# Patient Record
Sex: FEMALE | Race: OTHER | Employment: OTHER | URBAN - METROPOLITAN AREA
[De-identification: names, ages, dates, MRNs, and addresses within clinical notes are randomized per-mention and may not be internally consistent; named-entity substitution may affect disease eponyms.]

---

## 2017-06-29 PROBLEM — M62.82 NON-TRAUMATIC RHABDOMYOLYSIS: Status: ACTIVE | Noted: 2017-06-29

## 2017-06-29 PROBLEM — G24.9 DYSTONIA: Status: ACTIVE | Noted: 2017-06-29

## 2017-08-21 PROBLEM — R13.12 DYSPHAGIA, OROPHARYNGEAL: Status: ACTIVE | Noted: 2017-05-18

## 2017-08-21 PROBLEM — F41.9 ANXIETY: Status: ACTIVE | Noted: 2017-05-18

## 2017-08-21 PROBLEM — Z91.148 NON COMPLIANCE W MEDICATION REGIMEN: Status: ACTIVE | Noted: 2017-08-21

## 2017-08-21 PROBLEM — R62.7 FAILURE TO THRIVE IN ADULT: Status: ACTIVE | Noted: 2017-08-21

## 2017-08-21 PROBLEM — Z91.14 NON COMPLIANCE W MEDICATION REGIMEN: Status: ACTIVE | Noted: 2017-08-21

## 2017-08-21 PROBLEM — K76.82 HEPATIC ENCEPHALOPATHY: Status: ACTIVE | Noted: 2017-05-17

## 2017-08-21 PROBLEM — E87.0 HYPERNATREMIA: Status: ACTIVE | Noted: 2017-08-21

## 2017-08-21 PROBLEM — R45.1 AGITATION: Status: ACTIVE | Noted: 2017-04-19

## 2017-08-21 PROBLEM — G25.5 CHOREOATHETOID LIMB MOVEMENTS: Status: ACTIVE | Noted: 2017-05-18

## 2017-08-21 PROBLEM — E87.6 HYPOKALEMIA: Status: ACTIVE | Noted: 2017-08-21

## 2017-08-21 PROBLEM — B18.2 CHRONIC HEPATITIS C VIRUS INFECTION (HCC): Status: ACTIVE | Noted: 2017-04-20

## 2017-08-25 ENCOUNTER — TELEPHONE (OUTPATIENT)
Dept: ORTHOPEDIC SURGERY | Age: 22
End: 2017-08-25

## 2017-08-26 PROBLEM — E43 SEVERE PROTEIN-CALORIE MALNUTRITION (GOMEZ: LESS THAN 60% OF STANDARD WEIGHT) (HCC): Chronic | Status: ACTIVE | Noted: 2017-08-26

## 2018-06-04 ENCOUNTER — HOSPITAL ENCOUNTER (OUTPATIENT)
Dept: GENERAL RADIOLOGY | Age: 23
Discharge: HOME | End: 2018-06-04
Attending: FAMILY MEDICINE | Admitting: FAMILY MEDICINE

## 2018-06-04 DIAGNOSIS — R13.12 OROPHARYNGEAL DYSPHAGIA: ICD-10-CM

## 2018-06-04 DIAGNOSIS — R13.10 DYSPHAGIA, UNSPECIFIED TYPE: ICD-10-CM

## 2019-05-03 ENCOUNTER — HOSPITAL ENCOUNTER (OUTPATIENT)
Dept: GENERAL RADIOLOGY | Age: 24
Discharge: HOME | End: 2019-05-03
Payer: MEDICARE

## 2019-05-03 DIAGNOSIS — R13.12 DYSPHAGIA, OROPHARYNGEAL: ICD-10-CM

## 2019-05-03 PROCEDURE — 92611 MOTION FLUOROSCOPY/SWALLOW: CPT

## 2019-05-03 PROCEDURE — 74230 X-RAY XM SWLNG FUNCJ C+: CPT

## 2019-11-25 ENCOUNTER — ANESTHESIA EVENT (OUTPATIENT)
Dept: ENDOSCOPY | Age: 24
End: 2019-11-25
Payer: MEDICARE

## 2019-11-25 RX ORDER — IBUPROFEN 800 MG/1
800 TABLET ORAL EVERY 6 HOURS PRN
Status: ON HOLD | COMMUNITY
End: 2022-05-20

## 2019-11-25 RX ORDER — CHLORHEXIDINE GLUCONATE 0.12 MG/ML
RINSE ORAL DAILY
COMMUNITY
End: 2022-02-22

## 2019-11-25 RX ORDER — SERTRALINE HYDROCHLORIDE 20 MG/ML
200 SOLUTION ORAL DAILY
Status: ON HOLD | COMMUNITY
End: 2022-05-20

## 2019-11-25 RX ORDER — POLYETHYLENE GLYCOL 3350 17 G/17G
17 POWDER, FOR SOLUTION ORAL DAILY
COMMUNITY
End: 2020-07-30

## 2019-11-25 RX ORDER — QUETIAPINE FUMARATE 200 MG/1
300 TABLET, FILM COATED ORAL ONCE
Status: ON HOLD | COMMUNITY
End: 2022-05-20

## 2019-11-25 RX ORDER — LACOSAMIDE 100 MG/1
100 TABLET ORAL 2 TIMES DAILY
COMMUNITY
End: 2022-02-22

## 2019-11-25 RX ORDER — POTASSIUM CHLORIDE 20MEQ/15ML
20 LIQUID (ML) ORAL 2 TIMES DAILY
COMMUNITY
End: 2022-02-22

## 2019-11-25 RX ORDER — DIVALPROEX SODIUM 125 MG/1
500 CAPSULE, COATED PELLETS ORAL 2 TIMES DAILY
COMMUNITY
End: 2022-02-22

## 2019-11-26 ENCOUNTER — ANESTHESIA (OUTPATIENT)
Dept: ENDOSCOPY | Age: 24
End: 2019-11-26
Payer: MEDICARE

## 2019-11-26 ENCOUNTER — HOSPITAL ENCOUNTER (OUTPATIENT)
Age: 24
Setting detail: OUTPATIENT SURGERY
Discharge: HOME | End: 2019-11-26
Attending: INTERNAL MEDICINE | Admitting: INTERNAL MEDICINE
Payer: MEDICARE

## 2019-11-26 VITALS
WEIGHT: 114 LBS | RESPIRATION RATE: 16 BRPM | BODY MASS INDEX: 16.36 KG/M2 | SYSTOLIC BLOOD PRESSURE: 107 MMHG | DIASTOLIC BLOOD PRESSURE: 70 MMHG | HEART RATE: 67 BPM | TEMPERATURE: 96.8 F | OXYGEN SATURATION: 97 %

## 2019-11-26 VITALS
DIASTOLIC BLOOD PRESSURE: 55 MMHG | OXYGEN SATURATION: 97 % | RESPIRATION RATE: 8 BRPM | SYSTOLIC BLOOD PRESSURE: 92 MMHG

## 2019-11-26 LAB — GONADOTROPIN, CHORIONIC (HCG) QUANT: <5 MIU/ML

## 2019-11-26 PROCEDURE — 2500000003 HC RX 250 WO HCPCS: Performed by: NURSE ANESTHETIST, CERTIFIED REGISTERED

## 2019-11-26 PROCEDURE — 6360000002 HC RX W HCPCS: Performed by: NURSE ANESTHETIST, CERTIFIED REGISTERED

## 2019-11-26 PROCEDURE — 7100000000 HC PACU RECOVERY - FIRST 15 MIN: Performed by: INTERNAL MEDICINE

## 2019-11-26 PROCEDURE — 2580000003 HC RX 258: Performed by: ANESTHESIOLOGY

## 2019-11-26 PROCEDURE — 36415 COLL VENOUS BLD VENIPUNCTURE: CPT

## 2019-11-26 PROCEDURE — 3609012900 HC EGD FOREIGN BODY REMOVAL: Performed by: INTERNAL MEDICINE

## 2019-11-26 PROCEDURE — 7100000010 HC PHASE II RECOVERY - FIRST 15 MIN: Performed by: INTERNAL MEDICINE

## 2019-11-26 PROCEDURE — 3700000001 HC ADD 15 MINUTES (ANESTHESIA): Performed by: INTERNAL MEDICINE

## 2019-11-26 PROCEDURE — 84702 CHORIONIC GONADOTROPIN TEST: CPT

## 2019-11-26 PROCEDURE — 7100000001 HC PACU RECOVERY - ADDTL 15 MIN: Performed by: INTERNAL MEDICINE

## 2019-11-26 PROCEDURE — 3609013300 HC EGD TUBE PLACEMENT: Performed by: INTERNAL MEDICINE

## 2019-11-26 PROCEDURE — 7100000011 HC PHASE II RECOVERY - ADDTL 15 MIN: Performed by: INTERNAL MEDICINE

## 2019-11-26 PROCEDURE — 2709999900 HC NON-CHARGEABLE SUPPLY: Performed by: INTERNAL MEDICINE

## 2019-11-26 PROCEDURE — 3700000000 HC ANESTHESIA ATTENDED CARE: Performed by: INTERNAL MEDICINE

## 2019-11-26 RX ORDER — ONDANSETRON 2 MG/ML
4 INJECTION INTRAMUSCULAR; INTRAVENOUS
Status: DISCONTINUED | OUTPATIENT
Start: 2019-11-26 | End: 2019-11-26 | Stop reason: HOSPADM

## 2019-11-26 RX ORDER — PROPOFOL 10 MG/ML
INJECTION, EMULSION INTRAVENOUS PRN
Status: DISCONTINUED | OUTPATIENT
Start: 2019-11-26 | End: 2019-11-26 | Stop reason: SDUPTHER

## 2019-11-26 RX ORDER — SODIUM CHLORIDE 0.9 % (FLUSH) 0.9 %
10 SYRINGE (ML) INJECTION PRN
Status: DISCONTINUED | OUTPATIENT
Start: 2019-11-26 | End: 2019-11-26 | Stop reason: HOSPADM

## 2019-11-26 RX ORDER — LIDOCAINE HYDROCHLORIDE 20 MG/ML
INJECTION, SOLUTION EPIDURAL; INFILTRATION; INTRACAUDAL; PERINEURAL PRN
Status: DISCONTINUED | OUTPATIENT
Start: 2019-11-26 | End: 2019-11-26 | Stop reason: SDUPTHER

## 2019-11-26 RX ORDER — SODIUM CHLORIDE 0.9 % (FLUSH) 0.9 %
10 SYRINGE (ML) INJECTION EVERY 12 HOURS SCHEDULED
Status: DISCONTINUED | OUTPATIENT
Start: 2019-11-26 | End: 2019-11-26 | Stop reason: HOSPADM

## 2019-11-26 RX ORDER — SODIUM CHLORIDE 9 MG/ML
INJECTION, SOLUTION INTRAVENOUS CONTINUOUS
Status: DISCONTINUED | OUTPATIENT
Start: 2019-11-26 | End: 2019-11-26 | Stop reason: HOSPADM

## 2019-11-26 RX ADMIN — PROPOFOL 100 MG: 10 INJECTION, EMULSION INTRAVENOUS at 12:34

## 2019-11-26 RX ADMIN — PROPOFOL 100 MG: 10 INJECTION, EMULSION INTRAVENOUS at 12:17

## 2019-11-26 RX ADMIN — LIDOCAINE HYDROCHLORIDE 70 MG: 20 INJECTION, SOLUTION EPIDURAL; INFILTRATION; INTRACAUDAL; PERINEURAL at 12:17

## 2019-11-26 RX ADMIN — PROPOFOL 50 MG: 10 INJECTION, EMULSION INTRAVENOUS at 12:31

## 2019-11-26 RX ADMIN — PROPOFOL 50 MG: 10 INJECTION, EMULSION INTRAVENOUS at 12:24

## 2019-11-26 RX ADMIN — PROPOFOL 50 MG: 10 INJECTION, EMULSION INTRAVENOUS at 12:20

## 2019-11-26 RX ADMIN — PROPOFOL 50 MG: 10 INJECTION, EMULSION INTRAVENOUS at 12:36

## 2019-11-26 RX ADMIN — PROPOFOL 50 MG: 10 INJECTION, EMULSION INTRAVENOUS at 12:27

## 2019-11-26 RX ADMIN — PROPOFOL 50 MG: 10 INJECTION, EMULSION INTRAVENOUS at 12:29

## 2019-11-26 RX ADMIN — SODIUM CHLORIDE: 9 INJECTION, SOLUTION INTRAVENOUS at 11:11

## 2019-11-26 ASSESSMENT — PULMONARY FUNCTION TESTS
PIF_VALUE: 0
PIF_VALUE: 1
PIF_VALUE: 0

## 2019-11-26 ASSESSMENT — PAIN - FUNCTIONAL ASSESSMENT: PAIN_FUNCTIONAL_ASSESSMENT: FACES

## 2020-07-20 ENCOUNTER — HOSPITAL ENCOUNTER (OUTPATIENT)
Dept: GENERAL RADIOLOGY | Age: 25
Discharge: HOME | End: 2020-07-20
Payer: MEDICARE

## 2020-07-20 PROCEDURE — 74230 X-RAY XM SWLNG FUNCJ C+: CPT

## 2020-07-20 PROCEDURE — 92611 MOTION FLUOROSCOPY/SWALLOW: CPT

## 2020-07-20 NOTE — PROCEDURES
INSTRUMENTAL SWALLOW REPORT  MODIFIED BARIUM SWALLOW    NAME: Onel Scott   : 1995  MRN: [id number]       Date of Eval: 2020     Ordering Physician: Jacklyn Beatty  Radiologist: Cira Marshall     Referring Diagnosis: oropharyngeal dysphagia; r 13.12    Onel Scott has a past medical history of Acute hepatitis C without hepatic coma, Acute hepatitis C without hepatic coma, Anoxic brain damage ([address] Cardiac arrest ([address] Chronic kidney disease, Chronic viral hepatitis C ([address] Diffuse traumatic brain injury with loss of consciousness greater than 24 hours without return to pre-existing conscious level with patient surviving ([address] Major depressive disorder, recurrent, unspecified ([address] MRSA (methicillin resistant staph aureus) culture positive, Nicotine dependence, other tobacco product, uncomplicated, Overdose, Poisoning by heroin, undetermined, initial encounter ([facility] Schizophrenia, unspecified ([facility] Seizures ([address] Streptococcal sepsis, unspecified ([address] TBI (traumatic brain injury) ([address] Unspecified asthma, uncomplicated, and Unspecified sexually transmitted disease. She has a past surgical history that includes Gastrostomy tube placement; Gastrostomy tube placement; Upper gastrointestinal endoscopy (N/A, 2019); and Gastrostomy tube placement (N/A, 2019). Current Diet Solid Consistency: Dysphagia Pureed (Dysphagia I)  Current Diet Liquid Consistency: Mildly Thick (Nectar)    Type of Study: Repeat MBS  5/3/2019 MBS:  Severe oral stage dysphagia characterized by poor ability to masticate and poor lingula manipulation. · All PO with prolonged oral transit d/t limited lingual manipulation for bolus prep and movement. · Premature spillage to the pharynx with all trials. · Significant oral residue is spontaneously minimized with repeat swallow. · Severe left labial spillage with all trials.   Severe pharyngeal stage dysphagia characterized by significantly delayed swallow that does not trigger until the level of pyriform, decreased laryngeal elevation, and decreased pharyngeal peristalsis. · All trials pool to the pyriform prior to swallow. · SILENT aspiration with thin via teaspoon. · Patient did administer nectar thick via cup with assistance, but was unable to use straw this date. Patient Complaints/Reason for Referral:  · Lefty Gallardo was referred for a MBS to assess the efficiency of his/her swallow function, assess for aspiration, and to make recommendations regarding safe dietary consistencies, effective compensatory strategies, and safe eating environment. · Patient complaints: unable to verbalize complaint    Onset of problem:   Date of Onset: 8/22/2017    Subjective  Subjective: Accepted and tolerated MBS in fluoroscopy suite  Comments: staff reports patient eager for possible PO upgrade    Pain   Patient Currently in Pain: No    Behavior/Cognition/Vision/Hearing:  Behavior/Cognition: Alert; Cooperative; Impulsive; Requires cueing;Doesn't follow directions  Vision: Within Functional Limits  Hearing: Within functional limits    Patient Position: Lateral   Patient Degrees: Fully upright in VSE chair  Consistencies Administered: Nectar cup;Nectar  teaspoon;Honey teaspoon;Honey cup;Dysphagia Pureed (Dysphagia I); Dysphagia Minced and Moist (Dysphagia II)    Impressions:  Treatment Dx and ICD 10: oropharyngeal dysphagia; r13.12    Severe oral stage dysphagia characterized by poor ability to masticate and poor lingula manipulation. · All PO with prolonged oral transit d/t limited lingual manipulation for bolus prep and movement. · Premature spillage to the pharynx with all trials. · Significant oral residue is spontaneously minimized with repeat swallow. · Left labial spillage with all trials.     Severe pharyngeal stage dysphagia characterized by significantly delayed swallow that does not trigger until the level of pyriform, decreased laryngeal elevation, and decreased pharyngeal peristalsis. · All trials pool to the pyriform prior to swallow. · SILENT aspiration with nectar via cup and teaspoon which is a decline in swallow function compared to prior MBS completed in 2019. · Patient unable to self-administer PO which also appears to be a decline in function/status compared to prior    Dysphagia Outcome Severity Scale: Level 3: Moderate dysphagia- Total assisstance, supervision or strategies. Two or more diet consistencies restricted  Penetration-Aspiration Scale (PAS): 8 - Material Enters the airway, passes below the vocal folds, and no effort is made to eject    Recommended Diet:  Solid consistency: Dysphagia Pureed (Dysphagia I)  Liquid consistency: Moderately Thick (Honey)  Liquid administration via: Cup;Spoon  Medication administration: Meds in puree(CRUSHED if able)  Compensatory Swallowing Strategies: Upright as possible for all oral intake;Eat/Feed slowly; Remain upright for 30-45 minutes after meals;Small bites/sips;Swallow 2 times per bite/sip; Total feed; Alternate solids and liquids    Recommendations/Treatment  Requires SLP Intervention: Patient with noted decline in status; may benefit from trial tx? Defer decision to primary SLP    Education:   Patient Education: Completed on recommendations/plan  Patient Education Response: No evidence of learning;Needs reinforcement    Prognosis  Prognosis for safe diet advancement: fair  Barriers to reach goals: cognitive deficits;behavior;severity of dysphagia;time post onset      Oral Preparation / Oral Phase  Oral Phase - Major Contributing Deficits  Left Anterior Bolus Loss: All  Poor Mastication: Mechanical soft solid(incomplete, labored)  Weak Lingual Manipulation: All  Reduced Posterior Propulsion: All  Reduced Bolus Control: All  Decreased Bolus Cohesion: All  Lingual / Palatal Residue: All  Premature Bolus Loss to Pharynx:  All  Reduced Tongue Base Retraction: All    Pharyngeal Phase  Pharyngeal Phase - Major Contributing Deficits  Delayed Swallow Initiation: All  Premature Spillage to Valleculae: All  Premature Spillage to Pyriform: All  Reduced Pharyngeal Peristalsis: All  Reduced Laryngeal Elevation: All  Aspiration During: Nectar cup;Nectar teaspoon(SILENT - declined since prior MBS)      Upper Esophageal Phase  Esophageal Screen: (JANETTE)      Therapy Time:   Individual   Time In 1115   Time Out 1215   Minutes 60       Zoë Hdz, [address] [place] [address], [id number]  Speech-Language Pathologist  7/20/2020, 12:15 PM

## 2020-07-30 ENCOUNTER — APPOINTMENT (OUTPATIENT)
Dept: CT IMAGING | Age: 25
End: 2020-07-30
Payer: MEDICARE

## 2020-07-30 ENCOUNTER — HOSPITAL ENCOUNTER (EMERGENCY)
Age: 25
Discharge: HOME | End: 2020-07-30
Payer: MEDICARE

## 2020-07-30 VITALS
WEIGHT: 115.1 LBS | RESPIRATION RATE: 16 BRPM | HEART RATE: 68 BPM | OXYGEN SATURATION: 99 % | DIASTOLIC BLOOD PRESSURE: 101 MMHG | SYSTOLIC BLOOD PRESSURE: 132 MMHG | BODY MASS INDEX: 16.52 KG/M2 | TEMPERATURE: 98.7 F

## 2020-07-30 PROCEDURE — 99284 EMERGENCY DEPT VISIT MOD MDM: CPT

## 2020-07-30 PROCEDURE — 70450 CT HEAD/BRAIN W/O DYE: CPT

## 2020-07-30 PROCEDURE — 72125 CT NECK SPINE W/O DYE: CPT

## 2020-07-30 RX ORDER — DIPHENHYDRAMINE HCL 25 MG
12.5 CAPSULE ORAL EVERY 8 HOURS PRN
COMMUNITY
End: 2022-02-22

## 2020-07-30 RX ORDER — BACLOFEN 5 MG/1
5 TABLET ORAL 2 TIMES DAILY
COMMUNITY

## 2020-07-30 RX ORDER — MULTIVIT WITH MINERALS/LUTEIN
250 TABLET ORAL DAILY
Status: ON HOLD | COMMUNITY
End: 2022-05-20

## 2020-07-30 ASSESSMENT — ENCOUNTER SYMPTOMS
BACK PAIN: 0
APNEA: 0
FACIAL SWELLING: 0
VOMITING: 0
NAUSEA: 0
SHORTNESS OF BREATH: 0
CHOKING: 0
ABDOMINAL PAIN: 0
EYE DISCHARGE: 0
SORE THROAT: 0
EYE REDNESS: 0

## 2020-07-30 NOTE — ED NOTES
Patients POA and LTC nurse updated on patients DC,  at [facility], [address] [place] [address]  07/30/20 2379

## 2020-07-30 NOTE — PROGRESS NOTES
Pharmacy Medication Reconciliation Note      List of medications patient is currently taking is complete. Source of information:   1. Danni at [facility]  2. Epic records     Notes regarding home medications:   Per nurse at [facility], patient has received morning and afternoon medications prior to arrival in the ED.          Denies taking any other OTC or herbal medications    Reema Grossman, Pharmacy Student  07/30/20 4:33 PM

## 2020-07-30 NOTE — ED NOTES
Bed: [facility]  Expected date:   Expected time:   Means of arrival: [facility]  Comments:  Fall from wheelchair     Arsenio Major RN  07/30/20 2922

## 2020-07-30 NOTE — ED NOTES
Patient was again on the edge of the bed, staff assisted back up in the bed.   Reminded not to get up     Dontrell Mckenzie RN  07/30/20 0733

## 2020-07-30 NOTE — ED NOTES
Patient was on the edge of the bed, staff assisted back up in the bed.   Bed alarm placed on patient      Marin Marin RN  07/30/20 7345

## 2020-07-30 NOTE — ED PROVIDER NOTES
**EVALUATED BY ADVANCED PRACTICE PROVIDER**        [address]      Pt Name: Sarah Delgado  HBE:[id number]  [place] 1995  Date of evaluation: 7/30/2020  Provider: Elena Oconnor PA-C      Chief Complaint:    Chief Complaint   Patient presents with   Paulding Nemesio Rodriguezine Carlos Eduardo out of [facility] at nursing home, hit right side of head, has abrasion with steri strips in place       Nursing Notes, Past Medical Hx, Past Surgical Hx, Social Hx, Allergies, and Family Hx were all reviewed and agreed with or any disagreements were addressed in the HPI.    HPI:  (Location, Duration, Timing, Severity, Quality, Assoc Sx, Context, Modifying factors)  This is a  30 y.o. female whose from IV was nursing rehab facility. She nonverbal.  Was brought in for fall out of wheelchair. Has a small hematoma to the right forehead. Patient denies neck pain, no chest pain, no abdominal pain, no nausea vomiting noted. No extremity injury noted. No other complaints. PastMedical/Surgical History:      Diagnosis Date    Acute hepatitis C without hepatic coma     Acute hepatitis C without hepatic coma     Anoxic brain damage ([address]     Cardiac arrest ([address]     s/p overdose 08/2016.     Chronic kidney disease     \"a few\" UTIs with pregnancy    Chronic viral hepatitis C ([address]     Diffuse traumatic brain injury with loss of consciousness greater than 24 hours without return to pre-existing conscious level with patient surviving [facility])     sequela    Major depressive disorder, recurrent, unspecified ([address]     MRSA (methicillin resistant staph aureus) culture positive 08/25/2017    arm    Nicotine dependence, other tobacco product, uncomplicated     Overdose     Poisoning by heroin, undetermined, initial encounter ([address]     Schizophrenia, unspecified ([address]     Seizures ([address]     Streptococcal sepsis, unspecified ([address]     TBI (traumatic brain injury) ([address]     Unspecified asthma, uncomplicated     Unspecified sexually transmitted disease          Procedure Laterality Date    GASTROSTOMY TUBE PLACEMENT      GASTROSTOMY TUBE PLACEMENT      08/02/2016    GASTROSTOMY TUBE PLACEMENT N/A 11/26/2019    EGD PEG TUBE PLACEMENT performed by Cristel Aceves MD at [facility] 11/26/2019    EGD FOREIGN BODY REMOVAL performed by Cristel Aceves MD at [facility]       Medications:  Previous Medications    ASCORBIC ACID (VITAMIN C) 250 MG TABLET    Take 250 mg by mouth daily    BACLOFEN 5 MG TABS    Take 5 mg by mouth 2 times daily    BENZTROPINE (COGENTIN) 1 MG TABLET    Take 2 tablets by mouth 2 times daily    CHLORHEXIDINE (PERIDEX) 0.12 % SOLUTION    Take by mouth daily 1 application daily with toothette to clean mouth and gums    CHOLECALCIFEROL (VITAMIN D3) 125 MCG (5000 UT) TABS    1 tablet by Per G Tube route daily    DIPHENHYDRAMINE (BENADRYL) 25 MG CAPSULE    12.5 mg by Per G Tube route every 8 hours as needed (swelling)    DIVALPROEX (DEPAKOTE SPRINKLES) 125 MG CAPSULE    500 mg 2 times daily Via g-tube    FAMOTIDINE (PEPCID) 20 MG TABLET    1 tablet by PEG Tube route 2 times daily    FOLIC ACID (FOLVITE) 1 MG TABLET    1 tablet by PEG Tube route daily    IBUPROFEN (ADVIL;MOTRIN) 800 MG TABLET    800 mg by Per G Tube route every 6 hours as needed for Pain    LACOSAMIDE (VIMPAT) 100 MG TABS TABLET    100 mg by Per G Tube route 2 times daily.     LEVETIRACETAM (KEPPRA) 100 MG/ML SOLUTION    5 mLs by PEG Tube route 2 times daily    NONFORMULARY    by Gastrostomy Tube route nightly Flush feeding with 180 ml of water    NONFORMULARY    by Gastrostomy Tube route daily Enteral feed flush with 150 ml of water daily in am    PALIPERIDONE PALMITATE ER (INVEGA SUSTENNA) 117 MG/0.75ML MOSES IM INJECTION    Inject 117 mg into the muscle every 30 days At bedtime on the 8th of the month    POTASSIUM CHLORIDE 20 MEQ/15ML (10%) ORAL SOLUTION    20 mEq by Per G Tube route 2 times daily 15 ml via g-tube    QUETIAPINE (SEROQUEL) 200 MG TABLET    200 mg by Per G Tube route 2 times daily    QUETIAPINE (SEROQUEL) 25 MG TABLET    1 tablet by PEG Tube route 2 times daily    SERTRALINE (ZOLOFT) 20 MG/ML CONCENTRATED SOLUTION    200 mg by Per G Tube route daily          Review of Systems:  Review of Systems   Constitutional: Negative for chills and fever. HENT: Negative for congestion, facial swelling and sore throat. Eyes: Negative for discharge and redness. Respiratory: Negative for apnea, choking and shortness of breath. Cardiovascular: Negative for chest pain. Gastrointestinal: Negative for abdominal pain, nausea and vomiting. Genitourinary: Negative for dysuria. Musculoskeletal: Negative for back pain, neck pain and neck stiffness. Neurological: Negative for dizziness, tremors, seizures, weakness and headaches. All other systems reviewed and are negative. Positives and Pertinent negatives as per HPI. Except as noted above in the ROS, problem specific ROS was completed and is negative. Physical Exam:  Physical Exam  Vitals signs and nursing note reviewed. Constitutional:       Appearance: She is well-developed. She is not diaphoretic. HENT:      Head: Normocephalic. Contusion present. Nose: Nose normal.   Eyes:      General:         Right eye: No discharge. Left eye: No discharge. Neck:      Musculoskeletal: Normal range of motion and neck supple. Cardiovascular:      Rate and Rhythm: Normal rate and regular rhythm. Heart sounds: Normal heart sounds. No murmur. No friction rub. No gallop. Pulmonary:      Effort: Pulmonary effort is normal. No respiratory distress. Breath sounds: Normal breath sounds. No wheezing or rales. Chest:      Chest wall: No tenderness. Musculoskeletal: Normal range of motion. Skin:     General: Skin is warm and dry.    Neurological:      Mental Status: She is alert and oriented to person, place, and time. Psychiatric:         Behavior: Behavior normal.         MEDICAL DECISION MAKING    Vitals:    Vitals:    07/30/20 1539   BP: 114/75   Pulse: 75   Resp: 14   Temp: 98.7 °F (37.1 °C)   TempSrc: Oral   SpO2: 100%   Weight: 115 lb 1.6 oz (52.2 kg)       LABS:Labs Reviewed - No data to display     Remainder of labs reviewed and werenegative at this time or not returned at the time of this note. RADIOLOGY:   Non-plain film images such as CT, Ultrasound and MRI are read by the radiologist. Maura Mayes PA-C have directly visualized the radiologic plain film image(s) with the below findings:        Interpretation per the Radiologist below, if available at the time of this note:    CT HEAD WO CONTRAST   Final Result   No acute intracranial abnormality. CT CERVICAL SPINE WO CONTRAST   Final Result   No acute fracture or subluxation. Chronic reverse curvature of the cervical spine associated with prominent   anterior ossifications and spondylosis. Ct Head Wo Contrast    Result Date: 7/30/2020  EXAMINATION: CT OF THE HEAD WITHOUT CONTRAST  7/30/2020 4:48 pm TECHNIQUE: CT of the head was performed without the administration of intravenous contrast. Dose modulation, iterative reconstruction, and/or weight based adjustment of the mA/kV was utilized to reduce the radiation dose to as low as reasonably achievable. COMPARISON: 03/08/2018. HISTORY: ORDERING SYSTEM PROVIDED HISTORY: Fall from wheelchair TECHNOLOGIST PROVIDED HISTORY: Has a \"code stroke\" or \"stroke alert\" been called? ->No Reason for exam:->Fall from wheelchair Is the patient pregnant?->No FINDINGS: BRAIN/VENTRICLES: There is no acute intracranial hemorrhage, mass effect or midline shift. No abnormal extra-axial fluid collection. The gray-white differentiation is maintained without evidence of an acute infarct. There is no evidence of hydrocephalus.  ORBITS: The visualized portion of the orbits demonstrate no acute abnormality. SINUSES: The visualized paranasal sinuses and mastoid air cells demonstrate no acute abnormality. SOFT TISSUES/SKULL:  No acute abnormality of the visualized skull or soft tissues. No acute intracranial abnormality. Fl Modified Barium Swallow W Video    Result Date: 7/20/2020  EXAMINATION: MODIFIED BARIUM SWALLOW WAS PERFORMED IN CONJUNCTION WITH SPEECH PATHOLOGY SERVICES TECHNIQUE: Fluoroscopic evaluation of the swallowing mechanism was performed with multiple consistency of barium product. FLUOROSCOPY DOSE AND TYPE OR TIME AND EXPOSURES: 2.6 minutes, 10.23 mGy, 1 saved image COMPARISON: None HISTORY: ORDERING SYSTEM PROVIDED HISTORY: Oropharyngeal dysphagia TECHNOLOGIST PROVIDED HISTORY: Reason for Exam: oropharyngeal dysphagia; currently on a modified diet: puree nectar; history of dysphagia Acuity: Chronic Type of Exam: Ongoing FINDINGS: With nectar thick liquid, there was delay in swallowing with spillage into the valleculae and piriform sinuses. There were episodes of silent aspiration of nectar thick liquid administered by cup and spoon. No laryngeal penetration with honey thick and puree consistencies. With soft solid (barium moistened bread) there was prolongation of the oral phase, with significant delay in transfer of material to the posterior oral cavity to be swallowed, with limited lingual activity. 1. Silent aspiration of nectar thick liquid 2. Prolonged oral manipulation of soft solid Please see separate speech pathology report for full discussion of findings and recommendations. MEDICAL DECISION MAKING / ED COURSE:      PROCEDURES:   Procedures    None    Patient was given:  Medications - No data to display    Emergency room course: Patient on exam pupils equal round and reactive to light extraocular movement is intact. Scalp show no hematoma. Forehead shows a smile hematoma to the right side with abrasion. Steri-Strips is in place.   No active bleeding. Neck was supple no midline tenderness. No midline tenderness cervical thoracic or lumbar spine. Chest wall show no tenderness with palpation. Cardiovascular regular rhythm, lungs are clear. No wheeze, rales or rhonchi. Abdomen is soft nontender. Normal bowel sounds all 4 quadrant. She is able to move all extremities. No obvious deformity noted to any extremities. She is at her baseline normal mental status. She does take if she hurts. Unable to do full neurological exam due to patient mental status. CT of cervical spine showed no acute fractures. CT of head showed no acute intracranial normalities. Patient will be discharged. I did inform her that her CTs are negative. She will be discharged back to [facility] rehab center. Arrangements will be made for transportation. I will have the nurse notify her POA. The patient tolerated their visit well. I evaluated the patient. The physician was available for consultation as needed. The patient and / or the family were informed of the results of any tests, a time was given to answer questions, a plan was proposed and they agreed with plan. CLINICAL IMPRESSION:  1. Fall from wheelchair, initial encounter    2. Closed head injury, initial encounter    3.  Abrasion of forehead, initial encounter        DISPOSITION  DISPOSITION Decision To Discharge 07/30/2020 06:06:05 PM          PATIENT REFERRED TO:  Anna Salas MD    Call   As needed, If symptoms worsen      DISCHARGE MEDICATIONS:  New Prescriptions    No medications on file       DISCONTINUED MEDICATIONS:  Discontinued Medications    DOCUSATE (COLACE) 50 MG/5ML LIQUID    Take 10 mLs by mouth 2 times daily    GABAPENTIN (NEURONTIN) 250 MG/5ML SOLUTION    2.5 mLs by PEG Tube route every 8 hours    NUTRITIONAL SUPPLEMENTS (JEVITY 1.5 MAYITO) LIQD    1 Can by Gastrostomy Tube route nightly enternal feed    NUTRITIONAL SUPPLEMENTS (JEVITY 1.5 MAYITO) LIQD    1 Can by Gastrostomy Tube route daily as needed Enteral feed    POLYETHYLENE GLYCOL (GLYCOLAX) POWDER    17 g by Per G Tube route daily    TRAZODONE (DESYREL) 50 MG TABLET    Take 1 tablet by mouth nightly              (Please note the MDM and HPI sections of this note were completed with a voice recognition program.  Efforts were made to edit the dictations but occasionally words are mis-transcribed.)    Electronically signed, Jon Trinidad PA-C,          Jon Trinidad PA-C  08/01/20 9417

## 2022-02-22 ENCOUNTER — HOSPITAL ENCOUNTER (INPATIENT)
Age: 27
LOS: 5 days | Discharge: NURSING, REHAB OR LONG-TERM CARE | DRG: 871 | End: 2022-02-27
Attending: EMERGENCY MEDICINE | Admitting: INTERNAL MEDICINE
Payer: MEDICARE

## 2022-02-22 ENCOUNTER — APPOINTMENT (OUTPATIENT)
Dept: CT IMAGING | Age: 27
DRG: 871 | End: 2022-02-22
Payer: MEDICARE

## 2022-02-22 DIAGNOSIS — R33.9 RETENTION OF URINE: ICD-10-CM

## 2022-02-22 DIAGNOSIS — K56.41 FECAL IMPACTION (HCC): ICD-10-CM

## 2022-02-22 DIAGNOSIS — K56.609 COLONIC OBSTRUCTION (HCC): ICD-10-CM

## 2022-02-22 DIAGNOSIS — K62.89 PROCTITIS: Primary | ICD-10-CM

## 2022-02-22 DIAGNOSIS — T85.528A DISLODGED GASTROSTOMY TUBE: ICD-10-CM

## 2022-02-22 LAB
A/G RATIO: 1.2 (ref 1.1–2.2)
ALBUMIN SERPL-MCNC: 4.1 G/DL (ref 3.4–5)
ALP BLD-CCNC: 53 U/L (ref 40–129)
ALT SERPL-CCNC: 18 U/L (ref 10–40)
ANION GAP SERPL CALCULATED.3IONS-SCNC: 14 MMOL/L (ref 3–16)
AST SERPL-CCNC: 14 U/L (ref 15–37)
BASOPHILS ABSOLUTE: 0 K/UL (ref 0–0.2)
BASOPHILS RELATIVE PERCENT: 0.4 %
BILIRUB SERPL-MCNC: 0.3 MG/DL (ref 0–1)
BUN BLDV-MCNC: 25 MG/DL (ref 7–20)
CALCIUM SERPL-MCNC: 10 MG/DL (ref 8.3–10.6)
CHLORIDE BLD-SCNC: 107 MMOL/L (ref 99–110)
CO2: 24 MMOL/L (ref 21–32)
CREAT SERPL-MCNC: 0.8 MG/DL (ref 0.6–1.1)
EOSINOPHILS ABSOLUTE: 0 K/UL (ref 0–0.6)
EOSINOPHILS RELATIVE PERCENT: 0.3 %
GFR AFRICAN AMERICAN: >60
GFR NON-AFRICAN AMERICAN: >60
GLUCOSE BLD-MCNC: 114 MG/DL (ref 70–99)
HCG QUALITATIVE: NEGATIVE
HCT VFR BLD CALC: 39.2 % (ref 36–48)
HEMOGLOBIN: 13 G/DL (ref 12–16)
LACTIC ACID, SEPSIS: 1.2 MMOL/L (ref 0.4–1.9)
LIPASE: 21 U/L (ref 13–60)
LYMPHOCYTES ABSOLUTE: 1.2 K/UL (ref 1–5.1)
LYMPHOCYTES RELATIVE PERCENT: 13.6 %
MCH RBC QN AUTO: 30.7 PG (ref 26–34)
MCHC RBC AUTO-ENTMCNC: 33.1 G/DL (ref 31–36)
MCV RBC AUTO: 92.7 FL (ref 80–100)
MONOCYTES ABSOLUTE: 0.9 K/UL (ref 0–1.3)
MONOCYTES RELATIVE PERCENT: 10.4 %
NEUTROPHILS ABSOLUTE: 6.7 K/UL (ref 1.7–7.7)
NEUTROPHILS RELATIVE PERCENT: 75.3 %
PDW BLD-RTO: 13.2 % (ref 12.4–15.4)
PLATELET # BLD: 167 K/UL (ref 135–450)
PMV BLD AUTO: 9.7 FL (ref 5–10.5)
POTASSIUM SERPL-SCNC: 3.8 MMOL/L (ref 3.5–5.1)
RBC # BLD: 4.23 M/UL (ref 4–5.2)
SODIUM BLD-SCNC: 145 MMOL/L (ref 136–145)
TOTAL PROTEIN: 7.4 G/DL (ref 6.4–8.2)
WBC # BLD: 8.8 K/UL (ref 4–11)

## 2022-02-22 PROCEDURE — 2500000003 HC RX 250 WO HCPCS: Performed by: PHYSICIAN ASSISTANT

## 2022-02-22 PROCEDURE — 0D20XUZ CHANGE FEEDING DEVICE IN UPPER INTESTINAL TRACT, EXTERNAL APPROACH: ICD-10-PCS | Performed by: INTERNAL MEDICINE

## 2022-02-22 PROCEDURE — 84703 CHORIONIC GONADOTROPIN ASSAY: CPT

## 2022-02-22 PROCEDURE — 6370000000 HC RX 637 (ALT 250 FOR IP): Performed by: PHYSICIAN ASSISTANT

## 2022-02-22 PROCEDURE — 83605 ASSAY OF LACTIC ACID: CPT

## 2022-02-22 PROCEDURE — 6360000004 HC RX CONTRAST MEDICATION: Performed by: PHYSICIAN ASSISTANT

## 2022-02-22 PROCEDURE — 80053 COMPREHEN METABOLIC PANEL: CPT

## 2022-02-22 PROCEDURE — 87040 BLOOD CULTURE FOR BACTERIA: CPT

## 2022-02-22 PROCEDURE — 83690 ASSAY OF LIPASE: CPT

## 2022-02-22 PROCEDURE — 87086 URINE CULTURE/COLONY COUNT: CPT

## 2022-02-22 PROCEDURE — 81001 URINALYSIS AUTO W/SCOPE: CPT

## 2022-02-22 PROCEDURE — 1200000000 HC SEMI PRIVATE

## 2022-02-22 PROCEDURE — 2580000003 HC RX 258: Performed by: PHYSICIAN ASSISTANT

## 2022-02-22 PROCEDURE — 0T9B70Z DRAINAGE OF BLADDER WITH DRAINAGE DEVICE, VIA NATURAL OR ARTIFICIAL OPENING: ICD-10-PCS | Performed by: INTERNAL MEDICINE

## 2022-02-22 PROCEDURE — 85025 COMPLETE CBC W/AUTO DIFF WBC: CPT

## 2022-02-22 PROCEDURE — 74177 CT ABD & PELVIS W/CONTRAST: CPT

## 2022-02-22 PROCEDURE — 99285 EMERGENCY DEPT VISIT HI MDM: CPT

## 2022-02-22 RX ORDER — ACETAMINOPHEN 650 MG/1
650 SUPPOSITORY RECTAL ONCE
Status: COMPLETED | OUTPATIENT
Start: 2022-02-22 | End: 2022-02-22

## 2022-02-22 RX ORDER — CIPROFLOXACIN 2 MG/ML
400 INJECTION, SOLUTION INTRAVENOUS CONTINUOUS
Status: DISCONTINUED | OUTPATIENT
Start: 2022-02-22 | End: 2022-02-23 | Stop reason: ALTCHOICE

## 2022-02-22 RX ORDER — 0.9 % SODIUM CHLORIDE 0.9 %
1000 INTRAVENOUS SOLUTION INTRAVENOUS ONCE
Status: COMPLETED | OUTPATIENT
Start: 2022-02-22 | End: 2022-02-23

## 2022-02-22 RX ORDER — POLYETHYLENE GLYCOL 3350 17 G/17G
17 POWDER, FOR SOLUTION ORAL 4 TIMES DAILY PRN
Status: DISCONTINUED | OUTPATIENT
Start: 2022-02-22 | End: 2022-02-25

## 2022-02-22 RX ORDER — POLYETHYLENE GLYCOL 3350 17 G/17G
17 POWDER, FOR SOLUTION ORAL DAILY
Status: ON HOLD | COMMUNITY
End: 2022-05-23 | Stop reason: SDUPTHER

## 2022-02-22 RX ORDER — QUETIAPINE FUMARATE 100 MG/1
300 TABLET, FILM COATED ORAL ONCE
Status: CANCELLED | OUTPATIENT
Start: 2022-02-22 | End: 2022-02-22

## 2022-02-22 RX ORDER — LACOSAMIDE 100 MG/1
100 TABLET ORAL 2 TIMES DAILY
COMMUNITY

## 2022-02-22 RX ADMIN — SODIUM CHLORIDE 1000 ML: 9 INJECTION, SOLUTION INTRAVENOUS at 23:45

## 2022-02-22 RX ADMIN — METRONIDAZOLE 500 MG: 500 INJECTION, SOLUTION INTRAVENOUS at 23:44

## 2022-02-22 RX ADMIN — ACETAMINOPHEN 650 MG: 650 SUPPOSITORY RECTAL at 21:26

## 2022-02-22 RX ADMIN — IOPAMIDOL 75 ML: 755 INJECTION, SOLUTION INTRAVENOUS at 21:48

## 2022-02-22 ASSESSMENT — PAIN SCALES - GENERAL: PAINLEVEL_OUTOF10: 7

## 2022-02-23 ENCOUNTER — APPOINTMENT (OUTPATIENT)
Dept: GENERAL RADIOLOGY | Age: 27
DRG: 871 | End: 2022-02-23
Payer: MEDICARE

## 2022-02-23 PROBLEM — K56.41 FECAL IMPACTION (HCC): Status: ACTIVE | Noted: 2022-02-23

## 2022-02-23 PROBLEM — N32.0 BLADDER OUTLET OBSTRUCTION: Status: ACTIVE | Noted: 2022-02-23

## 2022-02-23 PROBLEM — N13.30 HYDRONEPHROSIS: Status: ACTIVE | Noted: 2022-02-23

## 2022-02-23 PROBLEM — T85.528A DISLODGED GASTROSTOMY TUBE: Status: ACTIVE | Noted: 2022-02-23

## 2022-02-23 LAB
ANION GAP SERPL CALCULATED.3IONS-SCNC: 11 MMOL/L (ref 3–16)
BILIRUBIN URINE: NEGATIVE
BLOOD, URINE: NEGATIVE
BUN BLDV-MCNC: 16 MG/DL (ref 7–20)
CALCIUM SERPL-MCNC: 9.2 MG/DL (ref 8.3–10.6)
CHLORIDE BLD-SCNC: 111 MMOL/L (ref 99–110)
CLARITY: CLEAR
CO2: 27 MMOL/L (ref 21–32)
COLOR: ABNORMAL
COMMENT UA: ABNORMAL
CREAT SERPL-MCNC: 0.7 MG/DL (ref 0.6–1.1)
EPITHELIAL CELLS, UA: 1 /HPF (ref 0–5)
GFR AFRICAN AMERICAN: >60
GFR NON-AFRICAN AMERICAN: >60
GLUCOSE BLD-MCNC: 98 MG/DL (ref 70–99)
GLUCOSE URINE: NEGATIVE MG/DL
HCT VFR BLD CALC: 32.7 % (ref 36–48)
HEMOGLOBIN: 11 G/DL (ref 12–16)
HYALINE CASTS: 6 /LPF (ref 0–8)
KETONES, URINE: ABNORMAL MG/DL
LEUKOCYTE ESTERASE, URINE: ABNORMAL
MAGNESIUM: 1.8 MG/DL (ref 1.8–2.4)
MCH RBC QN AUTO: 30.8 PG (ref 26–34)
MCHC RBC AUTO-ENTMCNC: 33.5 G/DL (ref 31–36)
MCV RBC AUTO: 91.9 FL (ref 80–100)
MICROSCOPIC EXAMINATION: YES
NITRITE, URINE: NEGATIVE
PDW BLD-RTO: 13 % (ref 12.4–15.4)
PH UA: 6 (ref 5–8)
PLATELET # BLD: 131 K/UL (ref 135–450)
PMV BLD AUTO: 9.5 FL (ref 5–10.5)
POTASSIUM REFLEX MAGNESIUM: 3.3 MMOL/L (ref 3.5–5.1)
PROTEIN UA: NEGATIVE MG/DL
RBC # BLD: 3.56 M/UL (ref 4–5.2)
RBC UA: ABNORMAL /HPF (ref 0–4)
SODIUM BLD-SCNC: 149 MMOL/L (ref 136–145)
SPECIFIC GRAVITY UA: 1.03 (ref 1–1.03)
URINE REFLEX TO CULTURE: YES
URINE TYPE: ABNORMAL
UROBILINOGEN, URINE: 0.2 E.U./DL
WBC # BLD: 7.1 K/UL (ref 4–11)
WBC UA: 23 /HPF (ref 0–5)

## 2022-02-23 PROCEDURE — 49465 FLUORO EXAM OF G/COLON TUBE: CPT

## 2022-02-23 PROCEDURE — 99222 1ST HOSP IP/OBS MODERATE 55: CPT | Performed by: SURGERY

## 2022-02-23 PROCEDURE — 6360000002 HC RX W HCPCS: Performed by: PHYSICIAN ASSISTANT

## 2022-02-23 PROCEDURE — 2580000003 HC RX 258: Performed by: PHYSICIAN ASSISTANT

## 2022-02-23 PROCEDURE — 6360000002 HC RX W HCPCS: Performed by: INTERNAL MEDICINE

## 2022-02-23 PROCEDURE — 6370000000 HC RX 637 (ALT 250 FOR IP): Performed by: PHYSICIAN ASSISTANT

## 2022-02-23 PROCEDURE — APPNB30 APP NON BILLABLE TIME 0-30 MINS: Performed by: NURSE PRACTITIONER

## 2022-02-23 PROCEDURE — 2500000003 HC RX 250 WO HCPCS: Performed by: PHYSICIAN ASSISTANT

## 2022-02-23 PROCEDURE — 36415 COLL VENOUS BLD VENIPUNCTURE: CPT

## 2022-02-23 PROCEDURE — APPSS60 APP SPLIT SHARED TIME 46-60 MINUTES: Performed by: NURSE PRACTITIONER

## 2022-02-23 PROCEDURE — 1200000000 HC SEMI PRIVATE

## 2022-02-23 PROCEDURE — 2580000003 HC RX 258: Performed by: INTERNAL MEDICINE

## 2022-02-23 PROCEDURE — 80048 BASIC METABOLIC PNL TOTAL CA: CPT

## 2022-02-23 PROCEDURE — 71045 X-RAY EXAM CHEST 1 VIEW: CPT

## 2022-02-23 PROCEDURE — 85027 COMPLETE CBC AUTOMATED: CPT

## 2022-02-23 PROCEDURE — 83735 ASSAY OF MAGNESIUM: CPT

## 2022-02-23 RX ORDER — BACLOFEN 10 MG/1
5 TABLET ORAL 2 TIMES DAILY
Status: DISCONTINUED | OUTPATIENT
Start: 2022-02-23 | End: 2022-02-27 | Stop reason: HOSPADM

## 2022-02-23 RX ORDER — KETOROLAC TROMETHAMINE 30 MG/ML
30 INJECTION, SOLUTION INTRAMUSCULAR; INTRAVENOUS EVERY 6 HOURS PRN
Status: DISCONTINUED | OUTPATIENT
Start: 2022-02-23 | End: 2022-02-27 | Stop reason: HOSPADM

## 2022-02-23 RX ORDER — LEVETIRACETAM 100 MG/ML
500 SOLUTION ORAL 2 TIMES DAILY
Status: DISCONTINUED | OUTPATIENT
Start: 2022-02-23 | End: 2022-02-27 | Stop reason: HOSPADM

## 2022-02-23 RX ORDER — BENZTROPINE MESYLATE 1 MG/1
1 TABLET ORAL 3 TIMES DAILY
Status: DISCONTINUED | OUTPATIENT
Start: 2022-02-23 | End: 2022-02-27 | Stop reason: HOSPADM

## 2022-02-23 RX ORDER — CIPROFLOXACIN 2 MG/ML
400 INJECTION, SOLUTION INTRAVENOUS EVERY 12 HOURS
Status: DISCONTINUED | OUTPATIENT
Start: 2022-02-23 | End: 2022-02-27 | Stop reason: HOSPADM

## 2022-02-23 RX ORDER — ONDANSETRON 2 MG/ML
4 INJECTION INTRAMUSCULAR; INTRAVENOUS EVERY 6 HOURS PRN
Status: DISCONTINUED | OUTPATIENT
Start: 2022-02-23 | End: 2022-02-27 | Stop reason: HOSPADM

## 2022-02-23 RX ORDER — ACETAMINOPHEN 650 MG/1
650 SUPPOSITORY RECTAL EVERY 6 HOURS PRN
Status: DISCONTINUED | OUTPATIENT
Start: 2022-02-23 | End: 2022-02-27 | Stop reason: HOSPADM

## 2022-02-23 RX ORDER — FAMOTIDINE 20 MG/1
20 TABLET, FILM COATED ORAL 2 TIMES DAILY
Status: DISCONTINUED | OUTPATIENT
Start: 2022-02-23 | End: 2022-02-27 | Stop reason: HOSPADM

## 2022-02-23 RX ORDER — DEXTROSE MONOHYDRATE 50 MG/ML
INJECTION, SOLUTION INTRAVENOUS CONTINUOUS
Status: DISCONTINUED | OUTPATIENT
Start: 2022-02-23 | End: 2022-02-27 | Stop reason: HOSPADM

## 2022-02-23 RX ORDER — SODIUM CHLORIDE 9 MG/ML
INJECTION, SOLUTION INTRAVENOUS ONCE
Status: COMPLETED | OUTPATIENT
Start: 2022-02-23 | End: 2022-02-23

## 2022-02-23 RX ORDER — SODIUM CHLORIDE 9 MG/ML
25 INJECTION, SOLUTION INTRAVENOUS PRN
Status: DISCONTINUED | OUTPATIENT
Start: 2022-02-23 | End: 2022-02-27 | Stop reason: HOSPADM

## 2022-02-23 RX ORDER — LORAZEPAM 2 MG/ML
1 INJECTION INTRAMUSCULAR EVERY 4 HOURS PRN
Status: DISCONTINUED | OUTPATIENT
Start: 2022-02-23 | End: 2022-02-27 | Stop reason: HOSPADM

## 2022-02-23 RX ORDER — LACTOBACILLUS RHAMNOSUS GG 10B CELL
1 CAPSULE ORAL 2 TIMES DAILY WITH MEALS
Status: DISCONTINUED | OUTPATIENT
Start: 2022-02-23 | End: 2022-02-27 | Stop reason: HOSPADM

## 2022-02-23 RX ORDER — SODIUM CHLORIDE 0.9 % (FLUSH) 0.9 %
10 SYRINGE (ML) INJECTION PRN
Status: DISCONTINUED | OUTPATIENT
Start: 2022-02-23 | End: 2022-02-27 | Stop reason: HOSPADM

## 2022-02-23 RX ORDER — LACOSAMIDE 100 MG/1
100 TABLET ORAL 2 TIMES DAILY
Status: DISCONTINUED | OUTPATIENT
Start: 2022-02-23 | End: 2022-02-27 | Stop reason: HOSPADM

## 2022-02-23 RX ORDER — ACETAMINOPHEN 325 MG/1
650 TABLET ORAL EVERY 6 HOURS PRN
Status: DISCONTINUED | OUTPATIENT
Start: 2022-02-23 | End: 2022-02-27 | Stop reason: HOSPADM

## 2022-02-23 RX ORDER — SODIUM CHLORIDE 0.9 % (FLUSH) 0.9 %
10 SYRINGE (ML) INJECTION EVERY 12 HOURS SCHEDULED
Status: DISCONTINUED | OUTPATIENT
Start: 2022-02-23 | End: 2022-02-27 | Stop reason: HOSPADM

## 2022-02-23 RX ADMIN — SODIUM CHLORIDE: 9 INJECTION, SOLUTION INTRAVENOUS at 20:35

## 2022-02-23 RX ADMIN — FAMOTIDINE 20 MG: 20 TABLET ORAL at 11:10

## 2022-02-23 RX ADMIN — DEXTROSE MONOHYDRATE: 50 INJECTION, SOLUTION INTRAVENOUS at 10:57

## 2022-02-23 RX ADMIN — METRONIDAZOLE 500 MG: 500 INJECTION, SOLUTION INTRAVENOUS at 16:58

## 2022-02-23 RX ADMIN — LACOSAMIDE 100 MG: 100 TABLET, FILM COATED ORAL at 10:59

## 2022-02-23 RX ADMIN — BENZTROPINE MESYLATE 1 MG: 1 TABLET ORAL at 10:58

## 2022-02-23 RX ADMIN — SODIUM CHLORIDE 25 ML: 9 INJECTION, SOLUTION INTRAVENOUS at 08:55

## 2022-02-23 RX ADMIN — ACETAMINOPHEN 650 MG: 650 SUPPOSITORY RECTAL at 03:22

## 2022-02-23 RX ADMIN — KETOROLAC TROMETHAMINE 30 MG: 30 INJECTION, SOLUTION INTRAMUSCULAR; INTRAVENOUS at 09:03

## 2022-02-23 RX ADMIN — LEVETIRACETAM 500 MG: 100 SOLUTION ORAL at 10:58

## 2022-02-23 RX ADMIN — CIPROFLOXACIN 400 MG: 2 INJECTION, SOLUTION INTRAVENOUS at 03:47

## 2022-02-23 RX ADMIN — LORAZEPAM 1 MG: 2 INJECTION INTRAMUSCULAR; INTRAVENOUS at 09:04

## 2022-02-23 RX ADMIN — Medication 1 CAPSULE: at 10:59

## 2022-02-23 RX ADMIN — POLYETHYLENE GLYCOL 3350 17 G: 17 POWDER, FOR SOLUTION ORAL at 10:58

## 2022-02-23 RX ADMIN — SODIUM CHLORIDE 25 ML: 9 INJECTION, SOLUTION INTRAVENOUS at 16:57

## 2022-02-23 RX ADMIN — Medication 10 ML: at 08:57

## 2022-02-23 RX ADMIN — BENZTROPINE MESYLATE 1 MG: 1 TABLET ORAL at 14:32

## 2022-02-23 RX ADMIN — METRONIDAZOLE 500 MG: 500 INJECTION, SOLUTION INTRAVENOUS at 08:56

## 2022-02-23 RX ADMIN — Medication 1 CAPSULE: at 16:59

## 2022-02-23 RX ADMIN — BACLOFEN 5 MG: 10 TABLET ORAL at 10:58

## 2022-02-23 RX ADMIN — CIPROFLOXACIN 400 MG: 2 INJECTION, SOLUTION INTRAVENOUS at 18:00

## 2022-02-23 RX ADMIN — SERTRALINE 200 MG: 50 TABLET, FILM COATED ORAL at 10:59

## 2022-02-23 RX ADMIN — METRONIDAZOLE 500 MG: 500 INJECTION, SOLUTION INTRAVENOUS at 23:56

## 2022-02-23 ASSESSMENT — PAIN SCALES - GENERAL
PAINLEVEL_OUTOF10: 0
PAINLEVEL_OUTOF10: 10
PAINLEVEL_OUTOF10: 0
PAINLEVEL_OUTOF10: 0

## 2022-02-23 NOTE — PROGRESS NOTES
Hospitalist Progress Note      PCP: Fozia Cantu MD    Date of Admission: 2/22/2022    Chief Complaint: Dislodged G tube    Hospital Course: 26 yo F with anoxic brain injury s/p G tube, h/o epilepsy, h/o Hep C who came to ER from [facility] with dislodged G tube. Admitted as inpatient for dislodged G tube, sepsis, complicated UTI, neurogenic bladder with bladder outlet obstruction, BL hydroureteronephrosis and massive fecal impaction. Followed by Surgery, GI and Urology. Started on IVF and IV Cipro/Flagyl. Pulled out Crouch placed in ED. Crouch replaced by Urology. Subjective:  Patient is anxious and yelling. Says she wants sitter out of room. No CP, SOB, HA or fevers. Remains impacted. Medications:  Reviewed    Infusion Medications    sodium chloride 25 mL (02/23/22 0863)    dextrose       Scheduled Medications    Baclofen  5 mg Oral BID    benztropine  1 mg Oral TID    famotidine  20 mg PEG Tube BID    lacosamide  100 mg Oral BID    levETIRAcetam  500 mg PEG Tube BID    QUEtiapine  150 mg Per G Tube Nightly    sertraline  200 mg Per G Tube Daily    sodium chloride flush  10 mL IntraVENous 2 times per day    [START ON 2/24/2022] enoxaparin  40 mg SubCUTAneous Daily    ciprofloxacin  400 mg IntraVENous Q12H    metroNIDAZOLE  500 mg IntraVENous Q8H    lactobacillus  1 capsule Oral BID WC     PRN Meds: sodium chloride flush, sodium chloride, acetaminophen **OR** acetaminophen, ondansetron, LORazepam, ketorolac, polyethylene glycol    No intake or output data in the 24 hours ending 02/23/22 0808    Physical Exam Performed:    BP (!) 132/53   Pulse 95   Temp 98.2 °F (36.8 °C) (Temporal)   Resp 17   Wt 110 lb 12.8 oz (50.3 kg)   SpO2 92%   BMI 15.90 kg/m²     General appearance: Awake, agitated, following commands and can be redirected  HEENT: Pupils equal, round, and reactive to light. Conjunctivae/corneas clear. Neck: Supple, with full range of motion.  No jugular venous distention. Trachea midline. Respiratory:  Normal respiratory effort. Clear to auscultation, bilaterally without Rales/Wheezes/Rhonchi. Cardiovascular: Regular rate and rhythm with normal S1/S2 without murmurs, rubs or gallops. Abdomen: Soft, non-tender, non-distended, G tube  Musculoskeletal: No clubbing, cyanosis or edema bilaterally. Full range of motion without deformity. Skin: Skin color, texture, turgor normal.  No rashes or lesions. Neurologic:  Moving all extremities  Psychiatric: Easily agitated  Capillary Refill: Brisk,3 seconds, normal   Peripheral Pulses: +2 palpable, equal bilaterally       Labs:   Recent Labs     02/22/22 2011 02/23/22  0657   WBC 8.8 7.1   HGB 13.0 11.0*   HCT 39.2 32.7*    131*     Recent Labs     02/22/22 2011 02/23/22  0657    149*   K 3.8 3.3*    111*   CO2 24 27   BUN 25* 16   CREATININE 0.8 0.7   CALCIUM 10.0 9.2     Recent Labs     02/22/22 2011   AST 14*   ALT 18   BILITOT 0.3   ALKPHOS 53     No results for input(s): INR in the last 72 hours. No results for input(s): Malina Deering in the last 72 hours. Urinalysis:      Lab Results   Component Value Date    NITRU Negative 02/22/2022    WBCUA 23 02/22/2022    BACTERIA 3+ 06/29/2017    RBCUA 3-4 02/22/2022    BLOODU Negative 02/22/2022    SPECGRAV 1.029 02/22/2022    GLUCOSEU Negative 02/22/2022    GLUCOSEU NEGATIVE 09/20/2011       Radiology:  XR INJ CONTRAST GASTRIC TUBE PERC   Final Result   Contrast injection documents positioning of the gastric tube in the stomach   as described. Moderate bilateral hydronephrosis. See separate recent CT report.          CT ABDOMEN PELVIS W IV CONTRAST Additional Contrast? None   Final Result   Motion artifact throughout the exam.      Mild hydronephrosis and mild hydroureter bilaterally seen down to the bladder   with moderately severe distention of the urinary bladder which may represent   an element of bladder outlet obstruction which is causing

## 2022-02-23 NOTE — PLAN OF CARE
[facility]        Assessment & Plan of Care    History of Present Illness: Ms. Lencho Peter is a 27 y.o. female who presented to the ED yesterday after her G-tube became dislodged at [facility] for an unspecified amount of time. Fever noted up to 101.8 on presentation. Abdomen was distended and rigid. CT of the abdomen/pelvis was obtained and showed moderately severe constipation with a severe fecal impaction in the rectum which is probably causing a partial obstruction of the colon at this level as well as questionable mild proctitis for which we were consulted to evaluate. She was also noted to have hydroureteronephrosis and severe distention of the urinary bladder concerning for bladder outlet obstruction and a Crouch catheter was placed with return of approximately 1700 mL. History is significant for anoxic brain injury and the patient is non-verbal, no family or caregiver present thus the above information was gathered entirely via chart review and staff reports. Physical Exam:  CONSTITUTIONAL:  alert, mild distress and thin  NEUROLOGIC:  Mental Status Exam:  Level of Alertness:   awake  Orientation: non-verbal  EYES:  sclera clear  ENT:  normocepalic, without obvious abnormality  NECK:  supple, symmetrical, trachea midline  LUNGS:  clear to auscultation  CARDIOVASCULAR:  regular rate and rhythm and no murmur noted  ABDOMEN: soft, non-distended, mild poorly localized lower abdominal tenderness noted, voluntary guarding absent, no masses palpated, normal bowel sounds, and hernia absent  Extremities: no edema  SKIN:  no bruising or bleeding and normal skin color, texture, turgor    Assessment:  Constipation, proctitis  Dislodged G-tube, reinserted in ED this admission  History of anoxic brain injury, nonverbal    Plan:  1. Contrast study via G-tube reviewed, appropriately positioned, okay to use  2.  Consult GI for constipation/procititis, monitor bowel function--BM reported overnight  3. IV hydration; monitor and correct electrolytes  4. Antibiotics--on Cipro and Flagyl  5. Activity as tolerated  6. PRN analgesics and antiemetics--minimizing narcotics as tolerated  7. Management of medical comorbid etiologies per primary team and consulting services    EDUCATION:  Deferred. Plans discussed with nursing. Reviewed and discussed with Dr. Nicole Fisher consult to follow.       Signed:  FRANCK Trammell - CNP  2/23/2022 8:24 AM

## 2022-02-23 NOTE — PROGRESS NOTES
Speech Language Pathology  Attempt/Hold Note    Becca Huerta  1995    SLP eval and treat orders received and appreciated for this pt. Chart reviewed, spoke with RN, who reported pt with reduced JOZEF and not appropriate for clinical bedside evaluation at this time. Will hold and attempt again as pt is more appropriate to participate.     Alla Newell, [address] [facility]  Speech-Language Pathologist  [phone]

## 2022-02-23 NOTE — CONSULTS
Urology Consult Note  [facility]     Patient: Coco Chávez MRN: [id number]  Room/Bed: [facility]-3321/3321-01   YOB: 1995  Age/Sex: 27 y. o.female  Admission Date: 2/22/2022     Date of Service:  2/23/2022    Consulting Provider: FRANCK Amezquita CNP  Admitting/Requesting Physician: Bambi Anderson MD  Primary Care Physician: Ligia Nichole MD    Reason for Consult: Urinary Retention, Bilateral hydronephrosis, Complex Uribe    ASSESSMENT/PLAN     28 yo female non-verbal with hx of anoxic brain injury  Presented with G-tube issues and noted to be febrile 101.8  CT a/p demonstrates bilateral hydroureteronephrosis and a severely distended bladder  Cr is stable at 0.7  UA is suspicious for infection  Exam with distended bladder to the level of the umbilicus     Recommendations:  Complex uribe insertion today- see procedure note. She likely has a neurogenic bladder which is a new issue for her as it appears as she has not seen urology in the past. Continue uribe and continue abx with f/u on cultures. She would benefit from follow up and discussion of SPT insertion. Discussed follow up with Ian Warren, legal guardian. All the patients questions were answered. She understands the plan as listed above. HISTORY     Chief Complaint:   Chief Complaint   Patient presents with    G Tube Complications     patient in by [place] em[facility] from [facility], pulled out gtube       History of Present Illness: Coco Chávez is a 27 y.o. female with urinary retention. Onset of symptoms was days ago with improving course since that time. Symptoms are aggravated by anoxic brain injury. Symptoms improved with uribe. Associated symptoms include pelvic distension. Patient also reports decreased output. She has tried the following treatments: uribe.     Past Medical History:  She has a past medical history of Acute hepatitis C without hepatic coma, Acute hepatitis C without hepatic coma, Anoxic brain damage [facility]), Cardiac arrest ([facility] Chronic kidney disease, Chronic viral hepatitis C ([facility] Diffuse traumatic brain injury with loss of consciousness greater than 24 hours without return to pre-existing conscious level with patient surviving [facility]), Major depressive disorder, recurrent, unspecified ([facility] MRSA (methicillin resistant staph aureus) culture positive (08/25/2017), Nicotine dependence, other tobacco product, uncomplicated, Overdose, Poisoning by heroin, undetermined, initial encounter ([facility] Schizophrenia, unspecified ([facility] Seizures ([facility] Streptococcal sepsis, unspecified ([facility] TBI (traumatic brain injury) ([facility] Unspecified asthma, uncomplicated, and Unspecified sexually transmitted disease. Past Surgical History:  She has a past surgical history that includes Gastrostomy tube placement; Gastrostomy tube placement; Upper gastrointestinal endoscopy (N/A, 11/26/2019); and Gastrostomy tube placement (N/A, 11/26/2019). Allergies:  No Known Allergies     Social History:  She reports that she quit smoking about 10 years ago. Her smoking use included cigarettes. She quit after 2.00 years of use. She has never used smokeless tobacco. She reports that she does not drink alcohol and does not use drugs. Family History:  family history includes COPD in her mother; Cancer in her paternal grandmother; High Cholesterol in her father.     Medications:  Scheduled Meds:   Baclofen  5 mg Oral BID    benztropine  1 mg Oral TID    famotidine  20 mg PEG Tube BID    lacosamide  100 mg Oral BID    levETIRAcetam  500 mg PEG Tube BID    QUEtiapine  150 mg Per G Tube Nightly    sertraline  200 mg Per G Tube Daily    sodium chloride flush  10 mL IntraVENous 2 times per day    [START ON 2/24/2022] enoxaparin  40 mg SubCUTAneous Daily    ciprofloxacin  400 mg IntraVENous Q12H    metroNIDAZOLE  500 mg IntraVENous Q8H    lactobacillus  1 capsule Oral BID WC     Continuous Infusions:   sodium chloride 25 mL (02/23/22 0855)    dextrose       PRN Meds:sodium chloride flush, sodium chloride, acetaminophen **OR** acetaminophen, ondansetron, LORazepam, ketorolac, polyethylene glycol    Review of Systems:  Pertinent positives/negatives reviewed in HPI. All other systems reviewed and negative, unless noted below. Constitutional: Negative  Genitourinary:  Negative  HEENT: Negative   Cardiovascular: Negative   Respiratory: Negative   Gastrointestinal: Negative   Musculoskeletal: Negative   Neurological: Negative   Psychiatric: Negative   Integumentary: Negative     PHYSICAL EXAM     Vitals:    02/23/22 0903   BP: (!) 108/47   Pulse: 112   Resp: 20   Temp:    SpO2: 94%     Constitutional: NAD, well-developed, well-nourished. HEENT: MMM. Hearing intact. Neck: no thyroid masses appreciated. Trachea is midline. Neck appears unremarkable. Lymph: no palpable adenopathy in supraclavicular, or axillary lymph nodes. Cardiovascular: Regular rate. No peripheral edema. ABDOMEN: Suprapubic distension. Abdomen soft, non-tender, non-distended. No enlarged liver or spleen. No hernias noted. Stool occult blood not indicated. Respiratory: Respirations are even and non-labored. No audible breath sounds. Genitourinary: no CVAT, pelvic deferred. Psychiatric: A + O x 3, normal affect. Insight appears intact. Muskuloskeletal: FRANCISCO JAVIER x 4  Skin: Pink, warm and dry. No rashes on face and arms.     No intake or output data in the 24 hours ending 02/23/22 0917    LABS     CBC:   Lab Results   Component Value Date    WBC 7.1 02/23/2022    RBC 3.56 02/23/2022    HGB 11.0 02/23/2022    HCT 32.7 02/23/2022    MCV 91.9 02/23/2022    MCH 30.8 02/23/2022    MCHC 33.5 02/23/2022    RDW 13.0 02/23/2022     02/23/2022    MPV 9.5 02/23/2022     BMP:   Lab Results   Component Value Date     02/23/2022    K 3.3 02/23/2022     02/23/2022    CO2 27 02/23/2022    BUN 16 02/23/2022    CREATININE 0.7 02/23/2022    GLUCOSE 98 02/23/2022 CALCIUM 9.2 02/23/2022     Urinalysis:   Lab Results   Component Value Date    COLORU DK YELLOW 02/22/2022    GLUCOSEU Negative 02/22/2022    GLUCOSEU NEGATIVE 09/20/2011    BLOODU Negative 02/22/2022    NITRU Negative 02/22/2022    LEUKOCYTESUR MODERATE 02/22/2022     Urine culture: No results for input(s): LABURIN in the last 72 hours. IMAGING     CT ABDOMEN PELVIS W IV CONTRAST Additional Contrast? None    Result Date: 2/22/2022  EXAMINATION: CT OF THE ABDOMEN AND PELVIS WITH CONTRAST 2/22/2022 9:43 pm TECHNIQUE: CT of the abdomen and pelvis was performed with the administration of intravenous contrast. Multiplanar reformatted images are provided for review. Dose modulation, iterative reconstruction, and/or weight based adjustment of the mA/kV was utilized to reduce the radiation dose to as low as reasonably achievable. COMPARISON: None. HISTORY: ORDERING SYSTEM PROVIDED HISTORY: fever, rigid abd TECHNOLOGIST PROVIDED HISTORY: Reason for exam:->fever, rigid abd Additional Contrast?->None Decision Support Exception - unselect if not a suspected or confirmed emergency medical condition->Emergency Medical Condition (MA) Reason for Exam: Fever, rigid abd. G Tube Complications (patient in by [place] ems from [facility], pulled out gtube). FINDINGS: Lower Chest:   There is motion artifact throughout the exam.  There are subsegmental linear opacities along the lung bases posterolaterally. Organs: The liver and spleen are normal size and density. The gallbladder, pancreas, and bile ducts are normal.  The adrenals are normal.  The kidneys are normal size with mild hydronephrosis bilaterally and mild hydroureter extending to the bladder with no obvious filling defects in the ureters. GI/Bowel: There are multiple loops of moderately dilated colon throughout the abdomen with moderate constipation throughout the colon and severe fecal impaction in the rectum. There is diffuse mild rectal wall thickening.  There is no rectal wall mass and no pericolonic fluid collection is seen. The small bowel is normal caliber. The mesentery is unremarkable. Pelvis: The bladder is severely distended extending superiorly above the level of the and umbilicus with no wall thickening. The uterus is not well visualized with no adnexal mass. The appendix is not well visualized with no obvious pericecal inflammation. Peritoneum/Retroperitoneum:   The aorta is unremarkable with no aneurysm or dissection and no retroperitoneal mass or adenopathy. Bones/Soft Tissues: The bones are intact. No aggressive osseous lesion is seen. Motion artifact throughout the exam. Mild hydronephrosis and mild hydroureter bilaterally seen down to the bladder with moderately severe distention of the urinary bladder which may represent an element of bladder outlet obstruction which is causing partial obstruction of the upper collecting systems. Recommend urological correlation. Moderately severe constipation with a severe fecal impaction in the rectum which is probably causing a partial obstruction of the colon at this level. Recommend clinical follow-up Questionable mild proctitis with no pelvic mass or active inflammation. Mild bibasilar atelectasis or early infiltrates which is more prominent on the left. XR INJ CONTRAST GASTRIC TUBE PERC    Result Date: 2/23/2022  EXAMINATION: ONE SUPINE XRAY VIEW(S) OF THE ABDOMEN 2/23/2022 12:20 am COMPARISON: Abdomen pelvic CT 02/22/2022 HISTORY: ORDERING SYSTEM PROVIDED HISTORY: confirm placement TECHNOLOGIST PROVIDED HISTORY: Reason for exam:->confirm placement Reason for Exam:  confirm placement FINDINGS: Contrast is noted to have been injected into the gastrostomy tube. The balloon is faintly demonstrated. Contrast extends into the gastric body and layers dependently within the fundus with some seen towards the distal stomach. There is no definite contrast extravasation.   Residual contrast is seen in the collecting system bilaterally with bilateral hydronephrosis. This is demonstrated on accompanying abdomen pelvic CT only 3 hours prior. There is gas throughout the colon with moderate stool on the right. Pelvis is not included to evaluate for the large fecal impaction seen on CT. Lung bases are clear. Contrast injection documents positioning of the gastric tube in the stomach as described. Moderate bilateral hydronephrosis. See separate recent CT report.             Electronically signed by: FRANCK Langston CNP, 2/23/2022   [facility]  Office contact: [phone]

## 2022-02-23 NOTE — PROGRESS NOTES
Pt was found on floor in room, pt was put back in bed, charge nurse, unit manager and physician notfied

## 2022-02-23 NOTE — PROGRESS NOTES
MD made aware that GT placement has not been verified. Medications will be held until placement verified. Pt is nonverbal and fidgity. Uncomfortable to give PO meds. Sitter at bedside Will continue to monitor.

## 2022-02-23 NOTE — PROCEDURES
[facility] Procedure Note  [facility]    Provider: FRANCK Austin CNP  Patient ID:  Admission Date: 2022 Name: Heather Decker  OR Date: n/a MRN: [id number]   Patient Location: 3AN-3321/3321-01 : 1995  Attending: Philip Currie MD Date of Service: 2022  PCP: Tana New MD     Diagnoses:  Urinary Retention    Procedure:   1. Insertion/exchange of uribe catheter - complicated    Findings:   large volume retention of urine: 1200cc's    Indication for Procedure:  Nursing had attempted to pass a catheter and was unsuccessful. The patient was informed of the need for bladder drainage based on likely neurogenic bladder with severely distended bladder on CT with hydronephrosis. We had a discussion of the procedure. She had a chance to ask questions which were answered prior to the catheterization. Procedure Details: The urethral meatus was prepped and draped in the usual fashion. Viscous lidocaine jelly (2%) was instilled retrograde via the urethra. An 18 Fr straight tip Uribe catheter was then passed into the bladder. She had return of 1200mL of clear yellow urine. 10mL of sterile water was instilled into the Uribe balloon. A stat lock was applied to the thigh. She tolerated the procedure well.     EBL: 0cc's    Plan:  See Progress Notes for urology plan regarding Uribe    FRANCK Austin CNP   2022

## 2022-02-23 NOTE — CONSULTS
[facility] and Laparoscopic Surgery     Consult                                                     Patient Name: Vimal Chin  MRN: [id number]  [place]: 1995  Admission date: 2022  7:36 PM   Date of evaluation: 2022  Primary Care Physician: Chris Ruiz MD  Requesting provider: Regina Murray PA-C  Reason for consult: Abdominal pain  History of Present Illness:    Ms. Lencho Peter is a 27 y.o. female who presents with a multitude of complaints. The patient is non-verbal from anoxic brain injury and information for this document has been supplemented with chart review and discussion with staff. Transferred from [place] to ED after G-tube dislodged for unclear amount of time. On arrival fevers to 101.8 noted and with distended rigid abdomen. CT showed severe constipation with large fecal impaction, proctitis and partial colon obstruction as well as bladder distension concerning for obstruction. Medical history includes hepatitis C, chronic renal failure, anoxic brain injury after cardiac arrest, schizophrenia. Past Medical History:        Diagnosis Date    Acute hepatitis C without hepatic coma     Acute hepatitis C without hepatic coma     Anoxic brain damage ([address]     Cardiac arrest ([address]     s/p overdose 2016.     Chronic kidney disease     \"a few\" UTIs with pregnancy    Chronic viral hepatitis C ([address]     Diffuse traumatic brain injury with loss of consciousness greater than 24 hours without return to pre-existing conscious level with patient surviving [facility])     sequela    Major depressive disorder, recurrent, unspecified ([address]     MRSA (methicillin resistant staph aureus) culture positive 2017    arm    Nicotine dependence, other tobacco product, uncomplicated     Overdose     Poisoning by heroin, undetermined, initial encounter ([address]     Schizophrenia, unspecified ([address]     Seizures ([address]     Streptococcal sepsis, unspecified ([address]     TBI [facility]     Unspecified asthma, uncomplicated     Unspecified sexually transmitted disease        Past Surgical History:        Procedure Laterality Date    GASTROSTOMY TUBE PLACEMENT      GASTROSTOMY TUBE PLACEMENT      08/02/2016    GASTROSTOMY TUBE PLACEMENT N/A 11/26/2019    EGD PEG TUBE PLACEMENT performed by Sheri Salinas MD at [address] 11/26/2019    EGD FOREIGN BODY REMOVAL performed by Sheri Salinas MD at [address]:   Baclofen  5 mg Oral BID    benztropine  1 mg Oral TID    famotidine  20 mg PEG Tube BID    lacosamide  100 mg Oral BID    levETIRAcetam  500 mg PEG Tube BID    QUEtiapine  150 mg Per G Tube Nightly    sertraline  200 mg Per G Tube Daily    sodium chloride flush  10 mL IntraVENous 2 times per day    [START ON 2/24/2022] enoxaparin  40 mg SubCUTAneous Daily    ciprofloxacin  400 mg IntraVENous Q12H    metroNIDAZOLE  500 mg IntraVENous Q8H    lactobacillus  1 capsule Oral BID WC     Continuous Infusions:   sodium chloride 25 mL (02/23/22 0855)    dextrose 100 mL/hr at 02/23/22 1057     PRN Meds:.sodium chloride flush, sodium chloride, acetaminophen **OR** acetaminophen, ondansetron, LORazepam, ketorolac, polyethylene glycol    Prior to Admission medications    Medication Sig Start Date End Date Taking?  Authorizing Provider   Baclofen 5 MG TABS Take 5 mg by mouth 2 times daily   Yes Historical Provider, MD   Ascorbic Acid (VITAMIN C) 250 MG tablet Take 250 mg by mouth daily   Yes Historical Provider, MD   ibuprofen (ADVIL;MOTRIN) 800 MG tablet 800 mg by Per G Tube route every 6 hours as needed for Pain   Yes Historical Provider, MD   paliperidone palmitate ER (INVEGA SUSTENNA) 117 MG/0.75ML MOSES IM injection Inject 117 mg into the muscle every 30 days At bedtime on the 8th of the month   Yes Historical Provider, MD   QUEtiapine (SEROQUEL) 200 MG tablet 300 mg by Per G Tube route once    Yes Historical Provider, MD   Cholecalciferol (VITAMIN D3) 125 MCG (5000 UT) TABS 1 tablet by Per G Tube route daily   Yes Historical Provider, MD   sertraline (ZOLOFT) 20 MG/ML concentrated solution 200 mg by Per G Tube route daily    Yes Historical Provider, MD   NONFORMULARY by Gastrostomy Tube route nightly Flush feeding with 180 ml of water   Yes Historical Provider, MD   NONFORMULARY by Gastrostomy Tube route daily Enteral feed flush with 150 ml of water daily in am   Yes Historical Provider, MD   levETIRAcetam (KEPPRA) 100 MG/ML solution 5 mLs by PEG Tube route 2 times daily 8/26/17  Yes Mac Foster MD   QUEtiapine (SEROQUEL) 25 MG tablet 1 tablet by PEG Tube route 2 times daily  Patient taking differently: 150 mg by Per G Tube route nightly  8/26/17  Yes Mac Foster MD   famotidine (PEPCID) 20 MG tablet 1 tablet by PEG Tube route 2 times daily 8/26/17  Yes Mac Foster MD   benztropine (COGENTIN) 1 MG tablet Take 2 tablets by mouth 2 times daily  Patient taking differently: Take 1 mg by mouth 3 times daily  8/26/17  Yes Mac Foster MD   polyethylene glycol (GLYCOLAX) 17 GM/SCOOP powder Take 17 g by mouth daily    Historical Provider, MD   lacosamide (VIMPAT) 100 MG TABS tablet Take 100 mg by mouth 2 times daily. Historical Provider, MD        Allergies:  Patient has no known allergies.     Social History:   Social History     Socioeconomic History    Marital status: Single     Spouse name: None    Number of children: None    Years of education: None    Highest education level: None   Occupational History    None   Tobacco Use    Smoking status: Former Smoker     Years: 2.00     Types: Cigarettes     Quit date: 3/23/2011     Years since quitting: 10.9    Smokeless tobacco: Never Used   Substance and Sexual Activity    Alcohol use: No    Drug use: No     Types: IV     Comment: Hx MJ use 3/3/11 and heroin use    Sexual activity: Never     Partners: Male Comment: heroin   Other Topics Concern    None   Social History Narrative    ** Merged History Encounter **          Social Determinants of Health     Financial Resource Strain:     Difficulty of Paying Living Expenses: Not on file   Food Insecurity:     Worried About Running Out of Food in the Last Year: Not on file    Nima of Food in the Last Year: Not on file   Transportation Needs:     Lack of Transportation (Medical): Not on file    Lack of Transportation (Non-Medical):  Not on file   Physical Activity:     Days of Exercise per Week: Not on file    Minutes of Exercise per Session: Not on file   Stress:     Feeling of Stress : Not on file   Social Connections:     Frequency of Communication with Friends and Family: Not on file    Frequency of Social Gatherings with Friends and Family: Not on file    Attends Restorationism Services: Not on file    Active Member of [address] [place], [place] [address] or Organizations: Not on file    Attends Club or Organization Meetings: Not on file    Marital Status: Not on file   Intimate Partner Violence:     Fear of Current or Ex-Partner: Not on file    Emotionally Abused: Not on file    Physically Abused: Not on file    Sexually Abused: Not on file   Housing Stability:     Unable to Pay for Housing in the Last Year: Not on file    Number of [place] in the Last Year: Not on file    Unstable Housing in the Last Year: Not on file       Family History:    Family History   Problem Relation Age of Onset    High Cholesterol Father     Cancer Paternal Grandmother     COPD Mother        Review of Systems: unable to review secondary to mental status    Vital Signs:  Patient Vitals for the past 24 hrs:   BP Temp Temp src Pulse Resp SpO2 Weight   02/23/22 1345 98/62 -- -- 69 -- 98 % --   02/23/22 0905 (!) 108/47 -- -- 112 20 94 % --   02/23/22 0903 (!) 108/47 -- -- 112 20 94 % --   02/23/22 0434 (!) 132/53 98.2 °F (36.8 °C) Temporal 95 17 92 % 110 lb 12.8 oz (50.3 kg)   02/23/22 0200 117/60 100 °F (37.8 °C) Oral 98 16 95 % 110 lb 12.8 oz (50.3 kg)   22 0100 116/69 -- -- -- -- -- --   22 0045 (!) 134/96 -- -- -- -- -- --   22 0032 -- -- -- -- -- 94 % --   22 0031 -- -- -- -- -- 94 % --   22 0030 120/89 -- -- -- -- 96 % --   22 0029 -- -- -- -- -- 95 % --   22 0028 -- -- -- -- -- 96 % --   22 0027 -- -- -- -- -- 95 % --   22 1940 129/86 101.8 °F (38.8 °C) -- 86 16 94 % --      TEMPERATURE HISTORY 24H: Temp (24hrs), Av °F (37.8 °C), Min:98.2 °F (36.8 °C), Max:101.8 °F (38.8 °C)    BLOOD PRESSURE HISTORY: Systolic (09UGN), HII:736 , Min:98 , LDZ:322    Diastolic (57BDH), GFQ:00, Min:47, Max:96    Admission Weight: 110 lb 12.8 oz (50.3 kg)     No intake or output data in the 24 hours ending 22 1609  Drain/tube Output:         Physical Exam:  Constitutional:  well-developed, well-nourished,   Neurologic: awake, alert, non-verbal and does not answer orienting questions  Psychiatric: difficult to assess, non-verbal  Eyes:  sclera clear, no visible discharge  Head, ears, nose, mouth, throat: normocepalic, without obvious abnormality, supple, symmetrical, trachea midline, no JVD, mucous membranes moist  Cardiovascular: regular rate and rhythm   Respiratory: clear to auscultation  GI: soft, non-distended, difficult to assess tenderness, possible lower abdominal tenderness  Skin: no bruising or bleeding, normal skin color, texture, turgor and no redness, warmth, or swelling    Labs:    CBC:    Recent Labs     22  0657   WBC 8.8 7.1   HGB 13.0 11.0*   HCT 39.2 32.7*    131*     BMP:    Recent Labs     22  0657    149*   K 3.8 3.3*    111*   CO2 24 27   BUN 25* 16   CREATININE 0.8 0.7   GLUCOSE 114* 98     Hepatic:   Recent Labs     22   AST 14*   ALT 18   BILITOT 0.3   ALKPHOS 53     Amylase: No results for input(s): AMYLASE in the last 72 hours.   Lipase:   Recent Labs 02/22/22 2011   LIPASE 21.0     Mag:      Recent Labs     02/23/22  0657   MG 1.80      Phos:   No results for input(s): PHOS in the last 72 hours. Coags: No results for input(s): INR, APTT in the last 72 hours. Imaging:  I have personally reviewed the following films:  CT ABDOMEN PELVIS W IV CONTRAST Additional Contrast? None    Result Date: 2/22/2022  EXAMINATION: CT OF THE ABDOMEN AND PELVIS WITH CONTRAST 2/22/2022 9:43 pm TECHNIQUE: CT of the abdomen and pelvis was performed with the administration of intravenous contrast. Multiplanar reformatted images are provided for review. Dose modulation, iterative reconstruction, and/or weight based adjustment of the mA/kV was utilized to reduce the radiation dose to as low as reasonably achievable. COMPARISON: None. HISTORY: ORDERING SYSTEM PROVIDED HISTORY: fever, rigid abd TECHNOLOGIST PROVIDED HISTORY: Reason for exam:->fever, rigid abd Additional Contrast?->None Decision Support Exception - unselect if not a suspected or confirmed emergency medical condition->Emergency Medical Condition (MA) Reason for Exam: Fever, rigid abd. G Tube Complications (patient in by [place] ems from [facility], pulled out gtube). FINDINGS: Lower Chest:   There is motion artifact throughout the exam.  There are subsegmental linear opacities along the lung bases posterolaterally. Organs: The liver and spleen are normal size and density. The gallbladder, pancreas, and bile ducts are normal.  The adrenals are normal.  The kidneys are normal size with mild hydronephrosis bilaterally and mild hydroureter extending to the bladder with no obvious filling defects in the ureters. GI/Bowel: There are multiple loops of moderately dilated colon throughout the abdomen with moderate constipation throughout the colon and severe fecal impaction in the rectum. There is diffuse mild rectal wall thickening. There is no rectal wall mass and no pericolonic fluid collection is seen.  The small bowel is normal caliber. The mesentery is unremarkable. Pelvis: The bladder is severely distended extending superiorly above the level of the and umbilicus with no wall thickening. The uterus is not well visualized with no adnexal mass. The appendix is not well visualized with no obvious pericecal inflammation. Peritoneum/Retroperitoneum:   The aorta is unremarkable with no aneurysm or dissection and no retroperitoneal mass or adenopathy. Bones/Soft Tissues: The bones are intact. No aggressive osseous lesion is seen. Motion artifact throughout the exam. Mild hydronephrosis and mild hydroureter bilaterally seen down to the bladder with moderately severe distention of the urinary bladder which may represent an element of bladder outlet obstruction which is causing partial obstruction of the upper collecting systems. Recommend urological correlation. Moderately severe constipation with a severe fecal impaction in the rectum which is probably causing a partial obstruction of the colon at this level. Recommend clinical follow-up Questionable mild proctitis with no pelvic mass or active inflammation. Mild bibasilar atelectasis or early infiltrates which is more prominent on the left. XR INJ CONTRAST GASTRIC TUBE PERC    Result Date: 2/23/2022  EXAMINATION: ONE SUPINE XRAY VIEW(S) OF THE ABDOMEN 2/23/2022 12:20 am COMPARISON: Abdomen pelvic CT 02/22/2022 HISTORY: ORDERING SYSTEM PROVIDED HISTORY: confirm placement TECHNOLOGIST PROVIDED HISTORY: Reason for exam:->confirm placement Reason for Exam:  confirm placement FINDINGS: Contrast is noted to have been injected into the gastrostomy tube. The balloon is faintly demonstrated. Contrast extends into the gastric body and layers dependently within the fundus with some seen towards the distal stomach. There is no definite contrast extravasation. Residual contrast is seen in the collecting system bilaterally with bilateral hydronephrosis.  This is demonstrated on accompanying abdomen pelvic CT only 3 hours prior. There is gas throughout the colon with moderate stool on the right. Pelvis is not included to evaluate for the large fecal impaction seen on CT. Lung bases are clear. Contrast injection documents positioning of the gastric tube in the stomach as described. Moderate bilateral hydronephrosis. See separate recent CT report. Cultures:  Relevant cultures documented under results section     Assessment:  Patient Active Problem List   Diagnosis    Drug overdose    Cardiac arrest due to respiratory disorder ([address]    Anoxic brain injury ([address]    Acute respiratory failure with hypoxia ([address]    Aspiration pneumonia of both lungs (HCC)    Cor pulmonale (HCC)    Seizure ([address]    Status epilepticus ([address]    Non-traumatic rhabdomyolysis    Dystonia    Non compliance w medication regimen    Failure to thrive in adult    Hypokalemia    Hypernatremia    Agitation    Anxiety    Chronic hepatitis C without hepatic coma (HCC)    Dysphagia, oropharyngeal    Choreoathetoid limb movements    Hepatic encephalopathy (HCC)    Abscess of forearm, left    Severe protein-calorie malnutrition (Jaime Lasso: less than 60% of standard weight) (HCC)    Colonic obstruction (HCC)    Fecal impaction (HCC)    Dislodged gastrostomy tube    Bladder outlet obstruction    Hydroureteronephrosis    Proctitis     Fecal impaction  Acute/chronic constipation  Partial colon obstruction  Proctitis  Dislodged PEG, replaced  Anoxic brain injury  Hepatitis C    Plan:  1. PEG replaced, position confirmed, may use from general surgery standpoint  2. Bowel regimen overnight helping, patient passing stool, will continue with PO (PEG) and CO regimen to clear fecal impaction, GI following to assess need for endoscopy and treatment for acute/chronic constipation  3. No acute general surgery issues, does not have signs of ischemia or perforation at this time  4. Antibiotics  5.  Defer management of remainder of medical comorbidities to primary and consulting teams    This plan was discussed at length with the patient. She was understanding and in agreement with the plan  Thank you for the consult and allowing me to participate in the care of this patient. I look forward to following her this admission    Mario Be MD, FACS  2/23/2022  4:09 PM

## 2022-02-23 NOTE — PLAN OF CARE
Problem: Falls - Risk of:  Goal: Will remain free from falls  Description: Will remain free from falls  2/23/2022 1740 by Vickey Christine RN  Outcome: Ongoing  2/23/2022 0800 by Sabina Dowling RN  Outcome: Ongoing  Goal: Absence of physical injury  Description: Absence of physical injury  2/23/2022 1740 by Vickey Christine RN  Outcome: Ongoing  2/23/2022 0800 by Sabina Dowling RN  Outcome: Ongoing     Problem: Skin Integrity:  Goal: Will show no infection signs and symptoms  Description: Will show no infection signs and symptoms  2/23/2022 1740 by Vickey Christine RN  Outcome: Ongoing  2/23/2022 0800 by Sabina Dowling RN  Outcome: Ongoing  Goal: Absence of new skin breakdown  Description: Absence of new skin breakdown  2/23/2022 0800 by Sabina Dowling RN  Outcome: Ongoing

## 2022-02-23 NOTE — ED PROVIDER NOTES
[address] [facility]    Physician Attestation    Pt Name: Tejal Meyer  MRN: [id number]  Sarah 1995  Date of evaluation: 2/22/22        Physician Note:    I havepersonally performed and/or participated in the history, exam and medical decision making and agree with all pertinent clinical information. I have also reviewed and agree with the past medical, family and social historyunless otherwise noted. I have personally performed a face to face diagnostic evaluation onthis patient. I have reviewed the mid-levels findings and agree. History: Home from the nursing home patient is unable to speak due  to anoxic brain injury the G-tube been pulled out is noted that she has a fever      REVIEW OF SYSTEMS    Unable to answer questions due to her anoxic brain injury        General Appearance:  Alert, cooperative, no distress, appears stated age. Head:  Normocephalic, without obvious abnormality, atraumatic. Eyes:  conjunctiva/corneas clear, EOM's intact. Sclera anicteric. ENT: Mucous membranes moist.   Neck: Supple, symmetrical, trachea midline, no adenopathy. No jugular venous distention. Lungs:   No Respiratory Distress. no rales  rhonchi rub   Chest Wall:  Nontender  no deformity   Heart:  Rsr no murmer gallop    Abdomen:   Soft nontender no organomegally    Extremities:  Full range of motion. no deformity   Pulses: Equal  upper and lower    Skin:  No rashes or lesions to exposed skin. Neurologic: Alert and oriented X 3. Motor grossly normal.  Speech clear. Cr n 2-12 intact   Please see the midlevel's note   We spoke with general surgery they recommend disimpaction and soapsuds enemas antibiotics we will reinsert the G-tube and admission    1. Proctitis    2. Fecal impaction ([address]    3. Colonic obstruction ([address]    4. Retention of urine    5.  Dislodged gastrostomy tube          DISPOSITION/PLAN  PATIENT REFERRED TO:  No follow-up provider specified.   DISCHARGE MEDICATIONS:  New Prescriptions    No medications on file         MD Eron Hernandez MD  02/23/22 0015       Eron Lua MD  02/23/22 [id number]

## 2022-02-23 NOTE — ACP (ADVANCE CARE PLANNING)
Advanced Care Planning Note. Purpose of Encounter: Advanced care planning in light of FADIA  Parties In Attendance: Patient, RN, sitter  Decisional Capacity: Limited  Subjective: Patient with abdominal pain  Objective: Cr 0.7  Goals of Care Determination: Patient/POA wants full support (CPR, vent, surgery, HD, trach, PEG)  Plan:  IVF, IV Abx, Urology/Surgery/GI consults, PT/OT/SLP  Code Status: Full code   Time spent on Advanced care Plannin minutes  Advanced Care Planning Documents: Completed advanced directives on chart, guardian is the POA.     Kelsie Rey MD  2022 8:57 AM

## 2022-02-23 NOTE — CONSULTS
Gastroenterology Consult Note        Patient: Juan Antonio Zhao  : 1995  Acct#:      Date:  2022    Subjective:       History of Present Illness  Patient is a 27 y.o.  female admitted with Proctitis [K62.89]  Colonic obstruction ([address] [S98.625]  Retention of urine [R33.9]  Dislodged gastrostomy tube [T85.528A]  Fecal impaction ([address] [K56.41] who is seen in consult for Constipation/protitis. She is nonverbal s/p anoxic brain injury. She presented yesterday evening with a dislodged G-tube. uncertin how long tube was out. This was reinstered in the ED, f/u imagining confirmed location. While in the Ed abdomen noted to be distended so a CT was done which showed, hydroureteronephrosis and severe distention of the urinary bladder concerning for bladder outlet obstruction. Crouch catheter was placed with return of approximately 1700 cc. Also note moderately severe constipation and severe fecal impaction causing partial obstruction of the colon. Questionable mild  proctitis noted as well. General surgery was consulted in ED and recommended subset enemas every 4 hours, MiraLAX every 6 hours per G-tube and empiric antibiotic therapy. After admission she had 1 BM overnight per nursing there was no black or bloody stool. Since then she has had multiple smears of stool. She is nonverbal but appears to be resting comfortably. Past Medical History:   Diagnosis Date    Acute hepatitis C without hepatic coma     Acute hepatitis C without hepatic coma     [facility]     Cardiac arrest ([facility]     s/p overdose 2016.     Chronic kidney disease     \"a few\" UTIs with pregnancy    Chronic viral hepatitis C ([address]     Diffuse traumatic brain injury with loss of consciousness greater than 24 hours without return to pre-existing conscious level with patient surviving ([address]     sequela    Major depressive disorder, recurrent, unspecified ([address]     MRSA (methicillin resistant staph aureus) culture positive 08/25/2017    arm    Nicotine dependence, other tobacco product, uncomplicated     Overdose     Poisoning by heroin, undetermined, initial encounter ([facility]     Schizophrenia, unspecified ([facility]     Seizures ([facility]     Streptococcal sepsis, unspecified ([facility]     TBI (traumatic brain injury) ([facility]     Unspecified asthma, uncomplicated     Unspecified sexually transmitted disease       Past Surgical History:   Procedure Laterality Date    GASTROSTOMY TUBE PLACEMENT      GASTROSTOMY TUBE PLACEMENT      08/02/2016    GASTROSTOMY TUBE PLACEMENT N/A 11/26/2019    EGD PEG TUBE PLACEMENT performed by Simon Cuellar MD at [address] N/A 11/26/2019    EGD FOREIGN BODY REMOVAL performed by Simon Cuellar MD at [address] [facility]      Past Endoscopic History  EGD with PEG tube placement 11/26/2019 with dr. ruiz    Esophagus: The esophagus appeared normal without evidence of Harper's esophagus or reflux esophagitis. Stomach: The stomach was examined on forward and retroflexed views. The old PEG tube internal bumper was in place and was mobile. The tube was initially grasped with a Breezy Coaster net around the internal bumper, and the external PEG tube was cut at the 4 cm mohinder. The bumper and distal stump of the PEG tube were pulled into the gastric lumen, and a replacement PEG tube was advanced through the lumen with ease. The internal balloon was inflated with 10 mL and the external bumper was placed 3.5 mL from the skin. The PEG bumper and distal tube stump was eventually removed with foreign body forceps. Duodenum: The first and 2nd portions of the duodenum appeared normal with normal villous pattern     The scope was then withdrawn back into the stomach, it was decompressed, and the scope was completely withdrawn.     The patient tolerated the procedure well and was taken to the post anesthesia care unit in good condition.         Admission Meds  No current facility-administered medications on file prior to encounter. Current Outpatient Medications on File Prior to Encounter   Medication Sig Dispense Refill    Baclofen 5 MG TABS Take 5 mg by mouth 2 times daily      Ascorbic Acid (VITAMIN C) 250 MG tablet Take 250 mg by mouth daily      ibuprofen (ADVIL;MOTRIN) 800 MG tablet 800 mg by Per G Tube route every 6 hours as needed for Pain      paliperidone palmitate ER (INVEGA SUSTENNA) 117 MG/0.75ML MOSES IM injection Inject 117 mg into the muscle every 30 days At bedtime on the 8th of the month      QUEtiapine (SEROQUEL) 200 MG tablet 300 mg by Per G Tube route once       Cholecalciferol (VITAMIN D3) 125 MCG (5000 UT) TABS 1 tablet by Per G Tube route daily      sertraline (ZOLOFT) 20 MG/ML concentrated solution 200 mg by Per G Tube route daily       NONFORMULARY by Gastrostomy Tube route nightly Flush feeding with 180 ml of water      NONFORMULARY by Gastrostomy Tube route daily Enteral feed flush with 150 ml of water daily in am      levETIRAcetam (KEPPRA) 100 MG/ML solution 5 mLs by PEG Tube route 2 times daily 1 Bottle 3    QUEtiapine (SEROQUEL) 25 MG tablet 1 tablet by PEG Tube route 2 times daily (Patient taking differently: 150 mg by Per G Tube route nightly ) 60 tablet 3    famotidine (PEPCID) 20 MG tablet 1 tablet by PEG Tube route 2 times daily 60 tablet 3    benztropine (COGENTIN) 1 MG tablet Take 2 tablets by mouth 2 times daily (Patient taking differently: Take 1 mg by mouth 3 times daily ) 120 tablet 3    polyethylene glycol (GLYCOLAX) 17 GM/SCOOP powder Take 17 g by mouth daily      lacosamide (VIMPAT) 100 MG TABS tablet Take 100 mg by mouth 2 times daily. Allergies  No Known Allergies   Social   Social History     Tobacco Use    Smoking status: Former Smoker     Years: 2.00     Types: Cigarettes     Quit date: 3/23/2011     Years since quitting: 10.9    Smokeless tobacco: Never Used   Substance Use Topics    Alcohol use:  No Family History   Problem Relation Age of Onset    High Cholesterol Father     Cancer Paternal Grandmother     COPD Mother         Review of Systems  Unable to complete due to patient status. Physical Exam  Blood pressure (!) 108/47, pulse 112, temperature 98.2 °F (36.8 °C), temperature source Temporal, resp. rate 20, weight 110 lb 12.8 oz (50.3 kg), SpO2 94 %, not currently breastfeeding. General appearance: alert, cooperative, no distress, appears stated age  Eyes: Anicteric  Head: Normocephalic, without obvious abnormality  Lungs: clear to auscultation bilaterally, Normal Effort  Heart: regular rate and rhythm, normal S1 and S2, no murmurs or rubs  Abdomen: soft, non-tender. Bowel sounds normal. No masses,  no organomegaly. + PEG to upper abd. Extremities: atraumatic, no cyanosis or edema  Skin: warm and dry, no jaundice  Neuro: Grossly intact, A&OX3  Musculoskeletal: 5/5  strength BUE      Data Review:    Recent Labs     02/22/22 2011 02/23/22  0657   WBC 8.8 7.1   HGB 13.0 11.0*   HCT 39.2 32.7*   MCV 92.7 91.9    131*     Recent Labs     02/22/22 2011 02/23/22  0657    149*   K 3.8 3.3*    111*   CO2 24 27   BUN 25* 16   CREATININE 0.8 0.7     Recent Labs     02/22/22 2011   AST 14*   ALT 18   BILITOT 0.3   ALKPHOS 53     Recent Labs     02/22/22 2011   LIPASE 21.0     No results for input(s): PROTIME, INR in the last 72 hours. No results for input(s): PTT in the last 72 hours. No results for input(s): OCCULTBLD in the last 72 hours. Imaging Studies:                               CT-scan of abdomen and pelvis W/ IV contrast 2/22/22  Impression   Motion artifact throughout the exam.       Mild hydronephrosis and mild hydroureter bilaterally seen down to the bladder   with moderately severe distention of the urinary bladder which may represent   an element of bladder outlet obstruction which is causing partial obstruction   of the upper collecting systems.  Recommend urological correlation.       Moderately severe constipation with a severe fecal impaction in the rectum   which is probably causing a partial obstruction of the colon at this level. Recommend clinical follow-up       Questionable mild proctitis with no pelvic mass or active inflammation.       Mild bibasilar atelectasis or early infiltrates which is more prominent on   the left. XR gastric tube placement 2/23/22  Impression   Contrast injection documents positioning of the gastric tube in the stomach   as described.       Moderate bilateral hydronephrosis.  See separate recent CT report. Assessment:     Principal Problem:    Colonic obstruction (HCC)  Active Problems:    Anoxic brain injury ([address]    Seizure ([address]    Dystonia    Failure to thrive in adult    Hypokalemia    Hypernatremia    Agitation    Choreoathetoid limb movements    Severe protein-calorie malnutrition Radha Stevan: less than 60% of standard weight) (HCC)    Fecal impaction (HCC)    Dislodged gastrostomy tube    Bladder outlet obstruction    Hydroureteronephrosis  Resolved Problems:    * No resolved hospital problems. *    Nonverbal: S/P anoxic brain injury  S/P PEG tube placeemnt: Placed 2019 with Dr. Stephenie Jansen, presented to the ED yesterday dislodged for an unknown amount of time. This was reinserted in the ED. Constipation/ proctitis: per CT, started on scheduled enemas and miralax per PEG tube. Had BM per nursing. Recommendations:     - Attempted to call patient's guardian Ted Sánchez to discuss  No answer, Voicemail left for her to call back. - Continue miralax per peg  - Warm tap water enemas BID     Discussed with Dr. Kristin Garza. Aurea Cooper, [facility]  GI attending addendum:     I have personally performed a face-to-face diagnostic evaluation on this patient. I have reviewed and agreed with the plan of care as documented by nurse practitioner.        History and exam by me shows:      76-year-old

## 2022-02-23 NOTE — ED PROVIDER NOTES
[address]        Pt Name: Jodie Thakur  MRN: [id number]  [place] 1995  Date of evaluation: 2/22/2022  Provider: Babita Serrano PA-C  PCP: Fareed Vega MD  Note Started: 8:43 PM EST        I have seen and evaluated this patient with my supervising physician Tabatha Mercado MD.    [facility]       Chief Complaint   Patient presents with    G Tube Complications     patient in by [facility] from [facility], pulled out gtube       HISTORY OF PRESENT ILLNESS   (Location, Timing/Onset, Context/Setting, Quality, Duration, Modifying Factors, Severity, Associated Signs and Symptoms)  Note limiting factors. Chief Complaint: G tube dislodged     Jodie Case is a 27 y.o. female who presents evaluation of dislodged G-tube. Patient is nonverbal status post anoxic brain injury. Unsure how long G-tube has been out. No additional information is able to be obtained at this time. Nursing Notes were all reviewed and agreed with or any disagreements were addressed in the HPI. REVIEW OF SYSTEMS    (2-9 systems for level 4, 10 or more for level 5)     Review of Systems   Unable to perform ROS: Patient nonverbal       Positives and Pertinent negatives as per HPI. Except as noted above in the ROS, all other systems were reviewed and negative. PAST MEDICAL HISTORY     Past Medical History:   Diagnosis Date    Acute hepatitis C without hepatic coma     Acute hepatitis C without hepatic coma     Anoxic brain damage ([address]     Cardiac arrest ([facility]     s/p overdose 08/2016.     Chronic kidney disease     \"a few\" UTIs with pregnancy    Chronic viral hepatitis C ([address]     Diffuse traumatic brain injury with loss of consciousness greater than 24 hours without return to pre-existing conscious level with patient surviving ([address]     sequela    Major depressive disorder, recurrent, unspecified ([address]     MRSA (methicillin resistant staph aureus) culture positive 08/25/2017    arm    Nicotine dependence, other tobacco product, uncomplicated     Overdose     Poisoning by heroin, undetermined, initial encounter [facility]     Schizophrenia, unspecified ([facility]     Seizures ([facility]     Streptococcal sepsis, unspecified ([facility]     TBI (traumatic brain injury) ([facility]     Unspecified asthma, uncomplicated     Unspecified sexually transmitted disease          SURGICAL HISTORY     Past Surgical History:   Procedure Laterality Date    GASTROSTOMY TUBE PLACEMENT      GASTROSTOMY TUBE PLACEMENT      08/02/2016    GASTROSTOMY TUBE PLACEMENT N/A 11/26/2019    EGD PEG TUBE PLACEMENT performed by Natan Dunaway MD at [address] [facility] 11/26/2019    EGD FOREIGN BODY REMOVAL performed by Natan Dunaway MD at [facility]       Previous Medications    ASCORBIC ACID (VITAMIN C) 250 MG TABLET    Take 250 mg by mouth daily    BACLOFEN 5 MG TABS    Take 5 mg by mouth 2 times daily    BENZTROPINE (COGENTIN) 1 MG TABLET    Take 2 tablets by mouth 2 times daily    CHOLECALCIFEROL (VITAMIN D3) 125 MCG (5000 UT) TABS    1 tablet by Per G Tube route daily    FAMOTIDINE (PEPCID) 20 MG TABLET    1 tablet by PEG Tube route 2 times daily    IBUPROFEN (ADVIL;MOTRIN) 800 MG TABLET    800 mg by Per G Tube route every 6 hours as needed for Pain    LACOSAMIDE (VIMPAT) 100 MG TABS TABLET    Take 100 mg by mouth 2 times daily.     LEVETIRACETAM (KEPPRA) 100 MG/ML SOLUTION    5 mLs by PEG Tube route 2 times daily    NONFORMULARY    by Gastrostomy Tube route nightly Flush feeding with 180 ml of water    NONFORMULARY    by Gastrostomy Tube route daily Enteral feed flush with 150 ml of water daily in am    PALIPERIDONE PALMITATE ER (INVEGA SUSTENNA) 117 MG/0.75ML MOSES IM INJECTION    Inject 117 mg into the muscle every 30 days At bedtime on the 8th of the month    POLYETHYLENE GLYCOL (GLYCOLAX) 17 GM/SCOOP POWDER Take 17 g by mouth daily    QUETIAPINE (SEROQUEL) 200 MG TABLET    300 mg by Per G Tube route once     QUETIAPINE (SEROQUEL) 25 MG TABLET    1 tablet by PEG Tube route 2 times daily    SERTRALINE (ZOLOFT) 20 MG/ML CONCENTRATED SOLUTION    200 mg by Per G Tube route daily          ALLERGIES     Patient has no known allergies. FAMILYHISTORY       Family History   Problem Relation Age of Onset    High Cholesterol Father     Cancer Paternal Grandmother     COPD Mother           SOCIAL HISTORY       Social History     Tobacco Use    Smoking status: Former Smoker     Years: 2.00     Types: Cigarettes     Quit date: 3/23/2011     Years since quitting: 10.9    Smokeless tobacco: Never Used   Substance Use Topics    Alcohol use: No    Drug use: No     Types: IV     Comment: Hx MJ use 3/3/11 and heroin use       SCREENINGS    Jerrod Coma Scale  Eye Opening: Spontaneous  Best Verbal Response: Oriented  Best Motor Response: Obeys commands  Darrington Coma Scale Score: 15        PHYSICAL EXAM    (up to 7 for level 4, 8 or more for level 5)     ED Triage Vitals [02/22/22 1940]   BP Temp Temp src Pulse Resp SpO2 Height Weight   129/86 101.8 °F (38.8 °C) -- 86 16 94 % -- --       Physical Exam  Vitals and nursing note reviewed. Constitutional:       Appearance: She is well-developed. She is not diaphoretic. HENT:      Head: Normocephalic and atraumatic. Right Ear: External ear normal.      Left Ear: External ear normal.      Nose: Nose normal.   Eyes:      General:         Right eye: No discharge. Left eye: No discharge. Cardiovascular:      Rate and Rhythm: Normal rate and regular rhythm. Heart sounds: Normal heart sounds. Pulmonary:      Effort: Pulmonary effort is normal. No respiratory distress. Breath sounds: Normal breath sounds. Chest:      Chest wall: No tenderness. Abdominal:      General: There is distension. Palpations: Abdomen is soft. Tenderness:  There is abdominal tenderness. There is no guarding or rebound. Musculoskeletal:         General: Normal range of motion. Cervical back: Normal range of motion and neck supple. Skin:     General: Skin is warm and dry. Neurological:      Mental Status: She is alert and oriented to person, place, and time. Psychiatric:         Behavior: Behavior normal.         DIAGNOSTIC RESULTS   LABS:    Labs Reviewed   COMPREHENSIVE METABOLIC PANEL - Abnormal; Notable for the following components:       Result Value    Glucose 114 (*)     BUN 25 (*)     AST 14 (*)     All other components within normal limits    Narrative:     Performed at:  [facility] [address]   Phone [phone]   CULTURE, BLOOD 1   CULTURE, BLOOD 2   CBC WITH AUTO DIFFERENTIAL    Narrative:     Performed at:  [facility] [address]   Phone [phone]   LIPASE    Narrative:     Performed at:  [facility] [address]   Phone [phone]   LACTATE, SEPSIS    Narrative:     k  Performed at:  [facility] [address]   Phone [phone]   HCG, SERUM, QUALITATIVE    Narrative:     Performed at:  [facility] [address]   Phone [phone]   [address] [facility]       When ordered only abnormal lab results are displayed. All other labs were within normal range or not returned as of this dictation. EKG: When ordered, EKG's are interpreted by the Emergency Department Physician in the absence of a cardiologist.  Please see their note for interpretation of EKG.     RADIOLOGY:   Non-plain film images such as CT, Ultrasound and MRI are read by the radiologist. Plain radiographic images are visualized and preliminarily interpreted by the ED Provider with the below findings:        Interpretation per the Radiologist below, if available at the time of this note:    CT ABDOMEN PELVIS W IV CONTRAST Additional Contrast? None   Final Result   Motion artifact throughout the exam.      Mild hydronephrosis and mild hydroureter bilaterally seen down to the bladder   with moderately severe distention of the urinary bladder which may represent   an element of bladder outlet obstruction which is causing partial obstruction   of the upper collecting systems. Recommend urological correlation. Moderately severe constipation with a severe fecal impaction in the rectum   which is probably causing a partial obstruction of the colon at this level. Recommend clinical follow-up      Questionable mild proctitis with no pelvic mass or active inflammation. Mild bibasilar atelectasis or early infiltrates which is more prominent on   the left. XR INJ CONTRAST GASTRIC TUBE PERC    (Results Pending)     No results found. PROCEDURES   Unless otherwise noted below, none     Procedures  Gastrostomy Tube Replacement Procedure Note    Indication: Spontaneous dislodgement    Procedure: The patient was placed in the supine position and the patient's gastric tube was replaced using an 18 Lebanese gastrostomy tube and the bulb was inflated using 10 cc of normal saline. The placement was verified by x-ray with contrast injection. The patient tolerated the procedure well.     Complications: None        CRITICAL CARE TIME       CONSULTS:  None      EMERGENCY DEPARTMENT COURSE and DIFFERENTIAL DIAGNOSIS/MDM:   Vitals:    Vitals:    02/22/22 1940   BP: 129/86   Pulse: 86   Resp: 16   Temp: 101.8 °F (38.8 °C)   SpO2: 94%       Patient was given the following medications:  Medications   ciprofloxacin (CIPRO) IVPB 400 mg (has no administration in time range)   metronidazole (FLAGYL) 500 mg in NaCl 100 mL IVPB premix (has no administration in time range)   0.9 % sodium chloride bolus (has no administration in time range)   polyethylene glycol (GLYCOLAX) packet 17 g (has no administration in time range)   acetaminophen (TYLENOL) suppository 650 mg (650 mg Rectal Given 2/22/22 2126)   iopamidol (ISOVUE-370) 76 % injection 75 mL (75 mLs IntraVENous Given 2/22/22 2148)           Patient presents for evaluation of dislodged G-tube. On exam, she is resting comfortably in bed no acute distress and nontoxic. Febrile up to 101.8. Lungs are clear to auscultation bilaterally, however, abdomen is distended and rigid. G-tube site is unremarkable. There is no active drainage noted. She was given Tylenol for symptomatic relief and will be reevaluated. CBC and CMP are unremarkable. Lipase 21. Lactic acid 1.2. Blood cultures are pending. Beta-hCG is negative. CT the abdomen and pelvis shows hydroureteronephrosis and severe distention of the urinary bladder concerning for element of bladder outlet obstruction. Crouch catheter was placed with return of approximately 1700 cc. She also has moderately severe constipation and a severe fecal impaction causing partial obstruction of the colon. Questionable mild proctitis as well. Rectal exam is remarkable for copious thick claylike stools with no large palpable stool ball. I was unable to manually disimpact. I spoke with on-call general surgery who recommends every 4 hours soapsuds enema, every 6 hours MiraLAX per G-tube and empiric antibiotic therapy. She was started on Cipro and Flagyl. Hospitalist resume care of the patient at this time. GI and urology will likely need to be consulted as well. Patient stable for admission      FINAL IMPRESSION      1. Proctitis    2. Fecal impaction ([address]    3. Colonic obstruction ([address]    4. Retention of urine    5.  Dislodged gastrostomy tube          DISPOSITION/PLAN   DISPOSITION Admitted 02/22/2022 11:09:56 PM      PATIENT REFERRED TO:  No follow-up provider specified. DISCHARGE MEDICATIONS:  New Prescriptions    No medications on file       DISCONTINUED MEDICATIONS:  Discontinued Medications    CHLORHEXIDINE (PERIDEX) 0.12 % SOLUTION    Take by mouth daily 1 application daily with toothette to clean mouth and gums    DIPHENHYDRAMINE (BENADRYL) 25 MG CAPSULE    12.5 mg by Per G Tube route every 8 hours as needed (swelling)    DIVALPROEX (DEPAKOTE SPRINKLES) 125 MG CAPSULE    500 mg 2 times daily Via g-tube    FOLIC ACID (FOLVITE) 1 MG TABLET    1 tablet by PEG Tube route daily    LACOSAMIDE (VIMPAT) 100 MG TABS TABLET    100 mg by Per G Tube route 2 times daily.     POTASSIUM CHLORIDE 20 MEQ/15ML (10%) ORAL SOLUTION    20 mEq by Per G Tube route 2 times daily 15 ml via g-tube              (Please note that portions of this note were completed with a voice recognition program.  Efforts were made to edit the dictations but occasionally words are mis-transcribed.)    Dave Arais PA-C (electronically signed)           Ameya Alejandre PA-C  02/22/22 8321

## 2022-02-23 NOTE — H&P
[facility] Medicine History & Physical      Patient Name: Joanie Harrington    : 1995    PCP: Paul Mendez MD    Date of Service:  Patient seen and examined on 2022     Chief Complaint:  Dislodged G tube    History Of Present Illness:    Joanie Harrington is a 27 y.o. female presented to ED for evaluation of dislodged G-tube. Patient is nonverbal s/p anoxic brain injury. Unsure how long G-tube has been out. No additional information able to be obtained at this time. Patient was noted to be febrile with temp of 101.8 in ED. Abdomen distended and rigid. CT abdomen/pelvis notable for hydroureteronephrosis and severe distention of the urinary bladder concerning for bladder outlet obstruction. Crouch catheter was placed with return of approximately 1700 cc. Also note moderately severe constipation and severe fecal impaction causing partial obstruction of the colon. Questionable mild  proctitis noted as well. General surgery was consulted in ED and recommended subset enemas every 4 hours, MiraLAX every 6 hours per G-tube and empiric antibiotic therapy. She was started on Cipro and Flagyl.     Past Medical History:    Patient has a past medical history of Acute hepatitis C without hepatic coma, Acute hepatitis C without hepatic coma, Anoxic brain damage ([address] Cardiac arrest ([address] Chronic kidney disease, Chronic viral hepatitis C ([address] Diffuse traumatic brain injury with loss of consciousness greater than 24 hours without return to pre-existing conscious level with patient surviving [facility]), Major depressive disorder, recurrent, unspecified ([address] MRSA (methicillin resistant staph aureus) culture positive, Nicotine dependence, other tobacco product, uncomplicated, Overdose, Poisoning by heroin, undetermined, initial encounter ([address] Schizophrenia, unspecified ([address] Seizures ([address] Streptococcal sepsis, unspecified ([address] TBI (traumatic brain injury) ([address] Unspecified asthma, uncomplicated, and Unspecified sexually transmitted disease. Past Surgical History:    Patient has a past surgical history that includes Gastrostomy tube placement; Gastrostomy tube placement; Upper gastrointestinal endoscopy (N/A, 11/26/2019); and Gastrostomy tube placement (N/A, 11/26/2019). Medications Prior to Admission:      Prior to Admission medications    Medication Sig Start Date End Date Taking?  Authorizing Provider   Baclofen 5 MG TABS Take 5 mg by mouth 2 times daily   Yes Historical Provider, MD   Ascorbic Acid (VITAMIN C) 250 MG tablet Take 250 mg by mouth daily   Yes Historical Provider, MD   ibuprofen (ADVIL;MOTRIN) 800 MG tablet 800 mg by Per G Tube route every 6 hours as needed for Pain   Yes Historical Provider, MD   paliperidone palmitate ER (INVEGA SUSTENNA) 117 MG/0.75ML MOSES IM injection Inject 117 mg into the muscle every 30 days At bedtime on the 8th of the month   Yes Historical Provider, MD   QUEtiapine (SEROQUEL) 200 MG tablet 300 mg by Per G Tube route once    Yes Historical Provider, MD   Cholecalciferol (VITAMIN D3) 125 MCG (5000 UT) TABS 1 tablet by Per G Tube route daily   Yes Historical Provider, MD   sertraline (ZOLOFT) 20 MG/ML concentrated solution 200 mg by Per G Tube route daily    Yes Historical Provider, MD   NONFORMULARY by Gastrostomy Tube route nightly Flush feeding with 180 ml of water   Yes Historical Provider, MD   NONFORMULARY by Gastrostomy Tube route daily Enteral feed flush with 150 ml of water daily in am   Yes Historical Provider, MD   levETIRAcetam (KEPPRA) 100 MG/ML solution 5 mLs by PEG Tube route 2 times daily 8/26/17  Yes Raad Tsang MD   QUEtiapine (SEROQUEL) 25 MG tablet 1 tablet by PEG Tube route 2 times daily  Patient taking differently: 150 mg by Per G Tube route nightly  8/26/17  Yes Raad Tsang MD   famotidine (PEPCID) 20 MG tablet 1 tablet by PEG Tube route 2 times daily 8/26/17  Yes Raad Tsang MD   benztropine (COGENTIN) 1 MG tablet Take 2 tablets by mouth 2 times daily  Patient taking differently: Take 1 mg by mouth 3 times daily  8/26/17  Yes Roverto Burrell MD   polyethylene glycol [facility]) 17 GM/SCOOP powder Take 17 g by mouth daily    Historical Provider, MD   lacosamide (VIMPAT) 100 MG TABS tablet Take 100 mg by mouth 2 times daily. Historical Provider, MD       Allergies:  Patient has no known allergies. Social History:      TOBACCO:   reports that she quit smoking about 10 years ago. Her smoking use included cigarettes. She quit after 2.00 years of use. She has never used smokeless tobacco.  ETOH:   reports no history of alcohol use. DRUGS:  reports no history of drug use. Family History:      Reviewed in detail positive as follows:        Problem Relation Age of Onset    High Cholesterol Father     Cancer Paternal Grandmother     COPD Mother        REVIEW OF SYSTEMS:   Unable to obtain due to patient nonverbal    PHYSICAL EXAM PERFORMED:    /86   Pulse 86   Temp 101.8 °F (38.8 °C)   Resp 16   SpO2 94%     General appearance:  Awake, alert, no apparent distress  HEENT:  Normocephalic, atraumatic without obvious deformity. PERRL. EOM intact. Conjunctivae/corneas clear. Neck: Supple, with full range of motion. No JVD. Trachea midline. Respiratory:  Clear to auscultation bilaterally without rales, wheezes, or rhonchi. Normal respiratory effort. Cardiovascular:  Regular rate and rhythm without murmurs, rubs or gallops. Abdomen: Soft, NT, ND, without rebound or guarding. Normal bowel sounds. Extremities:  No clubbing, cyanosis, or edema bilaterally. Full range of motion without deformity. +2 palpable pulses, equal bilaterally. Capillary refill brisk,< 3 seconds   Skin: No rashes or lesions. Warm/dry. Neurologic: Limited due to nonverbal status. Moves all extremities spontaneously. Grossly nonfocal.  Patient awake and alert. Psychiatric:  Thought content appropriate, normal insight.     Labs: CBC   Recent Labs     02/22/22 2011   WBC 8.8   HGB 13.0   HCT 39.2           RENAL  Recent Labs     02/22/22 2011      K 3.8      CO2 24   BUN 25*   CREATININE 0.8       LFTS  Recent Labs     02/22/22 2011   AST 14*   ALT 18   BILITOT 0.3   ALKPHOS 53       COAG  No results for input(s): INR in the last 72 hours. CARDIAC ENZYMES  No results for input(s): TROPONINI in the last 72 hours. LIPIDS  No results found for: CHOL, TRIG, HDL, LDLCALC      Radiology:     CT ABDOMEN PELVIS W IV CONTRAST Additional Contrast? None   Final Result   Motion artifact throughout the exam.      Mild hydronephrosis and mild hydroureter bilaterally seen down to the bladder   with moderately severe distention of the urinary bladder which may represent   an element of bladder outlet obstruction which is causing partial obstruction   of the upper collecting systems. Recommend urological correlation. Moderately severe constipation with a severe fecal impaction in the rectum   which is probably causing a partial obstruction of the colon at this level. Recommend clinical follow-up      Questionable mild proctitis with no pelvic mass or active inflammation. Mild bibasilar atelectasis or early infiltrates which is more prominent on   the left. ASSESSMENT/PLAN:    Colonic obstruction/fecal impaction/proctitis  Cipro and Flagyl initiated in ED. Will continue. Soapsuds enemas and Miralax  General surgery consulted    Urinary retention  Possibly due to above versus UTI  Started on Cipro for above  Crouch in place  Follow-up urine culture    DVT prophylaxis: Lovenox  Probiotic if on abx: Yes    Diet: Diet NPO  Code Status: Full Code    Consults:  IP CONSULT TO GENERAL SURGERY    Disposition: Admit to Inpatient   ELOS: Greater than two midnights due to medical therapy     Arcenio Woods PA-C    Thank you Musa Whiteside MD for the opportunity to be involved in this patient's care.  If you have any questions or concerns please feel free to contact me at [phone].

## 2022-02-24 LAB
A/G RATIO: 1.3 (ref 1.1–2.2)
ALBUMIN SERPL-MCNC: 3.4 G/DL (ref 3.4–5)
ALP BLD-CCNC: 41 U/L (ref 40–129)
ALT SERPL-CCNC: 12 U/L (ref 10–40)
ANION GAP SERPL CALCULATED.3IONS-SCNC: 9 MMOL/L (ref 3–16)
APTT: 29.7 SEC (ref 26.2–38.6)
AST SERPL-CCNC: 13 U/L (ref 15–37)
BASOPHILS ABSOLUTE: 0.1 K/UL (ref 0–0.2)
BASOPHILS RELATIVE PERCENT: 1.1 %
BILIRUB SERPL-MCNC: 0.3 MG/DL (ref 0–1)
BUN BLDV-MCNC: 10 MG/DL (ref 7–20)
CALCIUM SERPL-MCNC: 8.7 MG/DL (ref 8.3–10.6)
CHLORIDE BLD-SCNC: 106 MMOL/L (ref 99–110)
CO2: 25 MMOL/L (ref 21–32)
CREAT SERPL-MCNC: <0.5 MG/DL (ref 0.6–1.1)
EOSINOPHILS ABSOLUTE: 0.2 K/UL (ref 0–0.6)
EOSINOPHILS RELATIVE PERCENT: 3.1 %
GFR AFRICAN AMERICAN: >60
GFR NON-AFRICAN AMERICAN: >60
GLUCOSE BLD-MCNC: 104 MG/DL (ref 70–99)
HCT VFR BLD CALC: 35.2 % (ref 36–48)
HEMOGLOBIN: 11.4 G/DL (ref 12–16)
INR BLD: 1.2 (ref 0.88–1.12)
LACTIC ACID: 1.3 MMOL/L (ref 0.4–2)
LYMPHOCYTES ABSOLUTE: 2.4 K/UL (ref 1–5.1)
LYMPHOCYTES RELATIVE PERCENT: 40.5 %
MAGNESIUM: 1.8 MG/DL (ref 1.8–2.4)
MCH RBC QN AUTO: 30 PG (ref 26–34)
MCHC RBC AUTO-ENTMCNC: 32.3 G/DL (ref 31–36)
MCV RBC AUTO: 92.7 FL (ref 80–100)
MONOCYTES ABSOLUTE: 0.6 K/UL (ref 0–1.3)
MONOCYTES RELATIVE PERCENT: 9.5 %
NEUTROPHILS ABSOLUTE: 2.7 K/UL (ref 1.7–7.7)
NEUTROPHILS RELATIVE PERCENT: 45.8 %
PDW BLD-RTO: 12.9 % (ref 12.4–15.4)
PHOSPHORUS: 3.1 MG/DL (ref 2.5–4.9)
PLATELET # BLD: 123 K/UL (ref 135–450)
PLATELET SLIDE REVIEW: ADEQUATE
PMV BLD AUTO: 9.6 FL (ref 5–10.5)
POTASSIUM SERPL-SCNC: 3.6 MMOL/L (ref 3.5–5.1)
PREALBUMIN: 18 MG/DL (ref 20–40)
PROTHROMBIN TIME: 13.7 SEC (ref 9.9–12.7)
RBC # BLD: 3.8 M/UL (ref 4–5.2)
SLIDE REVIEW: ABNORMAL
SODIUM BLD-SCNC: 140 MMOL/L (ref 136–145)
TOTAL PROTEIN: 6 G/DL (ref 6.4–8.2)
TRANSFERRIN: 149 MG/DL (ref 200–360)
URINE CULTURE, ROUTINE: NORMAL
WBC # BLD: 5.9 K/UL (ref 4–11)

## 2022-02-24 PROCEDURE — 99232 SBSQ HOSP IP/OBS MODERATE 35: CPT | Performed by: SURGERY

## 2022-02-24 PROCEDURE — 80053 COMPREHEN METABOLIC PANEL: CPT

## 2022-02-24 PROCEDURE — 83735 ASSAY OF MAGNESIUM: CPT

## 2022-02-24 PROCEDURE — 85025 COMPLETE CBC W/AUTO DIFF WBC: CPT

## 2022-02-24 PROCEDURE — 97162 PT EVAL MOD COMPLEX 30 MIN: CPT

## 2022-02-24 PROCEDURE — 2580000003 HC RX 258: Performed by: INTERNAL MEDICINE

## 2022-02-24 PROCEDURE — 84466 ASSAY OF TRANSFERRIN: CPT

## 2022-02-24 PROCEDURE — 97530 THERAPEUTIC ACTIVITIES: CPT

## 2022-02-24 PROCEDURE — 6360000002 HC RX W HCPCS: Performed by: PHYSICIAN ASSISTANT

## 2022-02-24 PROCEDURE — 97166 OT EVAL MOD COMPLEX 45 MIN: CPT

## 2022-02-24 PROCEDURE — APPNB30 APP NON BILLABLE TIME 0-30 MINS: Performed by: NURSE PRACTITIONER

## 2022-02-24 PROCEDURE — 83605 ASSAY OF LACTIC ACID: CPT

## 2022-02-24 PROCEDURE — 84134 ASSAY OF PREALBUMIN: CPT

## 2022-02-24 PROCEDURE — APPSS15 APP SPLIT SHARED TIME 0-15 MINUTES: Performed by: NURSE PRACTITIONER

## 2022-02-24 PROCEDURE — 1200000000 HC SEMI PRIVATE

## 2022-02-24 PROCEDURE — 6360000002 HC RX W HCPCS: Performed by: INTERNAL MEDICINE

## 2022-02-24 PROCEDURE — 2580000003 HC RX 258: Performed by: PHYSICIAN ASSISTANT

## 2022-02-24 PROCEDURE — 85610 PROTHROMBIN TIME: CPT

## 2022-02-24 PROCEDURE — 2500000003 HC RX 250 WO HCPCS: Performed by: PHYSICIAN ASSISTANT

## 2022-02-24 PROCEDURE — 6370000000 HC RX 637 (ALT 250 FOR IP): Performed by: PHYSICIAN ASSISTANT

## 2022-02-24 PROCEDURE — 92610 EVALUATE SWALLOWING FUNCTION: CPT

## 2022-02-24 PROCEDURE — 85730 THROMBOPLASTIN TIME PARTIAL: CPT

## 2022-02-24 PROCEDURE — 36415 COLL VENOUS BLD VENIPUNCTURE: CPT

## 2022-02-24 PROCEDURE — 84100 ASSAY OF PHOSPHORUS: CPT

## 2022-02-24 PROCEDURE — 92526 ORAL FUNCTION THERAPY: CPT

## 2022-02-24 RX ADMIN — Medication 10 ML: at 09:11

## 2022-02-24 RX ADMIN — CIPROFLOXACIN 400 MG: 2 INJECTION, SOLUTION INTRAVENOUS at 16:56

## 2022-02-24 RX ADMIN — LORAZEPAM 1 MG: 2 INJECTION INTRAMUSCULAR; INTRAVENOUS at 09:09

## 2022-02-24 RX ADMIN — FAMOTIDINE 20 MG: 20 TABLET ORAL at 21:24

## 2022-02-24 RX ADMIN — BENZTROPINE MESYLATE 1 MG: 1 TABLET ORAL at 09:15

## 2022-02-24 RX ADMIN — LEVETIRACETAM 500 MG: 100 SOLUTION ORAL at 09:14

## 2022-02-24 RX ADMIN — METRONIDAZOLE 500 MG: 500 INJECTION, SOLUTION INTRAVENOUS at 16:56

## 2022-02-24 RX ADMIN — Medication 1 CAPSULE: at 16:31

## 2022-02-24 RX ADMIN — LORAZEPAM 1 MG: 2 INJECTION INTRAMUSCULAR; INTRAVENOUS at 13:47

## 2022-02-24 RX ADMIN — METRONIDAZOLE 500 MG: 500 INJECTION, SOLUTION INTRAVENOUS at 09:39

## 2022-02-24 RX ADMIN — Medication 10 ML: at 21:25

## 2022-02-24 RX ADMIN — ENOXAPARIN SODIUM 40 MG: 40 INJECTION SUBCUTANEOUS at 09:39

## 2022-02-24 RX ADMIN — BACLOFEN 5 MG: 10 TABLET ORAL at 09:14

## 2022-02-24 RX ADMIN — LACOSAMIDE 100 MG: 100 TABLET, FILM COATED ORAL at 21:23

## 2022-02-24 RX ADMIN — SERTRALINE 200 MG: 50 TABLET, FILM COATED ORAL at 09:15

## 2022-02-24 RX ADMIN — LEVETIRACETAM 500 MG: 100 SOLUTION ORAL at 21:22

## 2022-02-24 RX ADMIN — BENZTROPINE MESYLATE 1 MG: 1 TABLET ORAL at 21:24

## 2022-02-24 RX ADMIN — LORAZEPAM 1 MG: 2 INJECTION INTRAMUSCULAR; INTRAVENOUS at 21:22

## 2022-02-24 RX ADMIN — CIPROFLOXACIN 400 MG: 2 INJECTION, SOLUTION INTRAVENOUS at 05:02

## 2022-02-24 RX ADMIN — SODIUM CHLORIDE 25 ML: 9 INJECTION, SOLUTION INTRAVENOUS at 09:38

## 2022-02-24 RX ADMIN — DEXTROSE MONOHYDRATE: 50 INJECTION, SOLUTION INTRAVENOUS at 05:02

## 2022-02-24 RX ADMIN — LORAZEPAM 1 MG: 2 INJECTION INTRAMUSCULAR; INTRAVENOUS at 00:27

## 2022-02-24 RX ADMIN — BACLOFEN 5 MG: 10 TABLET ORAL at 21:23

## 2022-02-24 RX ADMIN — QUETIAPINE FUMARATE 150 MG: 25 TABLET ORAL at 21:23

## 2022-02-24 RX ADMIN — Medication 1 CAPSULE: at 09:15

## 2022-02-24 RX ADMIN — FAMOTIDINE 20 MG: 20 TABLET ORAL at 09:15

## 2022-02-24 RX ADMIN — BENZTROPINE MESYLATE 1 MG: 1 TABLET ORAL at 13:47

## 2022-02-24 RX ADMIN — LACOSAMIDE 100 MG: 100 TABLET, FILM COATED ORAL at 09:15

## 2022-02-24 RX ADMIN — SODIUM CHLORIDE 25 ML: 9 INJECTION, SOLUTION INTRAVENOUS at 16:49

## 2022-02-24 ASSESSMENT — PAIN SCALES - GENERAL
PAINLEVEL_OUTOF10: 0

## 2022-02-24 NOTE — PROGRESS NOTES
Speech Language Pathology  Chepe Clay   1995     Second attempt made to address SLP Clinical swallow Evaluation order. Per nurse report, Pt is currently held NPO per surgery and GI with clinical swallow evaluation unable to be completed at this time. Will re-attempt when medical clearance to allow po is given and as schedule allows.     Reji CARBAJALHubbard Regional Hospital#[id number]

## 2022-02-24 NOTE — CARE COORDINATION
Discharge Planning Assessment    RN/SW discharge planner met with patient/ (and family member) to discuss reason for admission, current living situation, and potential needs at the time of discharge    Demographics/Insurance verified:  Yes    Current type of dwelling:  LTC at the [facility]    Patient from ECF/SW confirmed with:  Fatoumata Cummingsr in admissions. Stated pt was a newer resident but is LTC. Living arrangements:  LTC    Level of function/Support:  Has anoxic brain injury and is mostly bed bound. Tentative discharge plan:  Back to LTC at the [facility]. PT/OT pending but pt is mostly bed bound. Unsure if will qualify but will follow. No need for any COVID test to return. Transportation at the time of discharge:  Ambulance.     Electronically signed by ERUM Alejandra, KEILAW on 2/24/2022 at 1:00 PM

## 2022-02-24 NOTE — PROGRESS NOTES
Progress Note    Patient Rajendra Lopez  MRN: [id number]  YOB: 1995 Age: 27 y.o. Sex: female  Room: [address] [place] [address]       Admitting Physician: Bienvenido Costa MD   Date of Admission: 2/22/2022  7:36 PM   Primary Care Physician: Juanis Vázquez MD     Subjective:  Rajendra Lopez was seen and examined. We are following for constipation and fecal impaction. Overnight patient had multiple bowel movement. ROS:  Constitutional: Denies fever, no change in appetite  Respiratory: Denies cough or shortness of breath  Cardiovascular: Denies chest pain or edema    Objective:  Vital Signs:   Vitals:    02/24/22 1002   BP:    Pulse: 76   Resp:    Temp:    SpO2:          Physical Exam:  Constitutional: Alert and oriented x 4. No acute distress. Respiratory: Respirations nonlabored, no crepitus  GI: Abdomen nondistended, soft, and nontender. Neurological: No focal deficits noted. No asterixis.     Intake/Output:    Intake/Output Summary (Last 24 hours) at 2/24/2022 1112  Last data filed at 2/24/2022 0745  Gross per 24 hour   Intake --   Output 800 ml   Net -800 ml        Current Medications:  Current Facility-Administered Medications   Medication Dose Route Frequency Provider Last Rate Last Admin    baclofen (LIORESAL) tablet 5 mg  5 mg Oral BID Carol Anna PA-C   5 mg at 02/24/22 9493    benztropine (COGENTIN) tablet 1 mg  1 mg Oral TID Carol Anna PA-C   1 mg at 02/24/22 0915    famotidine (PEPCID) tablet 20 mg  20 mg PEG Tube BID YOMAIRA CanasC   20 mg at 02/24/22 0915    lacosamide (VIMPAT) tablet 100 mg  100 mg Oral BID Carol Anna PA-C   100 mg at 02/24/22 0915    levETIRAcetam (KEPPRA) 100 MG/ML solution 500 mg  500 mg PEG Tube BID Carol Anna PA-C   500 mg at 02/24/22 1846    QUEtiapine (SEROQUEL) tablet 150 mg  150 mg Per G Tube Nightly Carol Anna PA-C        sertraline (ZOLOFT) tablet 200 mg  200 mg Per G Tube Daily YOMAIRA CanasC 200 mg at 02/24/22 0915    sodium chloride flush 0.9 % injection 10 mL  10 mL IntraVENous 2 times per day GRABIEL De Jesus-C   10 mL at 02/24/22 0911    sodium chloride flush 0.9 % injection 10 mL  10 mL IntraVENous PRN GRABIEL De Jesus-C        0.9 % sodium chloride infusion  25 mL IntraVENous PRN GRABIEL De Jesus-C 100 mL/hr at 02/24/22 0938 25 mL at 02/24/22 0938    enoxaparin (LOVENOX) injection 40 mg  40 mg SubCUTAneous Daily GRABIEL De Jesus-C   40 mg at 02/24/22 3292    acetaminophen (TYLENOL) tablet 650 mg  650 mg Oral Q6H PRN Peter Yee PA-C        Or    acetaminophen (TYLENOL) suppository 650 mg  650 mg Rectal Q6H PRN GRABIEL De Jesus-C   650 mg at 02/23/22 0322    ondansetron (ZOFRAN) injection 4 mg  4 mg IntraVENous Q6H PRN Peter Yee PA-C        ciprofloxacin (CIPRO) IVPB 400 mg  400 mg IntraVENous Q12H GRABIEL De Jesus-C   Stopped at 02/24/22 0559    metronidazole (FLAGYL) 500 mg in NaCl 100 mL IVPB premix  500 mg IntraVENous Q8H GRABIEL De Jesus-C   Stopped at 02/24/22 1050    lactobacillus (CULTURELLE) capsule 1 capsule  1 capsule Oral BID WC GRABIEL De Jesus-C   1 capsule at 02/24/22 0915    LORazepam (ATIVAN) injection 1 mg  1 mg IntraVENous Q4H PRN Manju Coronel MD   1 mg at 02/24/22 0909    dextrose 5 % solution   IntraVENous Continuous Manju Coronel  mL/hr at 02/24/22 1051 Restarted at 02/24/22 1051    ketorolac (TORADOL) injection 30 mg  30 mg IntraVENous Q6H PRN Manju Coronel MD   30 mg at 02/23/22 0903    polyethylene glycol (GLYCOLAX) packet 17 g  17 g Per G Tube 4x Daily PRN Roxanna Morgan PA-C   17 g at 02/23/22 1058         Recent labs and imaging reviewed. Assessment:  Constipation/proctitis per CT. Noted to have fecal impaction. She is responding to enemas and MiraLAX. Nursing staff reported more than 3 bowel movements since last evening. Plan:  1.  Continue MiraLAX and tapwater MD Marti Parker    [phone].  Also available via [employer]

## 2022-02-24 NOTE — PROGRESS NOTES
Physical Therapy    Facility/Department: [address] NURSING  Initial Assessment    NAME: Luiza Hopson  : 1995  MRN: [id number]    Date of Service: 2022    Discharge Recommendations: Luiza Hopson scored a  on the AM-PAC short mobility form. Current research shows that an AM-PAC score of 17 or less is typically not associated with a discharge to the patient's home setting. Based on the patient's AM-PAC score and their current functional mobility deficits, it is recommended that the patient have 3-5 sessions per week of Physical Therapy at d/c to increase the patient's independence. Please see assessment section for further patient specific details. If patient discharges prior to next session this note will serve as a discharge summary. Please see below for the latest assessment towards goals. PT Equipment Recommendations  Equipment Needed: No (defer)    Assessment   Body structures, Functions, Activity limitations: Decreased functional mobility ; Decreased cognition;Decreased strength;Decreased endurance;Decreased balance;Decreased posture;Decreased coordination;Decreased fine motor control  Assessment: Pt presents with colonic obstruction from LTC. Pt has hx of anoxic brain injury and is noverbal at baseline however is able to follow commands. Prior level of function was not able to be obtained despite attempts to call facility. At this time, pt is able to complete bed mobility with min A, sit EOB with Jeffery-max A, and stand at RW ~5 mins with min-mod A x2. Pt seems motivated to participate in PT and would continue to benefit from skilled PT to improve functional mobility.   Treatment Diagnosis: decreased functional mobility  Prognosis: Good  Decision Making: Medium Complexity  Clinical Presentation: evolving  PT Education: Goals;PT Role;Plan of Care  Patient Education: Pt will require reinforcement for carry over  Barriers to Learning: cognition  REQUIRES PT FOLLOW UP: Yes  Activity Tolerance  Activity Tolerance: Patient Tolerated treatment well  Activity Tolerance: Pt is mostly nonverbal but she is motivated to participate in therapy and good at command following this date       Patient Diagnosis(es): The primary encounter diagnosis was Proctitis. Diagnoses of Fecal impaction ([facility] Colonic obstruction ([facility] Retention of urine, and Dislodged gastrostomy tube were also pertinent to this visit. has a past medical history of Acute hepatitis C without hepatic coma, Acute hepatitis C without hepatic coma, Anoxic brain damage (H[facility]), Cardiac arrest ([facility] Chronic kidney disease, Chronic viral hepatitis C ([address] Diffuse traumatic brain injury with loss of consciousness greater than 24 hours without return to pre-existing conscious level with patient surviving [facility]), Major depressive disorder, recurrent, unspecified ([facility] MRSA (methicillin resistant staph aureus) culture positive, Nicotine dependence, other tobacco product, uncomplicated, Overdose, Poisoning by heroin, undetermined, initial encounter ([facility] Schizophrenia, unspecified ([facility] Seizures ([facility] Streptococcal sepsis, unspecified ([facility] TBI (traumatic brain injury) ([facility] Unspecified asthma, uncomplicated, and Unspecified sexually transmitted disease. has a past surgical history that includes Gastrostomy tube placement; Gastrostomy tube placement; Upper gastrointestinal endoscopy (N/A, 11/26/2019); and Gastrostomy tube placement (N/A, 11/26/2019). Restrictions  Restrictions/Precautions  Restrictions/Precautions: Fall Risk,Contact Precautions (high fall risk, contact, NPO)  Position Activity Restriction  Other position/activity restrictions: Julianne Mcrae is a 27 y.o. female who presents evaluation of dislodged G-tube. Patient is nonverbal status post anoxic brain injury. Unsure how long G-tube has been out. No additional information is able to be obtained at this time.   Vision/Hearing  Vision:  (unable to formally assess)  Hearing:  (unable to formally assess)     Subjective  General  Chart Reviewed: Yes  Patient assessed for rehabilitation services?: Yes  Response To Previous Treatment: Not applicable  Family / Caregiver Present: Yes (sitter present upon arrival and EOS)  Follows Commands: Within Functional Limits  Other (Comment): Despite pt's lack of verbal comminucation, pt does excellent job at command following and is very pleasant  General Comment  Comments: Pt supine in bed upon arrival. Pt agreeable to PT/OT eval  Subjective  Subjective: Pt reports no pain at this time. Pt is very soft spoken and has difficulty word finding  Pain Screening  Patient Currently in Pain: No  Vital Signs  Patient Currently in Pain: No       Orientation  Orientation  Overall Orientation Status: Impaired (pt is able to say her first name)  Social/Functional History  Social/Functional History  Type of Home: Facility  Additional Comments: From [facility]. Attempted to call facility on PLOF however kept getting forwarded to a voicemail  Cognition        Objective     Observation/Palpation  Posture: Poor (L lateral neck flexion)  Observation: L lateral head flexion, L posterior-lateral trunk leaning during suppored/unsupported sitting/standing; bilateral falangial flexion contractures of digits 1, 4, &5--grasping/pinch difficulty. Increased flexor tone in bilateral elbows, but denies pain with passive elbow extension.     PROM RLE (degrees)  RLE PROM: WFL  AROM RLE (degrees)  RLE AROM: WFL  PROM LLE (degrees)  LLE PROM: WFL  AROM LLE (degrees)  LLE AROM : WFL  Strength RLE  Comment: Pt is able to perform seated marches, LAQ and ankle pumps  Strength LLE  Comment: Pt is able to perform seated marches, LAQ and ankle pumps  Tone RLE  RLE Tone: Normotonic  Tone LLE  LLE Tone: Normotonic  Motor Control  Gross Motor?:  (UE tone noted (see OT notes))  Sensation  Overall Sensation Status:  (unable to formally assess)  Bed mobility  Supine to Sit: Minimal assistance (pulls up on therapist)  Sit to Supine: Moderate assistance  Scooting: Maximal assistance  Transfers  Sit to Stand: Moderate Assistance;Minimal Assistance (mod A+min A x2)  Stand to sit: Minimal Assistance; Moderate Assistance (mod +min A x2)  Comment: Pt performs 2 STS from EOB to RW with min +mod A progressing to min A x2  Ambulation  Ambulation?: No (deemed unsafe due to strong posterior lean)     Balance  Posture: Fair (left lateral neck flexion)  Sitting - Static: Fair (mod-min A)  Sitting - Dynamic: Fair;- (mod-min A)  Standing - Static: Poor;+ (min Ax2)  Standing - Dynamic: Poor (min A x2)  Comments: Pt sat EOB with min-mod A due to posterior and L lateral lean ~10 mins while assisting with clothing change and bathing. Pt stood 1 min at RW + 5 min RW with min A x2. Pt completes lateral weight shifts in standing with min A x2        Plan   Plan  Times per week: 3-5x  Times per day: Daily  Current Treatment Recommendations: Strengthening,Balance Training,Functional Mobility Training,Transfer Training,Gait Training,Cognitive Reorientation  Safety Devices  Type of devices:  All fall risk precautions in place,Call light within reach,Bed alarm in place,Gait belt,Patient at risk for falls,Left in bed,Sitter present,Nurse notified  Restraints  Initially in place: No    G-Code       OutComes Score                                                  AM-PAC Score  AM-PAC Inpatient Mobility Raw Score : 11 (02/24/22 1357)  AM-PAC Inpatient T-Scale Score : 33.86 (02/24/22 1357)  Mobility Inpatient CMS 0-100% Score: 72.57 (02/24/22 1357)  Mobility Inpatient CMS G-Code Modifier : CL (02/24/22 1357)          Goals  Short term goals  Time Frame for Short term goals: upon discharge  Short term goal 1: Pt will be able to perform bed mobility with SBA  Short term goal 2: Pt will be able to perform transfers with CGA  Short term goal 3: Pt will be able to ambulate 10 ft with LRAD and CGA       Therapy Time Individual Concurrent Group Co-treatment   Time In 1100         Time Out 1147         Minutes 47              Timed Code Treatment Minutes:   32    Total Treatment Minutes:  [address] [facility], [address], DPT [id number]

## 2022-02-24 NOTE — PROGRESS NOTES
Facility/Department: [address] NURSING  Initial Assessment  DYSPHAGIA BEDSIDE SWALLOW EVALUATION     Patient: Jenny Mckeon   : 1995   MRN: [id number]      Evaluation Date: 2022   Admitting Diagnosis: Proctitis [K62.89]  Colonic obstruction ([address] [K56.609]  Retention of urine [R33.9]  Dislodged gastrostomy tube [S96.589C]  Fecal impaction ([address] [K56.41]  Pain:denies                         H&P:   Anant Ford is a 27 y.o. female presented to ED for evaluation of dislodged G-tube. Patient is nonverbal s/p anoxic brain injury. Unsure how long G-tube has been out. No additional information able to be obtained at this time. Patient was noted to be febrile with temp of 101.8 in ED. Abdomen distended and rigid. CT abdomen/pelvis notable for hydroureteronephrosis and severe distention of the urinary bladder concerning for bladder outlet obstruction. Crouch catheter was placed with return of approximately 1700 cc. Also note moderately severe constipation and severe fecal impaction causing partial obstruction of the colon. Questionable mild  proctitis noted as well. General surgery was consulted in ED and recommended subset enemas every 4 hours, MiraLAX every 6 hours per G-tube and empiric antibiotic therapy. She was started on Cipro and Flagyl. \"    Modified Barium Swallow Study: \"Severe oral stage dysphagia characterized by poor ability to masticate and poor lingula manipulation. · All PO with prolonged oral transit d/t limited lingual manipulation for bolus prep and movement. · Premature spillage to the pharynx with all trials. · Significant oral residue is spontaneously minimized with repeat swallow. · Left labial spillage with all trials. Severe pharyngeal stage dysphagia characterized by significantly delayed swallow that does not trigger until the level of pyriform, decreased laryngeal elevation, and decreased pharyngeal peristalsis.     · All trials pool to the pyriform prior to swallow. · SILENT aspiration with nectar via cup and teaspoon which is a decline in swallow function compared to prior MBS completed in 2019. · Patient unable to self-administer PO which also appears to be a decline in function/status compared to prior\"    History/Prior Level of Function:   Living Status: ECF   Prior Dysphagia History: Pt with hx of dysphagia following anoxic brain injury. Most recent MBS completed 7/20/2020 (see above) recommended Dysphagia I Pureed with Moderately Thick (honey) Liquids. Pt with PEG tube in place to assist with meeting nutritional needs. Pt is able to intermittently able to respond to simple/functional yes/no questions and follow simple commands. She is unable to provide any hx. Dysphagia Impressions/Diagnosis: Oropharyngeal Dysphagia   Pt was positioned upright in bed. She was noted to have lean to the left and required frequent repositioning. Oral mechanism exam; open mouth posture at rest, decreased ROM/coordination. Trials of baseline diet level were provided to assess swallow function. Pt required assistance from SLP for feeding, she attempted to self feed via cup but required hand over hand assistance. Pt demonstrated delayed/decreased labial seal, decreased bolus manipulation with suspected premature bolus loss, suspected delayed swallow initiation, decreased laryngeal elevation and questionable delayed pharyngeal clearing. Anterior loss of saliva and some PO was noted from left side. Swallow timing appeared inconsistent. No overt clinical s/s of aspiration/penetration were assessed however, pt demonstrates increased risk due to prior hx of dysphagia, inability to self feed and dysphagia noted at bedside. Recommend initiation of baseline diet level with full feeding assistance.      Recommended Diet and Intervention 2/24/2022:  Diet Solids Recommendation:  Dysphagia I Pureed  Liquid Consistency Recommendation:  Moderately Thick (honey) Liquids   Recommended form of Meds: meds crushed with puree vs via PEG tube      Compensatory Swallowing Strategies: Alternate solids/liquids , Check for pocketing of food L, Check for pocketing of food R, Upright as possible with all PO intake , Eat/feed slowly, Remain upright 30-45 min , Total Feed     SHORT TERM DYSPHAGIA GOALS/PLAN OF CARE: Speech therapy for dysphagia tx 1-2 times to ensure diet tolerance.   Pt will functionally tolerate recommended diet with no overt clinical s/s of aspiration    Dysphagia Therapeutic Intervention:  Oral Care, Patient/Family Education , Therapeutic Trials with SLP     Discharge Recommendations: Do not anticipate need for further speech/dysphagia therapy upon discharge from hospital     Patient Positioning: Upright in bed     Current Diet Level (prior to evaluation): NPO      Respiratory Status:   [x]Room Air   []O2 via nasal cannula   []Other:    Dentition:  [x]Adequate  []Dentures   []Missing Many Teeth  []Edentulous  []Other:    Baseline Vocal Quality:  []Normal  []Dysphonic   []Aphonic   []Hoarse  []Wet  []Weak  [x]Other: limited attempts at vocalizations     Volitional Swallow:   []Absent   []Delayed     []Adequate     [x]Required use of drink     Oral Mechanism Exam:  []WFL [x]Mild   [] Moderate  []Severe  []To be assessed  Impaired:   []Left side      []Right side    [x]Labial ROM/Coordination    []Labial Symmetry   [x]Lingual ROM/Coordination   []Lingual Symmetry  []Gag  []Other:     Oral Phase: []WFL []Mild   [x] Moderate  []Severe  []To be assessed   [x]Impaired/Prolonged Mastication:   []Spillage Left:   []Spillage Right:  []Pocketing Left:   []Pocketing Right:   [x]Decreased Anterior to Posterior Transit:   [x]Suspected Premature Bolus Loss:   []Lingual/Palatal Residue:   [x]Other: decreased labial seal     Pharyngeal Phase: []WFL []Mild   [x] Moderate  []Severe  []To be assessed   [x]Delayed Swallow:   [x]Suspected Pharyngeal Pooling:   []Decreased Laryngeal Elevation:   []Absent Swallow:  []Wet Vocal Quality:   []Throat Clearing-Immediate:   []Throat Clearing-Delayed:   []Cough-Immediate:   []Cough-Delayed:  []Change in Vital Signs:  [x]Suspected Delayed Pharyngeal Clearing:  []Other:       Eating Assistance:  []Independent  []Setup or clean-up assistance   [] Supervision or touching assistance   [] Partial or moderate assistance   [] Substantial or maximal assistance  [x] Dependent       EDUCATION:   Provided education regarding role of SLP, results of assessment, recommendations and general speech pathology plan of care. [] Pt verbalized understanding and agreement   [] Pt requires ongoing learning   [x] No evidence of comprehension     If patient discharges prior to next visit, this note will serve as discharge.      Timed Code Minutes: 0 minutes   Total Treatment Minutes: 30 minutes     Electronically signed by:    Maria E Kelly MA [facility]  Speech Language Pathologist

## 2022-02-24 NOTE — PROGRESS NOTES
2/23/22 PM:  Patient lethargic at beginning of shift. Medicated with Ativan 1 mg IV and Tordal 30 mg IV at 0900 2/23/22.  2022 BP 85/44. Held her night meds due to most of them being sedating. 500 ml NS bolus and then /71. Patient then aroused. Large, watery BM's x3 overnight. 0027 medicated with Ativan 1mg IV for anxiousness, restlessness and crying for her kids. Sitter at bedside.

## 2022-02-24 NOTE — PROGRESS NOTES
Urology Progress Note  [facility]    Provider: FRANCK Rodriguez CNP  Patient ID:  Admission Date: 2022 Name: Filippo Colon  OR Date: 2022 MRN: [id number]   Patient Location: [id number] : 1995  Attending: Cathy Dacosta DO Date of Service: 2022  PCP: Christopher Campoverde MD     Diagnoses:  Urinary Retention, Bilateral hydronephrosis, Complex Uribe    Assessment/Plan:  28 yo female non-verbal with hx of anoxic brain injury  Presented with G-tube issues and noted to be febrile 101.8  CT a/p demonstrates bilateral hydroureteronephrosis and a severely distended bladder  Cr is stable at 0.7  UA is suspicious for infection  Exam with distended bladder to the level of the umbilicus      Recommendations:  Complex uribe insertion 2022- see procedure note. Uribe drains clear yellow, no issues with catheter. She likely has a neurogenic bladder which is a new issue for her as it appears as she has not seen urology in the past. Continue uribe and continue abx with f/u on cultures. She would benefit from follow up and discussion of SPT insertion. Home with uribe. The patient had a chance to ask questions which were answered. she understands the above plan. Subjective:   Filippo Colon is a 27 y.o. female. She was seen and examined this morning. Today we discussed follow up in office for SPT. Discussed plan of care with staff at bedside.      Objective:   Vitals:  Vitals:    22 1002   BP:    Pulse: 76   Resp:    Temp:    SpO2:        Intake/Output Summary (Last 24 hours) at 2022 1044  Last data filed at 2022 0745  Gross per 24 hour   Intake --   Output 800 ml   Net -800 ml     Physical Exam:  Gen: Alert and oriented x3, no acute distress  CV: Regular rate   Resp: unlabored respirations  Abd: Soft, non-distended, non-tender, no masses  Ext: no peripheral edema noted, moves upper and lower extremities spontaneously  Skin: warmand well perfused, no rashes noted on the face, or arms.      Labs:  Lab Results   Component Value Date    WBC 5.9 02/24/2022    HGB 11.4 (L) 02/24/2022    HCT 35.2 (L) 02/24/2022    MCV 92.7 02/24/2022     (L) 02/24/2022     Lab Results   Component Value Date    CREATININE <0.5 (L) 02/24/2022    BUN 10 02/24/2022     02/24/2022    K 3.6 02/24/2022     02/24/2022    CO2 25 02/24/2022       FRANCK Reynolds - CNP   2/24/2022

## 2022-02-24 NOTE — PROGRESS NOTES
Occupational Therapy   Occupational Therapy Initial Assessment  Date: 2022   Patient Name: Vimal Chin  MRN: [id number]     : 1995    Date of Service: 2022    Discharge Recommendations:Chepe Peter scored a  on the AM-PAC ADL Inpatient form. Current research shows that an AM-PAC score of 17 or less is typically not associated with a discharge to the patient's home setting. Based on the patient's AM-PAC score and their current ADL deficits, it is recommended that the patient have 3-5 sessions per week of Occupational Therapy at d/c to increase the patient's independence. Please see assessment section for further patient specific details. If patient discharges prior to next session this note will serve as a discharge summary. Please see below for the latest assessment towards goals. Assessment   Performance deficits / Impairments: Decreased functional mobility ; Decreased endurance;Decreased ADL status; Decreased coordination;Decreased posture  Assessment: Pt currently presents with the above funcitonal deficits, although her baseline is unknown at this time (despite unsuccessful attempts to reach pt home/facility staff). She is highly motivated to participate and has the ability to follow/process simple instructions. At this time, she is primarily limited by cognition secondary to old TBI, difficulty with verbal communication, global weakness/deconditioning, and tonal deficits. Continued OT indicated in order to address her deficits and ADL limitations for remainder of hospitalization.   Prognosis: Good  Decision Making: Medium Complexity  History: Patient has a past medical history of Acute hepatitis C without hepatic coma, Acute hepatitis C without hepatic coma, Anoxic brain damage ([address] Cardiac arrest ([address] Chronic kidney disease, Chronic viral hepatitis C ([facility] Diffuse traumatic brain injury with loss of consciousness greater than 24 hours without return to pre-existing conscious level with patient surviving [facility]), Major depressive disorder, recurrent, unspecified ([facility] MRSA (methicillin resistant staph aureus) culture positive, Nicotine dependence, other tobacco product, uncomplicated, Overdose, Poisoning by heroin, undetermined, initial encounter ([facility] Schizophrenia, unspecified ([facility] Seizures ([facility] Streptococcal sepsis, unspecified ([facility] TBI (traumatic brain injury) ([facility] Unspecified asthma, uncomplicated, and Unspecified sexually transmitted disease. Exam: as above  Assistance / Modification: Lift equipment/Max A x2 for transfers. OT Education: OT Role;Plan of Care  Patient Education: DC planning. Barriers to Learning: pt is not an independent learer. REQUIRES OT FOLLOW UP: Yes  Activity Tolerance  Activity Tolerance: Patient Tolerated treatment well  Safety Devices  Safety Devices in place: Yes  Type of devices: Nurse notified; All fall risk precautions in place; Bed alarm in place;Gait belt;Patient at risk for falls; Left in bed  Restraints  Initially in place: No           Patient Diagnosis(es): The primary encounter diagnosis was Proctitis. Diagnoses of Fecal impaction ([facility] Colonic obstruction ([facility] Retention of urine, and Dislodged gastrostomy tube were also pertinent to this visit.      has a past medical history of Acute hepatitis C without hepatic coma, Acute hepatitis C without hepatic coma, Anoxic brain damage ([facility]), Cardiac arrest ([facility] Chronic kidney disease, Chronic viral hepatitis C ([facility] Diffuse traumatic brain injury with loss of consciousness greater than 24 hours without return to pre-existing conscious level with patient surviving [facility]), Major depressive disorder, recurrent, unspecified ([facility] MRSA (methicillin resistant staph aureus) culture positive, Nicotine dependence, other tobacco product, uncomplicated, Overdose, Poisoning by heroin, undetermined, initial encounter ([facility] Schizophrenia, unspecified ([facility] Seizures ([facility] Streptococcal sepsis, unspecified ([facility] TBI (traumatic brain injury) ([facility] Unspecified asthma, uncomplicated, and Unspecified sexually transmitted disease. has a past surgical history that includes Gastrostomy tube placement; Gastrostomy tube placement; Upper gastrointestinal endoscopy (N/A, 11/26/2019); and Gastrostomy tube placement (N/A, 11/26/2019). Restrictions  Restrictions/Precautions  Restrictions/Precautions: Fall Risk,Contact Precautions (high fall risk, contact, NPO)  Position Activity Restriction  Other position/activity restrictions: Heather Decker is a 27 y.o. female who presents evaluation of dislodged G-tube. Patient is nonverbal status post anoxic brain injury. Unsure how long G-tube has been out. No additional information is able to be obtained at this time. Subjective   General  Chart Reviewed: Yes  Patient assessed for rehabilitation services?: Yes  Diagnosis: Proctitis  Subjective  Subjective: Pt supine upon arrival. She denies pain. Cognitively impaired secondary to old TBI; however remains pleasant and agreeable to PT/OT evaluations. PCA/sitter in room upon arrival and departure. Vital Signs  Level of Consciousness: Responds to Voice (1)  Height and Weight  Height: 5' 10\" (177.8 cm)  Social/Functional History  Social/Functional History  Type of Home: Facility  Additional Comments: From [facility]. Attempted to call facility on PLOF however kept getting forwarded to a voicemail       Objective   Vision:  (unable to formally assess)  Hearing:  (unable to formally assess.)    Orientation  Overall Orientation Status: Impaired  Orientation Level: Unable to assess (Pt cognition impacting ability assess.)  Observation/Palpation  Posture: Poor (L lateral neck flexion)  Observation: L lateral head flexion, L posterior-lateral trunk leaning during suppored/unsupported sitting/standing; bilateral falangial flexion contractures of digits 1, 4, &5--grasping/pinch difficulty.  Increased flexor tone in bilateral elbows, but denies pain with passive elbow extension. Balance  Sitting Balance: Maximum assistance (Max A + mod A for supported sitting at EOB x 20 minutes)  Standing Balance  Time: Two trials: 1st trial--1 min at EOB; 2nd trial--5 min at EOB  Activity: standing at EOB  Comment: Pt with wide ASHLI and continues with posterior lean and minor retropulsion; Completed standing with Mod A progressing to Min A x2 with substantial anchoring at RW to counter leaning. Functional Mobility  Functional Mobility Comments: unable to assess. ADL  Feeding: NPO  UE Bathing: Dependent/Total (completed in sitting at EOB with Mod A for supported sitting balance.)  LE Bathing: Maximum assistance;Dependent/Total (Pt able to grasp bathing wipe and initiate washing R thigh but remains with grasp and coordination difficulty--Dependent +Max A for washing remaining LEs)  UE Dressing: Dependent/Total (Dependent fro doffing pull-over sweater; however, pt able to initiate.)  Additional Comments: toileting and breif change completed by nursing/PCA prior to evaluation. Pt able to follow simple instructions w/o repetition and initiate tasks; however, intention tremors in conjunction with hand contractures/increased flexor tone in elbows impacting performance with all ADLs. Tone RUE  RUE Tone: Hypertonic  Tone Description: increased elbow flexor tone with mild contracture of digits 1, 4 &5  Tone LUE  LUE Tone: Hypertonic  Tone Description: increased elbow flexor tone with mild contracture of digits 1, 4 &5  Coordination  Coordination and Movement description: Fine motor impairments;Gross motor impairments;Right UE;Left UE; Intention tremors;Decreased speed  Quality of Movement Other  Comment: Notable intention tremors when pt performs reaching distally while attmepting to grasp objects (i.e therapist name badge and bathing wipes)     Bed mobility  Supine to Sit: Minimal assistance (pulls up on therapist)  Sit to Supine:  Moderate assistance  Scooting: Maximal assistance  Transfers  Slide Board: Moderate assistance;2 Person assistance;Minimal assistance  Sit to stand: 2 Person assistance; Moderate assistance;Minimal assistance  Transfer Comments: Mod A progressing to [address] for all sit<>stand txfers at EOB. Cognition  Overall Cognitive Status: Exceptions  Arousal/Alertness: Delayed responses to stimuli; Appropriate responses to stimuli  Following Commands: Follows one step commands consistently; Follows one step commands with increased time  Attention Span: Appears intact  Safety Judgement: Decreased awareness of need for assistance  Problem Solving: Assistance required to identify errors made;Assistance required to generate solutions;Assistance required to implement solutions;Assistance required to correct errors made  Insights: Decreased awareness of deficits  Initiation: Requires cues for some  Sequencing: Requires cues for some  Cognition Comment: Pt able to process simple instructions and initiate tasks appropriately; however, requires assistance for execution  Perception  Overall Perceptual Status: Impaired     Sensation  Overall Sensation Status: Impaired        LUE AROM (degrees)  LUE AROM : WFL  RUE AROM (degrees)  RUE AROM : WFL  LUE Strength  Gross LUE Strength: WFL  L Hand General: 4/5  RUE Strength  Gross RUE Strength: WFL  R Hand General: 4/5         Plan   Plan  Times per week: 3-5  Times per day: Daily  Current Treatment Recommendations: Strengthening,Gait Training,Patient/Caregiver Education & Training,Functional Mobility Training,Neuromuscular Re-education,Self-Care / ADL,Safety Education & Training,Balance Training      AM-PAC Score        [facility] Inpatient Daily Activity Raw Score: 6 (02/24/22 1349)  AM-PAC Inpatient ADL T-Scale Score : 17.07 (02/24/22 1349)  ADL Inpatient CMS 0-100% Score: 100 (02/24/22 1349)  ADL Inpatient CMS G-Code Modifier : CN (02/24/22 1349)    Goals  Short term goals  Time Frame for Short term goals: Discharge  Short term goal 1: Toileting at [facility] Max A x2  Short term goal 2: Complete UB ADL with AE as needed in sitting at EOB in 3/5 attempts Mod A  Short term goal 3: Functional sit<>stand txfers with DME as needed Mod A x1  Short term goal 4: Bed mobility Mod A with minimal verbal cues. Long term goals  Time Frame for Long term goals : STG=LTG  Patient Goals   Patient goals : Pt unable to state goals.        Therapy Time   Individual Concurrent Group Co-treatment   Time In 1100         Time Out 1147         Minutes 47           Timed Code Treatment Minutes:   32 minutes    Total Treatment Minutes:  47 minutes    URSZULA Kay OTR/L  [id number]

## 2022-02-24 NOTE — PROGRESS NOTES
Nutrition Note    RECOMMENDATIONS  PO Diet: NPO  Nutrition Support:   1. D/c D5% when tube feeds start per Dr. Jane Last  2. Bolus 240 mL 2 brenda HN four times daily to provide 960 mL total volume, 1920 calories, 80 grams protein, 672 mL free water as consistent with pre-admission regimen. 3. Recommend water bolus 120 mL before and after each feed. 4. Ensure head of bed is 30 - 45 degrees during continuous or bolus gastric feeding and for one hour after bolus. Turn off the feeding if head of bed is lowered less than 30 degrees. 5. Monitor for tolerance (bowel habits, N/V, cramping, abdominal exam findings: distended, firm, tense, guarded, discomfort). NUTRITION ASSESSMENT   Consult received for tube feed ordering and management. Pt is nonverbal s/p anoxic injury and has a PEG tube for nutrition. Typical regimen is bolus feeds 240 mL 2 brenda HN four times daily with 120 mL water bolus before and after each feed. Pt typically receives a pureed diet with honey thick liquids as well; but tube feeds meet 100% of nutrition needs. Note BMI 15.68; no weight hx available for review. Will monitor for tolerance to tube feeds.  Nutrition Related Findings: No edema noted. Non-verbal. PEG tube.  Wounds: None   Nutrition Education:  Education not indicated    Nutrition Goals: Pt will tolerate EN bolus feeds     MALNUTRITION ASSESSMENT   Malnutrition Status: Insufficient data    NUTRITION DIAGNOSIS   Inadequate oral intake related to cognitive or neurological impairment as evidenced by nutrition support - enteral nutrition    CURRENT NUTRITION THERAPIES  ADULT DIET;  Dysphagia - Pureed     PO Intake: NPO   PO Supplement Intake:NPO    ANTHROPOMETRICS   Current Height: 5' 10\" (177.8 cm)   Current Weight: 109 lb 4.8 oz (49.6 kg)     Ideal Body Weight (IBW): 150 lbs  (68 kg)        BMI: 15.7    COMPARATIVE STANDARDS  Energy (kcal):  6131-4593     Protein (g):   grams       Fluid (ml/day):  7259-7761 mL    The patient will be monitored per nutrition standards of care. Consult dietitian if additional nutrition interventions are needed prior to RD reassessment.      Blanche Cooney, [address], [place]    Contact: [phone]

## 2022-02-24 NOTE — PLAN OF CARE
Problem: Falls - Risk of:  Goal: Will remain free from falls  Description: Will remain free from falls  Outcome: Ongoing  Note: Bed alarm on and pt given slip resistant socks. Sitter in the room with pt.    Goal: Absence of physical injury  Description: Absence of physical injury  Outcome: Ongoing     Problem: Skin Integrity:  Goal: Will show no infection signs and symptoms  Description: Will show no infection signs and symptoms  Outcome: Ongoing  Goal: Absence of new skin breakdown  Description: Absence of new skin breakdown  Outcome: Ongoing

## 2022-02-24 NOTE — PROGRESS NOTES
Hospitalist Progress Note      PCP: Rashad Myles MD    Date of Admission: 2022    SUBJECTIVE:   Non-verbal, tolerating some dysphagia diet at [place] in SNF per reports; unable to contact [place] for prior TF as d/w RN; tolerating meds via PEG            OBJECTIVE:     Vitals    TEMPERATURE:  Current - Temp: 98.4 °F (36.9 °C); Max - Temp  Av.6 °F (36.4 °C)  Min: 96.7 °F (35.9 °C)  Max: 98.4 °F (36.9 °C)  RESPIRATIONS RANGE: Resp  Av.3  Min: 14  Max: 18  PULSE RANGE: Pulse  Av  Min: 51  Max: 89  BLOOD PRESSURE RANGE:  Systolic (66DNI), UGW:841 , Min:85 , LKE:217   ; Diastolic (32DNO), COY:32, Min:44, Max:76    PULSE OXIMETRY RANGE: SpO2  Av.4 %  Min: 95 %  Max: 96 %  24HR INTAKE/OUTPUT:    Intake/Output Summary (Last 24 hours) at 2022 1439  Last data filed at 2022 0745  Gross per 24 hour   Intake --   Output 800 ml   Net -800 ml       Exam:    General appearance: Alert, non-verbal  HEENT Normal cephalic, atraumatic without obvious deformity. Pupils equal, round, and reactive to light. Extra ocular muscles intact. Conjunctivae/corneas clear. Neck: Supple, No jugular venous distention/bruits. Trachea midline without thyromegaly or adenopathy with full range of motion. Lungs: Clear to ascultation, bilaterally without Rales/Wheezes/Rhonchi with good respiratory effort. Heart: Regular rate and rhythm with Normal S1/S2 without  murmurs, rubs or gallops, point of maximum impulse non-displaced  Abdomen: Soft, non-tender or non-distended without rigidity or guarding and positive bowel sounds all four quadrants. Extremities: No clubbing, cyanosis, or edema bilaterally. Full range of motion without deformity and normal gait intact. Skin: Skin color, texture, turgor normal. No rashes or lesions. Neurologic: Alert and oriented X 3,  neurovascularly intact with sensory/motor intact upper extremities/lower extremities, bilaterally.  Cranial nerves:II-XII intact, grossly non-focal.  Mental status: Alert, oriented, thought content appropriate. ASSESSMENT AND PLAN     Fecal impaction ([facility] [K56.41]      Dislodged gastrostomy tube [V77.970T]      Bladder outlet obstruction [N32.0]      Hydroureteronephrosis [N13.30]      Colonic obstruction ([facility] [K56.609]      Severe protein-calorie malnutrition ([place] [facility]: less than 60% of standard weight) (HCC) [E43]      Failure to thrive in adult [R62.7]      Hypernatremia [E87.0]      Hypokalemia [E87.6]      Dystonia [G24.9]      Choreoathetoid limb movements [G25.5]      Agitation [R45.1]      Seizure ([facility]) [R56.9]      Anoxic brain injury ([facility] [G93.1]        1. ST eval/Tx, resume TF  2. Continue Miralax and tapwater enemas per GI; GS following, no acute surgical needs  3. Continue IVF  4. Continue IV Cipro and Flagyl for possible intra-abdominal infection; no further temp, Cx NTD, possible dc back to SNF in AM  5.         GI following  6. 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 PEG replaced in ED  8. PT/OT/SLP  9. Continue Vimpat for epilepsy  10. Continue Seroquel, Zoloft and Cogentin     DVT Prophylaxis: Lovenox     PT/OT Requested though bedridden at [address] with patient, nursing and CM.     Hopefully back to [facility] in AM pending 48 hrs Cx and tolerance of resumption of TF              [employer], DO

## 2022-02-24 NOTE — PROGRESS NOTES
Pt with Tap water enama completed and tolerated well. Pt asleep throught the day. She awakes some but not dully. IV atb completed without any adverse reactions observed. Sitter at bedside.

## 2022-02-25 LAB
ANION GAP SERPL CALCULATED.3IONS-SCNC: 9 MMOL/L (ref 3–16)
BUN BLDV-MCNC: 10 MG/DL (ref 7–20)
CALCIUM SERPL-MCNC: 8.7 MG/DL (ref 8.3–10.6)
CHLORIDE BLD-SCNC: 104 MMOL/L (ref 99–110)
CO2: 24 MMOL/L (ref 21–32)
CREAT SERPL-MCNC: <0.5 MG/DL (ref 0.6–1.1)
GFR AFRICAN AMERICAN: >60
GFR NON-AFRICAN AMERICAN: >60
GLUCOSE BLD-MCNC: 132 MG/DL (ref 70–99)
HCT VFR BLD CALC: 35.1 % (ref 36–48)
HEMOGLOBIN: 11.5 G/DL (ref 12–16)
MCH RBC QN AUTO: 30.4 PG (ref 26–34)
MCHC RBC AUTO-ENTMCNC: 32.8 G/DL (ref 31–36)
MCV RBC AUTO: 92.7 FL (ref 80–100)
PDW BLD-RTO: 12.9 % (ref 12.4–15.4)
PLATELET # BLD: 161 K/UL (ref 135–450)
PMV BLD AUTO: 9.6 FL (ref 5–10.5)
POTASSIUM REFLEX MAGNESIUM: 3.6 MMOL/L (ref 3.5–5.1)
RBC # BLD: 3.78 M/UL (ref 4–5.2)
SODIUM BLD-SCNC: 137 MMOL/L (ref 136–145)
WBC # BLD: 5.6 K/UL (ref 4–11)

## 2022-02-25 PROCEDURE — 1200000000 HC SEMI PRIVATE

## 2022-02-25 PROCEDURE — 2580000003 HC RX 258: Performed by: PHYSICIAN ASSISTANT

## 2022-02-25 PROCEDURE — 97535 SELF CARE MNGMENT TRAINING: CPT

## 2022-02-25 PROCEDURE — 2500000003 HC RX 250 WO HCPCS: Performed by: PHYSICIAN ASSISTANT

## 2022-02-25 PROCEDURE — 94760 N-INVAS EAR/PLS OXIMETRY 1: CPT

## 2022-02-25 PROCEDURE — 6370000000 HC RX 637 (ALT 250 FOR IP): Performed by: INTERNAL MEDICINE

## 2022-02-25 PROCEDURE — 97530 THERAPEUTIC ACTIVITIES: CPT

## 2022-02-25 PROCEDURE — 85027 COMPLETE CBC AUTOMATED: CPT

## 2022-02-25 PROCEDURE — 6360000002 HC RX W HCPCS: Performed by: INTERNAL MEDICINE

## 2022-02-25 PROCEDURE — 36415 COLL VENOUS BLD VENIPUNCTURE: CPT

## 2022-02-25 PROCEDURE — 6360000002 HC RX W HCPCS: Performed by: PHYSICIAN ASSISTANT

## 2022-02-25 PROCEDURE — 80048 BASIC METABOLIC PNL TOTAL CA: CPT

## 2022-02-25 PROCEDURE — 6370000000 HC RX 637 (ALT 250 FOR IP): Performed by: PHYSICIAN ASSISTANT

## 2022-02-25 PROCEDURE — 97116 GAIT TRAINING THERAPY: CPT

## 2022-02-25 RX ORDER — POLYETHYLENE GLYCOL 3350 17 G/17G
17 POWDER, FOR SOLUTION ORAL 2 TIMES DAILY
Status: DISCONTINUED | OUTPATIENT
Start: 2022-02-25 | End: 2022-02-27 | Stop reason: HOSPADM

## 2022-02-25 RX ORDER — ERYTHROMYCIN 5 MG/G
OINTMENT OPHTHALMIC EVERY 8 HOURS SCHEDULED
Status: DISCONTINUED | OUTPATIENT
Start: 2022-02-25 | End: 2022-02-27 | Stop reason: HOSPADM

## 2022-02-25 RX ADMIN — FAMOTIDINE 20 MG: 20 TABLET ORAL at 10:25

## 2022-02-25 RX ADMIN — BACLOFEN 5 MG: 10 TABLET ORAL at 10:23

## 2022-02-25 RX ADMIN — POLYETHYLENE GLYCOL 3350 17 G: 17 POWDER, FOR SOLUTION ORAL at 20:42

## 2022-02-25 RX ADMIN — ERYTHROMYCIN: 5 OINTMENT OPHTHALMIC at 17:36

## 2022-02-25 RX ADMIN — Medication 10 ML: at 10:21

## 2022-02-25 RX ADMIN — ERYTHROMYCIN: 5 OINTMENT OPHTHALMIC at 22:55

## 2022-02-25 RX ADMIN — FAMOTIDINE 20 MG: 20 TABLET ORAL at 20:41

## 2022-02-25 RX ADMIN — LEVETIRACETAM 500 MG: 100 SOLUTION ORAL at 20:42

## 2022-02-25 RX ADMIN — ENOXAPARIN SODIUM 40 MG: 40 INJECTION SUBCUTANEOUS at 10:26

## 2022-02-25 RX ADMIN — Medication 1 CAPSULE: at 10:33

## 2022-02-25 RX ADMIN — KETOROLAC TROMETHAMINE 30 MG: 30 INJECTION, SOLUTION INTRAMUSCULAR; INTRAVENOUS at 22:27

## 2022-02-25 RX ADMIN — METRONIDAZOLE 500 MG: 500 INJECTION, SOLUTION INTRAVENOUS at 01:08

## 2022-02-25 RX ADMIN — SERTRALINE 200 MG: 50 TABLET, FILM COATED ORAL at 10:22

## 2022-02-25 RX ADMIN — CIPROFLOXACIN 400 MG: 2 INJECTION, SOLUTION INTRAVENOUS at 16:04

## 2022-02-25 RX ADMIN — Medication 10 ML: at 20:42

## 2022-02-25 RX ADMIN — Medication 1 CAPSULE: at 17:37

## 2022-02-25 RX ADMIN — CIPROFLOXACIN 400 MG: 2 INJECTION, SOLUTION INTRAVENOUS at 04:20

## 2022-02-25 RX ADMIN — POLYETHYLENE GLYCOL 3350 17 G: 17 POWDER, FOR SOLUTION ORAL at 15:54

## 2022-02-25 RX ADMIN — LACOSAMIDE 100 MG: 100 TABLET, FILM COATED ORAL at 20:41

## 2022-02-25 RX ADMIN — BENZTROPINE MESYLATE 1 MG: 1 TABLET ORAL at 13:40

## 2022-02-25 RX ADMIN — LACOSAMIDE 100 MG: 100 TABLET, FILM COATED ORAL at 10:25

## 2022-02-25 RX ADMIN — LORAZEPAM 1 MG: 2 INJECTION INTRAMUSCULAR; INTRAVENOUS at 20:15

## 2022-02-25 RX ADMIN — METRONIDAZOLE 500 MG: 500 INJECTION, SOLUTION INTRAVENOUS at 10:19

## 2022-02-25 RX ADMIN — BENZTROPINE MESYLATE 1 MG: 1 TABLET ORAL at 10:26

## 2022-02-25 RX ADMIN — LEVETIRACETAM 500 MG: 100 SOLUTION ORAL at 10:26

## 2022-02-25 RX ADMIN — ACETAMINOPHEN 650 MG: 325 TABLET ORAL at 15:54

## 2022-02-25 RX ADMIN — METRONIDAZOLE 500 MG: 500 INJECTION, SOLUTION INTRAVENOUS at 17:36

## 2022-02-25 RX ADMIN — BENZTROPINE MESYLATE 1 MG: 1 TABLET ORAL at 20:41

## 2022-02-25 RX ADMIN — QUETIAPINE FUMARATE 150 MG: 25 TABLET ORAL at 20:41

## 2022-02-25 RX ADMIN — BACLOFEN 5 MG: 10 TABLET ORAL at 20:41

## 2022-02-25 ASSESSMENT — PAIN SCALES - GENERAL
PAINLEVEL_OUTOF10: 0
PAINLEVEL_OUTOF10: 10
PAINLEVEL_OUTOF10: 0
PAINLEVEL_OUTOF10: 0
PAINLEVEL_OUTOF10: 5
PAINLEVEL_OUTOF10: 1
PAINLEVEL_OUTOF10: 5
PAINLEVEL_OUTOF10: 0
PAINLEVEL_OUTOF10: 5
PAINLEVEL_OUTOF10: 0

## 2022-02-25 ASSESSMENT — PAIN DESCRIPTION - LOCATION: LOCATION: GENERALIZED

## 2022-02-25 ASSESSMENT — PAIN DESCRIPTION - PAIN TYPE: TYPE: OTHER (COMMENT)

## 2022-02-25 NOTE — CARE COORDINATION
Patient from [facility] [place].  Transport scheduled with tentative  of 3.15 pm via [facility].

## 2022-02-25 NOTE — PLAN OF CARE
Problem: Falls - Risk of:  Goal: Will remain free from falls  Description: Will remain free from falls  2/25/2022 1109 by Jade Kennedy RN  Outcome: Ongoing     Problem: Skin Integrity:  Goal: Will show no infection signs and symptoms  Description: Will show no infection signs and symptoms  2/25/2022 1110 by Jade Kennedy RN  Outcome: Ongoing     Problem: Nutrition  Goal: Optimal nutrition therapy  Outcome: Ongoing     Problem: Pain Control  Goal: Improvement in pain related behaviors BP/HR WNL  Outcome: Ongoing     Problem: Neurological  Goal: Maximum potential motor/sensory/cognitive function  Outcome: Ongoing     Problem: Respiratory  Goal: No pulmonary complications  Outcome: Ongoing     Problem: GI  Goal: No bowel complications  Outcome: Ongoing     Problem:   Goal: Adequate urinary output  Outcome: Ongoing

## 2022-02-25 NOTE — PROGRESS NOTES
2/24/22 PM:  Patient less anxious tonight. Sitter at bedside. Continues with Flagyl and Cipro IV. Crouch cath in place to BSD. Possible dc back to LTC facility 2/25/22.

## 2022-02-25 NOTE — PROGRESS NOTES
Hospitalist Progress Note      PCP: Chris Ruiz MD    Date of Admission: 2/22/2022    Chief Complaint: PEG tube malfunction    Hospital Course: 27 y.o. female presented to ED for evaluation of dislodged G-tube. Patient is nonverbal s/p anoxic brain injury. Unsure how long G-tube has been out. No additional information able to be obtained at this time. Patient was noted to be febrile with temp of 101.8 in ED. Abdomen distended and rigid. CT abdomen/pelvis notable for hydroureteronephrosis and severe distention of the urinary bladder concerning for bladder outlet obstruction. Crouch catheter was placed with return of approximately 1700 cc. Also note moderately severe constipation and severe fecal impaction causing partial obstruction of the colon. Questionable mild  proctitis noted as well. General surgery was consulted in ED and recommended subset enemas every 4 hours, MiraLAX every 6 hours per G-tube and empiric antibiotic therapy. She was started on Cipro and Flagyl. Subjective: Patient seen and examined at bedside. Nonverbal.  Comfortable.       Medications:  Reviewed    Infusion Medications    sodium chloride 25 mL (02/24/22 1649)    dextrose 100 mL/hr at 02/24/22 1051     Scheduled Medications    polyethylene glycol  17 g Per G Tube BID    Baclofen  5 mg Oral BID    benztropine  1 mg Oral TID    famotidine  20 mg PEG Tube BID    lacosamide  100 mg Oral BID    levETIRAcetam  500 mg PEG Tube BID    QUEtiapine  150 mg Per G Tube Nightly    sertraline  200 mg Per G Tube Daily    sodium chloride flush  10 mL IntraVENous 2 times per day    enoxaparin  40 mg SubCUTAneous Daily    ciprofloxacin  400 mg IntraVENous Q12H    metroNIDAZOLE  500 mg IntraVENous Q8H    lactobacillus  1 capsule Oral BID      PRN Meds: sodium chloride flush, sodium chloride, acetaminophen **OR** acetaminophen, ondansetron, LORazepam, ketorolac      Intake/Output Summary (Last 24 hours) at 2/25/2022 1008 Michael Novoa filed at 2/25/2022 1352  Gross per 24 hour   Intake 1320 ml   Output 3925 ml   Net -2605 ml       Physical Exam Performed:    BP (!) 108/59   Pulse 85   Temp 97.7 °F (36.5 °C) (Axillary)   Resp 18   Ht 5' 10\" (1.778 m)   Wt 110 lb (49.9 kg)   SpO2 94%   BMI 15.78 kg/m²     General appearance: No apparent distress, appears stated age and cooperative. HEENT: Pupils equal, round, and reactive to light. Conjunctivae/corneas clear. Neck: Supple, with full range of motion. No jugular venous distention. Trachea midline. Respiratory:  Normal respiratory effort. Clear to auscultation, bilaterally without Rales/Wheezes/Rhonchi. Cardiovascular: Regular rate and rhythm with normal S1/S2 without murmurs, rubs or gallops. Abdomen: Soft, non-tender, non-distended with normal bowel sounds. Musculoskeletal: No clubbing, cyanosis or edema bilaterally. Full range of motion without deformity. Skin: Skin color, texture, turgor normal.  No rashes or lesions. Neurologic:  Neurovascularly intact without any focal sensory/motor deficits. Cranial nerves: II-XII intact, grossly non-focal.  Psychiatric: Alert and oriented, thought content appropriate, normal insight  Capillary Refill: Brisk,< 3 seconds   Peripheral Pulses: +2 palpable, equal bilaterally       Labs:   Recent Labs     02/23/22  0657 02/24/22  0553 02/25/22  0530   WBC 7.1 5.9 5.6   HGB 11.0* 11.4* 11.5*   HCT 32.7* 35.2* 35.1*   * 123* 161     Recent Labs     02/23/22  0657 02/24/22  0553 02/25/22  0530   * 140 137   K 3.3* 3.6 3.6   * 106 104   CO2 27 25 24   BUN 16 10 10   CREATININE 0.7 <0.5* <0.5*   CALCIUM 9.2 8.7 8.7   PHOS  --  3.1  --      Recent Labs     02/22/22 2011 02/24/22  0553   AST 14* 13*   ALT 18 12   BILITOT 0.3 0.3   ALKPHOS 53 41     Recent Labs     02/24/22  0616   INR 1.20*     No results for input(s): Ethelene Soulier in the last 72 hours.     Urinalysis:      Lab Results   Component Value Date    NITRU Negative 02/22/2022    WBCUA 23 02/22/2022    BACTERIA 3+ 06/29/2017    RBCUA 3-4 02/22/2022    BLOODU Negative 02/22/2022    SPECGRAV 1.029 02/22/2022    GLUCOSEU Negative 02/22/2022    GLUCOSEU NEGATIVE 09/20/2011       Radiology:  XR INJ CONTRAST GASTRIC TUBE PERC   Final Result   Contrast injection documents positioning of the gastric tube in the stomach   as described. Moderate bilateral hydronephrosis. See separate recent CT report. CT ABDOMEN PELVIS W IV CONTRAST Additional Contrast? None   Final Result   Motion artifact throughout the exam.      Mild hydronephrosis and mild hydroureter bilaterally seen down to the bladder   with moderately severe distention of the urinary bladder which may represent   an element of bladder outlet obstruction which is causing partial obstruction   of the upper collecting systems. Recommend urological correlation. Moderately severe constipation with a severe fecal impaction in the rectum   which is probably causing a partial obstruction of the colon at this level. Recommend clinical follow-up      Questionable mild proctitis with no pelvic mass or active inflammation. Mild bibasilar atelectasis or early infiltrates which is more prominent on   the left.                  Assessment/Plan:    Active Hospital Problems    Diagnosis     Fecal impaction ([address] [K56.41]     Dislodged gastrostomy tube [Z26.692G]     Bladder outlet obstruction [N32.0]     Hydroureteronephrosis [N13.30]     Proctitis [K62.89]     Colonic obstruction ([address] [K56.609]     Severe protein-calorie malnutrition Francois Salmons: less than 60% of standard weight) ([address] [E43]     Failure to thrive in adult [R62.7]     Hypernatremia [E87.0]     Hypokalemia [E87.6]     Dystonia [G24.9]     Choreoathetoid limb movements [G25.5]     Agitation [R45.1]     Seizure ([address] [R56.9]     Anoxic brain injury ([address] [G93.1]      Dislodged gastrostomy tube  Was replaced by GI  Continue with bolus feeds  GI signed off    Constipation with colonic obstruction  Was treated with MiraLAX and tap water enemas  Patient is having bowel movements now  Continue MiraLAX per PEG tube as per GI  GI signed off  GS signed off    Possible intra-abdominal infection  Patient is afebrile  No WBC elevation  Cultures NTD  Currently on Cipro Flagyl  We will continue n.p.o. form for total 10-day course    Neurogenic bladder  Status post complicated Crouch replacement  Urology on board  Continue Crouch on DC        DVT Prophylaxis: Lovenox  Diet: ADULT TUBE FEEDING; PEG; 2.0 Calorie; Bolus; 4 Times Daily; 240; Syringe Push; 120; Before and after each bolus  ADULT DIET; Dysphagia - Pureed; Moderately Thick (Honey)  Code Status: Full Code    Dispo -plan to return to SNF tomorrow a.m.     Electronically signed by Clifford Jordan MD on 2/25/2022 at 2:33 PM

## 2022-02-25 NOTE — PROGRESS NOTES
Sitter discontinued at 1100 in anticipation of discharge soon. Camera in room . Bed alarm engaged. Alarm on.  Pt has been sleeping all morning

## 2022-02-25 NOTE — PROGRESS NOTES
Physical Therapy  Facility/Department: [facility] 3A NURSING  Daily Treatment Note  NAME: Agnieszka Villar  : 1995  MRN: [id number]    Date of Service: 2022    Discharge Recommendations: Agnieszka Villar scored a  on the AM-PAC short mobility form. Current research shows that an AM-PAC score of 17 or less is typically not associated with a discharge to the patient's home setting. Based on the patient's AM-PAC score and their current functional mobility deficits, it is recommended that the patient have 3-5 sessions per week of Physical Therapy at d/c to increase the patient's independence. Please see assessment section for further patient specific details. If patient discharges prior to next session this note will serve as a discharge summary. Please see below for the latest assessment towards goals. PT Equipment Recommendations  Equipment Needed: No (defer to next level of care)    Assessment   Body structures, Functions, Activity limitations: Decreased functional mobility ; Decreased cognition;Decreased strength;Decreased endurance;Decreased balance;Decreased posture;Decreased coordination;Decreased fine motor control  Assessment: Pt presents with colonic obstruction from LTC. Pt has hx of anoxic brain injury and is nonverbal at baseline however she is able to follow commands. Prior level of function still not obtained despite attempts to call her LTC facility. This session pt shows improvement as she is able to take 4 steps with min A x2 and RW. Pt stands at RW ~15 mins with min x2. Pt is motivated to participate in PT and would continue to benefit from skilled PT to improve functional mobiltiy.   Treatment Diagnosis: decreased functional mobility  Prognosis: Good  Decision Making: Medium Complexity  Clinical Presentation: evolving  PT Education: Goals;PT Role;Plan of Care  Patient Education: Pt will require reinforcement for carry over  Barriers to Learning: cognition  REQUIRES PT FOLLOW UP: Patient with no complaints from previous session. Family / Caregiver Present: No  Subjective  Subjective: Pt reports no pain at this time. Pt is very soft spoken and has difficulty word finding. Pt has white discharge and redness around L eye and pt grimaces when assisted with opening, nurse notified  General Comment  Comments: Pt supine in bed upon arrival. Pt agreeable to PT/OT treatment  Pain Assessment  Pain Level: 5       Orientation  Orientation  Overall Orientation Status: Impaired (Pt is able to verbalize first name)  Cognition   Cognition  Overall Cognitive Status: Exceptions  Arousal/Alertness: Delayed responses to stimuli; Appropriate responses to stimuli  Following Commands: Follows one step commands consistently; Follows one step commands with increased time  Attention Span: Appears intact  Safety Judgement: Decreased awareness of need for assistance  Problem Solving: Assistance required to identify errors made;Assistance required to generate solutions;Assistance required to implement solutions;Assistance required to correct errors made  Insights: Decreased awareness of deficits  Initiation: Requires cues for some  Sequencing: Requires cues for some  Cognition Comment: Pt able to process simple instructions and initiate tasks appropriately; however, requires assistance for execution  Objective   Bed mobility  Supine to Sit: Maximum assistance;2 Person assistance  Sit to Supine: Maximum assistance;2 Person assistance  Scooting: Maximal assistance  Comment: Pt more lethargic this requiring more assist than last treatment session  Transfers  Sit to Stand: Maximum Assistance; Moderate Assistance;2 Person Assistance (max A+mod A x2)  Stand to sit: Maximum Assistance; Moderate Assistance;2 Person Assistance (max A +mod A x2)  Bed to Chair: Maximum assistance;2 Person Assistance  Comment: Squat pivot transfer from chair to bed with max A x2 due to pt fatigue.  Sit<>stand transfers from EOB  Ambulation  Ambulation?: Yes  Ambulation 1  Device: Rolling Walker  Other Apparatus: Wheelchair follow  Assistance: Minimal assistance (min A x2)  Quality of Gait: tactile ques for weight shifting, assist with navigation RW  Gait Deviations: Slow Adrienne  Distance: 4 steps  Comments: Pt takes 4 steps from EOB with RW and min A x2, slow and effortful. Close chair follow from nursing     Balance  Posture: Fair (left lateral neck flexion)  Sitting - Static: Fair (mod-min A)  Sitting - Dynamic: Fair;- (mod-min A)  Standing - Static: Poor;+ (min A x2)  Standing - Dynamic: Poor (min A x2)  Comments: Pt sat EOB with min-mod A ~20 mins while OT provided soft tissue massage and stretching to L upper trap, SCM, levator. Pt stood at [address] [place] [address] ~15 mins with min+mod A. Pt sat in chair ~15 while PT/OT helped with neck and body posturing and nursing assisted with feeding                           G-Code     OutComes Score                                                     AM-PAC Score  AM-PAC Inpatient Mobility Raw Score : 11 (02/25/22 1613)  AM-PAC Inpatient T-Scale Score : 33.86 (02/25/22 1613)  Mobility Inpatient CMS 0-100% Score: 72.57 (02/25/22 1613)  Mobility Inpatient CMS G-Code Modifier : CL (02/25/22 1613)          Goals  Short term goals  Time Frame for Short term goals: upon discharge  Short term goal 1: Pt will be able to perform bed mobility with SBA  Short term goal 2: Pt will be able to perform transfers with CGA  Short term goal 3: Pt will be able to ambulate 10 ft with LRAD and CGA  NO GOALS MET THIS DATE     Plan    Plan  Times per week: 3-5x  Times per day: Daily  Current Treatment Recommendations: Strengthening,Balance Training,Functional Mobility Training,Transfer Training,Gait Training,Cognitive Reorientation  Safety Devices  Type of devices:  All fall risk precautions in place,Call light within reach,Bed alarm in place,Gait belt,Patient at risk for falls,Left in bed,Nurse notified  Restraints  Initially in place:  No     Therapy Time   Individual Concurrent Group Co-treatment   Time In       3477   Time Out       1553   Minutes       [address] [facility], [address] [facility], [address], [place] [address]

## 2022-02-25 NOTE — PROGRESS NOTES
Occupational Therapy  Facility/Department: [facility] 3A NURSING  Daily Treatment Note  NAME: Ayde Martines  : 1995  MRN: [id number]    Date of Service: 2022    Discharge Recommendations:Chepe Chandler scored a 6/24 on the AM-PAC ADL Inpatient form. Current research shows that an AM-PAC score of 17 or less is typically not associated with a discharge to the patient's home setting. Based on the patient's AM-PAC score and their current ADL deficits, it is recommended that the patient have 3-5 sessions per week of Occupational Therapy at d/c to increase the patient's independence. Please see assessment section for further patient specific details. If patient discharges prior to next session this note will serve as a discharge summary. Please see below for the latest assessment towards goals. OT Equipment Recommendations  Equipment Needed:  (defer to next level care.)    Assessment   Performance deficits / Impairments: Decreased functional mobility ; Decreased endurance;Decreased ADL status; Decreased coordination;Decreased posture  Assessment: Pt currently presents with the above funcitonal deficits, although her baseline is unknown at this time (despite unsuccessful attempts to reach pt home/facility staff). She is highly motivated to participate and has the ability to follow/process simple instructions. At this time, she is primarily limited by cognition secondary to old TBI, difficulty with verbal communication, global weakness/deconditioning, and tonal deficits. Continued OT indicated in order to address her deficits and ADL limitations for remainder of hospitalization.   Prognosis: Good  History: Patient has a past medical history of Acute hepatitis C without hepatic coma, Acute hepatitis C without hepatic coma, Anoxic brain damage ([facility] [address] Cardiac arrest ([facility] [address] Chronic kidney disease, Chronic viral hepatitis C ([facility] [address] Diffuse traumatic brain injury with loss of consciousness greater than 24 hours without return to pre-existing conscious level with patient surviving [facility]), Major depressive disorder, recurrent, unspecified ([facility] [address] MRSA (methicillin resistant staph aureus) culture positive, Nicotine dependence, other tobacco product, uncomplicated, Overdose, Poisoning by heroin, undetermined, initial encounter ([facility] Schizophrenia, unspecified ([facility] Seizures ([facility] Streptococcal sepsis, unspecified ([facility] TBI (traumatic brain injury) ([facility] Unspecified asthma, uncomplicated, and Unspecified sexually transmitted disease. Exam: as above  Assistance / Modification: Lift equipment/Max A x2 for transfers. OT Education: OT Role;Plan of Care  Patient Education: DC planning. Barriers to Learning: pt is not an independent learer. REQUIRES OT FOLLOW UP: Yes  Activity Tolerance  Activity Tolerance: Patient Tolerated treatment well  Activity Tolerance: pt with increased fatigue and lethargy requiring additional encouragement to participate in activities compaired to last session. Safety Devices  Safety Devices in place: Yes  Type of devices: Nurse notified; All fall risk precautions in place; Bed alarm in place;Gait belt;Patient at risk for falls; Left in bed  Restraints  Initially in place: No         Patient Diagnosis(es): The primary encounter diagnosis was Proctitis. Diagnoses of Fecal impaction ([facility] Colonic obstruction ([facility] Retention of urine, and Dislodged gastrostomy tube were also pertinent to this visit.       has a past medical history of Acute hepatitis C without hepatic coma, Acute hepatitis C without hepatic coma, Anoxic brain damage (HCC), Cardiac arrest ([facility] [address] Chronic kidney disease, Chronic viral hepatitis C ([facility] [address] Diffuse traumatic brain injury with loss of consciousness greater than 24 hours without return to pre-existing conscious level with patient surviving [facility]), Major depressive disorder, recurrent, unspecified ([facility] [address] MRSA (methicillin resistant staph aureus) culture positive, Nicotine dependence, other tobacco product, uncomplicated, Overdose, Poisoning by heroin, undetermined, initial encounter ([facility] [address] Schizophrenia, unspecified ([facility] [address] Seizures ([facility] [address] Streptococcal sepsis, unspecified ([facility] [address] TBI (traumatic brain injury) ([facility] [address] Unspecified asthma, uncomplicated, and Unspecified sexually transmitted disease. has a past surgical history that includes Gastrostomy tube placement; Gastrostomy tube placement; Upper gastrointestinal endoscopy (N/A, 11/26/2019); and Gastrostomy tube placement (N/A, 11/26/2019). Restrictions  Restrictions/Precautions  Restrictions/Precautions: Fall Risk,Contact Precautions  Position Activity Restriction  Other position/activity restrictions: Sana Arciniega is a 27 y.o. female who presents evaluation of dislodged G-tube. Patient is nonverbal status post anoxic brain injury. Unsure how long G-tube has been out. No additional information is able to be obtained at this time. Subjective   General  Chart Reviewed: Yes  Patient assessed for rehabilitation services?: Yes  Diagnosis: Proctitis  Subjective  Subjective: Pt supine upon arrival. She denies pain. Cognitively impaired secondary to old TBI; however remains pleasant and agreeable to PT/OT treatment. Nursing x2 in room to observe during treatment. General Comment  Comments: Pt with notable secreations and crusting in L eye (suspecting conjunctivitis)--Nursing notified and provided warm compress on eye during session. Pt with significant facial grimace but unable to verbalize or otherwise indicate pain or discomfort. Grimacing disipated throughout session. Orientation  Orientation  Overall Orientation Status: Impaired  Orientation Level: Unable to assess  Objective    ADL  Feeding:  Thickened liquids;Bringing food to mouth assist;Dependent/Total (pt dependent for cup to mouth while sipping on thickened apple juice.)  LE Bathing: Maximum assistance  Additional Comments: pt completed gown change and toileting/breif change with nursing prior to treatment session. Feeding ADLs only completed this PM.        Balance  Sitting Balance: Maximum assistance (Max A + mod A for supported sitting at EOB x 20 minutes and in chair for 15 additional minutes to tolerate drinking from cup.)  Standing Balance: Moderate assistance (Mod A + Min A x2 for safety with RW for UE support.)  Standing Balance  Time: 15 min  Activity: standing at EOB, ambulation  Comment: Pt with wide ASHLI and continues with posterior lean and minor retropulsion; Completed standing with Mod A progressing to Min A x2 with substantial anchoring at RW to counter leaning. Pt stood for 15 total minutes with intermittant cues to initiate weigtht shifting in stance. Functional Mobility  Functional - Mobility Device: Rolling Walker  Activity: Other  Assist Level: Minimal assistance  Functional Mobility Comments: Min A x2 for ambulating 4ft from EOB to room window with cues for weight shifting and foot placement. Pt not SOB or with incidents of LOB. Please see PT notes for further details regarding gait quality. Bed mobility  Supine to Sit: Maximum assistance;2 Person assistance  Sit to Supine: Maximum assistance;2 Person assistance  Scooting: Maximal assistance  Comment: Pt with increased lethargy this PM and required additional encouragement/assistance with mobility compaired to prior session. Transfers  Sit Pivot Transfers: 2 Person assistance;Maximum assistance;Contact guard assistance (Max Ax2 progressing to Max Ax1 for squat pivot txfer (pt requires additional ischial lift assist and cues for gluteal activation)  Sit to stand: 2 Person assistance; Moderate assistance;Minimal assistance (Mod Ax2 progressing to Min Ax 2 at EOB.)  Stand to sit: 2 Person assistance;Minimal assistance (EOB>standing>chair.)  Transfer Comments: Pt txfer from EOB>standing>ambulation to window>chair>squat/pivot back to EOB.             Cognition  Overall Cognitive Status: Exceptions  Arousal/Alertness: Delayed responses to stimuli; Appropriate responses to stimuli  Following Commands: Follows one step commands consistently; Follows one step commands with increased time  Attention Span: Appears intact  Safety Judgement: Decreased awareness of need for assistance  Problem Solving: Assistance required to identify errors made;Assistance required to generate solutions;Assistance required to implement solutions;Assistance required to correct errors made  Insights: Decreased awareness of deficits  Initiation: Requires cues for some  Sequencing: Requires cues for some  Cognition Comment: Pt able to process simple instructions and initiate tasks appropriately; however, requires assistance for execution     Perception  Overall Perceptual Status: Impaired      Additional Activities:   Provided manual therapy techniques including Muscle [employer] (contract-relax) applied to pt left trapezius, levator,splenius, and SCM in order to reduce muscle tension needed to participate in dependent, but optimal feeding in upright/nutrial head position. (I.e. drinking thickened apple juice from cup). Pt with notable muscle release. No observable facial grimace or indication of pain, but unable to maintain upright/nuetral head positioning independently in sitting.      Plan   Plan  Times per week: 3-5  Times per day: Daily  Current Treatment Recommendations: Strengthening,Gait Training,Patient/Caregiver Education & Training,Functional Mobility Training,Neuromuscular Re-education,Self-Care / ADL,Safety Education & Training,Balance Training,Manual Therapy:  STM    AM-PAC Score        AM-[facility] Inpatient Daily Activity Raw Score: 6 (02/25/22 1556)  AM-PAC Inpatient ADL T-Scale Score : 17.07 (02/25/22 1556)  ADL Inpatient CMS 0-100% Score: 100 (02/25/22 1556)  ADL Inpatient CMS G-Code Modifier : CN (02/25/22 1556)    Goals  Short term goals  Time Frame for Short term goals: Discharge  Short term goal 1: Toileting at [facility] Max A x2  Short term goal 2: Updated goal: Complete UB ADL  in sitting at EOB in 3/5 attempts Mod A-- 5/5 attemps depependent for drinking from cup 2/25  Short term goal 3: Functional sit<>stand txfers with DME as needed Mod A x1--Max Ax2 progressing to Max Ax1 2/25  Short term goal 4: Bed mobility Mod A with minimal verbal cues. --ongoing 2/25  Short term goal 5: Pt will tolerate 1-2 hours of upright sitting/supine with proper/corrective head positioning to ensure neurtral alignment--on going 2/25  Long term goals  Time Frame for Long term goals : STG=LTG  Patient Goals   Patient goals : Pt unable to state goals.        Therapy Time   Individual Concurrent Group Co-treatment   Time In       1977   Time Out       1553   Minutes       71       Total timed minutes: 71 minutes    URSZULA Bennett OTR/L  [id number]

## 2022-02-25 NOTE — PROGRESS NOTES
Progress Note    Patient Nba Silva  MRN: [id number]  YOB: 1995 Age: 27 y.o. Sex: female  Room: [address] [place] [address]       Admitting Physician: Corina Sampson MD   Date of Admission: 2/22/2022  7:36 PM   Primary Care Physician: Meagan Conde MD     Subjective:  Nba Silva was seen and examined. We are following for constipation and fecal impaction. Per RN report, she multiple bowel movements overnight. ROS:  Unable to obtain    Objective:  Vital Signs:   Vitals:    02/25/22 1308   BP:    Pulse:    Resp: 18   Temp:    SpO2: 94%         Physical Exam:  Constitutional: Alert and oriented x 4. No acute distress. Respiratory: Respirations nonlabored, no crepitus  GI: Abdomen nondistended, soft, and nontender. Neurological: No focal deficits noted. No asterixis.     Intake/Output:    Intake/Output Summary (Last 24 hours) at 2/25/2022 1339  Last data filed at 2/25/2022 1245  Gross per 24 hour   Intake 840 ml   Output 3925 ml   Net -3085 ml        Current Medications:  Current Facility-Administered Medications   Medication Dose Route Frequency Provider Last Rate Last Admin    polyethylene glycol (GLYCOLAX) packet 17 g  17 g Per G Tube BID Deadra Gitelman, PA-C        baclofen (LIORESAL) tablet 5 mg  5 mg Oral BID Gauri Jennifer, PA-C   5 mg at 02/25/22 1023    benztropine (COGENTIN) tablet 1 mg  1 mg Oral TID Gauri Jennifer, PA-C   1 mg at 02/25/22 1026    famotidine (PEPCID) tablet 20 mg  20 mg PEG Tube BID Gauri Jennifer, PA-C   20 mg at 02/25/22 1025    lacosamide (VIMPAT) tablet 100 mg  100 mg Oral BID Gauri Jennifer, PA-C   100 mg at 02/25/22 1025    levETIRAcetam (KEPPRA) 100 MG/ML solution 500 mg  500 mg PEG Tube BID Gauri Jennifer, PA-C   500 mg at 02/25/22 1026    QUEtiapine (SEROQUEL) tablet 150 mg  150 mg Per G Tube Nightly Gauri Jennifer, PA-C   150 mg at 02/24/22 2123    sertraline (ZOLOFT) tablet 200 mg  200 mg Per G Tube Daily Bang Cornejo LUCIA De Jesus   200 mg at 02/25/22 1022    sodium chloride flush 0.9 % injection 10 mL  10 mL IntraVENous 2 times per day Angely Webb PA-C   10 mL at 02/25/22 1021    sodium chloride flush 0.9 % injection 10 mL  10 mL IntraVENous PRN Angely Webb PA-C        0.9 % sodium chloride infusion  25 mL IntraVENous PRN Angely Webb PA-C 100 mL/hr at 02/24/22 1649 25 mL at 02/24/22 1649    enoxaparin (LOVENOX) injection 40 mg  40 mg SubCUTAneous Daily Angely Webb PA-C   40 mg at 02/25/22 1026    acetaminophen (TYLENOL) tablet 650 mg  650 mg Oral Q6H PRN Angely Webb PA-C        Or    acetaminophen (TYLENOL) suppository 650 mg  650 mg Rectal Q6H PRN Angely Webb PA-C   650 mg at 02/23/22 0322    ondansetron (ZOFRAN) injection 4 mg  4 mg IntraVENous Q6H PRN Angely Webb PA-C        ciprofloxacin (CIPRO) IVPB 400 mg  400 mg IntraVENous Q12H Angely Webb PA-C   Stopped at 02/25/22 0558    metronidazole (FLAGYL) 500 mg in NaCl 100 mL IVPB premix  500 mg IntraVENous Q8H Angely Webb PA-C   Stopped at 02/25/22 1154    lactobacillus (CULTURELLE) capsule 1 capsule  1 capsule Oral BID WC Angeyl Webb PA-C   1 capsule at 02/25/22 1033    LORazepam (ATIVAN) injection 1 mg  1 mg IntraVENous Q4H PRN Alejandra Carranza MD   1 mg at 02/24/22 2122    dextrose 5 % solution   IntraVENous Continuous Alejandra Carranza  mL/hr at 02/24/22 1051 Restarted at 02/24/22 1051    ketorolac (TORADOL) injection 30 mg  30 mg IntraVENous Q6H PRN Alejandra Carranza MD   30 mg at 02/23/22 8701         Recent labs and imaging reviewed. Assessment:  Constipation/proctitis per CT. Noted to have fecal impaction. She responded to enemas and MiraLAX. Had multiple BMs overnight per RN. Plan:  1. MiraLAX BID via PEG    Will sign off. Please call if questions.    Discussed with Dr. Lior Solorio PA-C  [id number] attending addendum:     I have personally performed a face-to-face diagnostic evaluation on this patient. I have reviewed and agreed with the plan of care as documented by nurse practitioner.            Cheri Dixon MD,   Attending Dale Ambrocio

## 2022-02-25 NOTE — DISCHARGE INSTR - COC
Continuity of Care Form    Patient Name: Jodie Thakur   :  1995  MRN:  [id number]    Admit date:  2022  Discharge date:  ***    Code Status Order: Full Code   Advance Directives:      Admitting Physician:  Tabatha Mercado MD  PCP: Fareed Vega MD    Discharging Nurse: [facility] Unit/Room#: 3AN-3301/3301-01  Discharging Unit Phone Number: [phone]    Emergency Contact:   No emergency contact information on file.     Past Surgical History:  Past Surgical History:   Procedure Laterality Date    GASTROSTOMY TUBE PLACEMENT      GASTROSTOMY TUBE PLACEMENT      2016    GASTROSTOMY TUBE PLACEMENT N/A 2019    EGD PEG TUBE PLACEMENT performed by Rogelio Schmitt MD at [address] [place] [address] 2019    EGD FOREIGN BODY REMOVAL performed by Rogelio Schmitt MD at [facility] ENDOSCOPY       Immunization History:   Immunization History   Administered Date(s) Administered    COVID-19, Pfizer Purple top, DILUTE for use, 12+ yrs, 30mcg/0.3mL dose 2021, 2021    Influenza Virus Vaccine 10/24/2011    Tdap (Boostrix, Adacel) 10/24/2011, 2015       Active Problems:  Patient Active Problem List   Diagnosis Code    Drug overdose T50.901A    Cardiac arrest due to respiratory disorder ([address] J98.9, I46.8    Anoxic brain injury ([address] G93.1    Acute respiratory failure with hypoxia ([address] J96.01    Aspiration pneumonia of both lungs (HCC) J69.0    Cor pulmonale (HCC) I27.81    Seizure ([address] R56.9    Status epilepticus ([address] G40.901    Non-traumatic rhabdomyolysis M62.82    Dystonia G24.9    Non compliance w medication regimen Z91.14    Failure to thrive in adult R62.7    Hypokalemia E87.6    Hypernatremia E87.0    Agitation R45.1    Anxiety F41.9    Chronic hepatitis C without hepatic coma ([address] B18.2    Dysphagia, oropharyngeal R13.12    Choreoathetoid limb movements G25.5    Hepatic encephalopathy (HCC) K72.90    Abscess of forearm, left L02.414 Severe protein-calorie malnutrition [place]: less than 60% of standard weight) ([facility] [address] E43    Colonic obstruction ([facility] [address] K56.609    Fecal impaction (HCC) K56.41    Dislodged gastrostomy tube T85.528A    Bladder outlet obstruction N32.0    Hydroureteronephrosis N13.30    Proctitis K62.89       Isolation/Infection:   Isolation            Contact          Patient Infection Status       Infection Onset Added Last Indicated Last Indicated By Review Planned Expiration Resolved Resolved By    [facility] 08/25/17 08/28/17 08/28/17 Luc Crowe, HOLLY        Arm             Nurse Assessment:  Last Vital Signs: BP (!) 108/59   Pulse 85   Temp 97.7 °F (36.5 °C) (Axillary)   Resp 18   Ht 5' 10\" (1.778 m)   Wt 110 lb (49.9 kg)   SpO2 94%   BMI 15.78 kg/m²     Last documented pain score (0-10 scale): Pain Level: 5  Last Weight:   Wt Readings from Last 1 Encounters:   02/25/22 110 lb (49.9 kg)     Mental Status:  disoriented    IV Access:  - None    Nursing Mobility/ADLs:  Walking   Dependent  Transfer  Dependent  Bathing  Dependent  Dressing  Dependent  Toileting  Dependent  Feeding  Dependent  Med Admin  Dependent  Med Delivery   crushed    Wound Care Documentation and Therapy:        Elimination:  Continence: Bowel: No  Bladder: No  Urinary Catheter: Indication for Use of Catheter: Acute urinary retention/obstruction   Colostomy/Ileostomy/Ileal Conduit: No       Date of Last BM: 2/26/22    Intake/Output Summary (Last 24 hours) at 2/25/2022 1600  Last data filed at 2/25/2022 1352  Gross per 24 hour   Intake 1320 ml   Output 3925 ml   Net -2605 ml     I/O last 3 completed shifts: In: 240 [P.O.:240]  [facility] [Urine:3625]    Safety Concerns:      At Risk for Falls and Aspiration Risk seizure precaution    Impairments/Disabilities:      Speech    Nutrition Therapy:  Current Nutrition Therapy:   - Tube Feedings:  240 ml 4 times per day    Routes of Feeding: Jejunal Tube  Liquids: Honey Thick Liquids  Daily Fluid Restriction: no  Last

## 2022-02-26 VITALS
RESPIRATION RATE: 16 BRPM | BODY MASS INDEX: 15.89 KG/M2 | OXYGEN SATURATION: 95 % | DIASTOLIC BLOOD PRESSURE: 71 MMHG | HEIGHT: 70 IN | HEART RATE: 73 BPM | SYSTOLIC BLOOD PRESSURE: 113 MMHG | TEMPERATURE: 97.4 F | WEIGHT: 111 LBS

## 2022-02-26 LAB
ANION GAP SERPL CALCULATED.3IONS-SCNC: 9 MMOL/L (ref 3–16)
BLOOD CULTURE, ROUTINE: NORMAL
BUN BLDV-MCNC: 17 MG/DL (ref 7–20)
CALCIUM SERPL-MCNC: 8.8 MG/DL (ref 8.3–10.6)
CHLORIDE BLD-SCNC: 105 MMOL/L (ref 99–110)
CO2: 23 MMOL/L (ref 21–32)
CREAT SERPL-MCNC: 0.6 MG/DL (ref 0.6–1.1)
CULTURE, BLOOD 2: NORMAL
GFR AFRICAN AMERICAN: >60
GFR NON-AFRICAN AMERICAN: >60
GLUCOSE BLD-MCNC: 80 MG/DL (ref 70–99)
HCT VFR BLD CALC: 34.9 % (ref 36–48)
HEMOGLOBIN: 11.5 G/DL (ref 12–16)
MCH RBC QN AUTO: 30.7 PG (ref 26–34)
MCHC RBC AUTO-ENTMCNC: 33 G/DL (ref 31–36)
MCV RBC AUTO: 93.1 FL (ref 80–100)
PDW BLD-RTO: 13 % (ref 12.4–15.4)
PLATELET # BLD: 163 K/UL (ref 135–450)
PMV BLD AUTO: 9.9 FL (ref 5–10.5)
POTASSIUM REFLEX MAGNESIUM: 4.4 MMOL/L (ref 3.5–5.1)
RBC # BLD: 3.75 M/UL (ref 4–5.2)
SODIUM BLD-SCNC: 137 MMOL/L (ref 136–145)
WBC # BLD: 5.3 K/UL (ref 4–11)

## 2022-02-26 PROCEDURE — 36415 COLL VENOUS BLD VENIPUNCTURE: CPT

## 2022-02-26 PROCEDURE — 6360000002 HC RX W HCPCS: Performed by: PHYSICIAN ASSISTANT

## 2022-02-26 PROCEDURE — 6370000000 HC RX 637 (ALT 250 FOR IP): Performed by: PHYSICIAN ASSISTANT

## 2022-02-26 PROCEDURE — 1200000000 HC SEMI PRIVATE

## 2022-02-26 PROCEDURE — 80048 BASIC METABOLIC PNL TOTAL CA: CPT

## 2022-02-26 PROCEDURE — 2500000003 HC RX 250 WO HCPCS: Performed by: PHYSICIAN ASSISTANT

## 2022-02-26 PROCEDURE — 85027 COMPLETE CBC AUTOMATED: CPT

## 2022-02-26 PROCEDURE — 2580000003 HC RX 258: Performed by: PHYSICIAN ASSISTANT

## 2022-02-26 RX ORDER — ERYTHROMYCIN 5 MG/G
OINTMENT OPHTHALMIC
Qty: 1 EACH | Refills: 0 | Status: SHIPPED | OUTPATIENT
Start: 2022-02-26 | End: 2022-03-08

## 2022-02-26 RX ORDER — CIPROFLOXACIN 500 MG/1
500 TABLET, FILM COATED ORAL 2 TIMES DAILY
Qty: 14 TABLET | Refills: 0 | Status: SHIPPED | OUTPATIENT
Start: 2022-02-26 | End: 2022-03-05

## 2022-02-26 RX ORDER — LACTOBACILLUS RHAMNOSUS GG 10B CELL
1 CAPSULE ORAL 2 TIMES DAILY WITH MEALS
Qty: 30 CAPSULE | Refills: 0 | Status: SHIPPED | OUTPATIENT
Start: 2022-02-26 | End: 2022-03-13

## 2022-02-26 RX ORDER — METRONIDAZOLE 500 MG/1
500 TABLET ORAL 3 TIMES DAILY
Qty: 21 TABLET | Refills: 0 | Status: SHIPPED | OUTPATIENT
Start: 2022-02-26 | End: 2022-03-05

## 2022-02-26 RX ADMIN — BACLOFEN 5 MG: 10 TABLET ORAL at 08:35

## 2022-02-26 RX ADMIN — POLYETHYLENE GLYCOL 3350 17 G: 17 POWDER, FOR SOLUTION ORAL at 08:36

## 2022-02-26 RX ADMIN — BENZTROPINE MESYLATE 1 MG: 1 TABLET ORAL at 08:38

## 2022-02-26 RX ADMIN — FAMOTIDINE 20 MG: 20 TABLET ORAL at 08:35

## 2022-02-26 RX ADMIN — LEVETIRACETAM 500 MG: 100 SOLUTION ORAL at 08:36

## 2022-02-26 RX ADMIN — CIPROFLOXACIN 400 MG: 2 INJECTION, SOLUTION INTRAVENOUS at 05:49

## 2022-02-26 RX ADMIN — SODIUM CHLORIDE 25 ML: 9 INJECTION, SOLUTION INTRAVENOUS at 01:42

## 2022-02-26 RX ADMIN — METRONIDAZOLE 500 MG: 500 INJECTION, SOLUTION INTRAVENOUS at 08:30

## 2022-02-26 RX ADMIN — METRONIDAZOLE 500 MG: 500 INJECTION, SOLUTION INTRAVENOUS at 01:43

## 2022-02-26 RX ADMIN — ENOXAPARIN SODIUM 40 MG: 40 INJECTION SUBCUTANEOUS at 08:36

## 2022-02-26 RX ADMIN — SERTRALINE 200 MG: 50 TABLET, FILM COATED ORAL at 08:36

## 2022-02-26 RX ADMIN — LACOSAMIDE 100 MG: 100 TABLET, FILM COATED ORAL at 08:35

## 2022-02-26 RX ADMIN — Medication 1 CAPSULE: at 08:35

## 2022-02-26 ASSESSMENT — PAIN SCALES - GENERAL
PAINLEVEL_OUTOF10: 0
PAINLEVEL_OUTOF10: 0

## 2022-02-26 NOTE — PROGRESS NOTES
Data- discharge order received, pt  (appointed legal authority) verbalized agreement to discharge, disposition to ECF the [facility] [id number] [facility] [address], [address] reviewed and signed by physician. Action- AVS prepared, DORIAN completed/ reported faxed by case management/. Discharge instruction summary: Diet- Tube feed, dysphagia- pureed; mderately thick, Activity- up as tolerated. Full Code, LDAs to remain with discharge: Miko. Response- Bedside RN to call report to receiving facility. Pt belongings gathered, peripheral IV and cardiac monitoring removed. Disposition to Discharged via ambulance to skilled nursing by EMS transportation, no complications reported. 1. WEIGHT: Admit Weight: 110 lb 12.8 oz (50.3 kg) (02/23/22 0200)        Today  Weight: 111 lb (50.3 kg) (02/26/22 0700)       2.  O2 SAT.: SpO2: 95 % (02/26/22 1130)

## 2022-02-26 NOTE — CARE COORDINATION
Discharge Plan:     Patient discharged to: Discharging to [address] [place] [address] Street: [phone]  F: [phone]  SW/DC Planner faxed, DORIAN and AVS   Narcotic Prescriptions faxed were:  RN:  Grace Arellano will call report       Medical Transport with: [address] - [phone]   time:  3.15 pm  Family advised of discharge?: No contact on file  HENS Submitted?:   N/A     All discharge needs met per case management.

## 2022-02-26 NOTE — DISCHARGE SUMMARY
Hospital Medicine Discharge Summary    Patient ID: Pamela Self      Patient's PCP: Olga Rob MD    Admit Date: 2/22/2022     Discharge Date:   2/26/2022    Admitting Physician: Kinza Platt MD     Discharge Physician: Mathew Gamez MD     Discharge Diagnoses: Active Hospital Problems    Diagnosis     Fecal impaction ([address] [K56.41]     Dislodged gastrostomy tube [V32.756Z]     Bladder outlet obstruction [N32.0]     Hydroureteronephrosis [N13.30]     Proctitis [K62.89]     Colonic obstruction ([address] [M90.164]     Severe protein-calorie malnutrition (Zelpha Render: less than 60% of standard weight) ([address] [E43]     Failure to thrive in adult [R62.7]     Hypernatremia [E87.0]     Hypokalemia [E87.6]     Dystonia [G24.9]     Choreoathetoid limb movements [G25.5]     Agitation [R45.1]     Seizure ([address] [R56.9]     Anoxic brain injury ([address] [G93.1]        The patient was seen and examined on day of discharge and this discharge summary is in conjunction with any daily progress note from day of discharge. Hospital Course:     27 y. o. female presented to ED for evaluation of dislodged G-tube.  Patient is nonverbal s/p anoxic brain injury.  Unsure how long G-tube has been out.  No additional information able to be obtained at this time.  Patient was noted to be febrile with temp of 101.8 in ED.  Abdomen distended and rigid.  CT abdomen/pelvis notable for hydroureteronephrosis and severe distention of the urinary bladder concerning for bladder outlet obstruction.  Crouch catheter was placed with return of approximately 1700 cc. Makayla Moore note moderately severe constipation and severe fecal impaction causing partial obstruction of the colon.  Questionable mild  proctitis noted as well.  General surgery was consulted in ED and recommended subset enemas every 4 hours, MiraLAX every 6 hours per G-tube and empiric antibiotic therapy.  She was started on Cipro and Flagyl.      Dislodged gastrostomy tube  Was replaced by GI  Continue with bolus feeds  GI signed off     Constipation with colonic obstruction  Was treated with MiraLAX and tap water enemas  Patient is having bowel movements now  Continue MiraLAX per PEG tube as per GI  GI signed off  GS signed off     Possible intra-abdominal infection  Patient is afebrile  No WBC elevation  Cultures NTD  Currently on Cipro Flagyl  We will continue n.p.o. form for total 10-day course     Neurogenic bladder  Status post complicated Crouch replacement  Urology on board  Continue Crouch on DC      Physical Exam Performed:     /71   Pulse 73   Temp 97.4 °F (36.3 °C) (Axillary)   Resp 16   Ht 5' 10\" (1.778 m)   Wt 111 lb (50.3 kg)   SpO2 95%   BMI 15.93 kg/m²       General appearance:  No apparent distress, appears stated age and cooperative. HEENT:  Normal cephalic, atraumatic without obvious deformity. Pupils equal, round, and reactive to light. Extra ocular muscles intact. Conjunctivae/corneas clear. Neck: Supple, with full range of motion. No jugular venous distention. Trachea midline. Respiratory:  Normal respiratory effort. Clear to auscultation, bilaterally without Rales/Wheezes/Rhonchi. Cardiovascular:  Regular rate and rhythm with normal S1/S2 without murmurs, rubs or gallops. Abdomen: Soft, non-tender, non-distended with normal bowel sounds. Musculoskeletal:  No clubbing, cyanosis or edema bilaterally. Full range of motion without deformity. Skin: Skin color, texture, turgor normal.  No rashes or lesions. Neurologic:  Neurovascularly intact without any focal sensory/motor deficits. Cranial nerves: II-XII intact, grossly non-focal.  Psychiatric:  Alert and oriented, thought content appropriate, normal insight  Capillary Refill: Brisk,< 3 seconds   Peripheral Pulses: +2 palpable, equal bilaterally       Labs:  For convenience and continuity at follow-up the following most recent labs are provided:      CBC:    Lab Results   Component Value Date    WBC 5.3 02/26/2022    HGB 11.5 02/26/2022    HCT 34.9 02/26/2022     02/26/2022       Renal:    Lab Results   Component Value Date     02/26/2022    K 4.4 02/26/2022     02/26/2022    CO2 23 02/26/2022    BUN 17 02/26/2022    CREATININE 0.6 02/26/2022    CALCIUM 8.8 02/26/2022    PHOS 3.1 02/24/2022         Significant Diagnostic Studies    Radiology:   XR INJ CONTRAST GASTRIC TUBE PERC   Final Result   Contrast injection documents positioning of the gastric tube in the stomach   as described. Moderate bilateral hydronephrosis. See separate recent CT report. CT ABDOMEN PELVIS W IV CONTRAST Additional Contrast? None   Final Result   Motion artifact throughout the exam.      Mild hydronephrosis and mild hydroureter bilaterally seen down to the bladder   with moderately severe distention of the urinary bladder which may represent   an element of bladder outlet obstruction which is causing partial obstruction   of the upper collecting systems. Recommend urological correlation. Moderately severe constipation with a severe fecal impaction in the rectum   which is probably causing a partial obstruction of the colon at this level. Recommend clinical follow-up      Questionable mild proctitis with no pelvic mass or active inflammation. Mild bibasilar atelectasis or early infiltrates which is more prominent on   the left.                 Consults:     IP CONSULT TO GENERAL SURGERY  IP CONSULT TO GI  IP CONSULT TO UROLOGY  IP CONSULT TO SOCIAL WORK  IP CONSULT TO DIETITIAN  IP CONSULT TO DIETITIAN    Disposition: SNF    Condition at Discharge: Stable    Discharge Instructions/Follow-up: PCP  Urology    Code Status:  Full Code     Activity: activity as tolerated    Diet: Tube feeds      Discharge Medications:     Current Discharge Medication List           Details   lactobacillus (CULTURELLE) capsule Take 1 capsule by mouth 2 times daily (with meals) for 15 days  Qty: 30 capsule, Refills: 0      ciprofloxacin (CIPRO) 500 MG tablet Take 1 tablet by mouth 2 times daily for 7 days  Qty: 14 tablet, Refills: 0      metroNIDAZOLE (FLAGYL) 500 MG tablet Take 1 tablet by mouth 3 times daily for 7 days  Qty: 21 tablet, Refills: 0      erythromycin (ROMYCIN) 5 MG/GM ophthalmic ointment Left eye TID  Qty: 1 each, Refills: 0              Details   Baclofen 5 MG TABS Take 5 mg by mouth 2 times daily      Ascorbic Acid (VITAMIN C) 250 MG tablet Take 250 mg by mouth daily      ibuprofen (ADVIL;MOTRIN) 800 MG tablet 800 mg by Per G Tube route every 6 hours as needed for Pain      paliperidone palmitate ER (INVEGA SUSTENNA) 117 MG/0.75ML MOSES IM injection Inject 117 mg into the muscle every 30 days At bedtime on the 8th of the month      !! QUEtiapine (SEROQUEL) 200 MG tablet 300 mg by Per G Tube route once       Cholecalciferol (VITAMIN D3) 125 MCG (5000 UT) TABS 1 tablet by Per G Tube route daily      sertraline (ZOLOFT) 20 MG/ML concentrated solution 200 mg by Per G Tube route daily       levETIRAcetam (KEPPRA) 100 MG/ML solution 5 mLs by PEG Tube route 2 times daily  Qty: 1 Bottle, Refills: 3      !! QUEtiapine (SEROQUEL) 25 MG tablet 1 tablet by PEG Tube route 2 times daily  Qty: 60 tablet, Refills: 3      famotidine (PEPCID) 20 MG tablet 1 tablet by PEG Tube route 2 times daily  Qty: 60 tablet, Refills: 3      benztropine (COGENTIN) 1 MG tablet Take 2 tablets by mouth 2 times daily  Qty: 120 tablet, Refills: 3      polyethylene glycol (GLYCOLAX) 17 GM/SCOOP powder Take 17 g by mouth daily      lacosamide (VIMPAT) 100 MG TABS tablet Take 100 mg by mouth 2 times daily. !! - Potential duplicate medications found. Please discuss with provider. Time Spent on discharge is more than 30 minutes in the examination, evaluation, counseling and review of medications and discharge plan.       Signed:    Electronically signed by Mathew Gamez MD on 2/26/2022 at 12:47 PM

## 2022-02-26 NOTE — PLAN OF CARE
Problem: Falls - Risk of:  Goal: Will remain free from falls  Description: Will remain free from falls  Outcome: Ongoing  Note: Pt is wearing the fall bracelet, S.A.F.E. sign is posted outside door, and yellow blanket is on the bed. Pt informed of fall risks, verbalizes understanding, and agrees to ask for help to ambulate. Will monitor      Problem: Falls - Risk of:  Goal: Absence of physical injury  Description: Absence of physical injury  Outcome: Ongoing     Problem: Skin Integrity:  Goal: Will show no infection signs and symptoms  Description: Will show no infection signs and symptoms  Outcome: Ongoing     Problem: Skin Integrity:  Goal: Absence of new skin breakdown  Description: Absence of new skin breakdown  Outcome: Ongoing  Note: No breakdown noted on this shift. Turning pt q2h and PRN. Incontinent care being done PRN. Heels elevated off of bed. . Will monitor.        Problem: Nutrition  Goal: Optimal nutrition therapy  Outcome: Ongoing

## 2022-02-26 NOTE — CARE COORDINATION
Discharge order noted. VANESA spoke with Fran Coker at the [facility] , who was updated of patient's discharge order and the  time of 3.15 pm. She stated it is okay for patient to go back will inform the facility's staff.

## 2022-05-20 ENCOUNTER — HOSPITAL ENCOUNTER (INPATIENT)
Age: 27
LOS: 2 days | Discharge: NURSING, REHAB OR LONG-TERM CARE | DRG: 389 | End: 2022-05-23
Attending: EMERGENCY MEDICINE | Admitting: HOSPITALIST
Payer: MEDICARE

## 2022-05-20 ENCOUNTER — APPOINTMENT (OUTPATIENT)
Dept: CT IMAGING | Age: 27
DRG: 389 | End: 2022-05-20
Payer: MEDICARE

## 2022-05-20 DIAGNOSIS — K62.89 PROCTITIS: Primary | ICD-10-CM

## 2022-05-20 DIAGNOSIS — K56.41 FECAL IMPACTION (HCC): ICD-10-CM

## 2022-05-20 PROBLEM — R10.9 ABDOMINAL PAIN: Status: ACTIVE | Noted: 2022-05-20

## 2022-05-20 LAB
A/G RATIO: 1.9 (ref 1.1–2.2)
ALBUMIN SERPL-MCNC: 4.4 G/DL (ref 3.4–5)
ALP BLD-CCNC: 55 U/L (ref 40–129)
ALT SERPL-CCNC: 15 U/L (ref 10–40)
ANION GAP SERPL CALCULATED.3IONS-SCNC: 7 MMOL/L (ref 3–16)
AST SERPL-CCNC: 11 U/L (ref 15–37)
BACTERIA: NORMAL /HPF
BASOPHILS ABSOLUTE: 0 K/UL (ref 0–0.2)
BASOPHILS RELATIVE PERCENT: 0.8 %
BILIRUB SERPL-MCNC: <0.2 MG/DL (ref 0–1)
BILIRUBIN URINE: NEGATIVE
BLOOD, URINE: NEGATIVE
BUN BLDV-MCNC: 16 MG/DL (ref 7–20)
CALCIUM SERPL-MCNC: 9.3 MG/DL (ref 8.3–10.6)
CHLORIDE BLD-SCNC: 102 MMOL/L (ref 99–110)
CLARITY: CLEAR
CO2: 27 MMOL/L (ref 21–32)
COLOR: YELLOW
CREAT SERPL-MCNC: 0.6 MG/DL (ref 0.6–1.1)
EOSINOPHILS ABSOLUTE: 0.2 K/UL (ref 0–0.6)
EOSINOPHILS RELATIVE PERCENT: 3.8 %
EPITHELIAL CELLS, UA: 3 /HPF (ref 0–5)
GFR AFRICAN AMERICAN: >60
GFR NON-AFRICAN AMERICAN: >60
GLUCOSE BLD-MCNC: 92 MG/DL (ref 70–99)
GLUCOSE URINE: NEGATIVE MG/DL
HCG QUALITATIVE: NEGATIVE
HCT VFR BLD CALC: 39.1 % (ref 36–48)
HEMOGLOBIN: 13.2 G/DL (ref 12–16)
HYALINE CASTS: 0 /LPF (ref 0–8)
KETONES, URINE: ABNORMAL MG/DL
LEUKOCYTE ESTERASE, URINE: ABNORMAL
LYMPHOCYTES ABSOLUTE: 1.6 K/UL (ref 1–5.1)
LYMPHOCYTES RELATIVE PERCENT: 31.3 %
MCH RBC QN AUTO: 31.1 PG (ref 26–34)
MCHC RBC AUTO-ENTMCNC: 33.7 G/DL (ref 31–36)
MCV RBC AUTO: 92.5 FL (ref 80–100)
MICROSCOPIC EXAMINATION: YES
MONOCYTES ABSOLUTE: 0.4 K/UL (ref 0–1.3)
MONOCYTES RELATIVE PERCENT: 7.1 %
NEUTROPHILS ABSOLUTE: 2.8 K/UL (ref 1.7–7.7)
NEUTROPHILS RELATIVE PERCENT: 57 %
NITRITE, URINE: NEGATIVE
PDW BLD-RTO: 13.4 % (ref 12.4–15.4)
PH UA: 6.5 (ref 5–8)
PLATELET # BLD: 169 K/UL (ref 135–450)
PMV BLD AUTO: 10 FL (ref 5–10.5)
POTASSIUM REFLEX MAGNESIUM: 4 MMOL/L (ref 3.5–5.1)
PROTEIN UA: NEGATIVE MG/DL
RBC # BLD: 4.23 M/UL (ref 4–5.2)
RBC UA: 2 /HPF (ref 0–4)
SODIUM BLD-SCNC: 136 MMOL/L (ref 136–145)
SPECIFIC GRAVITY UA: 1.02 (ref 1–1.03)
TOTAL PROTEIN: 6.7 G/DL (ref 6.4–8.2)
URINE REFLEX TO CULTURE: ABNORMAL
URINE TYPE: ABNORMAL
UROBILINOGEN, URINE: 1 E.U./DL
WBC # BLD: 5 K/UL (ref 4–11)
WBC UA: 2 /HPF (ref 0–5)

## 2022-05-20 PROCEDURE — 96365 THER/PROPH/DIAG IV INF INIT: CPT

## 2022-05-20 PROCEDURE — 96367 TX/PROPH/DG ADDL SEQ IV INF: CPT

## 2022-05-20 PROCEDURE — G0378 HOSPITAL OBSERVATION PER HR: HCPCS

## 2022-05-20 PROCEDURE — 80053 COMPREHEN METABOLIC PANEL: CPT

## 2022-05-20 PROCEDURE — 6360000002 HC RX W HCPCS: Performed by: HOSPITALIST

## 2022-05-20 PROCEDURE — 74177 CT ABD & PELVIS W/CONTRAST: CPT

## 2022-05-20 PROCEDURE — 96372 THER/PROPH/DIAG INJ SC/IM: CPT

## 2022-05-20 PROCEDURE — 2580000003 HC RX 258: Performed by: HOSPITALIST

## 2022-05-20 PROCEDURE — 6360000004 HC RX CONTRAST MEDICATION: Performed by: EMERGENCY MEDICINE

## 2022-05-20 PROCEDURE — 99285 EMERGENCY DEPT VISIT HI MDM: CPT

## 2022-05-20 PROCEDURE — 0DCP7ZZ EXTIRPATION OF MATTER FROM RECTUM, VIA NATURAL OR ARTIFICIAL OPENING: ICD-10-PCS | Performed by: EMERGENCY MEDICINE

## 2022-05-20 PROCEDURE — 84703 CHORIONIC GONADOTROPIN ASSAY: CPT

## 2022-05-20 PROCEDURE — 2500000003 HC RX 250 WO HCPCS: Performed by: HOSPITALIST

## 2022-05-20 PROCEDURE — 96366 THER/PROPH/DIAG IV INF ADDON: CPT

## 2022-05-20 PROCEDURE — 6360000002 HC RX W HCPCS: Performed by: EMERGENCY MEDICINE

## 2022-05-20 PROCEDURE — 85025 COMPLETE CBC W/AUTO DIFF WBC: CPT

## 2022-05-20 PROCEDURE — 6370000000 HC RX 637 (ALT 250 FOR IP): Performed by: HOSPITALIST

## 2022-05-20 PROCEDURE — 81001 URINALYSIS AUTO W/SCOPE: CPT

## 2022-05-20 PROCEDURE — 2500000003 HC RX 250 WO HCPCS: Performed by: EMERGENCY MEDICINE

## 2022-05-20 RX ORDER — SENNA AND DOCUSATE SODIUM 50; 8.6 MG/1; MG/1
2 TABLET, FILM COATED ORAL DAILY PRN
Status: DISCONTINUED | OUTPATIENT
Start: 2022-05-20 | End: 2022-05-23 | Stop reason: HOSPADM

## 2022-05-20 RX ORDER — ACETAMINOPHEN 325 MG/1
650 TABLET ORAL EVERY 6 HOURS PRN
Status: DISCONTINUED | OUTPATIENT
Start: 2022-05-20 | End: 2022-05-23 | Stop reason: HOSPADM

## 2022-05-20 RX ORDER — POLYETHYLENE GLYCOL 3350 17 G/17G
17 POWDER, FOR SOLUTION ORAL DAILY PRN
Status: DISCONTINUED | OUTPATIENT
Start: 2022-05-20 | End: 2022-05-23 | Stop reason: HOSPADM

## 2022-05-20 RX ORDER — SODIUM PHOSPHATE, DIBASIC AND SODIUM PHOSPHATE, MONOBASIC 7; 19 G/133ML; G/133ML
1 ENEMA RECTAL PRN
Status: DISCONTINUED | OUTPATIENT
Start: 2022-05-20 | End: 2022-05-23 | Stop reason: HOSPADM

## 2022-05-20 RX ORDER — LEVETIRACETAM 100 MG/ML
500 SOLUTION ORAL 2 TIMES DAILY
Status: DISCONTINUED | OUTPATIENT
Start: 2022-05-20 | End: 2022-05-23 | Stop reason: HOSPADM

## 2022-05-20 RX ORDER — CIPROFLOXACIN 2 MG/ML
400 INJECTION, SOLUTION INTRAVENOUS CONTINUOUS
Status: DISCONTINUED | OUTPATIENT
Start: 2022-05-20 | End: 2022-05-20 | Stop reason: SDUPTHER

## 2022-05-20 RX ORDER — SODIUM CHLORIDE 0.9 % (FLUSH) 0.9 %
5-40 SYRINGE (ML) INJECTION EVERY 12 HOURS SCHEDULED
Status: DISCONTINUED | OUTPATIENT
Start: 2022-05-20 | End: 2022-05-23 | Stop reason: HOSPADM

## 2022-05-20 RX ORDER — SENNA AND DOCUSATE SODIUM 50; 8.6 MG/1; MG/1
2 TABLET, FILM COATED ORAL DAILY PRN
Status: DISCONTINUED | OUTPATIENT
Start: 2022-05-20 | End: 2022-05-20

## 2022-05-20 RX ORDER — POLYETHYLENE GLYCOL 3350 17 G/17G
17 POWDER, FOR SOLUTION ORAL DAILY
Status: DISCONTINUED | OUTPATIENT
Start: 2022-05-21 | End: 2022-05-20

## 2022-05-20 RX ORDER — LACOSAMIDE 100 MG/1
100 TABLET ORAL 2 TIMES DAILY
Status: DISCONTINUED | OUTPATIENT
Start: 2022-05-20 | End: 2022-05-20

## 2022-05-20 RX ORDER — BACLOFEN 10 MG/1
5 TABLET ORAL 2 TIMES DAILY
Status: DISCONTINUED | OUTPATIENT
Start: 2022-05-20 | End: 2022-05-20

## 2022-05-20 RX ORDER — SODIUM CHLORIDE 9 MG/ML
INJECTION, SOLUTION INTRAVENOUS CONTINUOUS
Status: DISCONTINUED | OUTPATIENT
Start: 2022-05-20 | End: 2022-05-21

## 2022-05-20 RX ORDER — SODIUM CHLORIDE 0.9 % (FLUSH) 0.9 %
5-40 SYRINGE (ML) INJECTION PRN
Status: DISCONTINUED | OUTPATIENT
Start: 2022-05-20 | End: 2022-05-23 | Stop reason: HOSPADM

## 2022-05-20 RX ORDER — SODIUM PHOSPHATE, DIBASIC AND SODIUM PHOSPHATE, MONOBASIC 7; 19 G/133ML; G/133ML
1 ENEMA RECTAL DAILY PRN
COMMUNITY

## 2022-05-20 RX ORDER — BENZTROPINE MESYLATE 1 MG/1
1 TABLET ORAL 3 TIMES DAILY
Status: DISCONTINUED | OUTPATIENT
Start: 2022-05-20 | End: 2022-05-23 | Stop reason: HOSPADM

## 2022-05-20 RX ORDER — METRONIDAZOLE 500 MG/100ML
500 INJECTION, SOLUTION INTRAVENOUS EVERY 8 HOURS
Status: DISCONTINUED | OUTPATIENT
Start: 2022-05-20 | End: 2022-05-23 | Stop reason: HOSPADM

## 2022-05-20 RX ORDER — POLYETHYLENE GLYCOL 3350 17 G/17G
17 POWDER, FOR SOLUTION ORAL DAILY PRN
Status: DISCONTINUED | OUTPATIENT
Start: 2022-05-20 | End: 2022-05-20

## 2022-05-20 RX ORDER — SENNA PLUS 8.6 MG/1
2 TABLET ORAL DAILY
Status: ON HOLD | COMMUNITY
End: 2022-09-19 | Stop reason: SDUPTHER

## 2022-05-20 RX ORDER — FERROUS SULFATE 220 (44)/5
220 ELIXIR ORAL 3 TIMES DAILY
COMMUNITY

## 2022-05-20 RX ORDER — ACETAMINOPHEN 650 MG/1
650 SUPPOSITORY RECTAL EVERY 6 HOURS PRN
Status: DISCONTINUED | OUTPATIENT
Start: 2022-05-20 | End: 2022-05-23 | Stop reason: HOSPADM

## 2022-05-20 RX ORDER — ENOXAPARIN SODIUM 100 MG/ML
40 INJECTION SUBCUTANEOUS DAILY
Status: DISCONTINUED | OUTPATIENT
Start: 2022-05-20 | End: 2022-05-23 | Stop reason: HOSPADM

## 2022-05-20 RX ORDER — ONDANSETRON 4 MG/1
4 TABLET, ORALLY DISINTEGRATING ORAL EVERY 8 HOURS PRN
Status: DISCONTINUED | OUTPATIENT
Start: 2022-05-20 | End: 2022-05-23 | Stop reason: HOSPADM

## 2022-05-20 RX ORDER — POLYETHYLENE GLYCOL 3350 17 G/17G
17 POWDER, FOR SOLUTION ORAL DAILY
Status: DISCONTINUED | OUTPATIENT
Start: 2022-05-21 | End: 2022-05-21

## 2022-05-20 RX ORDER — LACOSAMIDE 100 MG/1
100 TABLET ORAL 2 TIMES DAILY
Status: DISCONTINUED | OUTPATIENT
Start: 2022-05-20 | End: 2022-05-23 | Stop reason: HOSPADM

## 2022-05-20 RX ORDER — ACETAMINOPHEN 325 MG/1
650 TABLET ORAL 2 TIMES DAILY PRN
COMMUNITY

## 2022-05-20 RX ORDER — METRONIDAZOLE 500 MG/100ML
500 INJECTION, SOLUTION INTRAVENOUS EVERY 8 HOURS
Status: DISCONTINUED | OUTPATIENT
Start: 2022-05-20 | End: 2022-05-20 | Stop reason: SDUPTHER

## 2022-05-20 RX ORDER — SODIUM CHLORIDE 9 MG/ML
INJECTION, SOLUTION INTRAVENOUS PRN
Status: DISCONTINUED | OUTPATIENT
Start: 2022-05-20 | End: 2022-05-23 | Stop reason: HOSPADM

## 2022-05-20 RX ORDER — ONDANSETRON 2 MG/ML
4 INJECTION INTRAMUSCULAR; INTRAVENOUS EVERY 6 HOURS PRN
Status: DISCONTINUED | OUTPATIENT
Start: 2022-05-20 | End: 2022-05-23 | Stop reason: HOSPADM

## 2022-05-20 RX ORDER — VALPROATE SODIUM 100 MG/ML
250 INJECTION, SOLUTION INTRAVENOUS EVERY 12 HOURS
COMMUNITY

## 2022-05-20 RX ORDER — BENZTROPINE MESYLATE 1 MG/1
1 TABLET ORAL 3 TIMES DAILY
Status: DISCONTINUED | OUTPATIENT
Start: 2022-05-20 | End: 2022-05-20

## 2022-05-20 RX ORDER — CIPROFLOXACIN 2 MG/ML
400 INJECTION, SOLUTION INTRAVENOUS EVERY 12 HOURS
Status: DISCONTINUED | OUTPATIENT
Start: 2022-05-21 | End: 2022-05-23 | Stop reason: HOSPADM

## 2022-05-20 RX ORDER — BACLOFEN 10 MG/1
5 TABLET ORAL 2 TIMES DAILY
Status: DISCONTINUED | OUTPATIENT
Start: 2022-05-20 | End: 2022-05-23 | Stop reason: HOSPADM

## 2022-05-20 RX ORDER — ERYTHROMYCIN 5 MG/G
1 OINTMENT OPHTHALMIC 3 TIMES DAILY
Status: ON HOLD | COMMUNITY
Start: 2022-05-03 | End: 2022-09-19 | Stop reason: HOSPADM

## 2022-05-20 RX ADMIN — LACOSAMIDE 100 MG: 100 TABLET, FILM COATED ORAL at 20:10

## 2022-05-20 RX ADMIN — LEVETIRACETAM 500 MG: 500 SOLUTION ORAL at 20:10

## 2022-05-20 RX ADMIN — QUETIAPINE 300 MG: 200 TABLET ORAL at 19:11

## 2022-05-20 RX ADMIN — METRONIDAZOLE 500 MG: 500 INJECTION, SOLUTION INTRAVENOUS at 15:17

## 2022-05-20 RX ADMIN — CIPROFLOXACIN 400 MG: 2 INJECTION, SOLUTION INTRAVENOUS at 14:06

## 2022-05-20 RX ADMIN — METRONIDAZOLE 500 MG: 500 INJECTION, SOLUTION INTRAVENOUS at 20:13

## 2022-05-20 RX ADMIN — BACLOFEN 5 MG: 10 TABLET ORAL at 20:11

## 2022-05-20 RX ADMIN — SERTRALINE 200 MG: 50 TABLET, FILM COATED ORAL at 19:11

## 2022-05-20 RX ADMIN — POLYETHYLENE GLYCOL 3350, SODIUM SULFATE ANHYDROUS, SODIUM BICARBONATE, SODIUM CHLORIDE, POTASSIUM CHLORIDE 4000 ML: 236; 22.74; 6.74; 5.86; 2.97 POWDER, FOR SOLUTION ORAL at 19:11

## 2022-05-20 RX ADMIN — IOPAMIDOL 60 ML: 755 INJECTION, SOLUTION INTRAVENOUS at 12:49

## 2022-05-20 RX ADMIN — BENZTROPINE MESYLATE 1 MG: 1 TABLET ORAL at 20:11

## 2022-05-20 RX ADMIN — SODIUM CHLORIDE: 9 INJECTION, SOLUTION INTRAVENOUS at 19:10

## 2022-05-20 RX ADMIN — ENOXAPARIN SODIUM 40 MG: 100 INJECTION SUBCUTANEOUS at 19:11

## 2022-05-20 ASSESSMENT — PAIN SCALES - WONG BAKER: WONGBAKER_NUMERICALRESPONSE: 0

## 2022-05-20 NOTE — H&P
HOSPITALISTS HISTORY AND PHYSICAL    5/20/2022 3:01 PM    Patient Information:  Nikos Resendez is a 27 y.o. female [id number]  PCP:  Faheem Shen MD (Tel: None )    Chief complaint:    Chief Complaint   Patient presents with    Abnormal Test Results     pt sent in by [facility] at the [facility] due to KUB results from 5/19 showing an Ileus ? bowel obstruction. incontinent of bowel and bladder, had stool yesterday and the day before. History of Present Illness:  Melchor Arauz is a 27 y.o. female who presented with complaints of abnormal KUB. With questionable ileus. For small bowel obstruction patient was incontinent of bowel and bladder. Red stool all over. Patient presented a 48 nonverbal at baseline due to traumatic brain injury. Patient does not provide history however patient had to be severe constipation. Patient has chronic PEG tube. REVIEW OF SYSTEMS:   Able to obtain due to patient mental  Past Medical History:   has a past medical history of Acute hepatitis C without hepatic coma, Acute hepatitis C without hepatic coma, Anoxic brain damage (HCC), Cardiac arrest ([address] Chronic kidney disease, Chronic viral hepatitis C ([address] Diffuse traumatic brain injury with loss of consciousness greater than 24 hours without return to pre-existing conscious level with patient surviving [facility]), Major depressive disorder, recurrent, unspecified ([address] MRSA (methicillin resistant staph aureus) culture positive, Nicotine dependence, other tobacco product, uncomplicated, Overdose, Poisoning by heroin, undetermined, initial encounter ([address] Schizophrenia, unspecified ([address] Seizures ([address] Streptococcal sepsis, unspecified ([address] TBI (traumatic brain injury) ([address] Unspecified asthma, uncomplicated, and Unspecified sexually transmitted disease.      Past Surgical History:   has a past surgical history that includes Gastrostomy tube placement; Gastrostomy tube placement; Upper gastrointestinal endoscopy (N/A, 11/26/2019); and Gastrostomy tube placement (N/A, 11/26/2019). Medications:  No current facility-administered medications on file prior to encounter. Current Outpatient Medications on File Prior to Encounter   Medication Sig Dispense Refill    polyethylene glycol (GLYCOLAX) 17 GM/SCOOP powder Take 17 g by mouth daily      lacosamide (VIMPAT) 100 MG TABS tablet Take 100 mg by mouth 2 times daily.  Baclofen 5 MG TABS Take 5 mg by mouth 2 times daily      Ascorbic Acid (VITAMIN C) 250 MG tablet Take 250 mg by mouth daily      ibuprofen (ADVIL;MOTRIN) 800 MG tablet 800 mg by Per G Tube route every 6 hours as needed for Pain      paliperidone palmitate ER (INVEGA SUSTENNA) 117 MG/0.75ML MOSES IM injection Inject 117 mg into the muscle every 30 days At bedtime on the 8th of the month      QUEtiapine (SEROQUEL) 200 MG tablet 300 mg by Per G Tube route once       Cholecalciferol (VITAMIN D3) 125 MCG (5000 UT) TABS 1 tablet by Per G Tube route daily      sertraline (ZOLOFT) 20 MG/ML concentrated solution 200 mg by Per G Tube route daily       levETIRAcetam (KEPPRA) 100 MG/ML solution 5 mLs by PEG Tube route 2 times daily 1 Bottle 3    QUEtiapine (SEROQUEL) 25 MG tablet 1 tablet by PEG Tube route 2 times daily (Patient taking differently: 150 mg by Per G Tube route nightly ) 60 tablet 3    famotidine (PEPCID) 20 MG tablet 1 tablet by PEG Tube route 2 times daily 60 tablet 3    benztropine (COGENTIN) 1 MG tablet Take 2 tablets by mouth 2 times daily (Patient taking differently: Take 1 mg by mouth 3 times daily ) 120 tablet 3       Allergies:  No Known Allergies     Social History:   reports that she quit smoking about 11 years ago. Her smoking use included cigarettes. She quit after 2.00 years of use. She has never used smokeless tobacco. She reports that she does not drink alcohol and does not use drugs.      Family History:  family history includes COPD in her mother; Cancer in her paternal grandmother; High Cholesterol in her father. ,     Physical Exam:  /66   Pulse (!) 44   Temp 97.8 °F (36.6 °C) (Oral)   Resp 11   Ht 5' 10\" (1.778 m)   Wt 115 lb (52.2 kg)   SpO2 98%   BMI 16.50 kg/m²     General appearance:  Appears comfortable. Well nourished  Eyes: Sclera clear, pupils equal  ENT: Moist mucus membranes, no thrush. Trachea midline. Cardiovascular: Regular rhythm, normal S1, S2. No murmur, gallop, rub. No edema in lower extremities  Respiratory: Clear to auscultation bilaterally, no wheeze, good inspiratory effort  Gastrointestinal: Abdomen soft, soft distended distended, normal bowel sounds  Musculoskeletal: No cyanosis in digits, neck supple  Neurology: Nonverbal at baseline minimal  Psychiatry: Appropriate affect. Not agitated  Skin: Warm, dry, normal turgor, no rash    Labs:  CBC:   Lab Results   Component Value Date    WBC 5.0 05/20/2022    RBC 4.23 05/20/2022    HGB 13.2 05/20/2022    HCT 39.1 05/20/2022    MCV 92.5 05/20/2022    MCH 31.1 05/20/2022    MCHC 33.7 05/20/2022    RDW 13.4 05/20/2022     05/20/2022    MPV 10.0 05/20/2022     BMP:    Lab Results   Component Value Date     05/20/2022    K 4.0 05/20/2022     05/20/2022    CO2 27 05/20/2022    BUN 16 05/20/2022    CREATININE 0.6 05/20/2022    CALCIUM 9.3 05/20/2022    GFRAA >60 05/20/2022    GFRAA >60 09/20/2011    LABGLOM >60 05/20/2022    GLUCOSE 92 05/20/2022       Chest Xray:   EKG:    I visualized CXR images and EKG strips        Problem List  Principal Problem:    Abdominal pain  Resolved Problems:    * No resolved hospital problems. *        Assessment/Plan:   Abdominal pain  -Patient CT finding is consistent with constipation throughout the colon.   Manual disimpaction was unsuccessful  -We will admit to try GoLytely see if that helps  -GI consult to help us as well given G-tube dependent nonverbal  -Monitor electrolyte    Continue all other home medication      Boubacar Howe MD    5/20/2022 3:01 PM

## 2022-05-20 NOTE — ED TRIAGE NOTES
Called Charmaine at the [facility] due to report from squad \"bowel obstruction but passing stools\". Spoke with pt's nurse who states that pt had a KUB yesterday and that the want to verify those results.

## 2022-05-20 NOTE — ED NOTES
Assisted in MD digital rectal assessment and use of 1/2 soap suds enema. Stool was like toothpaste with no hard obstructions.      Daphne Araujo RN  05/20/22 0544

## 2022-05-20 NOTE — ED PROVIDER NOTES
[address] [facility] PROVIDER NOTE    Patient Identification  Pt Name: David Hernandez  MRN: [id number]  Sarah 1995  Date of evaluation: 5/20/2022  Provider: Roxy Mariee MD  PCP: Carmelo Nichole MD    Chief Complaint  Abnormal kub    HPI  History provided by patients nurse   This is a 27 y.o. female who presents to the ED for abnormal KUB. Patient is nonverbal and unable to tell if she has any pain or discomfort. It is unclear why the KUB was ordered. She had a normal bowel movement yesterday and the day before. No fever. Has been receiving g tube feeds easily. .     MARGARET jimenez    I have reviewed the following nursing documentation:  Allergies: Patient has no known allergies. Past medical history:   Past Medical History:   Diagnosis Date    Acute hepatitis C without hepatic coma     Acute hepatitis C without hepatic coma     Anoxic brain damage ([address]     Cardiac arrest ([address]     s/p overdose 08/2016.     Chronic kidney disease     \"a few\" UTIs with pregnancy    Chronic viral hepatitis C ([address]     Diffuse traumatic brain injury with loss of consciousness greater than 24 hours without return to pre-existing conscious level with patient surviving [facility])     sequela    Major depressive disorder, recurrent, unspecified ([address]     MRSA (methicillin resistant staph aureus) culture positive 08/25/2017    arm    Nicotine dependence, other tobacco product, uncomplicated     Overdose     Poisoning by heroin, undetermined, initial encounter ([address]     Schizophrenia, unspecified ([address]     Seizures ([address]     Streptococcal sepsis, unspecified ([address]     TBI (traumatic brain injury) ([address]     Unspecified asthma, uncomplicated     Unspecified sexually transmitted disease      Past surgical history:   Past Surgical History:   Procedure Laterality Date    GASTROSTOMY TUBE PLACEMENT      GASTROSTOMY TUBE PLACEMENT      08/02/2016    GASTROSTOMY TUBE PLACEMENT N/A 11/26/2019 EGD PEG TUBE PLACEMENT performed by Manfred Rosado MD at [facility] N/A 11/26/2019    EGD FOREIGN BODY REMOVAL performed by Manfred Rosado MD at [address] [place] [address] medications:   Previous Medications    ASCORBIC ACID (VITAMIN C) 250 MG TABLET    Take 250 mg by mouth daily    BACLOFEN 5 MG TABS    Take 5 mg by mouth 2 times daily    BENZTROPINE (COGENTIN) 1 MG TABLET    Take 2 tablets by mouth 2 times daily    CHOLECALCIFEROL (VITAMIN D3) 125 MCG (5000 UT) TABS    1 tablet by Per G Tube route daily    FAMOTIDINE (PEPCID) 20 MG TABLET    1 tablet by PEG Tube route 2 times daily    IBUPROFEN (ADVIL;MOTRIN) 800 MG TABLET    800 mg by Per G Tube route every 6 hours as needed for Pain    LACOSAMIDE (VIMPAT) 100 MG TABS TABLET    Take 100 mg by mouth 2 times daily. LEVETIRACETAM (KEPPRA) 100 MG/ML SOLUTION    5 mLs by PEG Tube route 2 times daily    PALIPERIDONE PALMITATE ER (INVEGA SUSTENNA) 117 MG/0.75ML MOSES IM INJECTION    Inject 117 mg into the muscle every 30 days At bedtime on the 8th of the month    POLYETHYLENE GLYCOL (GLYCOLAX) 17 GM/SCOOP POWDER    Take 17 g by mouth daily    QUETIAPINE (SEROQUEL) 200 MG TABLET    300 mg by Per G Tube route once     QUETIAPINE (SEROQUEL) 25 MG TABLET    1 tablet by PEG Tube route 2 times daily    SERTRALINE (ZOLOFT) 20 MG/ML CONCENTRATED SOLUTION    200 mg by Per G Tube route daily        Social history: cant obtain    Family history:    Family History   Problem Relation Age of Onset    High Cholesterol Father     Cancer Paternal Derotha Dearth COPD Mother          Exam  ED Triage Vitals [05/20/22 1134]   BP Temp Temp Source Pulse Resp SpO2 Height Weight   (!) 114/93 97.8 °F (36.6 °C) Oral 82 16 -- 5' 10\" (1.778 m) 115 lb (52.2 kg)     Nursing note and vitals reviewed.   Constitutional: In no acute distress  HENT:      Head: Normocephalic      Ears: External ears normal.      Nose: Nose normal.     Mouth: Membrane mucosa moist   Eyes: No discharge. Neck: Supple. Trachea midline. Cardiovascular: Regular rate. Warm extremities  Pulmonary/Chest: Effort normal. No respiratory distress. Abdominal: Soft. No distension. Nontender  : Deferred  Rectal: Deferred   Musculoskeletal:  No gross deformity. Neurological: Keeps eyes shut very tightly. Mumbles incoherently to sternal rub. Skin: Warm and dry. Psychiatric: cant assess, appears comfortable    Procedures        Radiology  CT ABDOMEN PELVIS W IV CONTRAST Additional Contrast? None   Final Result   Large amount of stool in the colon, with recto sigmoidal fecal impaction. Proctitis is suspected. Mild left hydroureteronephrosis, perhaps related to extrinsic compression of   recto sigmoidal of fecal impaction.              Labs  Results for orders placed or performed during the hospital encounter of 05/20/22   CBC with Auto Differential   Result Value Ref Range    WBC 5.0 4.0 - 11.0 K/uL    RBC 4.23 4.00 - 5.20 M/uL    Hemoglobin 13.2 12.0 - 16.0 g/dL    Hematocrit 39.1 36.0 - 48.0 %    MCV 92.5 80.0 - 100.0 fL    MCH 31.1 26.0 - 34.0 pg    MCHC 33.7 31.0 - 36.0 g/dL    RDW 13.4 12.4 - 15.4 %    Platelets 675 724 - 443 K/uL    MPV 10.0 5.0 - 10.5 fL    Neutrophils % 57.0 %    Lymphocytes % 31.3 %    Monocytes % 7.1 %    Eosinophils % 3.8 %    Basophils % 0.8 %    Neutrophils Absolute 2.8 1.7 - 7.7 K/uL    Lymphocytes Absolute 1.6 1.0 - 5.1 K/uL    Monocytes Absolute 0.4 0.0 - 1.3 K/uL    Eosinophils Absolute 0.2 0.0 - 0.6 K/uL    Basophils Absolute 0.0 0.0 - 0.2 K/uL   Comprehensive Metabolic Panel w/ Reflex to MG   Result Value Ref Range    Sodium 136 136 - 145 mmol/L    Potassium reflex Magnesium 4.0 3.5 - 5.1 mmol/L    Chloride 102 99 - 110 mmol/L    CO2 27 21 - 32 mmol/L    Anion Gap 7 3 - 16    Glucose 92 70 - 99 mg/dL    BUN 16 7 - 20 mg/dL    CREATININE 0.6 0.6 - 1.1 mg/dL    GFR Non-African American >60 >60    GFR African American >60 >60    Calcium 9.3 8.3 - 10.6 mg/dL    Total Protein 6.7 6.4 - 8.2 g/dL    Albumin 4.4 3.4 - 5.0 g/dL    Albumin/Globulin Ratio 1.9 1.1 - 2.2    Total Bilirubin <0.2 0.0 - 1.0 mg/dL    Alkaline Phosphatase 55 40 - 129 U/L    ALT 15 10 - 40 U/L    AST 11 (L) 15 - 37 U/L   Urinalysis with Reflex to Culture    Specimen: Urine   Result Value Ref Range    Color, UA Yellow Straw/Yellow    Clarity, UA Clear Clear    Glucose, Ur Negative Negative mg/dL    Bilirubin Urine Negative Negative    Ketones, Urine TRACE (A) Negative mg/dL    Specific Gravity, UA 1.025 1.005 - 1.030    Blood, Urine Negative Negative    pH, UA 6.5 5.0 - 8.0    Protein, UA Negative Negative mg/dL    Urobilinogen, Urine 1.0 <2.0 E.U./dL    Nitrite, Urine Negative Negative    Leukocyte Esterase, Urine SMALL (A) Negative    Microscopic Examination YES     Urine Type NotGiven     Urine Reflex to Culture Not Indicated    HCG Qualitative, Serum   Result Value Ref Range    hCG Qual Negative Detects HCG level >10 MIU/mL   Microscopic Urinalysis   Result Value Ref Range    Bacteria, UA None Seen None Seen /HPF    Hyaline Casts, UA 0 0 - 8 /LPF    WBC, UA 2 0 - 5 /HPF    RBC, UA 2 0 - 4 /HPF    Epithelial Cells, UA 3 0 - 5 /HPF       Screenings   [place] Coma Scale  Eye Opening: To pain  Best Verbal Response: None  Best Motor Response: Localizes pain  [place] Coma Scale Score: 8       MDM and ED Course  This is a 27 y.o. female who presents to the ED for abnormal KUB results showing possible ileus versus obstruction. Obtaining CT abdomen pelvis with contrast.  It is unclear why the KUB was ordered. Per her nurse, she has been having normal bowel movements and behaving her normal self    ---------    It turns out the KUB was ordered for constipation. The patient did have a bowel movement here.    ---------    CT shows evidence of proctitis and fecal impaction going all the way up to the sigmoid.   Due to increased risk of perforation in this setting, patient admitted to hospitalist service. Started on Cipro and Flagyl. Procedure  Fecal disimpaction  Patient placed on her side. Soapsuds enema administered about 500 cc with warm water gently dripped into the rectal cavity. I was able to palpate a small amount of stool that was very soft. I was only able to disimpact a scant amount of stool. This procedure took me 10 minutes. Patient did not appear to be in any pain during this time. [unfilled]    Is this patient to be included in the SEP-1 Core Measure due to severe sepsis or septic shock? No   Exclusion criteria - the patient is NOT to be included for SEP-1 Core Measure due to:  2+ SIRS criteria are not met        Final Impression  1. Proctitis    2. Fecal impaction (HCC)        Blood pressure 107/66, pulse (!) 44, temperature 97.8 °F (36.6 °C), temperature source Oral, resp. rate 11, height 5' 10\" (1.778 m), weight 115 lb (52.2 kg), SpO2 98 %, not currently breastfeeding. Disposition:  DISPOSITION Admitted 05/20/2022 01:49:51 PM      Patient Referrals:  No follow-up provider specified. Discharge Medications:  New Prescriptions    No medications on file       Discontinued Medications:  Discontinued Medications    No medications on file       This chart was generated using the [address] [place] [address] [address] dictation system. I created this record but it may contain dictation errors given the limitations of this technology.         Hieu Esposito MD  05/20/22 2371

## 2022-05-20 NOTE — ED NOTES
Straight cath'ed for UA. Small amount soft dk green stool noted in depends. Gretchen care provided prior to being cath'ed. Pt tolerated well. Specimen sent to lab per orders.      John Jones RN  05/20/22 8420

## 2022-05-21 PROCEDURE — 6370000000 HC RX 637 (ALT 250 FOR IP): Performed by: HOSPITALIST

## 2022-05-21 PROCEDURE — 2580000003 HC RX 258: Performed by: HOSPITALIST

## 2022-05-21 PROCEDURE — 6370000000 HC RX 637 (ALT 250 FOR IP): Performed by: NURSE PRACTITIONER

## 2022-05-21 PROCEDURE — 2500000003 HC RX 250 WO HCPCS: Performed by: HOSPITALIST

## 2022-05-21 PROCEDURE — 6360000002 HC RX W HCPCS: Performed by: HOSPITALIST

## 2022-05-21 PROCEDURE — 1200000000 HC SEMI PRIVATE

## 2022-05-21 PROCEDURE — 96366 THER/PROPH/DIAG IV INF ADDON: CPT

## 2022-05-21 RX ORDER — POLYETHYLENE GLYCOL 3350 17 G/17G
17 POWDER, FOR SOLUTION ORAL 2 TIMES DAILY
Status: DISCONTINUED | OUTPATIENT
Start: 2022-05-21 | End: 2022-05-23 | Stop reason: HOSPADM

## 2022-05-21 RX ADMIN — BENZTROPINE MESYLATE 1 MG: 1 TABLET ORAL at 09:24

## 2022-05-21 RX ADMIN — CIPROFLOXACIN 400 MG: 2 INJECTION, SOLUTION INTRAVENOUS at 04:18

## 2022-05-21 RX ADMIN — LEVETIRACETAM 500 MG: 500 SOLUTION ORAL at 09:20

## 2022-05-21 RX ADMIN — METRONIDAZOLE 500 MG: 500 INJECTION, SOLUTION INTRAVENOUS at 11:48

## 2022-05-21 RX ADMIN — BENZTROPINE MESYLATE 1 MG: 1 TABLET ORAL at 21:38

## 2022-05-21 RX ADMIN — SODIUM CHLORIDE 25 ML: 9 INJECTION, SOLUTION INTRAVENOUS at 21:51

## 2022-05-21 RX ADMIN — SODIUM CHLORIDE: 9 INJECTION, SOLUTION INTRAVENOUS at 11:30

## 2022-05-21 RX ADMIN — CIPROFLOXACIN 400 MG: 2 INJECTION, SOLUTION INTRAVENOUS at 16:51

## 2022-05-21 RX ADMIN — BACLOFEN 5 MG: 10 TABLET ORAL at 09:19

## 2022-05-21 RX ADMIN — BACLOFEN 5 MG: 10 TABLET ORAL at 21:38

## 2022-05-21 RX ADMIN — LACOSAMIDE 100 MG: 100 TABLET, FILM COATED ORAL at 21:38

## 2022-05-21 RX ADMIN — LEVETIRACETAM 500 MG: 500 SOLUTION ORAL at 21:37

## 2022-05-21 RX ADMIN — Medication 10 ML: at 21:38

## 2022-05-21 RX ADMIN — METRONIDAZOLE 500 MG: 500 INJECTION, SOLUTION INTRAVENOUS at 05:23

## 2022-05-21 RX ADMIN — POLYETHYLENE GLYCOL 3350 17 G: 17 POWDER, FOR SOLUTION ORAL at 21:38

## 2022-05-21 RX ADMIN — Medication 10 ML: at 10:20

## 2022-05-21 RX ADMIN — LACOSAMIDE 100 MG: 100 TABLET, FILM COATED ORAL at 09:20

## 2022-05-21 RX ADMIN — METRONIDAZOLE 500 MG: 500 INJECTION, SOLUTION INTRAVENOUS at 21:52

## 2022-05-21 RX ADMIN — SERTRALINE 200 MG: 50 TABLET, FILM COATED ORAL at 09:22

## 2022-05-21 RX ADMIN — ACETAMINOPHEN 650 MG: 325 TABLET ORAL at 11:43

## 2022-05-21 RX ADMIN — BENZTROPINE MESYLATE 1 MG: 1 TABLET ORAL at 14:49

## 2022-05-21 ASSESSMENT — PAIN SCALES - WONG BAKER
WONGBAKER_NUMERICALRESPONSE: 0;4
WONGBAKER_NUMERICALRESPONSE: 0;4
WONGBAKER_NUMERICALRESPONSE: 0
WONGBAKER_NUMERICALRESPONSE: 0
WONGBAKER_NUMERICALRESPONSE: 4
WONGBAKER_NUMERICALRESPONSE: 0
WONGBAKER_NUMERICALRESPONSE: 0;4
WONGBAKER_NUMERICALRESPONSE: 0

## 2022-05-21 ASSESSMENT — PAIN SCALES - GENERAL: PAINLEVEL_OUTOF10: 0

## 2022-05-21 ASSESSMENT — PAIN DESCRIPTION - LOCATION
LOCATION: LEG

## 2022-05-21 ASSESSMENT — PAIN DESCRIPTION - ORIENTATION
ORIENTATION: RIGHT;LEFT

## 2022-05-21 NOTE — PROGRESS NOTES
Nutrition Note    RECOMMENDATIONS  Per MD approval: PO Diet: Per NH: Dysphagia pureed, Honey Thick liquids  ONS: Per NH: Magic cup TID    Nutrition Support:      1. Bolus 240 mL 2 brenda HN four times daily to provide 960 mL total volume, 1920 calories, 80 grams protein, 672 mL free water as consistent with pre-admission regimen. 2. DC IVF's as water flush will provide adequate fluid  3. Recommend water bolus 120 mL before and after each feed. 4. Ensure head of bed is 30 - 45 degrees during continuous or bolus gastric feeding and for one hour after bolus. Turn off the feeding if head of bed is lowered less than 30 degrees. 5. Monitor for tolerance (bowel habits, N/V, cramping, abdominal exam findings: distended, firm, tense, guarded, discomfort      NUTRITION ASSESSMENT   Nutrition consult received for TF order & manage. Pt from NH where she received Pureed diet with honey thick liquids & magic cup supplements, as well as bolus TF via PEG tube. Pt received 240 ml bolus of Two brenda HN TF QID with 120 ml water flush before & after each feeding. Pt is nonverbal d/t anoxic brain injury. No wt loss per EMR since last admission, 2/2022. Will order TF/diet as per NH & monitor for tolerance.  Nutrition Related Findings: IVF: NS @100 ml/hr. No edema noted; LBM 5/21. Nonverbal   Wounds: None   Nutrition Education:  Education not indicated    Nutrition Goals:  Tolerate nutrition support at goal rate,Initiate PO diet     MALNUTRITION ASSESSMENT   Chronic Illness  Malnutrition Status: Insufficient data    NUTRITION DIAGNOSIS   · Inadequate oral intake related to cognitive or neurological impairment as evidenced by nutrition support - enteral nutrition,poor intake prior to admission    CURRENT NUTRITION THERAPIES  Diet NPO     PO Intake: NPO   PO Supplement Intake:NPO    ANTHROPOMETRICS   Current Height: 5' 10\" (177.8 cm)   Current Weight: 112 lb 4.8 oz (50.9 kg)     Ideal Body Weight (IBW): 150 lbs  (68 kg) BMI: 16.1      COMPARATIVE STANDARDS  Energy (kcal):  1530- 1785     Protein (g):  77- 102g       Fluid (mL/day):  1 ml/kcal    The patient will be monitored per nutrition standards of care. Consult dietitian if additional nutrition interventions are needed prior to RD reassessment.      Estephanie Garrett RD, LD    Contact: [phone]

## 2022-05-21 NOTE — PROGRESS NOTES
Paolo sent the following perfect serve: \"Patient did not urinate over night. Bladder scans shows 450 mls\" waiting for response.

## 2022-05-21 NOTE — PROGRESS NOTES
Pt has voided on her own. Pt also has BM and bladder scanned after voiding showed 185ml. Will monitor.

## 2022-05-21 NOTE — PROGRESS NOTES
Patients abdomen is distended, feeding tube appears to be falling out. Patient's RN states that she did a 240ml bolus thsi afternoon. NP notified. Awaiting new orders.

## 2022-05-21 NOTE — PROGRESS NOTES
Pt's PEG tube appears to be falling out. Pt's abdomen is distended, This RN administered 240ml of tube feed bolus as per order. Pt is having lots of loose stool and pt is urinating on her own. Charge nurse notified and will notify physician.

## 2022-05-21 NOTE — CONSULTS
Consultation Note    Patient Name: Bryn Dugan  : 1995  Age: 27 y.o. Admitting Physician: Helena Pérez MD   Date of Admission: 2022 11:12 AM   Primary Care Physician: Barbara Barnes MD        Bryn Dugan is being seen at the request of Helena Pérez MD for fecal impaction and proctitis. History of Present Illness:  She is nonverbal s/p anoxic brain injury. She presented to the hospital with abnormal KUB suggesting ileus. CT scan was done which showed large amount of stool in the colon with rectosigmoid fecal impaction and questionable proctitis. Also noted was mild left hydronephrosis probably related to extrinsic compression of the rectosigmoid fecal impaction. She had similar admissions in the past with same findings on the CT scan. Patient has PEG tube in place. Overnight she was given GoLytely through the PEG tube and now she is having multiple bowel movements. Past Medical History:  Past Medical History:   Diagnosis Date    Acute hepatitis C without hepatic coma     Acute hepatitis C without hepatic coma     Anoxic brain damage (HCC)     Cardiac arrest ([address]     s/p overdose 2016.     Chronic kidney disease     \"a few\" UTIs with pregnancy    Chronic viral hepatitis C ([address]     Diffuse traumatic brain injury with loss of consciousness greater than 24 hours without return to pre-existing conscious level with patient surviving [facility])     sequela    Major depressive disorder, recurrent, unspecified ([address]     MRSA (methicillin resistant staph aureus) culture positive 2017    arm    Nicotine dependence, other tobacco product, uncomplicated     Overdose     Poisoning by heroin, undetermined, initial encounter ([address]     Schizophrenia, unspecified ([address]     Seizures ([address]     Streptococcal sepsis, unspecified ([address]     TBI (traumatic brain injury) ([address]     Unspecified asthma, uncomplicated     Unspecified sexually transmitted disease         Past Surgical History:  Past Surgical History:   Procedure Laterality Date    GASTROSTOMY TUBE PLACEMENT      GASTROSTOMY TUBE PLACEMENT      08/02/2016    GASTROSTOMY TUBE PLACEMENT N/A 11/26/2019    EGD PEG TUBE PLACEMENT performed by Mary Mary MD at [address] [place] [address] [place] N/A 11/26/2019    EGD FOREIGN BODY REMOVAL performed by Mary Mary MD at [facility] Medications:  Prior to Visit Medications    Medication Sig Taking? Authorizing Provider   ferrous sulfate 220 (44 Fe) MG/5ML solution 220 mg by Per G Tube route 3 times daily Yes Historical Provider, MD   Sodium Phosphates (FLEET) 7-19 GM/118ML Place 1 enema rectally daily as needed Yes Historical Provider, MD   senna (SENOKOT) 8.6 MG tablet Take 2 tablets by mouth daily Yes Historical Provider, MD   acetaminophen (TYLENOL) 325 MG tablet Take 650 mg by mouth 2 times daily as needed for Pain Yes Historical Provider, MD   valproate (DEPACON) 100 MG/ML injection Infuse 250 mg intravenously every 12 hours Yes Historical Provider, MD   erythromycin (ROMYCIN) 5 MG/GM ophthalmic ointment Place 1 applicator into the left eye in the morning, at noon, and at bedtime  Historical Provider, MD   polyethylene glycol (GLYCOLAX) 17 GM/SCOOP powder Take 17 g by mouth daily  Historical Provider, MD   lacosamide (VIMPAT) 100 MG TABS tablet Take 100 mg by mouth 2 times daily.   Historical Provider, MD   Baclofen 5 MG TABS Take 5 mg by mouth 2 times daily  Historical Provider, MD   paliperidone palmitate ER (INVEGA SUSTENNA) 117 MG/0.75ML MOSES IM injection Inject 117 mg into the muscle every 30 days At bedtime on the 8th of the month  Historical Provider, MD   levETIRAcetam (KEPPRA) 100 MG/ML solution 5 mLs by PEG Tube route 2 times daily  Rhoda Apley, MD   QUEtiapine (SEROQUEL) 25 MG tablet 1 tablet by PEG Tube route 2 times daily  Patient taking differently: 25 mg by Per G Tube route 2 times daily   Isabella Pouch Jesús Hernandez MD   famotidine (PEPCID) 20 MG tablet 1 tablet by PEG Tube route 2 times daily  Evan Hernandez MD   benztropine (COGENTIN) 1 MG tablet Take 2 tablets by mouth 2 times daily  Evan Hernandez MD        Hospital Medications:  Current Facility-Administered Medications: fleet rectal enema 1 enema, 1 enema, Rectal, PRN  ciprofloxacin (CIPRO) IVPB 400 mg, 400 mg, IntraVENous, Q12H  metronidazole (FLAGYL) 500 mg in 0.9% NaCl 100 mL IVPB premix, 500 mg, IntraVENous, Q8H  levETIRAcetam (KEPPRA) 100 MG/ML solution 500 mg, 500 mg, PEG Tube, BID  sertraline (ZOLOFT) tablet 200 mg, 200 mg, Per G Tube, Daily  sodium chloride flush 0.9 % injection 5-40 mL, 5-40 mL, IntraVENous, 2 times per day  sodium chloride flush 0.9 % injection 5-40 mL, 5-40 mL, IntraVENous, PRN  0.9 % sodium chloride infusion, , IntraVENous, PRN  enoxaparin (LOVENOX) injection 40 mg, 40 mg, SubCUTAneous, Daily  ondansetron (ZOFRAN-ODT) disintegrating tablet 4 mg, 4 mg, Oral, Q8H PRN **OR** ondansetron (ZOFRAN) injection 4 mg, 4 mg, IntraVENous, Q6H PRN  acetaminophen (TYLENOL) tablet 650 mg, 650 mg, Oral, Q6H PRN **OR** acetaminophen (TYLENOL) suppository 650 mg, 650 mg, Rectal, Q6H PRN  0.9 % sodium chloride infusion, , IntraVENous, Continuous  polyethylene glycol (GLYCOLAX) packet 17 g, 17 g, Per G Tube, Daily PRN  benztropine (COGENTIN) tablet 1 mg, 1 mg, Per G Tube, TID  baclofen (LIORESAL) tablet 5 mg, 5 mg, Per G Tube, BID  polyethylene glycol (GLYCOLAX) packet 17 g, 17 g, Per G Tube, Daily  lacosamide (VIMPAT) tablet 100 mg, 100 mg, Per G Tube, BID  sennosides-docusate sodium (SENOKOT-S) 8.6-50 MG tablet 2 tablet, 2 tablet, Per G Tube, Daily PRN     Social History:   Social History     Tobacco History     Smoking Status  Former Smoker Quit date  3/23/2011 Smoking Frequency  For 2 years Smoking Tobacco Type  Cigarettes    Smokeless Tobacco Use  Never Used          Alcohol History     Alcohol Use Status  No          Drug Use     Drug Use Status  No Comment  Hx MJ use 3/3/11 and heroin use          Sexual Activity     Sexually Active  Never Comment  heroin                 Family History:  Family History   Problem Relation Age of Onset    High Cholesterol Father     Cancer Paternal Grandmother     COPD Mother         Allergies:  No Known Allergies     ROS:   General: No fever or weight change  Hematologic: No unexpected submucosal bleeding or bruising  HEENT: No sore throat or facial pain  Respiratory: No cough or dyspnea  Cardiovascular: No angina or dependent edema  Gastrointestinal: See HPI  Musculoskeletal: No usual joint pain or stiffness  Skin: No skin eruptions or changing lesions  Neurologic: No focal weakness or numbness  Psychiatric: No anxiety or sleep disturbance    Physical Exam:  Vital Signs:   Vitals:    05/21/22 0901   BP: 113/70   Pulse: 59   Resp: 16   Temp: 97.5 °F (36.4 °C)   SpO2: 97%       General: Non verbal  HEENT: Sclera anicteric, mucosal membranes moist  Cardiovascular: Regular rate and rhythm. No murmurs. Respiratory: Respirations nonlabored, no crepitus  GI: Abdomen nondistended, soft, and nontender. PEG in place. Normal active bowel sounds. No masses palpable. Rectal: Deferred  Musculoskeletal: No pitting edema of the lower legs. Neurological: Gross memory appears intact. Patient is alert and oriented    Recent labs and imaging reviewed. Assessment:  49-year-old female with anoxic brain injury/nonverbal presented to the ER with abnormal KUB suggesting ileus but CT scan done here showed stool burden throughout the colon with rectosigmoid fecal impaction causing hydronephrosis due to extrinsic compression. Lorean Snare She was given GoLytely through the PEG which is helping with her bowel movement. Plan:  1. GoLytely is done I would give MiraLAX twice daily  2. Tapwater enemas as needed      MD Sharmila Cartagena    [phone].  Also available via Perfect Serve    This note was completed using dragon medical speech recognition software. Grammatical errors, random word insertions, pronoun errors and incomplete sentences are occasional consequences of this technology due to software limitations. If there are questions or concerns about the content of this note of information contained within the body of this dictation they should be addressed with the provider for clarification.

## 2022-05-21 NOTE — PROGRESS NOTES
Hospitalist Progress Note      PCP: Reji Romo MD    Date of Admission: 5/20/2022    Chief Complaint: Possible bowel obstruction seen on KUB performed 5/19/2022    Hospital Course: Diandra Cosby is a 27 y.o. female who presented with complaints of abnormal KUB. With questionable ileus. For small bowel obstruction patient was incontinent of bowel and bladder. Red stool all over. Patient presented a 48 nonverbal at baseline due to traumatic brain injury. Patient does not provide history however patient had to be severe constipation. Patient has chronic PEG tube. Subjective: Patient laying in bed, makes appropriate eye contact. Attempts to be verbal, reports that she has pain in her legs. No family at bedside. Reviewed plan of care, unable to determine if she fully understands. Assessment/Plan:    Abdominal pain  Constipation  Proctitis  -Abdominal pain secondary to constipation  -CT finding consistent with constipation throughout colon  -GoLytely was given, patient now having numerous bowel movements  -Discussed with GI, okay to resume tube feeds.   Dietitian consulted for orders and management.  -Continue IV fluids until tube feed is resumed.  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, hepatitis C, cardiac arrest secondary to drug overdose in 2016, MRSA, schizophrenia, seizures, asthma  -Continue home medications as ordered    Active Hospital Problems    Diagnosis     Abdominal pain [R10.9]      Priority: Medium       Medications:  Reviewed    Infusion Medications    sodium chloride      sodium chloride 100 mL/hr at 05/21/22 1130     Scheduled Medications    polyethylene glycol  17 g Per G Tube BID    ciprofloxacin  400 mg IntraVENous Q12H    metroNIDAZOLE  500 mg IntraVENous Q8H    levETIRAcetam  500 mg PEG Tube BID    sertraline  200 mg Per G Tube Daily    sodium chloride flush  5-40 mL IntraVENous 2 times per day    enoxaparin  40 mg SubCUTAneous Daily    benztropine  1 mg Per G Tube TID    Baclofen  5 mg Per G Tube BID    lacosamide  100 mg Per G Tube BID     PRN Meds: fleet, sodium chloride flush, sodium chloride, ondansetron **OR** ondansetron, acetaminophen **OR** acetaminophen, polyethylene glycol, sennosides-docusate sodium      Intake/Output Summary (Last 24 hours) at 5/21/2022 1348  Last data filed at 5/21/2022 0700  Gross per 24 hour   Intake 3525.6 ml   Output 0 ml   Net 3525.6 ml       Physical Exam Performed:    BP (!) 97/57   Pulse 59   Temp 98.6 °F (37 °C) (Axillary)   Resp 16   Ht 5' 10\" (1.778 m)   Wt 112 lb 4.8 oz (50.9 kg)   SpO2 96%   BMI 16.11 kg/m²     General appearance: No apparent distress, appears stated age and cooperative. HEENT: Pupils equal, round, and reactive to light. Conjunctivae/corneas clear. Neck: Supple, with full range of motion. No jugular venous distention. Trachea midline. Respiratory:  Normal respiratory effort. Clear to auscultation, bilaterally without Rales/Wheezes/Rhonchi. Cardiovascular: Regular rate and rhythm with normal S1/S2 without murmurs, rubs or gallops. Abdomen: Soft, non-tender, mildly distended with normal bowel sounds. PEG tube in place. Musculoskeletal: No clubbing, cyanosis or edema bilaterally. Full range of motion without deformity. Skin: Skin color, texture, turgor normal.  No rashes or lesions. Neurologic: Anoxic brain injury history. Minimal verbal skills. Tracks appropriately with eyes. Contracted.   Psychiatric: History of anoxic brain injury  Capillary Refill: Brisk,< 3 seconds   Peripheral Pulses: +2 palpable, equal bilaterally       Labs:   Recent Labs     05/20/22  1207   WBC 5.0   HGB 13.2   HCT 39.1        Recent Labs     05/20/22  1207      K 4.0      CO2 27   BUN 16   CREATININE 0.6   CALCIUM 9.3     Recent Labs     05/20/22  1207   AST 11*   ALT 15   BILITOT <0.2   ALKPHOS 55     No results for input(s): INR in the last 72 hours. No results for input(s): Sarah Fierro in the last 72 hours. Urinalysis:      Lab Results   Component Value Date    NITRU Negative 05/20/2022    WBCUA 2 05/20/2022    BACTERIA None Seen 05/20/2022    RBCUA 2 05/20/2022    BLOODU Negative 05/20/2022    SPECGRAV 1.025 05/20/2022    GLUCOSEU Negative 05/20/2022    GLUCOSEU NEGATIVE 09/20/2011       Radiology:  CT ABDOMEN PELVIS W IV CONTRAST Additional Contrast? None   Final Result   Large amount of stool in the colon, with recto sigmoidal fecal impaction. Proctitis is suspected. Mild left hydroureteronephrosis, perhaps related to extrinsic compression of   recto sigmoidal of fecal impaction. DVT Prophylaxis: Lovenox  Diet: Diet NPO  Code Status: Full Code    PT/OT Eval Status: No acute needs    Dispo -home once medically stable    Patsy Kenny, APRN - CNP      NOTE:  This report was transcribed using voice recognition software. Every effort was made to ensure accuracy; however, inadvertent computerized transcription errors may be present.

## 2022-05-22 ENCOUNTER — APPOINTMENT (OUTPATIENT)
Dept: GENERAL RADIOLOGY | Age: 27
DRG: 389 | End: 2022-05-22
Payer: MEDICARE

## 2022-05-22 LAB
ANION GAP SERPL CALCULATED.3IONS-SCNC: 11 MMOL/L (ref 3–16)
BASOPHILS ABSOLUTE: 0 K/UL (ref 0–0.2)
BASOPHILS RELATIVE PERCENT: 0.4 %
BILIRUBIN URINE: NEGATIVE
BLOOD, URINE: NEGATIVE
BUN BLDV-MCNC: 14 MG/DL (ref 7–20)
CALCIUM SERPL-MCNC: 9.2 MG/DL (ref 8.3–10.6)
CHLORIDE BLD-SCNC: 101 MMOL/L (ref 99–110)
CLARITY: CLEAR
CO2: 25 MMOL/L (ref 21–32)
COLOR: YELLOW
CREAT SERPL-MCNC: 0.6 MG/DL (ref 0.6–1.1)
EOSINOPHILS ABSOLUTE: 0.3 K/UL (ref 0–0.6)
EOSINOPHILS RELATIVE PERCENT: 4.1 %
GFR AFRICAN AMERICAN: >60
GFR NON-AFRICAN AMERICAN: >60
GLUCOSE BLD-MCNC: 107 MG/DL (ref 70–99)
GLUCOSE URINE: NEGATIVE MG/DL
HCT VFR BLD CALC: 39.9 % (ref 36–48)
HEMOGLOBIN: 13.1 G/DL (ref 12–16)
KETONES, URINE: NEGATIVE MG/DL
LEUKOCYTE ESTERASE, URINE: NEGATIVE
LYMPHOCYTES ABSOLUTE: 1.4 K/UL (ref 1–5.1)
LYMPHOCYTES RELATIVE PERCENT: 22.3 %
MCH RBC QN AUTO: 30 PG (ref 26–34)
MCHC RBC AUTO-ENTMCNC: 32.7 G/DL (ref 31–36)
MCV RBC AUTO: 91.6 FL (ref 80–100)
MICROSCOPIC EXAMINATION: NORMAL
MONOCYTES ABSOLUTE: 1.2 K/UL (ref 0–1.3)
MONOCYTES RELATIVE PERCENT: 18.9 %
NEUTROPHILS ABSOLUTE: 3.4 K/UL (ref 1.7–7.7)
NEUTROPHILS RELATIVE PERCENT: 54.3 %
NITRITE, URINE: NEGATIVE
PDW BLD-RTO: 13.2 % (ref 12.4–15.4)
PH UA: 7 (ref 5–8)
PLATELET # BLD: 166 K/UL (ref 135–450)
PMV BLD AUTO: 9.5 FL (ref 5–10.5)
POTASSIUM SERPL-SCNC: 4.1 MMOL/L (ref 3.5–5.1)
PROTEIN UA: NEGATIVE MG/DL
RBC # BLD: 4.36 M/UL (ref 4–5.2)
SODIUM BLD-SCNC: 137 MMOL/L (ref 136–145)
SPECIFIC GRAVITY UA: 1.01 (ref 1–1.03)
URINE TYPE: NORMAL
UROBILINOGEN, URINE: 0.2 E.U./DL
WBC # BLD: 6.3 K/UL (ref 4–11)

## 2022-05-22 PROCEDURE — 51701 INSERT BLADDER CATHETER: CPT

## 2022-05-22 PROCEDURE — 6360000002 HC RX W HCPCS: Performed by: NURSE PRACTITIONER

## 2022-05-22 PROCEDURE — 51798 US URINE CAPACITY MEASURE: CPT

## 2022-05-22 PROCEDURE — 2580000003 HC RX 258: Performed by: NURSE PRACTITIONER

## 2022-05-22 PROCEDURE — 6360000002 HC RX W HCPCS: Performed by: HOSPITALIST

## 2022-05-22 PROCEDURE — 85025 COMPLETE CBC W/AUTO DIFF WBC: CPT

## 2022-05-22 PROCEDURE — 2500000003 HC RX 250 WO HCPCS: Performed by: HOSPITALIST

## 2022-05-22 PROCEDURE — 2580000003 HC RX 258: Performed by: HOSPITALIST

## 2022-05-22 PROCEDURE — 81003 URINALYSIS AUTO W/O SCOPE: CPT

## 2022-05-22 PROCEDURE — 6370000000 HC RX 637 (ALT 250 FOR IP): Performed by: HOSPITALIST

## 2022-05-22 PROCEDURE — 6370000000 HC RX 637 (ALT 250 FOR IP): Performed by: NURSE PRACTITIONER

## 2022-05-22 PROCEDURE — 1200000000 HC SEMI PRIVATE

## 2022-05-22 PROCEDURE — 80048 BASIC METABOLIC PNL TOTAL CA: CPT

## 2022-05-22 PROCEDURE — 74018 RADEX ABDOMEN 1 VIEW: CPT

## 2022-05-22 PROCEDURE — 36415 COLL VENOUS BLD VENIPUNCTURE: CPT

## 2022-05-22 PROCEDURE — 51702 INSERT TEMP BLADDER CATH: CPT

## 2022-05-22 RX ORDER — SODIUM CHLORIDE 9 MG/ML
INJECTION, SOLUTION INTRAVENOUS CONTINUOUS
Status: DISCONTINUED | OUTPATIENT
Start: 2022-05-22 | End: 2022-05-23 | Stop reason: HOSPADM

## 2022-05-22 RX ORDER — HALOPERIDOL 5 MG/ML
5 INJECTION INTRAMUSCULAR EVERY 6 HOURS PRN
Status: DISCONTINUED | OUTPATIENT
Start: 2022-05-22 | End: 2022-05-23 | Stop reason: HOSPADM

## 2022-05-22 RX ADMIN — Medication 10 ML: at 21:30

## 2022-05-22 RX ADMIN — HALOPERIDOL LACTATE 5 MG: 5 INJECTION, SOLUTION INTRAMUSCULAR at 17:11

## 2022-05-22 RX ADMIN — POLYETHYLENE GLYCOL 3350 17 G: 17 POWDER, FOR SOLUTION ORAL at 15:27

## 2022-05-22 RX ADMIN — SERTRALINE 200 MG: 50 TABLET, FILM COATED ORAL at 15:28

## 2022-05-22 RX ADMIN — SODIUM CHLORIDE: 9 INJECTION, SOLUTION INTRAVENOUS at 14:29

## 2022-05-22 RX ADMIN — ENOXAPARIN SODIUM 40 MG: 100 INJECTION SUBCUTANEOUS at 15:28

## 2022-05-22 RX ADMIN — SODIUM CHLORIDE 25 ML/HR: 9 INJECTION, SOLUTION INTRAVENOUS at 04:08

## 2022-05-22 RX ADMIN — Medication 10 ML: at 11:12

## 2022-05-22 RX ADMIN — POLYETHYLENE GLYCOL 3350 17 G: 17 POWDER, FOR SOLUTION ORAL at 23:25

## 2022-05-22 RX ADMIN — METRONIDAZOLE 500 MG: 500 INJECTION, SOLUTION INTRAVENOUS at 14:33

## 2022-05-22 RX ADMIN — LACOSAMIDE 100 MG: 100 TABLET, FILM COATED ORAL at 23:26

## 2022-05-22 RX ADMIN — BENZTROPINE MESYLATE 1 MG: 1 TABLET ORAL at 12:46

## 2022-05-22 RX ADMIN — LEVETIRACETAM 500 MG: 500 SOLUTION ORAL at 23:30

## 2022-05-22 RX ADMIN — BENZTROPINE MESYLATE 1 MG: 1 TABLET ORAL at 23:26

## 2022-05-22 RX ADMIN — BACLOFEN 5 MG: 10 TABLET ORAL at 23:26

## 2022-05-22 RX ADMIN — SODIUM PHOSPHATE, DIBASIC AND SODIUM PHOSPHATE, MONOBASIC 1 ENEMA: 7; 19 ENEMA RECTAL at 12:40

## 2022-05-22 NOTE — PROGRESS NOTES
Pt agitated, trying to get out of bed. Trying to kick staff. Medicated with haldol, will cont to monitor.

## 2022-05-22 NOTE — PROGRESS NOTES
Hospitalist Progress Note      PCP: Rodolfo Crews MD    Date of Admission: 5/20/2022    Chief Complaint: Possible bowel obstruction seen on KUB performed 5/19/2022    Hospital Course: Dennis Aceves is a 27 y.o. female who presented with complaints of abnormal KUB. With questionable ileus. For small bowel obstruction patient was incontinent of bowel and bladder. Red stool all over. Patient presented a 48 nonverbal at baseline due to traumatic brain injury. Patient does not provide history however patient had to be severe constipation. Patient has chronic PEG tube. Subjective: Patient laying in bed, makes appropriate eye contact. No family at bedside. Reviewed plan of care, unable to determine if she fully understands. On review of chart patient had very similar admission in February with fecal impaction, proctitis, urine retention with hydroureteronephrosis. At that time urology was consulted and recommended Crouch catheter at discharge. Also recommended outpatient follow-up for likely suprapubic catheter placement secondary to neurogenic bladder. When patient returned to ER this admission, she did not have a Crouch catheter. Assessment/Plan:    Abdominal pain  Constipation  Proctitis  Ileus  -Abdominal pain secondary to constipation  -CT finding consistent with constipation throughout colon  -GoLytely was given, patient now having numerous bowel movements  -Abdomen distended. Likely secondary to urine retention. KUB ordered and found ileus, GI recommended to hold tube feeds for today. -KUB also showed large rectal impaction, unable to manually disimpact, Fleet enema given. -Continue IV fluids until tube feed is resumed.  -Continue Cipro and Flagyl for proctitis  -will need bowel regimen prescribed at discharge  -Peg Tube appeared to becoming dislodged, GI tightened it and is now functioning.     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, hepatitis C, cardiac arrest secondary to drug overdose in 2016, MRSA, schizophrenia, seizures, asthma  -Continue home medications as ordered    Active Hospital Problems    Diagnosis     Abdominal pain [R10.9]      Priority: Medium    Proctitis [K62.89]        Medications:  Reviewed    Infusion Medications    sodium chloride      sodium chloride Stopped (05/22/22 0415)     Scheduled Medications    polyethylene glycol  17 g Per G Tube BID    ciprofloxacin  400 mg IntraVENous Q12H    metroNIDAZOLE  500 mg IntraVENous Q8H    levETIRAcetam  500 mg PEG Tube BID    sertraline  200 mg Per G Tube Daily    sodium chloride flush  5-40 mL IntraVENous 2 times per day    enoxaparin  40 mg SubCUTAneous Daily    benztropine  1 mg Per G Tube TID    Baclofen  5 mg Per G Tube BID    lacosamide  100 mg Per G Tube BID     PRN Meds: fleet, sodium chloride flush, sodium chloride, ondansetron **OR** ondansetron, acetaminophen **OR** acetaminophen, polyethylene glycol, sennosides-docusate sodium      Intake/Output Summary (Last 24 hours) at 5/22/2022 1307  Last data filed at 5/22/2022 1121  Gross per 24 hour   Intake 1664.1 ml   Output 2275 ml   Net -610.9 ml       Physical Exam Performed:    /61   Pulse 94   Temp 98.4 °F (36.9 °C) (Oral)   Resp 18   Ht 5' 10\" (1.778 m)   Wt 113 lb 6.4 oz (51.4 kg)   SpO2 95%   BMI 16.27 kg/m²     General appearance: No apparent distress, appears stated age and cooperative. HEENT: Pupils equal, round, and reactive to light. Conjunctivae/corneas clear. Neck: Supple, with full range of motion. No jugular venous distention. Trachea midline. Respiratory:  Normal respiratory effort. Clear to auscultation, bilaterally without Rales/Wheezes/Rhonchi. Cardiovascular: Regular rate and rhythm with normal S1/S2 without murmurs, rubs or gallops.   Abdomen: Soft, non-tender, moderate distended with normal bowel sounds. PEG tube in place. Musculoskeletal: No clubbing, cyanosis or edema bilaterally. Full range of motion without deformity. Skin: Skin color, texture, turgor normal.  No rashes or lesions. Neurologic: Anoxic brain injury history. Minimal verbal skills. Tracks appropriately with eyes. Contracted. Psychiatric: History of anoxic brain injury  Capillary Refill: Brisk,< 3 seconds   Peripheral Pulses: +2 palpable, equal bilaterally       Labs:   Recent Labs     05/20/22  1207 05/22/22  1126   WBC 5.0 6.3   HGB 13.2 13.1   HCT 39.1 39.9    166     Recent Labs     05/20/22  1207 05/22/22  1126    137   K 4.0 4.1    101   CO2 27 25   BUN 16 14   CREATININE 0.6 0.6   CALCIUM 9.3 9.2     Recent Labs     05/20/22  1207   AST 11*   ALT 15   BILITOT <0.2   ALKPHOS 55     No results for input(s): INR in the last 72 hours. No results for input(s): Agustina Beat in the last 72 hours. Urinalysis:      Lab Results   Component Value Date    NITRU Negative 05/22/2022    WBCUA 2 05/20/2022    BACTERIA None Seen 05/20/2022    RBCUA 2 05/20/2022    BLOODU Negative 05/22/2022    SPECGRAV 1.010 05/22/2022    GLUCOSEU Negative 05/22/2022    GLUCOSEU NEGATIVE 09/20/2011       Radiology:  XR ABDOMEN (KUB) (SINGLE AP VIEW)   Final Result   Findings suggest a diffuse ileus      Possible fecal impaction in the rectum         CT ABDOMEN PELVIS W IV CONTRAST Additional Contrast? None   Final Result   Large amount of stool in the colon, with recto sigmoidal fecal impaction. Proctitis is suspected. Mild left hydroureteronephrosis, perhaps related to extrinsic compression of   recto sigmoidal of fecal impaction.                  DVT Prophylaxis: Lovenox  Diet: Diet NPO  Code Status: Full Code    PT/OT Eval Status: No acute needs    Dispo -home once medically stable    Otto Peoples, APRN - CNP      NOTE:  This report was transcribed using voice recognition software. Every effort was made to ensure accuracy; however, inadvertent computerized transcription errors may be present.

## 2022-05-22 NOTE — PROGRESS NOTES
Spoke with patients facility, at baseline patient can stand and pivot for transfers. Patient has a history of behaviors that include throwing herself onto the floor and yelling out. Patient attempting to climb out of bed  al times this shift, camera is in place a and a specialty low bed with fall mats had been ordered.

## 2022-05-22 NOTE — PROGRESS NOTES
Uribe cath placed per orders. 1200 ml yellow clear urine came out immediately . Abdomen less distended after uribe cath placed.

## 2022-05-22 NOTE — PROGRESS NOTES
Progress Note    Patient Eric Mccracken  MRN: [id number]  YOB: 1995 Age: 27 y.o. Sex: female  Room: [address] [place] [address]       Admitting Physician: May Peabody, MD   Date of Admission: 5/20/2022 11:12 AM   Primary Care Physician: Walter Gordon MD     Subjective:  Eric Mccracken was seen and examined. We are following for fecal impaction. Patient is nonverbal.  According to the nursing staff patient is having decent bowel movements. ROS:  Constitutional: Denies fever, no change in appetite  Respiratory: Denies cough or shortness of breath  Cardiovascular: Denies chest pain or edema    Objective:  Vital Signs:   Vitals:    05/22/22 0900   BP: 102/62   Pulse: 87   Resp: 18   Temp: 98.6 °F (37 °C)   SpO2: 96%         Physical Exam:  Constitutional: Alert and oriented x 4. No acute distress. Respiratory: Respirations nonlabored, no crepitus  GI: Abdomen distended, soft, and nontender. Neurological: No focal deficits noted. No asterixis.     Intake/Output:    Intake/Output Summary (Last 24 hours) at 5/22/2022 1021  Last data filed at 5/22/2022 0547  Gross per 24 hour   Intake 1664.1 ml   Output 1075 ml   Net 589.1 ml        Current Medications:  Current Facility-Administered Medications   Medication Dose Route Frequency Provider Last Rate Last Admin    polyethylene glycol (GLYCOLAX) packet 17 g  17 g Per G Tube BID Patsy KennyFRANCK - CNP   17 g at 05/21/22 2138    fleet rectal enema 1 enema  1 enema Rectal PRN Annie Meier MD        ciprofloxacin (CIPRO) IVPB 400 mg  400 mg IntraVENous Q12H May Peabody, MD   Stopped at 05/21/22 1751    metronidazole (FLAGYL) 500 mg in 0.9% NaCl 100 mL IVPB premix  500 mg IntraVENous Madai Trevizo MD   Stopped at 05/21/22 2315    levETIRAcetam (KEPPRA) 100 MG/ML solution 500 mg  500 mg PEG Tube BID Annie Thomas MD   500 mg at 05/21/22 2137    sertraline (ZOLOFT) tablet 200 mg  200 mg Per G Tube Daily May Peabody, MD   200 mg at 05/21/22 5569    sodium chloride flush 0.9 % injection 5-40 mL  5-40 mL IntraVENous 2 times per day May Peabody, MD   10 mL at 05/21/22 2138    sodium chloride flush 0.9 % injection 5-40 mL  5-40 mL IntraVENous PRN Annie Thomas MD        0.9 % sodium chloride infusion   IntraVENous PRN May Peabody, MD   Stopped at 05/22/22 0415    enoxaparin (LOVENOX) injection 40 mg  40 mg SubCUTAneous Daily Annie Thomas MD   40 mg at 05/20/22 1911    ondansetron (ZOFRAN-ODT) disintegrating tablet 4 mg  4 mg Oral Q8H PRN Annie Thomas MD        Or    ondansetron (ZOFRAN) injection 4 mg  4 mg IntraVENous Q6H PRN Annie Thomas MD        acetaminophen (TYLENOL) tablet 650 mg  650 mg Oral Q6H PRN Annie Thomas MD   650 mg at 05/21/22 1143    Or    acetaminophen (TYLENOL) suppository 650 mg  650 mg Rectal Q6H PRN Annie Thomas MD        polyethylene glycol (GLYCOLAX) packet 17 g  17 g Per G Tube Daily PRN Annie Thomas MD        benztropine (COGENTIN) tablet 1 mg  1 mg Per G Tube TID May Peabody, MD   1 mg at 05/21/22 2138    baclofen (LIORESAL) tablet 5 mg  5 mg Per G Tube BID Annie Thomas MD   5 mg at 05/21/22 2138    lacosamide (VIMPAT) tablet 100 mg  100 mg Per G Tube BID May Peabody, MD   100 mg at 05/21/22 2138    sennosides-docusate sodium (SENOKOT-S) 8.6-50 MG tablet 2 tablet  2 tablet Per G Tube Daily PRN May Peabody, MD             Recent labs and imaging reviewed. Assessment:  25-year-old female with anoxic brain injury/nonverbal presented to the ER with abnormal KUB suggesting ileus but CT scan done here showed stool burden throughout the colon with rectosigmoid fecal impaction causing hydronephrosis due to extrinsic compression. She was given GoLytely through the PEG which is helping with her bowel movement.     Abdomen appears to be distended but soft. But looks like her bladder scan showed 500 cc of urine in the bladder.    G-tube external bumper loose which was readjusted and

## 2022-05-22 NOTE — CARE COORDINATION
Discharge Planning:     (VANESA) reviewed patient's chart which states patient is from [place] [facility]. [facility] ([phone]) and left a voicemail requesting a callback to confirm patient's residency and ability to return upon discharge. CM provided callback information. [facility] -   [phone]    Electronically signed by ERUM Joya on 5/22/2022 at 8:48 AM        Update at [phone]:  CM received a callback from [place] [facility] staff, Wilfrid Jones, who reported that the patient is a Long-Term Care (LTC) resident at the facility and can return upon discharge as the patient has a bed hold.       [facility] -   [phone]    Electronically signed by ERUM Joya on 5/22/2022 at 11:11 AM

## 2022-05-22 NOTE — PROGRESS NOTES
DIT VASCULAR ACCESS SERVICES    USG PIV PLACEMENT:  Called to attempt USG PIV d/t several unsuccessful attempts by RN staff; pt denies any arm restrictions; #20gu 1.75in sterile PIV placed successfully, guided by US imagery, to the R upper Brachial vein; PIV is patent w brisk blood return and flushes easily without resistance; pt appeared to have tolerated well; pt is left in a position of comfort w siderails up x4, remains in seizure precautions, call light in reach and bed is locked in lowest position; reported successful PIV placement to primary RN Elvira Nava, DIT RN, BSN, Smith Vega.

## 2022-05-22 NOTE — PROGRESS NOTES
Pt incont of very large dark brown formed stool. Pt cleaned and transferred to low bed. Camera and fall precautions in place.

## 2022-05-22 NOTE — PROGRESS NOTES
Pt laying in bed, alert, making eye contact and smiling. Appears comfortable. Pt incont of soft formed stool, incont care provided. Pt repositioned. Pt has not voided since 0200 straight cath, pt bladder scanned at this time, shows greater that 513 ml in bladder. NP notified. Abdomen distended/soft. Gtube in place. Pt NPO at this time. Orders received for KUB per GI md. Vss. Fall precautions in place. Camera in place.  Bed low, call bell in reach, instructed to call for assist.

## 2022-05-23 VITALS
OXYGEN SATURATION: 96 % | SYSTOLIC BLOOD PRESSURE: 115 MMHG | TEMPERATURE: 97.9 F | WEIGHT: 110.23 LBS | HEIGHT: 70 IN | DIASTOLIC BLOOD PRESSURE: 63 MMHG | BODY MASS INDEX: 15.78 KG/M2 | RESPIRATION RATE: 14 BRPM | HEART RATE: 97 BPM

## 2022-05-23 LAB
ANION GAP SERPL CALCULATED.3IONS-SCNC: 12 MMOL/L (ref 3–16)
BASOPHILS ABSOLUTE: 0 K/UL (ref 0–0.2)
BASOPHILS RELATIVE PERCENT: 0.5 %
BUN BLDV-MCNC: 8 MG/DL (ref 7–20)
CALCIUM SERPL-MCNC: 9.3 MG/DL (ref 8.3–10.6)
CHLORIDE BLD-SCNC: 104 MMOL/L (ref 99–110)
CO2: 23 MMOL/L (ref 21–32)
CREAT SERPL-MCNC: 0.6 MG/DL (ref 0.6–1.1)
EOSINOPHILS ABSOLUTE: 0.1 K/UL (ref 0–0.6)
EOSINOPHILS RELATIVE PERCENT: 2.7 %
GFR AFRICAN AMERICAN: >60
GFR NON-AFRICAN AMERICAN: >60
GLUCOSE BLD-MCNC: 89 MG/DL (ref 70–99)
HCT VFR BLD CALC: 38.4 % (ref 36–48)
HEMOGLOBIN: 12.6 G/DL (ref 12–16)
LYMPHOCYTES ABSOLUTE: 1.2 K/UL (ref 1–5.1)
LYMPHOCYTES RELATIVE PERCENT: 23.5 %
MCH RBC QN AUTO: 30.5 PG (ref 26–34)
MCHC RBC AUTO-ENTMCNC: 32.9 G/DL (ref 31–36)
MCV RBC AUTO: 92.6 FL (ref 80–100)
MONOCYTES ABSOLUTE: 0.8 K/UL (ref 0–1.3)
MONOCYTES RELATIVE PERCENT: 16 %
NEUTROPHILS ABSOLUTE: 2.9 K/UL (ref 1.7–7.7)
NEUTROPHILS RELATIVE PERCENT: 57.3 %
PDW BLD-RTO: 13.1 % (ref 12.4–15.4)
PLATELET # BLD: 154 K/UL (ref 135–450)
PMV BLD AUTO: 9.7 FL (ref 5–10.5)
POTASSIUM REFLEX MAGNESIUM: 3.6 MMOL/L (ref 3.5–5.1)
RBC # BLD: 4.14 M/UL (ref 4–5.2)
SARS-COV-2, NAAT: NOT DETECTED
SODIUM BLD-SCNC: 139 MMOL/L (ref 136–145)
WBC # BLD: 5.1 K/UL (ref 4–11)

## 2022-05-23 PROCEDURE — 36415 COLL VENOUS BLD VENIPUNCTURE: CPT

## 2022-05-23 PROCEDURE — 87635 SARS-COV-2 COVID-19 AMP PRB: CPT

## 2022-05-23 PROCEDURE — 6370000000 HC RX 637 (ALT 250 FOR IP): Performed by: HOSPITALIST

## 2022-05-23 PROCEDURE — 6360000002 HC RX W HCPCS: Performed by: HOSPITALIST

## 2022-05-23 PROCEDURE — 2500000003 HC RX 250 WO HCPCS: Performed by: HOSPITALIST

## 2022-05-23 PROCEDURE — 80048 BASIC METABOLIC PNL TOTAL CA: CPT

## 2022-05-23 PROCEDURE — 85025 COMPLETE CBC W/AUTO DIFF WBC: CPT

## 2022-05-23 PROCEDURE — 6370000000 HC RX 637 (ALT 250 FOR IP): Performed by: NURSE PRACTITIONER

## 2022-05-23 PROCEDURE — 2580000003 HC RX 258: Performed by: HOSPITALIST

## 2022-05-23 RX ORDER — POLYETHYLENE GLYCOL 3350 17 G/17G
17 POWDER, FOR SOLUTION ORAL 2 TIMES DAILY
Qty: 1020 G | Refills: 0
Start: 2022-05-23 | End: 2022-06-22

## 2022-05-23 RX ORDER — METRONIDAZOLE 500 MG/1
500 TABLET ORAL 3 TIMES DAILY
Qty: 12 TABLET | Refills: 0
Start: 2022-05-23 | End: 2022-05-27

## 2022-05-23 RX ORDER — DOCUSATE SODIUM 100 MG/1
100 CAPSULE, LIQUID FILLED ORAL 2 TIMES DAILY
Qty: 60 CAPSULE | Refills: 0
Start: 2022-05-23 | End: 2022-06-22

## 2022-05-23 RX ORDER — CIPROFLOXACIN 500 MG/1
500 TABLET, FILM COATED ORAL 2 TIMES DAILY
Qty: 8 TABLET | Refills: 0
Start: 2022-05-23 | End: 2022-05-27

## 2022-05-23 RX ORDER — GREEN TEA/HOODIA GORDONII 315-12.5MG
1 CAPSULE ORAL 2 TIMES DAILY
Qty: 14 TABLET | Refills: 0
Start: 2022-05-23 | End: 2022-05-30

## 2022-05-23 RX ADMIN — BENZTROPINE MESYLATE 1 MG: 1 TABLET ORAL at 09:41

## 2022-05-23 RX ADMIN — CIPROFLOXACIN 400 MG: 2 INJECTION, SOLUTION INTRAVENOUS at 11:28

## 2022-05-23 RX ADMIN — METRONIDAZOLE 500 MG: 500 INJECTION, SOLUTION INTRAVENOUS at 15:24

## 2022-05-23 RX ADMIN — CIPROFLOXACIN 400 MG: 2 INJECTION, SOLUTION INTRAVENOUS at 00:07

## 2022-05-23 RX ADMIN — BACLOFEN 5 MG: 10 TABLET ORAL at 09:42

## 2022-05-23 RX ADMIN — POLYETHYLENE GLYCOL 3350 17 G: 17 POWDER, FOR SOLUTION ORAL at 09:42

## 2022-05-23 RX ADMIN — METRONIDAZOLE 500 MG: 500 INJECTION, SOLUTION INTRAVENOUS at 01:17

## 2022-05-23 RX ADMIN — LACOSAMIDE 100 MG: 100 TABLET, FILM COATED ORAL at 09:40

## 2022-05-23 RX ADMIN — BENZTROPINE MESYLATE 1 MG: 1 TABLET ORAL at 15:23

## 2022-05-23 RX ADMIN — METRONIDAZOLE 500 MG: 500 INJECTION, SOLUTION INTRAVENOUS at 09:45

## 2022-05-23 RX ADMIN — SERTRALINE 200 MG: 50 TABLET, FILM COATED ORAL at 09:41

## 2022-05-23 RX ADMIN — LEVETIRACETAM 500 MG: 500 SOLUTION ORAL at 09:40

## 2022-05-23 RX ADMIN — ENOXAPARIN SODIUM 40 MG: 100 INJECTION SUBCUTANEOUS at 09:41

## 2022-05-23 RX ADMIN — SODIUM CHLORIDE: 9 INJECTION, SOLUTION INTRAVENOUS at 09:44

## 2022-05-23 NOTE — CARE COORDINATION
Discharge order noted. CM informed  Froylan Bar at [facility] who stated ready to accept patient today. Covid-19 test done- Negative. Transport scheduled with the  time of 1945 via [address] CM left VM and call back number for patient's [phone]  regarding patient's discharge  Froylan Bar at the [facility]  was also updated of the  time.

## 2022-05-23 NOTE — PLAN OF CARE
Problem: Discharge Planning  Goal: Discharge to home or other facility with appropriate resources  Outcome: Completed     Problem: Safety - Adult  Goal: Free from fall injury  5/23/2022 1920 by Brennen Coleman RN  Outcome: Completed  5/23/2022 1050 by Ryann Campoverde RN  Outcome: Progressing     Problem: ABCDS Injury Assessment  Goal: Absence of physical injury  Outcome: Completed     Problem: Skin/Tissue Integrity  Goal: Absence of new skin breakdown  Description: 1. Monitor for areas of redness and/or skin breakdown  2. Assess vascular access sites hourly  3. Every 4-6 hours minimum:  Change oxygen saturation probe site  4. Every 4-6 hours:  If on nasal continuous positive airway pressure, respiratory therapy assess nares and determine need for appliance change or resting period. 5/23/2022 1920 by Brennen Coleman RN  Outcome: Completed  5/23/2022 1050 by Ryann Campoverde RN  Outcome: Progressing     Problem: Confusion  Goal: Confusion, delirium, dementia, or psychosis is improved or at baseline  Description: INTERVENTIONS:  1. Assess for possible contributors to thought disturbance, including medications, impaired vision or hearing, underlying metabolic abnormalities, dehydration, psychiatric diagnoses, and notify attending LIP  2. [place] high risk fall precautions, as indicated  3. Provide frequent short contacts to provide reality reorientation, refocusing and direction  4. Decrease environmental stimuli, including noise as appropriate  5. Monitor and intervene to maintain adequate nutrition, hydration, elimination, sleep and activity  6. If unable to ensure safety without constant attention obtain sitter and review sitter guidelines with assigned personnel  7.  Initiate Psychosocial CNS and Spiritual Care consult, as indicated  5/23/2022 1920 by Brennen Coleman RN  Outcome: Completed  5/23/2022 1050 by Ryann Campoverde RN  Outcome: Progressing     Problem: Pain  Goal: Verbalizes/displays adequate comfort level or baseline comfort level  Outcome: Completed     Problem: Nutrition Deficit:  Goal: Optimize nutritional status  Outcome: Completed

## 2022-05-23 NOTE — PLAN OF CARE
Problem: Discharge Planning  Goal: Discharge to home or other facility with appropriate resources  Outcome: Completed     Problem: Safety - Adult  Goal: Free from fall injury  5/23/2022 1920 by Kolton Mallory RN  Outcome: Completed  5/23/2022 1050 by Renee Cummings RN  Outcome: Progressing     Problem: ABCDS Injury Assessment  Goal: Absence of physical injury  Outcome: Completed     Problem: Skin/Tissue Integrity  Goal: Absence of new skin breakdown  Description: 1. Monitor for areas of redness and/or skin breakdown  2. Assess vascular access sites hourly  3. Every 4-6 hours minimum:  Change oxygen saturation probe site  4. Every 4-6 hours:  If on nasal continuous positive airway pressure, respiratory therapy assess nares and determine need for appliance change or resting period. 5/23/2022 1920 by Kolton Mallory RN  Outcome: Completed  5/23/2022 1050 by Renee Cummings RN  Outcome: Progressing     Problem: Confusion  Goal: Confusion, delirium, dementia, or psychosis is improved or at baseline  Description: INTERVENTIONS:  1. Assess for possible contributors to thought disturbance, including medications, impaired vision or hearing, underlying metabolic abnormalities, dehydration, psychiatric diagnoses, and notify attending LIP  2. [place] high risk fall precautions, as indicated  3. Provide frequent short contacts to provide reality reorientation, refocusing and direction  4. Decrease environmental stimuli, including noise as appropriate  5. Monitor and intervene to maintain adequate nutrition, hydration, elimination, sleep and activity  6. If unable to ensure safety without constant attention obtain sitter and review sitter guidelines with assigned personnel  7.  Initiate Psychosocial CNS and Spiritual Care consult, as indicated  5/23/2022 1920 by Kolton Mallory RN  Outcome: Completed  5/23/2022 1050 by Renee Cummings RN  Outcome: Progressing     Problem: Pain  Goal: Verbalizes/displays adequate comfort level or baseline comfort level  Outcome: Completed     Problem: Nutrition Deficit:  Goal: Optimize nutritional status  Outcome: Completed

## 2022-05-23 NOTE — CARE COORDINATION
VANESA spoke with Paco Salvador the admissions liaison at the [facility] regarding the patient's  sitter situation, she stated the patient does not need to be  off the sitter before going back. But patient will need to have a rapid covid-19 test done before going back.

## 2022-05-23 NOTE — PROGRESS NOTES
Patient continues with behaviors. Sitter at bedside. Bed in lowest position, mats in place. Camera on.

## 2022-05-23 NOTE — PROGRESS NOTES
Patient ate all of lunch. TF bolus given at 1200. Abdomen remains soft, patient seemed to tolerate. NP aware and will discharge today.

## 2022-05-23 NOTE — PLAN OF CARE
Problem: Safety - Adult  Goal: Free from fall injury  Outcome: Progressing     Problem: Skin/Tissue Integrity  Goal: Absence of new skin breakdown  Description: 1. Monitor for areas of redness and/or skin breakdown  2. Assess vascular access sites hourly  3. Every 4-6 hours minimum:  Change oxygen saturation probe site  4. Every 4-6 hours:  If on nasal continuous positive airway pressure, respiratory therapy assess nares and determine need for appliance change or resting period. Outcome: Progressing     Problem: Confusion  Goal: Confusion, delirium, dementia, or psychosis is improved or at baseline  Description: INTERVENTIONS:  1. Assess for possible contributors to thought disturbance, including medications, impaired vision or hearing, underlying metabolic abnormalities, dehydration, psychiatric diagnoses, and notify attending LIP  2. [place] high risk fall precautions, as indicated  3. Provide frequent short contacts to provide reality reorientation, refocusing and direction  4. Decrease environmental stimuli, including noise as appropriate  5. Monitor and intervene to maintain adequate nutrition, hydration, elimination, sleep and activity  6. If unable to ensure safety without constant attention obtain sitter and review sitter guidelines with assigned personnel  7.  Initiate Psychosocial CNS and Spiritual Care consult, as indicated  Outcome: Progressing

## 2022-05-23 NOTE — PROGRESS NOTES
Gastroenterology Progress Note            Melchor Arauz is a 27 y.o. female patient. 1. Proctitis    2. Fecal impaction (HCC)        SUBJECTIVE:   No bm overnight. Pt unable to provide history    Physical    VITALS:  BP (!) 94/55   Pulse 88   Temp 98 °F (36.7 °C) (Oral)   Resp 18   Ht 5' 10\" (1.778 m)   Wt 110 lb 3.7 oz (50 kg)   SpO2 94%   BMI 15.82 kg/m²   TEMPERATURE:  Current - Temp: 98 °F (36.7 °C); Max - Temp  Av.2 °F (36.8 °C)  Min: 98 °F (36.7 °C)  Max: 98.6 °F (37 °C)    Abdomen soft, ab is scaphoid, NT, no HSM, Bowel sounds present  Awake and alert. PEG site clean. Data      Recent Labs     22  1207 22  1126 22  0801   WBC 5.0 6.3 5.1   HGB 13.2 13.1 12.6   HCT 39.1 39.9 38.4   MCV 92.5 91.6 92.6    166 154     Recent Labs     22  1207 22  1126 22  0801    137 139   K 4.0 4.1 3.6    101 104   CO2 27 25 23   BUN 16 14 8   CREATININE 0.6 0.6 0.6     Recent Labs     22  1207   AST 11*   ALT 15   BILITOT <0.2   ALKPHOS 55     No results for input(s): LIPASE, AMYLASE in the last 72 hours. ASSESSMENT   1. Constipation-  Pt had large bm yesterday. PLAN    1. Cont miralax daily  2. Ok to restart tf.   3. Will sign off.       Mario Norris MD  [address]

## 2022-05-23 NOTE — DISCHARGE SUMMARY
Hospital Medicine Discharge Summary    Patient ID: Kaitlyn Mac      Patient's PCP: Jacki Baker MD    Admit Date: 5/20/2022     Discharge Date: 5/23/2022      Admitting Physician: Yumiko Newton MD     Discharge Physician: FRANCK Calixto - CNP     Discharge Diagnoses  Abdominal pain  Constipation  Proctitis  Ileus  Urine retention  Past medical history anoxic brain injury secondary to drug overdose, hepatitis C, cardiac arrest secondary to drug overdose in 2016, MRSA, schizophrenia, seizures, asthma    Hospital Course: Chepe Clay is a 27 y. o. female who presented with complaints of abnormal KUB.  With questionable ileus.  For small bowel obstruction patient was incontinent of bowel and bladder.  Red stool all over.  Patient presented a 48 nonverbal at baseline due to traumatic brain injury.  Patient does not provide history however patient had to be severe constipation.  Patient has chronic PEG tube.     Subjective: Patient laying in bed, makes appropriate eye contact. No family at bedside. Reviewed plan of care, unable to determine if she fully understands.     On review of chart patient had very similar admission in February with fecal impaction, proctitis, urine retention with hydroureteronephrosis. At that time urology was consulted and recommended Crouch catheter at discharge. Also recommended outpatient follow-up for likely suprapubic catheter placement secondary to neurogenic bladder. When patient returned to ER this admission, she did not have a Crouch catheter    Abdominal pain  Constipation  Proctitis  Ileus  -Abdominal pain secondary to constipation  -CT finding consistent with constipation throughout colon  -GoLytely was given, patient now having numerous bowel movements  -Ileus resolved.   -Patient tolerated tube feeds prior to discharge  -Continue Cipro and Flagyl for proctitis  -Bowel regimen with MiraLAX and Colace prescribed at discharge  -Peg Tube appeared to becoming dislodged, GI tightened it and is now functioning.     Urine retention  Mild left hydroureteronephrosis  -Greater than 1 L urine removed from Crouch catheter placed  -Urology consulted, recommend to leave Crouch catheter in at discharge. Needs to have outpatient follow-up to discuss long-term management. May benefit from suprapubic catheter.     Past medical history anoxic brain injury secondary to drug overdose, hepatitis C, cardiac arrest secondary to drug overdose in 2016, MRSA, schizophrenia, seizures, asthma  -Continue home medications as ordered       Patient calm and cooperative today. DC back to ECF. Physical Exam Performed:     /63   Pulse 97   Temp 97.9 °F (36.6 °C) (Axillary)   Resp 14   Ht 5' 10\" (1.778 m)   Wt 110 lb 3.7 oz (50 kg)   SpO2 96%   BMI 15.82 kg/m²       General appearance: No apparent distress, appears stated age and cooperative. HEENT: Pupils equal, round, and reactive to light. Conjunctivae/corneas clear. Neck: Supple, with full range of motion. No jugular venous distention. Trachea midline. Respiratory:  Normal respiratory effort. Clear to auscultation, bilaterally without Rales/Wheezes/Rhonchi. Cardiovascular: Regular rate and rhythm with normal S1/S2 without murmurs, rubs or gallops. Abdomen: Soft, non-tender, not distended with normal bowel sounds. PEG tube in place. Musculoskeletal: No clubbing, cyanosis or edema bilaterally. Full range of motion without deformity. Skin: Skin color, texture, turgor normal.  No rashes or lesions. Neurologic: Anoxic brain injury history. Minimal verbal skills. Tracks appropriately with eyes. Contracted. Psychiatric: History of anoxic brain injury  Capillary Refill: Brisk,< 3 seconds   Peripheral Pulses: +2 palpable, equal bilaterally       Labs:  For convenience and continuity at follow-up the following most recent labs are provided:      CBC:    Lab Results   Component Value Date    WBC 5.1 05/23/2022    HGB 12.6 05/23/2022    HCT 38.4 05/23/2022     05/23/2022       Renal:    Lab Results   Component Value Date     05/23/2022    K 3.6 05/23/2022     05/23/2022    CO2 23 05/23/2022    BUN 8 05/23/2022    CREATININE 0.6 05/23/2022    CALCIUM 9.3 05/23/2022    PHOS 3.1 02/24/2022         Significant Diagnostic Studies    Radiology:   XR ABDOMEN (KUB) (SINGLE AP VIEW)   Final Result   Findings suggest a diffuse ileus      Possible fecal impaction in the rectum         CT ABDOMEN PELVIS W IV CONTRAST Additional Contrast? None   Final Result   Large amount of stool in the colon, with recto sigmoidal fecal impaction. Proctitis is suspected. Mild left hydroureteronephrosis, perhaps related to extrinsic compression of   recto sigmoidal of fecal impaction.                 Consults:     IP CONSULT TO GI  IP CONSULT TO DIETITIAN  IP CONSULT TO UROLOGY    Disposition: ECF    Condition at Discharge: Stable    Discharge Instructions/Follow-up:      Follow-up with PCP in 1 week    Code Status:  Prior     Activity: activity as tolerated    Diet: Tube feed      Discharge Medications:     Discharge Medication List as of 5/23/2022  5:17 PM           Details   ciprofloxacin (CIPRO) 500 MG tablet Take 1 tablet by mouth 2 times daily for 4 days, Disp-8 tablet, R-0NO PRINT      metroNIDAZOLE (FLAGYL) 500 MG tablet Take 1 tablet by mouth 3 times daily for 4 days, Disp-12 tablet, R-0NO PRINT      docusate sodium (COLACE) 100 MG capsule Take 1 capsule by mouth 2 times daily, Disp-60 capsule, R-0NO PRINT      Probiotic Acidophilus (FLORANEX) TABS Take 1 tablet by mouth 2 times daily for 7 days, Disp-14 tablet, R-0NO PRINT              Details   polyethylene glycol (GLYCOLAX) 17 GM/SCOOP powder Take 17 g by mouth 2 times daily, Disp-1020 g, R-0NO PRINT              Details   erythromycin (ROMYCIN) 5 MG/GM ophthalmic ointment Place 1 applicator into the left eye in the morning, at noon, and at bedtime, Left Eye, 3 times daily Starting Tue 5/3/2022, Historical Med      ferrous sulfate 220 (44 Fe) MG/5ML solution 220 mg by Per G Tube route 3 times dailyHistorical Med      Sodium Phosphates (FLEET) 7-19 GM/118ML Place 1 enema rectally daily as neededHistorical Med      senna (SENOKOT) 8.6 MG tablet Take 2 tablets by mouth dailyHistorical Med      acetaminophen (TYLENOL) 325 MG tablet Take 650 mg by mouth 2 times daily as needed for PainHistorical Med      valproate (DEPACON) 100 MG/ML injection Infuse 250 mg intravenously every 12 hoursHistorical Med      lacosamide (VIMPAT) 100 MG TABS tablet Take 100 mg by mouth 2 times daily. Historical Med      Baclofen 5 MG TABS Take 5 mg by mouth 2 times dailyHistorical Med      paliperidone palmitate ER (INVEGA SUSTENNA) 117 MG/0.75ML MOSES IM injection Inject 117 mg into the muscle every 30 days At bedtime on the 8th of the monthHistorical Med      levETIRAcetam (KEPPRA) 100 MG/ML solution 5 mLs by PEG Tube route 2 times daily, Disp-1 Bottle, R-3DC to SNF      QUEtiapine (SEROQUEL) 25 MG tablet 1 tablet by PEG Tube route 2 times daily, Disp-60 tablet, R-3DC to SNF      famotidine (PEPCID) 20 MG tablet 1 tablet by PEG Tube route 2 times daily, Disp-60 tablet, R-3DC to SNF      benztropine (COGENTIN) 1 MG tablet Take 2 tablets by mouth 2 times daily, Disp-120 tablet, R-3Print             Time Spent on discharge is more than 30 minutes in the examination, evaluation, counseling and review of medications and discharge plan. Signed: FRANCK Leslie - CNP   5/25/2022    The patient was seen and examined on day of discharge and this discharge summary is in conjunction with any daily progress note from day of discharge. Thank you Libra Mejia MD for the opportunity to be involved in this patient's care. If you have any questions or concerns please feel free to contact me at [phone]. NOTE:  This report was transcribed using voice recognition software.   Every effort was made to ensure accuracy; however, inadvertent computerized transcription errors may be present.

## 2022-05-23 NOTE — DISCHARGE INSTR - COC
Continuity of Care Form    Patient Name: Melchor Arauz   :  1995  MRN:  [id number]    Admit date:  2022  Discharge date:  22    Code Status Order: Full Code   Advance Directives:      Admitting Physician:  Tam Sparrow MD  PCP: Faheem Shen MD    Discharging Nurse:  [facility] Unit/Room#: 3AN-3326/3326-01  Discharging Unit Phone Number: [phone]    Emergency Contact:   Extended Emergency Contact Information  Primary Emergency Contact: Reilly Zarate Phone: [phone]  Relation: Legal Guardian    Past Surgical History:  Past Surgical History:   Procedure Laterality Date    GASTROSTOMY TUBE PLACEMENT      GASTROSTOMY TUBE PLACEMENT      2016    GASTROSTOMY TUBE PLACEMENT N/A 2019    EGD PEG TUBE PLACEMENT performed by Jeannie Jain MD at [facility] 2019    EGD FOREIGN BODY REMOVAL performed by Jeannie Jain MD at [address] [place] [address] History:   Immunization History   Administered Date(s) Administered    COVID-19, Pfizer Purple top, DILUTE for use, 12+ yrs, 30mcg/0.3mL dose 2021, 2021    Influenza Virus Vaccine 10/24/2011    Tdap (Boostrix, Adacel) 10/24/2011, 2015       Active Problems:  Patient Active Problem List   Diagnosis Code    Drug overdose T50.901A    Cardiac arrest due to respiratory disorder ([address] J98.9, I46.8    Anoxic brain injury ([address] G93.1    Acute respiratory failure with hypoxia (HCC) J96.01    Aspiration pneumonia of both lungs (HCC) J69.0    Cor pulmonale (HCC) I27.81    Seizure ([address] R56.9    Status epilepticus ([address] G40.901    Non-traumatic rhabdomyolysis M62.82    Dystonia G24.9    Non compliance w medication regimen Z91.14    Failure to thrive in adult R62.7    Hypokalemia E87.6    Hypernatremia E87.0    Agitation R45.1    Anxiety F41.9    Chronic hepatitis C without hepatic coma (HCC) B18.2    Dysphagia, oropharyngeal R13.12    Choreoathetoid limb movements G25.5    Hepatic encephalopathy (HCC) K72.90    Abscess of forearm, left L02.414    Severe protein-calorie malnutrition Jesica Mini: less than 60% of standard weight) (HCC) E43    Colonic obstruction ([address] K56.609    Fecal impaction (HCC) K56.41    Dislodged gastrostomy tube T85.528A    Bladder outlet obstruction N32.0    Hydroureteronephrosis N13.30    Proctitis K62.89    Abdominal pain R10.9       Isolation/Infection:   Isolation            Contact          Patient Infection Status       Infection Onset Added Last Indicated Last Indicated By Review Planned Expiration Resolved Resolved By    MRSA 08/25/17 08/28/17 08/28/17 Анна Baird RN        Arm             Nurse Assessment:  Last Vital Signs: /63   Pulse 97   Temp 97.9 °F (36.6 °C) (Axillary)   Resp 14   Ht 5' 10\" (1.778 m)   Wt 110 lb 3.7 oz (50 kg)   SpO2 96%   BMI 15.82 kg/m²     Last documented pain score (0-10 scale): Pain Level: 0  Last Weight:   Wt Readings from Last 1 Encounters:   05/23/22 110 lb 3.7 oz (50 kg)     Mental Status:  disoriented    IV Access:  - None    Nursing Mobility/ADLs:  Walking   Dependent  Transfer  Dependent  Bathing  Dependent  Dressing  Dependent  Toileting  Dependent  Feeding  Dependent  Med Admin  Dependent  Med Delivery    via PEG    Wound Care Documentation and Therapy:        Elimination:  Continence: Bowel: No  Bladder: uribe  Urinary Catheter: Insertion Date: 5/22/22 for urinary retention-leave until follow up with urology in 2 weeks    Colostomy/Ileostomy/Ileal Conduit: No       Date of Last BM: 5/23/22    Intake/Output Summary (Last 24 hours) at 5/23/2022 1347  Last data filed at 5/23/2022 1227  Gross per 24 hour   Intake 100 ml   Output 2150 ml   Net -2050 ml     I/O last 3 completed shifts: In: 1284.1 [I.V.:7; NG/GT:940; IV Piggyback:337.1]  Out: 7729 [Urine:3325]    Safety Concerns:      At Risk for Falls and Aspiration Risk    Impairments/Disabilities:      Contractures - BUE    Nutrition Therapy:  Current Nutrition Therapy:   - Oral Diet:  Dysphagia 1 pureed  TwoCal HN, 240 ml bolus 4x/day, 120 water flush before and after    Routes of Feeding: Oral/PEG  Liquids: Honey Thick Liquids  Daily Fluid Restriction: no  Last Modified Barium Swallow with Video (Video Swallowing Test): not done    Treatments at the Time of Hospital Discharge:   Respiratory Treatments: n/a  Oxygen Therapy:  is not on home oxygen therapy. Ventilator:    - No ventilator support    Rehab Therapies: n/a  Weight Bearing Status/Restrictions: No weight bearing restrictions  Other Medical Equipment (for information only, NOT a DME order):  hospital bed  Other Treatments: n/a    Patient's personal belongings (please select all that are sent with patient):  None    RN SIGNATURE:  Electronically signed by Renee Cummings RN on 5/23/22 at 4:47 PM EDT        CASE MANAGEMENT/SOCIAL WORK SECTION    Inpatient Status Date 5/21/22    Readmission Risk Assessment Score:  Readmission Risk              Risk of Unplanned Readmission:  15           Discharging to Facility/ Agency   Name:    [facility]am [facility] at [facility]: Qi Brochure: [address] [facility]      Dialysis Facility (if applicable)   Name:  Address:  Dialysis Schedule:  Phone:  Fax:    / signature: Electronically signed by Laura Perea RN on 5/23/22 at 2:54 PM EDT    PHYSICIAN SECTION    Prognosis: Good    Condition at Discharge: Stable    Rehab Potential (if transferring to Rehab): Good    Recommended Labs or Other Treatments After Discharge: PT OT RN aide resume tube feeds as prior    Physician Certification: I certify the above information and transfer of Chandni Villareal  is necessary for the continuing treatment of the diagnosis listed and that she requires [facility] for greater 30 days.      Update Admission H&P: No change in H&P    PHYSICIAN SIGNATURE:  Electronically signed by FRANCK Cortes CNP on 5/23/22 at 1:48 PM EDT 13-Apr-2017 07:02

## 2022-05-23 NOTE — CARE COORDINATION
Discharge Plan:   Patient discharged to:  [facility] at  [facility] 96: [phone]  F: [phone]  SW/DC Planner faxed, [phone] and AVS   Narcotic Prescriptions faxed were: No  RN:  Adelina Martinez will call report       Medical Transport with: [facility] - [phone]   time: [facility] advised of discharge?: Left VM for Legal [address] Submitted?:   N/A    All discharge needs met per case management.

## 2022-05-23 NOTE — CONSULTS
[facility] Consult Note  Inpatient Setting - [facility]    Provider: Ag Cardona MD MD Patient ID:  Admission Date: 2022 Name: Sonali Luevano  OR Date: n/a MRN: [id number]   Patient Location: [id number]/3326-01 : 1995  Attending: Tim Pineda MD Date of Service: 2022  PCP: Clark Rothman MD     Diagnoses:  1. Proctitis    2. Fecal impaction (HCC)      Constipation  Urinary retention  NGB    Assessment/Plan:  26 yo F with hx TBI admitted with fecal impaction now having BM. Seen in February for similar issues and dced with uribe, readmitted with no uribe and 1 L upon placement with mild left hydro. At last hospitalization BL hydro and severe bladder distention. No family in room      - continue constipation care  - keep uribe and will discuss long term mgmt at follow up; outpt follow up requested  - may benefit from UDS +/-SPT placement     All the patients questions were answered in detail. She understands the plan as listed above. · Review/order of labs  · Review/order of radiology studies  Total time spent face-to-face with the patient 5 min, including greater than 50% of this time in discussion with the patient/family concerning the following:  · Recommended tests  · management options  · risks/benefits of management options  · importance of compliance  · Prognosis  · Risk factor reduction  · Patient/family education                                                                                                                                                      CC:   Chief Complaint   Patient presents with    Abnormal Test Results     pt sent in by [facility] due to KUB results from  showing an Ileus ? bowel obstruction. incontinent of bowel and bladder, had stool yesterday and the day before. HPI: Sonali Luevano is a 27 y.o. female.   I saw the patient today for urinary retention    Past Medical History:   She has a past medical history of Acute hepatitis C without hepatic coma, Acute hepatitis C without hepatic coma, Anoxic brain damage (HCC), Cardiac arrest ([facility] Chronic kidney disease, Chronic viral hepatitis C ([facility] Diffuse traumatic brain injury with loss of consciousness greater than 24 hours without return to pre-existing conscious level with patient surviving [facility]), Major depressive disorder, recurrent, unspecified ([facility] MRSA (methicillin resistant staph aureus) culture positive (08/25/2017), Nicotine dependence, other tobacco product, uncomplicated, Overdose, Poisoning by heroin, undetermined, initial encounter ([facility] Schizophrenia, unspecified ([facility] Seizures ([facility] Streptococcal sepsis, unspecified ([facility] TBI (traumatic brain injury) ([facility] Unspecified asthma, uncomplicated, and Unspecified sexually transmitted disease. Past Surgical History:  She has a past surgical history that includes Gastrostomy tube placement; Gastrostomy tube placement; Upper gastrointestinal endoscopy (N/A, 11/26/2019); and Gastrostomy tube placement (N/A, 11/26/2019). Allergies:   No Known Allergies    Social History:  She reports that she quit smoking about 11 years ago. Her smoking use included cigarettes. She quit after 2.00 years of use. She has never used smokeless tobacco. She reports that she does not drink alcohol and does not use drugs. Family History:  family history includes COPD in her mother; Cancer in her paternal grandmother; High Cholesterol in her father.     Medications:   Scheduled Meds:   polyethylene glycol  17 g Per G Tube BID    ciprofloxacin  400 mg IntraVENous Q12H    metroNIDAZOLE  500 mg IntraVENous Q8H    levETIRAcetam  500 mg PEG Tube BID    sertraline  200 mg Per G Tube Daily    sodium chloride flush  5-40 mL IntraVENous 2 times per day    enoxaparin  40 mg SubCUTAneous Daily    benztropine  1 mg Per G Tube TID    Baclofen  5 mg Per G Tube BID    lacosamide  100 mg Per G Tube BID     Continuous Infusions:   sodium chloride 100 mL/hr at 05/22/22 1429    sodium chloride 10 mL/hr at 05/23/22 1524     PRN Meds:haloperidol lactate, fleet, sodium chloride flush, sodium chloride, ondansetron **OR** ondansetron, acetaminophen **OR** acetaminophen, polyethylene glycol, sennosides-docusate sodium    Review of Systems:  UTO 2/2 AMS     Physical Exam:  Vitals:    05/23/22 1151   BP: 115/63   Pulse: 97   Resp: 14   Temp: 97.9 °F (36.6 °C)   SpO2: 96%     Constitutional: chronically ill appearing  HEENT: MMM. Hearing intact. PERRL  Neck: no thyroid masses appreciated. Trachea is midline. Neck appears unremarkable   Lymph: no palpable adenopathy in supraclavicular, or axillary lymph nodes  Cardiovascular: Regular rate. No peripheral edema  Respiratory: Respirations are even and non-labored. No audible breath sounds. Genitourinary: uribe in place with CYU  Abdomen: Soft. No distension  Psychiatric: A + O x 0  Skin: Pink, warm and dry. No rashes on face and arms.     Labs:  Lab Results   Component Value Date    WBC 5.1 05/23/2022    HGB 12.6 05/23/2022    HCT 38.4 05/23/2022    MCV 92.6 05/23/2022     05/23/2022     Lab Results   Component Value Date    CREATININE 0.6 05/23/2022    BUN 8 05/23/2022     05/23/2022    K 3.6 05/23/2022     05/23/2022    CO2 23 05/23/2022       Imaging:   none    Gita Robles MD, MD  5/23/2022

## 2022-05-24 NOTE — PROGRESS NOTES
Data- discharge order received, pt or Hugo Bachelor (appointed legal authority) verbalized agreement to discharge, disposition to [facility] [address] reviewed and signed by physician. Action- AVS prepared, DORIAN completed/ reported faxed by case management/. Discharge instruction summary: Diet- Dysphagia Pureed, Moderately Thick liquid/Honey, Activity- Up with Assist, medications prescriptions to be filled at receiving facility, Transfer code status: Full Code, LDAs to remain with discharge: PEG Tube and Crouch catheter. DME used: Hospital Bed, lift equipment. Response- Bedside RN to call report to receiving facility. Pt belongings gathered, peripheral IV and cardiac monitoring removed. Disposition to Discharged via cart/stretcher and via ambulance to skilled nursing by EMS transportation, no complications reported. 1. WEIGHT: Admit Weight: 115 lb (52.2 kg) (05/20/22 1134)        Today  Weight: 110 lb 3.7 oz (50 kg) (05/23/22 0600)       2.  O2 SAT.: SpO2: 96 % (05/23/22 1151)

## 2022-05-24 NOTE — PROGRESS NOTES
Physician Progress Note      PATIENT:               Jannette Collado  [facility] #:                  [id number]  :                       1995  ADMIT DATE:       2022 11:12 AM  DISCH DATE:        2022 8:47 PM  RESPONDING  PROVIDER #:        Eryn Stanley          QUERY TEXT:    Patient admitted with fecal impaction with proctitis. If possible, please   document in progress notes and discharge summary if you are evaluating and /or   treating any of the following: The medical record reflects the following:  Risk Factors: Residential care, PEG tube, Ileus  Clinical Indicators: Patient from SNF with concerns for abdominal pain,   possible Ileus on KUB. Patient noted with fecal impaction on CT, dependent on   tube feedings, pureed diet with thickened liquids after TBI. Evaluated by   dietitian for tube feeding management, malnutrition status noted with   insufficient data. Patients current BMI is 15.8 with NPO status. Treatment: Imaging, Dietitian evaluation and continued monitoring, IV fluids  Options provided:  -- Underweight with BMI 15.8  -- Other - I will add my own diagnosis  -- Disagree - Not applicable / Not valid  -- Disagree - Clinically unable to determine / Unknown  -- Refer to Clinical Documentation Reviewer    PROVIDER RESPONSE TEXT:    This patient is underweight with a BMI of 15.8.     Query created by: Kristan Troy on 2022 9:43 AM      Electronically signed by:  Eryn Stanley 2022 3:49 PM

## 2022-06-26 ENCOUNTER — HOSPITAL ENCOUNTER (EMERGENCY)
Age: 27
Discharge: HOME | End: 2022-06-26
Attending: EMERGENCY MEDICINE
Payer: MEDICARE

## 2022-06-26 ENCOUNTER — APPOINTMENT (OUTPATIENT)
Dept: CT IMAGING | Age: 27
End: 2022-06-26
Payer: MEDICARE

## 2022-06-26 ENCOUNTER — APPOINTMENT (OUTPATIENT)
Dept: GENERAL RADIOLOGY | Age: 27
End: 2022-06-26
Payer: MEDICARE

## 2022-06-26 VITALS
SYSTOLIC BLOOD PRESSURE: 105 MMHG | TEMPERATURE: 97.1 F | DIASTOLIC BLOOD PRESSURE: 72 MMHG | OXYGEN SATURATION: 96 % | RESPIRATION RATE: 12 BRPM | HEART RATE: 77 BPM

## 2022-06-26 DIAGNOSIS — R14.0 ABDOMINAL DISTENSION: ICD-10-CM

## 2022-06-26 DIAGNOSIS — K56.41 FECAL IMPACTION (HCC): Primary | ICD-10-CM

## 2022-06-26 LAB
A/G RATIO: 1.8 (ref 1.1–2.2)
ALBUMIN SERPL-MCNC: 4.3 G/DL (ref 3.4–5)
ALP BLD-CCNC: 57 U/L (ref 40–129)
ALT SERPL-CCNC: 17 U/L (ref 10–40)
ANION GAP SERPL CALCULATED.3IONS-SCNC: 9 MMOL/L (ref 3–16)
AST SERPL-CCNC: 13 U/L (ref 15–37)
BACTERIA: ABNORMAL /HPF
BASOPHILS ABSOLUTE: 0.1 K/UL (ref 0–0.2)
BASOPHILS RELATIVE PERCENT: 0.9 %
BILIRUB SERPL-MCNC: <0.2 MG/DL (ref 0–1)
BILIRUBIN URINE: NEGATIVE
BLOOD, URINE: NEGATIVE
BUN BLDV-MCNC: 19 MG/DL (ref 7–20)
CALCIUM SERPL-MCNC: 9.6 MG/DL (ref 8.3–10.6)
CHLORIDE BLD-SCNC: 105 MMOL/L (ref 99–110)
CLARITY: ABNORMAL
CO2: 26 MMOL/L (ref 21–32)
COLOR: YELLOW
CREAT SERPL-MCNC: 0.6 MG/DL (ref 0.6–1.1)
EOSINOPHILS ABSOLUTE: 0.2 K/UL (ref 0–0.6)
EOSINOPHILS RELATIVE PERCENT: 2.9 %
EPITHELIAL CELLS, UA: 2 /HPF (ref 0–5)
GFR AFRICAN AMERICAN: >60
GFR NON-AFRICAN AMERICAN: >60
GLUCOSE BLD-MCNC: 83 MG/DL (ref 70–99)
GLUCOSE URINE: NEGATIVE MG/DL
HCG QUALITATIVE: NEGATIVE
HCT VFR BLD CALC: 38.2 % (ref 36–48)
HEMOGLOBIN: 12.5 G/DL (ref 12–16)
HYALINE CASTS: 1 /LPF (ref 0–8)
KETONES, URINE: NEGATIVE MG/DL
LEUKOCYTE ESTERASE, URINE: ABNORMAL
LIPASE: 148 U/L (ref 13–60)
LYMPHOCYTES ABSOLUTE: 2.2 K/UL (ref 1–5.1)
LYMPHOCYTES RELATIVE PERCENT: 30.6 %
MCH RBC QN AUTO: 29.9 PG (ref 26–34)
MCHC RBC AUTO-ENTMCNC: 32.8 G/DL (ref 31–36)
MCV RBC AUTO: 91 FL (ref 80–100)
MICROSCOPIC EXAMINATION: YES
MONOCYTES ABSOLUTE: 0.5 K/UL (ref 0–1.3)
MONOCYTES RELATIVE PERCENT: 6.6 %
NEUTROPHILS ABSOLUTE: 4.2 K/UL (ref 1.7–7.7)
NEUTROPHILS RELATIVE PERCENT: 59 %
NITRITE, URINE: NEGATIVE
PDW BLD-RTO: 12.9 % (ref 12.4–15.4)
PH UA: 8.5 (ref 5–8)
PLATELET # BLD: 191 K/UL (ref 135–450)
PMV BLD AUTO: 9.4 FL (ref 5–10.5)
POTASSIUM REFLEX MAGNESIUM: 4.3 MMOL/L (ref 3.5–5.1)
PROTEIN UA: NEGATIVE MG/DL
RBC # BLD: 4.19 M/UL (ref 4–5.2)
RBC UA: 3 /HPF (ref 0–4)
SODIUM BLD-SCNC: 140 MMOL/L (ref 136–145)
SPECIFIC GRAVITY UA: 1.02 (ref 1–1.03)
TOTAL PROTEIN: 6.7 G/DL (ref 6.4–8.2)
URINE REFLEX TO CULTURE: YES
URINE TYPE: ABNORMAL
UROBILINOGEN, URINE: 0.2 E.U./DL
WBC # BLD: 7.2 K/UL (ref 4–11)
WBC UA: 57 /HPF (ref 0–5)

## 2022-06-26 PROCEDURE — 83690 ASSAY OF LIPASE: CPT

## 2022-06-26 PROCEDURE — 87077 CULTURE AEROBIC IDENTIFY: CPT

## 2022-06-26 PROCEDURE — 80053 COMPREHEN METABOLIC PANEL: CPT

## 2022-06-26 PROCEDURE — 36415 COLL VENOUS BLD VENIPUNCTURE: CPT

## 2022-06-26 PROCEDURE — 81001 URINALYSIS AUTO W/SCOPE: CPT

## 2022-06-26 PROCEDURE — 6360000004 HC RX CONTRAST MEDICATION: Performed by: EMERGENCY MEDICINE

## 2022-06-26 PROCEDURE — 87086 URINE CULTURE/COLONY COUNT: CPT

## 2022-06-26 PROCEDURE — 87186 SC STD MICRODIL/AGAR DIL: CPT

## 2022-06-26 PROCEDURE — 74177 CT ABD & PELVIS W/CONTRAST: CPT

## 2022-06-26 PROCEDURE — 84703 CHORIONIC GONADOTROPIN ASSAY: CPT

## 2022-06-26 PROCEDURE — 99285 EMERGENCY DEPT VISIT HI MDM: CPT

## 2022-06-26 PROCEDURE — 85025 COMPLETE CBC W/AUTO DIFF WBC: CPT

## 2022-06-26 PROCEDURE — 6360000004 HC RX CONTRAST MEDICATION

## 2022-06-26 PROCEDURE — 74018 RADEX ABDOMEN 1 VIEW: CPT

## 2022-06-26 RX ORDER — MIDODRINE HYDROCHLORIDE 5 MG/1
10 TABLET ORAL 3 TIMES DAILY
COMMUNITY

## 2022-06-26 RX ORDER — DOCUSATE SODIUM 100 MG/1
100 CAPSULE, LIQUID FILLED ORAL 2 TIMES DAILY
Qty: 60 CAPSULE | Refills: 0 | Status: SHIPPED | OUTPATIENT
Start: 2022-06-26 | End: 2022-10-04

## 2022-06-26 RX ORDER — POLYETHYLENE GLYCOL 3350 17 G/17G
17 POWDER, FOR SOLUTION ORAL DAILY
Qty: 1530 G | Refills: 1 | Status: ON HOLD | OUTPATIENT
Start: 2022-06-26 | End: 2022-09-19 | Stop reason: SDUPTHER

## 2022-06-26 RX ORDER — SERTRALINE HYDROCHLORIDE 100 MG/1
100 TABLET, FILM COATED ORAL DAILY
COMMUNITY

## 2022-06-26 RX ADMIN — DIATRIZOATE MEGLUMINE AND DIATRIZOATE SODIUM 40 ML: 660; 100 LIQUID ORAL; RECTAL at 07:25

## 2022-06-26 RX ADMIN — IOPAMIDOL 75 ML: 755 INJECTION, SOLUTION INTRAVENOUS at 08:44

## 2022-06-26 ASSESSMENT — PAIN - FUNCTIONAL ASSESSMENT: PAIN_FUNCTIONAL_ASSESSMENT: NONE - DENIES PAIN

## 2022-06-26 NOTE — ED NOTES
Report given to Any Mccabe at [facility] and is aware that patient will require enema. Rayna confirmed with this RN that that can be completed at the facility.       Jose Landers, RN  06/26/22 Krys Haney RN  06/26/22 5114

## 2022-06-26 NOTE — ED NOTES
Report given to [facility] transport. All paperwork, including prescriptions sent with [facility].       Kaylah Sanchez, RN  06/26/22 3441

## 2022-06-26 NOTE — ED PROVIDER NOTES
[address] PROVIDER NOTE    Patient Identification  Pt Name: Kip Hwang  MRN: [id number]  Amritagfhelio 1995  Date of evaluation: 6/26/2022  Provider: Elias Jordan MD  PCP: Casey Fuentes MD    Chief Complaint  Other (pt. arrived from [facility] squad from [facility]. Pt. is nonverbal.  per squad staff concerned about abd distention x 1 day. pt. does have a g tube. abd is from and distended upon arrival. )      HPI  (History provided by EMS and patient; limited as patient is non-verbal and has baseline anoxic brain injury)  This is a 27 y.o. female who was brought in by EMS transportation for concern for abdominal distension. The patient is non-verbal due to anoxic brain injury. However, she is able to communicate to some degree non-verbally She indicates her abdomen is painful diffusely, without clearly localizing such pain. The patient uses a feeding tube for all meals. The tube has been functioning normally without clogging or other evidence of malfunction She has not had nausea, vomiting, or diarrhea. It is unclear when she last had a BM. This started on Saturday and has been present since. ROS  10 systems reviewed, pertinent positives/negatives per HPI otherwise noted to be negative. I have reviewed the following nursing documentation:  Allergies: Patient has no known allergies. Past medical history:   Past Medical History:   Diagnosis Date    Acute hepatitis C without hepatic coma     Acute hepatitis C without hepatic coma     Anoxic brain damage ([address]     Cardiac arrest ([address]     s/p overdose 08/2016.     Chronic kidney disease     \"a few\" UTIs with pregnancy    Chronic viral hepatitis C ([address]     Diffuse traumatic brain injury with loss of consciousness greater than 24 hours without return to pre-existing conscious level with patient surviving ([address]     sequela    Major depressive disorder, recurrent, unspecified ([address]     MRSA (methicillin resistant staph aureus) culture positive 08/25/2017    arm    Nicotine dependence, other tobacco product, uncomplicated     Overdose     Poisoning by heroin, undetermined, initial encounter ([facility]     Schizophrenia, unspecified ([facility]     Seizures ([facility]     Streptococcal sepsis, unspecified ([facility]     TBI (traumatic brain injury) ([facility]     Unspecified asthma, uncomplicated     Unspecified sexually transmitted disease      Past surgical history:   Past Surgical History:   Procedure Laterality Date    GASTROSTOMY TUBE PLACEMENT      GASTROSTOMY TUBE PLACEMENT      08/02/2016    GASTROSTOMY TUBE PLACEMENT N/A 11/26/2019    EGD PEG TUBE PLACEMENT performed by Eron Lindsay MD at [address] 11/26/2019    EGD [address] performed by Eron Lindsay MD at [facility] medications:   Discharge Medication List as of 6/26/2022 11:59 AM      CONTINUE these medications which have NOT CHANGED    Details   midodrine (PROAMATINE) 5 MG tablet 10 mg by Per G Tube route 3 times dailyHistorical Med      LACTOBACILLUS PO 1 tablet by Per G Tube route in the morning and at bedtimeHistorical Med      sertraline (ZOLOFT) 100 MG tablet 100 mg by Per G Tube route daily Give 2 tabletsHistorical Med      erythromycin (ROMYCIN) 5 MG/GM ophthalmic ointment Place 1 applicator into the left eye in the morning, at noon, and at bedtime, Left Eye, 3 times daily Starting Tue 5/3/2022, Historical Med      ferrous sulfate 220 (44 Fe) MG/5ML solution 220 mg by Per G Tube route 3 times dailyHistorical Med      Sodium Phosphates (FLEET) 7-19 GM/118ML Place 1 enema rectally daily as neededHistorical Med      senna (SENOKOT) 8.6 MG tablet 2 tablets by Per G Tube route daily Historical Med      acetaminophen (TYLENOL) 325 MG tablet 650 mg by Per G Tube route 2 times daily as needed for Pain Historical Med      valproate (DEPACON) 100 MG/ML injection 250 mg by Per G Tube route every 12 hours Give 10 ml every 12 hours for sz activityHistorical Med      lacosamide (VIMPAT) 100 MG TABS tablet Take 100 mg by mouth 2 times daily. Historical Med      Baclofen 5 MG TABS 5 mg by Per G Tube route 2 times daily Historical Med      paliperidone palmitate ER (INVEGA SUSTENNA) 117 MG/0.75ML MOSES IM injection Inject 117 mg into the muscle every 30 days At bedtime on the 8th of the monthHistorical Med      levETIRAcetam (KEPPRA) 100 MG/ML solution 5 mLs by PEG Tube route 2 times daily, Disp-1 Bottle, R-3DC to SNF      QUEtiapine (SEROQUEL) 25 MG tablet 1 tablet by PEG Tube route 2 times daily, Disp-60 tablet, R-3DC to SNF      famotidine (PEPCID) 20 MG tablet 1 tablet by PEG Tube route 2 times daily, Disp-60 tablet, R-3DC to SNF      benztropine (COGENTIN) 1 MG tablet Take 2 tablets by mouth 2 times daily, Disp-120 tablet, R-3Print             Social history:  reports that she quit smoking about 11 years ago. Her smoking use included cigarettes. She quit after 2.00 years of use. She has never used smokeless tobacco. She reports that she does not drink alcohol and does not use drugs. Family history:    Family History   Problem Relation Age of Onset    High Cholesterol Father     Cancer Paternal Alanna Beams COPD Mother        Exam  ED Triage Vitals [06/26/22 0545]   BP Temp Temp Source Heart Rate Resp SpO2 Height Weight   104/72 97.1 °F (36.2 °C) Oral 74 14 97 % -- --     Nursing note and vitals reviewed. Constitutional: Well developed, well nourished. Non-toxic in appearance. HENT:      Head: Normocephalic and atraumatic. Ears: External ears normal.      Nose: Nose normal.     Mouth: Membrane mucosa moist and pink. Eyes: Anicteric sclera. No discharge. Neck: Supple. Trachea midline. Cardiovascular: RRR; no murmurs, rubs, or gallops. Pulmonary/Chest: Effort normal. No respiratory distress. CTAB. No stridor. No wheezes. No rales. Abdominal: Soft. Distended.  Feeding tube in place functioning normally. Tube site is clean, dry, and intact without erythema, induration, warmth, or leakage. Abdomen is diffusely tender without guarding or rebound. Musculoskeletal: No extremity deformity. Neurological: Alert. Chronic contractures  Skin: Warm and dry. No rash. Psychiatric: Normal mood and affect. Behavior is normal.    Radiology  CT ABDOMEN PELVIS W IV CONTRAST Additional Contrast? None   Final Result   1. Fecal impaction. XR ABDOMEN (KUB) (SINGLE AP VIEW)   Final Result   1. No evidence of contrast extravasation. 2. Gastroesophageal reflux. 3. Dilated loop of sigmoid colon.              Labs  Results for orders placed or performed during the hospital encounter of 06/26/22   CBC with Auto Differential   Result Value Ref Range    WBC 7.2 4.0 - 11.0 K/uL    RBC 4.19 4.00 - 5.20 M/uL    Hemoglobin 12.5 12.0 - 16.0 g/dL    Hematocrit 38.2 36.0 - 48.0 %    MCV 91.0 80.0 - 100.0 fL    MCH 29.9 26.0 - 34.0 pg    MCHC 32.8 31.0 - 36.0 g/dL    RDW 12.9 12.4 - 15.4 %    Platelets 174 203 - 532 K/uL    MPV 9.4 5.0 - 10.5 fL    Neutrophils % 59.0 %    Lymphocytes % 30.6 %    Monocytes % 6.6 %    Eosinophils % 2.9 %    Basophils % 0.9 %    Neutrophils Absolute 4.2 1.7 - 7.7 K/uL    Lymphocytes Absolute 2.2 1.0 - 5.1 K/uL    Monocytes Absolute 0.5 0.0 - 1.3 K/uL    Eosinophils Absolute 0.2 0.0 - 0.6 K/uL    Basophils Absolute 0.1 0.0 - 0.2 K/uL   Comprehensive Metabolic Panel w/ Reflex to MG   Result Value Ref Range    Sodium 140 136 - 145 mmol/L    Potassium reflex Magnesium 4.3 3.5 - 5.1 mmol/L    Chloride 105 99 - 110 mmol/L    CO2 26 21 - 32 mmol/L    Anion Gap 9 3 - 16    Glucose 83 70 - 99 mg/dL    BUN 19 7 - 20 mg/dL    CREATININE 0.6 0.6 - 1.1 mg/dL    GFR Non-African American >60 >60    GFR African American >60 >60    Calcium 9.6 8.3 - 10.6 mg/dL    Total Protein 6.7 6.4 - 8.2 g/dL    Albumin 4.3 3.4 - 5.0 g/dL    Albumin/Globulin Ratio 1.8 1.1 - 2.2    Total Bilirubin <0.2 0.0 - 1.0 TUBO-OVARIAN ABSCESS, thus I consider the discharge disposition reasonable. Final Impression  1. Fecal impaction (HCC)    2. Abdominal distension        Blood pressure 105/72, pulse 77, temperature 97.1 °F (36.2 °C), temperature source Oral, resp. rate 12, SpO2 96 %, not currently breastfeeding. Disposition:  DISPOSITION Decision To Discharge 06/26/2022 11:39:29 AM      Patient Referrals:  [facility] Emergency Department  [address] [place] [address]  [phone]    As needed, If symptoms worsen or new symptoms develop    Brown Beard MD    In 2 days  for re-evaluation      Discharge Medications:  Discharge Medication List as of 6/26/2022 11:59 AM      START taking these medications    Details   docusate sodium (COLACE) 100 MG capsule Take 1 capsule by mouth 2 times daily, Disp-60 capsule, R-0Print             This chart was generated using the [address] [place] [address] dictation system. I created this record but it may contain dictation errors given the limitations of this technology.        Yoon Arrington MD  07/05/22 0617

## 2022-06-28 LAB
ORGANISM: ABNORMAL
URINE CULTURE, ROUTINE: ABNORMAL

## 2022-09-13 ENCOUNTER — HOSPITAL ENCOUNTER (INPATIENT)
Age: 27
LOS: 5 days | Discharge: NURSING, REHAB OR LONG-TERM CARE | DRG: 871 | End: 2022-09-19
Attending: EMERGENCY MEDICINE | Admitting: INTERNAL MEDICINE
Payer: MEDICARE

## 2022-09-13 ENCOUNTER — APPOINTMENT (OUTPATIENT)
Dept: GENERAL RADIOLOGY | Age: 27
DRG: 871 | End: 2022-09-13
Payer: MEDICARE

## 2022-09-13 ENCOUNTER — APPOINTMENT (OUTPATIENT)
Dept: CT IMAGING | Age: 27
DRG: 871 | End: 2022-09-13
Payer: MEDICARE

## 2022-09-13 DIAGNOSIS — K59.00 CONSTIPATION, UNSPECIFIED CONSTIPATION TYPE: ICD-10-CM

## 2022-09-13 DIAGNOSIS — A41.9 SEPTICEMIA (HCC): Primary | ICD-10-CM

## 2022-09-13 DIAGNOSIS — N30.00 ACUTE CYSTITIS WITHOUT HEMATURIA: ICD-10-CM

## 2022-09-13 LAB
A/G RATIO: 1.3 (ref 1.1–2.2)
ALBUMIN SERPL-MCNC: 4.2 G/DL (ref 3.4–5)
ALP BLD-CCNC: 50 U/L (ref 40–129)
ALT SERPL-CCNC: 22 U/L (ref 10–40)
ANION GAP SERPL CALCULATED.3IONS-SCNC: 12 MMOL/L (ref 3–16)
ANISOCYTOSIS: ABNORMAL
AST SERPL-CCNC: 57 U/L (ref 15–37)
BACTERIA: ABNORMAL /HPF
BANDED NEUTROPHILS RELATIVE PERCENT: 4 % (ref 0–7)
BASOPHILS ABSOLUTE: 0.3 K/UL (ref 0–0.2)
BASOPHILS RELATIVE PERCENT: 3 %
BILIRUB SERPL-MCNC: <0.2 MG/DL (ref 0–1)
BILIRUBIN URINE: NEGATIVE
BLOOD, URINE: ABNORMAL
BUN BLDV-MCNC: 13 MG/DL (ref 7–20)
CALCIUM SERPL-MCNC: 9.5 MG/DL (ref 8.3–10.6)
CHLORIDE BLD-SCNC: 102 MMOL/L (ref 99–110)
CLARITY: ABNORMAL
CO2: 24 MMOL/L (ref 21–32)
COLOR: YELLOW
COMMENT UA: ABNORMAL
CREAT SERPL-MCNC: 0.6 MG/DL (ref 0.6–1.1)
EOSINOPHILS ABSOLUTE: 0.1 K/UL (ref 0–0.6)
EOSINOPHILS RELATIVE PERCENT: 1 %
EPITHELIAL CELLS, UA: 10 /HPF (ref 0–5)
GFR AFRICAN AMERICAN: >60
GFR NON-AFRICAN AMERICAN: >60
GLUCOSE BLD-MCNC: 141 MG/DL (ref 70–99)
GLUCOSE URINE: NEGATIVE MG/DL
HCG(URINE) PREGNANCY TEST: NEGATIVE
HCT VFR BLD CALC: 36.1 % (ref 36–48)
HEMOGLOBIN: 12 G/DL (ref 12–16)
HYALINE CASTS: 0 /LPF (ref 0–8)
KETONES, URINE: NEGATIVE MG/DL
LACTIC ACID, SEPSIS: 2.4 MMOL/L (ref 0.4–1.9)
LEUKOCYTE ESTERASE, URINE: ABNORMAL
LYMPHOCYTES ABSOLUTE: 2.6 K/UL (ref 1–5.1)
LYMPHOCYTES RELATIVE PERCENT: 24 %
MCH RBC QN AUTO: 30.5 PG (ref 26–34)
MCHC RBC AUTO-ENTMCNC: 33.2 G/DL (ref 31–36)
MCV RBC AUTO: 91.9 FL (ref 80–100)
MICROSCOPIC EXAMINATION: YES
MONOCYTES ABSOLUTE: 1.2 K/UL (ref 0–1.3)
MONOCYTES RELATIVE PERCENT: 11 %
NEUTROPHILS ABSOLUTE: 6.6 K/UL (ref 1.7–7.7)
NEUTROPHILS RELATIVE PERCENT: 57 %
NITRITE, URINE: NEGATIVE
OCCULT BLOOD DIAGNOSTIC: NORMAL
PDW BLD-RTO: 13.7 % (ref 12.4–15.4)
PH UA: 6.5 (ref 5–8)
PLATELET # BLD: 155 K/UL (ref 135–450)
PLATELET SLIDE REVIEW: ADEQUATE
PMV BLD AUTO: 10.4 FL (ref 5–10.5)
POLYCHROMASIA: ABNORMAL
POTASSIUM SERPL-SCNC: 3.7 MMOL/L (ref 3.5–5.1)
PROTEIN UA: NEGATIVE MG/DL
RBC # BLD: 3.93 M/UL (ref 4–5.2)
RBC UA: 12 /HPF (ref 0–4)
RENAL EPITHELIAL, UA: ABNORMAL /HPF (ref 0–1)
SLIDE REVIEW: ABNORMAL
SODIUM BLD-SCNC: 138 MMOL/L (ref 136–145)
SPECIFIC GRAVITY UA: 1.01 (ref 1–1.03)
TOTAL PROTEIN: 7.4 G/DL (ref 6.4–8.2)
URINE REFLEX TO CULTURE: YES
URINE TYPE: ABNORMAL
UROBILINOGEN, URINE: 1 E.U./DL
WBC # BLD: 10.9 K/UL (ref 4–11)
WBC UA: 121 /HPF (ref 0–5)

## 2022-09-13 PROCEDURE — 83605 ASSAY OF LACTIC ACID: CPT

## 2022-09-13 PROCEDURE — 87040 BLOOD CULTURE FOR BACTERIA: CPT

## 2022-09-13 PROCEDURE — 87186 SC STD MICRODIL/AGAR DIL: CPT

## 2022-09-13 PROCEDURE — 80053 COMPREHEN METABOLIC PANEL: CPT

## 2022-09-13 PROCEDURE — 2580000003 HC RX 258: Performed by: PHYSICIAN ASSISTANT

## 2022-09-13 PROCEDURE — 93005 ELECTROCARDIOGRAM TRACING: CPT | Performed by: PHYSICIAN ASSISTANT

## 2022-09-13 PROCEDURE — 96361 HYDRATE IV INFUSION ADD-ON: CPT

## 2022-09-13 PROCEDURE — 6360000004 HC RX CONTRAST MEDICATION: Performed by: PHYSICIAN ASSISTANT

## 2022-09-13 PROCEDURE — 99285 EMERGENCY DEPT VISIT HI MDM: CPT

## 2022-09-13 PROCEDURE — 71045 X-RAY EXAM CHEST 1 VIEW: CPT

## 2022-09-13 PROCEDURE — 85025 COMPLETE CBC W/AUTO DIFF WBC: CPT

## 2022-09-13 PROCEDURE — 81001 URINALYSIS AUTO W/SCOPE: CPT

## 2022-09-13 PROCEDURE — 82270 OCCULT BLOOD FECES: CPT

## 2022-09-13 PROCEDURE — 84703 CHORIONIC GONADOTROPIN ASSAY: CPT

## 2022-09-13 PROCEDURE — 74177 CT ABD & PELVIS W/CONTRAST: CPT

## 2022-09-13 PROCEDURE — 6370000000 HC RX 637 (ALT 250 FOR IP): Performed by: PHYSICIAN ASSISTANT

## 2022-09-13 PROCEDURE — 87086 URINE CULTURE/COLONY COUNT: CPT

## 2022-09-13 PROCEDURE — 84443 ASSAY THYROID STIM HORMONE: CPT

## 2022-09-13 PROCEDURE — 96360 HYDRATION IV INFUSION INIT: CPT

## 2022-09-13 PROCEDURE — 87088 URINE BACTERIA CULTURE: CPT

## 2022-09-13 PROCEDURE — 36415 COLL VENOUS BLD VENIPUNCTURE: CPT

## 2022-09-13 PROCEDURE — 51702 INSERT TEMP BLADDER CATH: CPT

## 2022-09-13 RX ORDER — ACETAMINOPHEN 650 MG/1
650 SUPPOSITORY RECTAL ONCE
Status: COMPLETED | OUTPATIENT
Start: 2022-09-13 | End: 2022-09-13

## 2022-09-13 RX ORDER — 0.9 % SODIUM CHLORIDE 0.9 %
30 INTRAVENOUS SOLUTION INTRAVENOUS ONCE
Status: COMPLETED | OUTPATIENT
Start: 2022-09-13 | End: 2022-09-14

## 2022-09-13 RX ADMIN — ACETAMINOPHEN 650 MG: 650 SUPPOSITORY RECTAL at 22:52

## 2022-09-13 RX ADMIN — IOPAMIDOL 75 ML: 755 INJECTION, SOLUTION INTRAVENOUS at 22:55

## 2022-09-13 RX ADMIN — SODIUM CHLORIDE 1497 ML: 9 INJECTION, SOLUTION INTRAVENOUS at 22:50

## 2022-09-14 PROBLEM — N39.0 UTI (URINARY TRACT INFECTION): Status: ACTIVE | Noted: 2022-09-14

## 2022-09-14 LAB
EKG ATRIAL RATE: 87 BPM
EKG DIAGNOSIS: NORMAL
EKG P AXIS: 50 DEGREES
EKG P-R INTERVAL: 96 MS
EKG Q-T INTERVAL: 350 MS
EKG QRS DURATION: 104 MS
EKG QTC CALCULATION (BAZETT): 421 MS
EKG R AXIS: 30 DEGREES
EKG T AXIS: -22 DEGREES
EKG VENTRICULAR RATE: 87 BPM
LACTIC ACID: 1.2 MMOL/L (ref 0.4–2)
TSH REFLEX FT4: 3.58 UIU/ML (ref 0.27–4.2)

## 2022-09-14 PROCEDURE — 36415 COLL VENOUS BLD VENIPUNCTURE: CPT

## 2022-09-14 PROCEDURE — 2580000003 HC RX 258: Performed by: NURSE PRACTITIONER

## 2022-09-14 PROCEDURE — 6360000002 HC RX W HCPCS: Performed by: INTERNAL MEDICINE

## 2022-09-14 PROCEDURE — 6370000000 HC RX 637 (ALT 250 FOR IP): Performed by: NURSE PRACTITIONER

## 2022-09-14 PROCEDURE — 6360000002 HC RX W HCPCS: Performed by: NURSE PRACTITIONER

## 2022-09-14 PROCEDURE — 96361 HYDRATE IV INFUSION ADD-ON: CPT

## 2022-09-14 PROCEDURE — 6360000002 HC RX W HCPCS: Performed by: PHYSICIAN ASSISTANT

## 2022-09-14 PROCEDURE — 6370000000 HC RX 637 (ALT 250 FOR IP): Performed by: INTERNAL MEDICINE

## 2022-09-14 PROCEDURE — 1200000000 HC SEMI PRIVATE

## 2022-09-14 PROCEDURE — 83605 ASSAY OF LACTIC ACID: CPT

## 2022-09-14 RX ORDER — POLYETHYLENE GLYCOL 3350 17 G/17G
17 POWDER, FOR SOLUTION ORAL DAILY PRN
Status: DISCONTINUED | OUTPATIENT
Start: 2022-09-14 | End: 2022-09-16

## 2022-09-14 RX ORDER — VALPROIC ACID 250 MG/5ML
250 SOLUTION ORAL 2 TIMES DAILY
Status: DISCONTINUED | OUTPATIENT
Start: 2022-09-14 | End: 2022-09-14

## 2022-09-14 RX ORDER — BENZTROPINE MESYLATE 1 MG/1
2 TABLET ORAL 2 TIMES DAILY
Status: DISCONTINUED | OUTPATIENT
Start: 2022-09-14 | End: 2022-09-14

## 2022-09-14 RX ORDER — MIDODRINE HYDROCHLORIDE 5 MG/1
10 TABLET ORAL 3 TIMES DAILY
Status: DISCONTINUED | OUTPATIENT
Start: 2022-09-14 | End: 2022-09-19 | Stop reason: HOSPADM

## 2022-09-14 RX ORDER — ACETAMINOPHEN 650 MG/1
650 SUPPOSITORY RECTAL EVERY 6 HOURS PRN
Status: DISCONTINUED | OUTPATIENT
Start: 2022-09-14 | End: 2022-09-19 | Stop reason: HOSPADM

## 2022-09-14 RX ORDER — PEG-3350, SODIUM SULFATE, SODIUM CHLORIDE, POTASSIUM CHLORIDE, SODIUM ASCORBATE AND ASCORBIC ACID 7.5-2.691G
100 KIT ORAL ONCE
Status: COMPLETED | OUTPATIENT
Start: 2022-09-14 | End: 2022-09-14

## 2022-09-14 RX ORDER — SODIUM CHLORIDE, SODIUM LACTATE, POTASSIUM CHLORIDE, CALCIUM CHLORIDE 600; 310; 30; 20 MG/100ML; MG/100ML; MG/100ML; MG/100ML
INJECTION, SOLUTION INTRAVENOUS CONTINUOUS
Status: DISCONTINUED | OUTPATIENT
Start: 2022-09-14 | End: 2022-09-15

## 2022-09-14 RX ORDER — SENNA PLUS 8.6 MG/1
2 TABLET ORAL 2 TIMES DAILY
Status: DISCONTINUED | OUTPATIENT
Start: 2022-09-14 | End: 2022-09-19 | Stop reason: HOSPADM

## 2022-09-14 RX ORDER — LEVETIRACETAM 5 MG/ML
500 INJECTION INTRAVASCULAR EVERY 12 HOURS
Status: DISCONTINUED | OUTPATIENT
Start: 2022-09-14 | End: 2022-09-16

## 2022-09-14 RX ORDER — MORPHINE SULFATE 2 MG/ML
2 INJECTION, SOLUTION INTRAMUSCULAR; INTRAVENOUS
Status: COMPLETED | OUTPATIENT
Start: 2022-09-14 | End: 2022-09-14

## 2022-09-14 RX ORDER — SODIUM CHLORIDE 9 MG/ML
INJECTION, SOLUTION INTRAVENOUS PRN
Status: DISCONTINUED | OUTPATIENT
Start: 2022-09-14 | End: 2022-09-19 | Stop reason: HOSPADM

## 2022-09-14 RX ORDER — BENZTROPINE MESYLATE 1 MG/1
2 TABLET ORAL 2 TIMES DAILY
Status: DISCONTINUED | OUTPATIENT
Start: 2022-09-14 | End: 2022-09-19 | Stop reason: HOSPADM

## 2022-09-14 RX ORDER — BACLOFEN 10 MG/1
5 TABLET ORAL 2 TIMES DAILY
Status: DISCONTINUED | OUTPATIENT
Start: 2022-09-14 | End: 2022-09-15

## 2022-09-14 RX ORDER — LACOSAMIDE 50 MG/1
100 TABLET ORAL 2 TIMES DAILY
Status: DISCONTINUED | OUTPATIENT
Start: 2022-09-14 | End: 2022-09-19 | Stop reason: HOSPADM

## 2022-09-14 RX ORDER — ONDANSETRON 2 MG/ML
4 INJECTION INTRAMUSCULAR; INTRAVENOUS EVERY 6 HOURS PRN
Status: DISCONTINUED | OUTPATIENT
Start: 2022-09-14 | End: 2022-09-19 | Stop reason: HOSPADM

## 2022-09-14 RX ORDER — FERROUS SULFATE 300 MG/5ML
220 LIQUID (ML) ORAL 3 TIMES DAILY
Status: DISCONTINUED | OUTPATIENT
Start: 2022-09-14 | End: 2022-09-19 | Stop reason: HOSPADM

## 2022-09-14 RX ORDER — ONDANSETRON 4 MG/1
4 TABLET, ORALLY DISINTEGRATING ORAL EVERY 8 HOURS PRN
Status: DISCONTINUED | OUTPATIENT
Start: 2022-09-14 | End: 2022-09-19 | Stop reason: HOSPADM

## 2022-09-14 RX ORDER — ACETAMINOPHEN 325 MG/1
650 TABLET ORAL EVERY 6 HOURS PRN
Status: DISCONTINUED | OUTPATIENT
Start: 2022-09-14 | End: 2022-09-19 | Stop reason: HOSPADM

## 2022-09-14 RX ORDER — SODIUM CHLORIDE 0.9 % (FLUSH) 0.9 %
5-40 SYRINGE (ML) INJECTION PRN
Status: DISCONTINUED | OUTPATIENT
Start: 2022-09-14 | End: 2022-09-19 | Stop reason: HOSPADM

## 2022-09-14 RX ORDER — VALPROIC ACID 250 MG/5ML
250 SOLUTION ORAL 2 TIMES DAILY
Status: DISCONTINUED | OUTPATIENT
Start: 2022-09-14 | End: 2022-09-19 | Stop reason: HOSPADM

## 2022-09-14 RX ORDER — LEVETIRACETAM 100 MG/ML
500 SOLUTION ORAL 2 TIMES DAILY
Status: DISCONTINUED | OUTPATIENT
Start: 2022-09-14 | End: 2022-09-14

## 2022-09-14 RX ORDER — ENOXAPARIN SODIUM 100 MG/ML
30 INJECTION SUBCUTANEOUS DAILY
Status: DISCONTINUED | OUTPATIENT
Start: 2022-09-14 | End: 2022-09-19 | Stop reason: HOSPADM

## 2022-09-14 RX ORDER — SODIUM CHLORIDE 0.9 % (FLUSH) 0.9 %
5-40 SYRINGE (ML) INJECTION EVERY 12 HOURS SCHEDULED
Status: DISCONTINUED | OUTPATIENT
Start: 2022-09-14 | End: 2022-09-19 | Stop reason: HOSPADM

## 2022-09-14 RX ORDER — FAMOTIDINE 20 MG/1
20 TABLET, FILM COATED ORAL 2 TIMES DAILY
Status: DISCONTINUED | OUTPATIENT
Start: 2022-09-14 | End: 2022-09-19 | Stop reason: HOSPADM

## 2022-09-14 RX ORDER — POLYETHYLENE GLYCOL 3350 17 G/17G
17 POWDER, FOR SOLUTION ORAL DAILY
Status: DISCONTINUED | OUTPATIENT
Start: 2022-09-14 | End: 2022-09-14

## 2022-09-14 RX ORDER — MORPHINE SULFATE 2 MG/ML
2 INJECTION, SOLUTION INTRAMUSCULAR; INTRAVENOUS ONCE
Status: COMPLETED | OUTPATIENT
Start: 2022-09-14 | End: 2022-09-14

## 2022-09-14 RX ORDER — QUETIAPINE FUMARATE 25 MG/1
25 TABLET, FILM COATED ORAL 2 TIMES DAILY
Status: DISCONTINUED | OUTPATIENT
Start: 2022-09-14 | End: 2022-09-19 | Stop reason: HOSPADM

## 2022-09-14 RX ORDER — MORPHINE SULFATE 2 MG/ML
2 INJECTION, SOLUTION INTRAMUSCULAR; INTRAVENOUS EVERY 6 HOURS PRN
Status: DISCONTINUED | OUTPATIENT
Start: 2022-09-14 | End: 2022-09-19 | Stop reason: HOSPADM

## 2022-09-14 RX ADMIN — MORPHINE SULFATE 2 MG: 2 INJECTION, SOLUTION INTRAMUSCULAR; INTRAVENOUS at 15:09

## 2022-09-14 RX ADMIN — VALPROIC ACID 250 MG: 250 SOLUTION ORAL at 11:59

## 2022-09-14 RX ADMIN — BACLOFEN 5 MG: 10 TABLET ORAL at 10:35

## 2022-09-14 RX ADMIN — BENZTROPINE MESYLATE 2 MG: 1 TABLET ORAL at 10:33

## 2022-09-14 RX ADMIN — VALPROIC ACID 250 MG: 250 SOLUTION ORAL at 21:45

## 2022-09-14 RX ADMIN — MIDODRINE HYDROCHLORIDE 10 MG: 5 TABLET ORAL at 10:34

## 2022-09-14 RX ADMIN — Medication 240 ML: at 14:45

## 2022-09-14 RX ADMIN — BENZTROPINE MESYLATE 2 MG: 1 TABLET ORAL at 21:47

## 2022-09-14 RX ADMIN — MORPHINE SULFATE 2 MG: 2 INJECTION, SOLUTION INTRAMUSCULAR; INTRAVENOUS at 05:25

## 2022-09-14 RX ADMIN — FAMOTIDINE 20 MG: 20 TABLET, FILM COATED ORAL at 10:35

## 2022-09-14 RX ADMIN — ENOXAPARIN SODIUM 30 MG: 100 INJECTION SUBCUTANEOUS at 10:06

## 2022-09-14 RX ADMIN — MIDODRINE HYDROCHLORIDE 10 MG: 5 TABLET ORAL at 21:47

## 2022-09-14 RX ADMIN — ACETAMINOPHEN 650 MG: 325 TABLET ORAL at 18:49

## 2022-09-14 RX ADMIN — BACLOFEN 5 MG: 10 TABLET ORAL at 21:46

## 2022-09-14 RX ADMIN — LACOSAMIDE 100 MG: 50 TABLET, FILM COATED ORAL at 10:35

## 2022-09-14 RX ADMIN — SENNOSIDES 17.2 MG: 8.6 TABLET, COATED ORAL at 10:50

## 2022-09-14 RX ADMIN — QUETIAPINE FUMARATE 25 MG: 25 TABLET ORAL at 10:49

## 2022-09-14 RX ADMIN — MORPHINE SULFATE 2 MG: 2 INJECTION, SOLUTION INTRAMUSCULAR; INTRAVENOUS at 21:48

## 2022-09-14 RX ADMIN — SERTRALINE 100 MG: 50 TABLET, FILM COATED ORAL at 10:34

## 2022-09-14 RX ADMIN — FAMOTIDINE 20 MG: 20 TABLET, FILM COATED ORAL at 21:47

## 2022-09-14 RX ADMIN — LACOSAMIDE 100 MG: 50 TABLET, FILM COATED ORAL at 21:47

## 2022-09-14 RX ADMIN — QUETIAPINE FUMARATE 25 MG: 25 TABLET ORAL at 21:47

## 2022-09-14 RX ADMIN — MINERAL SUPPLEMENT IRON 300 MG / 5 ML STRENGTH LIQUID 100 PER BOX UNFLAVORED 220 MG: at 10:12

## 2022-09-14 RX ADMIN — MINERAL SUPPLEMENT IRON 300 MG / 5 ML STRENGTH LIQUID 100 PER BOX UNFLAVORED 220 MG: at 14:35

## 2022-09-14 RX ADMIN — MIDODRINE HYDROCHLORIDE 10 MG: 5 TABLET ORAL at 14:35

## 2022-09-14 RX ADMIN — POLYETHYLENE GLYCOL 3350, SODIUM SULFATE, SODIUM CHLORIDE, POTASSIUM CHLORIDE, ASCORBIC ACID, SODIUM ASCORBATE 100 G: KIT at 16:01

## 2022-09-14 RX ADMIN — Medication 1000 MG: at 01:32

## 2022-09-14 RX ADMIN — BENZTROPINE MESYLATE 2 MG: 1 TABLET ORAL at 16:53

## 2022-09-14 RX ADMIN — DOCUSATE SODIUM LIQUID 100 MG: 100 LIQUID ORAL at 21:45

## 2022-09-14 RX ADMIN — SENNOSIDES 17.2 MG: 8.6 TABLET, COATED ORAL at 21:46

## 2022-09-14 RX ADMIN — MINERAL SUPPLEMENT IRON 300 MG / 5 ML STRENGTH LIQUID 100 PER BOX UNFLAVORED 220 MG: at 21:46

## 2022-09-14 RX ADMIN — Medication 10 ML: at 11:41

## 2022-09-14 RX ADMIN — DOCUSATE SODIUM LIQUID 100 MG: 100 LIQUID ORAL at 10:13

## 2022-09-14 RX ADMIN — POLYETHYLENE GLYCOL 3350 17 G: 17 POWDER, FOR SOLUTION ORAL at 11:59

## 2022-09-14 RX ADMIN — SODIUM CHLORIDE, POTASSIUM CHLORIDE, SODIUM LACTATE AND CALCIUM CHLORIDE: 600; 310; 30; 20 INJECTION, SOLUTION INTRAVENOUS at 08:54

## 2022-09-14 RX ADMIN — SODIUM CHLORIDE, POTASSIUM CHLORIDE, SODIUM LACTATE AND CALCIUM CHLORIDE: 600; 310; 30; 20 INJECTION, SOLUTION INTRAVENOUS at 03:35

## 2022-09-14 RX ADMIN — LEVETIRACETAM 500 MG: 5 INJECTION INTRAVENOUS at 11:40

## 2022-09-14 RX ADMIN — Medication 240 ML: at 01:32

## 2022-09-14 ASSESSMENT — PAIN DESCRIPTION - ORIENTATION
ORIENTATION: RIGHT
ORIENTATION: RIGHT
ORIENTATION: RIGHT;DISTAL
ORIENTATION: RIGHT

## 2022-09-14 ASSESSMENT — PAIN SCALES - WONG BAKER
WONGBAKER_NUMERICALRESPONSE: 0
WONGBAKER_NUMERICALRESPONSE: 6
WONGBAKER_NUMERICALRESPONSE: 0
WONGBAKER_NUMERICALRESPONSE: 0

## 2022-09-14 ASSESSMENT — PAIN SCALES - GENERAL
PAINLEVEL_OUTOF10: 8
PAINLEVEL_OUTOF10: 0
PAINLEVEL_OUTOF10: 8
PAINLEVEL_OUTOF10: 8
PAINLEVEL_OUTOF10: 7
PAINLEVEL_OUTOF10: 0
PAINLEVEL_OUTOF10: 8
PAINLEVEL_OUTOF10: 0

## 2022-09-14 ASSESSMENT — PAIN DESCRIPTION - DESCRIPTORS
DESCRIPTORS: ACHING
DESCRIPTORS: ACHING
DESCRIPTORS: ACHING;BURNING
DESCRIPTORS: ACHING

## 2022-09-14 ASSESSMENT — PAIN DESCRIPTION - LOCATION
LOCATION: LEG

## 2022-09-14 NOTE — PROGRESS NOTES
Physician Progress Note      PATIENT:               Gerri Villareal  CSN #:                  [id number]  :                       1995  ADMIT DATE:       2022 9:26 PM  [address] [place] DATE:  Nadia Littlejohn  PROVIDER #:        Shannon Rodriguez MD          QUERY TEXT:    Pt admitted with UTI with Fevers. Noted documentation of Septicemia per E.D. If possible, please document in progress notes and/or discharge summary   whether: The medical record reflects the following:  Risk Factors: brought in from ECF due to fevers  Clinical Indicators: Temp 101.3, HR 90, Lactic acid 2.4, Diagnosed with UTI,   per E.D.: Septicemia  Treatment: IV Fluids, IV Rocephin  Options provided:  -- Sepsis confirmed present on admission  -- Sepsis present on admission, now resolved  -- Sepsis ruled out  -- Other - I will add my own diagnosis  -- Disagree - Not applicable / Not valid  -- Disagree - Clinically unable to determine / Unknown  -- Refer to Clinical Documentation Reviewer    PROVIDER RESPONSE TEXT:    The diagnosis of Sepsis was confirmed as present on admission.     Query created by: Lynn Casey on 2022 12:33 PM      Electronically signed by:  Shannon Rodriguez MD 2022 4:50 PM

## 2022-09-14 NOTE — ED PROVIDER NOTES
[facility]    Physician Attestation    Pt Name: Vinod Stephens  MRN: [id number]  Amritagfhelio 1995  Date of evaluation: 9/14/22        Physician Note:    I havepersonally performed and/or participated in the history, exam and medical decision making and agree with all pertinent clinical information. I have also reviewed and agree with the past medical, family and social historyunless otherwise noted. I have personally performed a face to face diagnostic evaluation onthis patient. I have reviewed the mid-levels findings and agree. History: Sent in for fever from the nursing facility she has anoxic brain injury and has a PEG tube fever of 101 here initially      REVIEW OF SYSTEMS    Patient unable to provide history due to nonverbal status        General Appearance:  , no distress, appears stated age. Head:  Normocephalic, without obvious abnormality, atraumatic. Eyes:  conjunctiva/corneas clear   Sclera anicteric. ENT: Mucous membranes moist.   Neck: Supple, symmetrical, trachea midline, no adenopathy. No jugular venous distention. Lungs:   No Respiratory Distress. no rales  rhonchi rub   Chest Wall:  Nontender  no deformity   Heart:  Rsr no murmer gallop    Abdomen:   Soft nontender no organomegally PEG tube in place   Extremities:  Full range of motion. no deformity   Pulses: Equal  upper and lower    Skin:  No rashes or lesions to exposed skin. Neurologic: Response to painful stimuli   Rectal exam soft stool no impaction stool can be felt in the upper portion  Patient was admitted for UTI and septicemia and constipation    1. Septicemia ([address]    2. Constipation, unspecified constipation type    3. Acute cystitis without hematuria          DISPOSITION/PLAN  PATIENT REFERRED TO:  No follow-up provider specified.   DISCHARGE MEDICATIONS:  New Prescriptions    No medications on file         Is this patient to be included in the MD  09/14/22 9629

## 2022-09-14 NOTE — PROGRESS NOTES
Patient has arrived to unit in stable condition. Vitals stable. Patient is awake, alert and oriented. Respirations are easy and unlabored. Patient does not appear to be in distress. Call light within reach.

## 2022-09-14 NOTE — ED TRIAGE NOTES
Pt arrived via squad from [facility]. Bruise noted on right knee. Dark stool near rectum. Pt nonverbal. All 4 limbs contracted. Gtube puss/blood/swelling noted around tube. Pt is on a continuous pulse oximetry and telemetry monitoring. Pt continued on cycling blood pressure. Fall risk precautions in place, call light in reach, bed side table within reach,will continue to monitor.

## 2022-09-14 NOTE — PROGRESS NOTES
Comprehensive Nutrition Assessment    Type and Reason for Visit:  Initial, Consult, Positive Nutrition Screen    Nutrition Recommendations/Plan:   . Recommend order \"Diet: Adult Tube Feed\". Initiate Jevity 1.5 (Standard with fiber formula) at 25 mL/hr and as tolerated, increase by 25 mL/hr q 4 hours until goal of 50 mL/hr. Recommend 30 mL H20 q 4 hours while on IV fluids. Once IV fluids are dc/d, recommend 145 ml  H2O q 4 hours. Monitor closely and correct lytes (K, Phos, Mg) before progressing TF to goal.  Ensure head of bed is 30 - 45 degrees during continuous or bolus gastric feeding and for one hour after bolus. Turn off the feeding if head of bed is lowered less than 30 degrees. Monitor for tolerance (bowel habits, N/V, cramping, abdominal exam findings: distended, firm, tense, guarded, discomfort). Irrigate tube with 30 mL water before, between and after each medication. If tube becomes clogged, first try flushing with water. If water does not unclog the tube, may use clog zapper at the direction of the physician/LIP. If tube remains clogged, contact Physician/LIP for additional orders. Closed System Guidelines:  Change tubing and feeding container every 24 hours. Malnutrition Assessment:  Malnutrition Status:  Insufficient data (09/14/22 1243)    Context:  Chronic Illness     Findings of the 6 clinical characteristics of malnutrition:  Energy Intake:  Unable to assess  Weight Loss:  No significant weight loss     Body Fat Loss:  No significant body fat loss     Muscle Mass Loss:  Unable to assess    Fluid Accumulation:  No significant fluid accumulation     Strength:  Not Performed    Nutrition Assessment:    Pt triggered for consult to order/manage TF and admission MST 2, indicating weight loss and poor po intake prior to admission. Admitted with UTI, fecal impaction, left hydroureteronephrosis. NPO at present. CBW-112 lbs.  Noted weight of 110#(5/20, hosp encounter) per EMR, indicating no weight loss. Receiving TF per PEG at [facility]. RD will place TF orders in chart and continue to monitor. Nutrition Related Findings:    No edema. -2.6L. Lytes wnl. glu-141. 9/14LBM.  ml/hr. (peg tube) Wound Type:  (pretibial soft tissue necrosis)       Current Nutrition Intake & Therapies:    Average Meal Intake: NPO  Average Supplements Intake: NPO      Anthropometric Measures:  Height: 5' 5\" (165.1 cm)  Ideal Body Weight (IBW): 125 lbs (57 kg)    Admission Body Weight: 110 lb (49.9 kg)  Current Body Weight: 112 lb (50.8 kg) (9/14), 89.6 % IBW. Weight Source: Not Specified     BMI Categories: Normal Weight (BMI 18.5-24. 9)    Estimated Daily Nutrient Needs:    Energy (kcal/day): 9704-3804  Protein (g/day): 76-91  Fluid (ml/day): 5407-7575    Nutrition Diagnosis:   Inadequate oral intake related to swallowing difficulty as evidenced by NPO or clear liquid status due to medical condition, nutrition support - enteral nutrition    Nutrition Interventions:   Food and/or Nutrient Delivery: Continue NPO, Start Tube Feeding  Nutrition Education/Counseling: No recommendation at this time  Coordination of Nutrition Care: Continue to monitor while inpatient       Goals:     Goals: Initiate nutrition support, Tolerate nutrition support at goal rate, by next RD assessment       Nutrition Monitoring and Evaluation:   Behavioral-Environmental Outcomes: None Identified  Food/Nutrient Intake Outcomes: Enteral Nutrition Intake/Tolerance  Physical Signs/Symptoms Outcomes: GI Status, Weight    Discharge Planning:     Too soon to determine     Mello Worthy, [address]  Contact: [phone]

## 2022-09-14 NOTE — ED PROVIDER NOTES
[facility]        Pt Name: Peewee Gates  MRN: [id number]  [place] 1995  Date of evaluation: 9/13/2022  Provider: Raul Pantoja PA-C  PCP: Naa Singh MD  Note Started: 11:37 PM EDT        I have seen and evaluated this patient with my supervising physician Christine Moreno MD    [address] [place] [address]       Chief Complaint   Patient presents with    Fever     Pt from [facility]. Fever, bruise on right knee       HISTORY OF PRESENT ILLNESS   (Location, Timing/Onset, Context/Setting, Quality, Duration, Modifying Factors, Severity, Associated Signs and Symptoms)  Note limiting factors. Chief Complaint: Oswaldo Gates is a 27 y.o. female who presents to the emergency department today from [facility] for evaluation for a fever. The patient has a history of an anoxic brain injury, after a cardiac arrest and heroin overdose in 2016. She arrives with a PEG tube in place. Arrives with a fever of 1-1.3. Fever was just noted today per nursing home facility. No reports of any cough, congestion reports of any vomiting, or diarrhea. Patient is otherwise acting at her baseline, no other history is able to be obtained at this time. Nursing Notes were all reviewed and agreed with or any disagreements were addressed in the HPI. REVIEW OF SYSTEMS    (2-9 systems for level 4, 10 or more for level 5)     Review of Systems   Unable to perform ROS: Patient nonverbal     Positives and Pertinent negatives as per HPI. Except as noted above in the ROS, all other systems were reviewed and negative. PAST MEDICAL HISTORY     Past Medical History:   Diagnosis Date    Acute hepatitis C without hepatic coma     Acute hepatitis C without hepatic coma     Anoxic brain damage ([facility]     Cardiac arrest ([facility]     s/p overdose 08/2016.     Chronic kidney disease     \"a few\" UTIs with pregnancy    Chronic viral hepatitis C ([facility]     Diffuse traumatic brain injury with loss of consciousness greater than 24 hours without return to pre-existing conscious level with patient surviving [facility])     sequela    Major depressive disorder, recurrent, unspecified ([facility]     MRSA (methicillin resistant staph aureus) culture positive 08/25/2017    arm    Nicotine dependence, other tobacco product, uncomplicated     Overdose     Poisoning by heroin, undetermined, initial encounter ([facility]     Schizophrenia, unspecified ([facility]     Seizures ([facility]     Streptococcal sepsis, unspecified ([facility]     TBI (traumatic brain injury) ([facility]     Unspecified asthma, uncomplicated     Unspecified sexually transmitted disease          SURGICAL HISTORY     Past Surgical History:   Procedure Laterality Date    GASTROSTOMY TUBE PLACEMENT      GASTROSTOMY TUBE PLACEMENT      08/02/2016    GASTROSTOMY TUBE PLACEMENT N/A 11/26/2019    EGD PEG TUBE PLACEMENT performed by Lianna Lee MD at [address] [place] [address] 11/26/2019    EGD FOREIGN BODY REMOVAL performed by Lianna Lee MD at [facility]       Previous Medications    ACETAMINOPHEN (TYLENOL) 325 MG TABLET    650 mg by Per G Tube route 2 times daily as needed for Pain     BACLOFEN 5 MG TABS    5 mg by Per G Tube route 2 times daily     BENZTROPINE (COGENTIN) 1 MG TABLET    Take 2 tablets by mouth 2 times daily    DOCUSATE SODIUM (COLACE) 100 MG CAPSULE    Take 1 capsule by mouth 2 times daily    ERYTHROMYCIN (ROMYCIN) 5 MG/GM OPHTHALMIC OINTMENT    Place 1 applicator into the left eye in the morning, at noon, and at bedtime    FAMOTIDINE (PEPCID) 20 MG TABLET    1 tablet by PEG Tube route 2 times daily    FERROUS SULFATE 220 (44 FE) MG/5ML SOLUTION    220 mg by Per G Tube route 3 times daily    LACOSAMIDE (VIMPAT) 100 MG TABS TABLET    Take 100 mg by mouth 2 times daily.     LACTOBACILLUS PO    1 tablet by Per G Tube route in the morning and at bedtime    LEVETIRACETAM (KEPPRA) 100 MG/ML SOLUTION    5 mLs by PEG Tube route 2 times daily    MIDODRINE (PROAMATINE) 5 MG TABLET    10 mg by Per G Tube route 3 times daily    PALIPERIDONE PALMITATE ER (INVEGA SUSTENNA) 117 MG/0.75ML MOSES IM INJECTION    Inject 117 mg into the muscle every 30 days At bedtime on the 8th of the month    POLYETHYLENE GLYCOL (GLYCOLAX) 17 GM/SCOOP POWDER    Take 17 g by mouth daily    QUETIAPINE (SEROQUEL) 25 MG TABLET    1 tablet by PEG Tube route 2 times daily    SENNA (SENOKOT) 8.6 MG TABLET    2 tablets by Per G Tube route daily     SERTRALINE (ZOLOFT) 100 MG TABLET    100 mg by Per G Tube route daily Give 2 tablets    SODIUM PHOSPHATES (FLEET) 7-19 GM/118ML    Place 1 enema rectally daily as needed    VALPROATE (DEPACON) 100 MG/ML INJECTION    250 mg by Per G Tube route every 12 hours Give 10 ml every 12 hours for sz activity         ALLERGIES     Patient has no known allergies. FAMILYHISTORY       Family History   Problem Relation Age of Onset    High Cholesterol Father     Cancer Paternal Grandmother     COPD Mother           SOCIAL HISTORY       Social History     Tobacco Use    Smoking status: Former     Years: 2.00     Types: Cigarettes     Quit date: 3/23/2011     Years since quittin.4    Smokeless tobacco: Never   Substance Use Topics    Alcohol use: No    Drug use: No     Types: IV     Comment: Hx MJ use 3/3/11 and heroin use       SCREENINGS             PHYSICAL EXAM    (up to 7 for level 4, 8 or more for level 5)     ED Triage Vitals   BP Temp Temp Source Heart Rate Resp SpO2 Height Weight   22 -- 22   107/62 (!) 101.3 °F (38.5 °C) Rectal 90 15 97 %  110 lb (49.9 kg)       Physical Exam  Vitals and nursing note reviewed. Constitutional:       Appearance: She is well-developed. She is not diaphoretic. HENT:      Head: Normocephalic and atraumatic.       Right Ear: External ear normal.      Left Ear: External ear normal.      Nose: Nose normal.   Eyes:      General:         Right eye: No discharge. Left eye: No discharge. Neck:      Trachea: No tracheal deviation. Cardiovascular:      Rate and Rhythm: Normal rate and regular rhythm. Heart sounds: No murmur heard. Pulmonary:      Effort: Pulmonary effort is normal. No respiratory distress. Breath sounds: No wheezing or rales. Abdominal:      General: Bowel sounds are normal. There is no distension. Palpations: Abdomen is soft. Tenderness: There is abdominal tenderness in the right lower quadrant, periumbilical area, suprapubic area and left lower quadrant. There is no guarding. Comments: PEG tube noted   Musculoskeletal:         General: Normal range of motion. Cervical back: Normal range of motion and neck supple. Skin:     General: Skin is warm and dry. Neurological:      General: No focal deficit present. Mental Status: She is alert. Mental status is at baseline.    Psychiatric:         Behavior: Behavior normal.       DIAGNOSTIC RESULTS   LABS:    Labs Reviewed   CBC WITH AUTO DIFFERENTIAL - Abnormal; Notable for the following components:       Result Value    RBC 3.93 (*)     Basophils Absolute 0.3 (*)     Anisocytosis Occasional (*)     Polychromasia Occasional (*)     All other components within normal limits   COMPREHENSIVE METABOLIC PANEL - Abnormal; Notable for the following components:    Glucose 141 (*)     AST 57 (*)     All other components within normal limits   LACTATE, SEPSIS - Abnormal; Notable for the following components:    Lactic Acid, Sepsis 2.4 (*)     All other components within normal limits   URINALYSIS WITH REFLEX TO CULTURE - Abnormal; Notable for the following components:    Clarity, UA CLOUDY (*)     Blood, Urine SMALL (*)     Leukocyte Esterase, Urine LARGE (*)     All other components within normal limits   MICROSCOPIC URINALYSIS - Abnormal; Notable for the following components: Renal Epithelial, UA 2-5 (*)     Bacteria, UA Rare (*)     WBC,  (*)     RBC, UA 12 (*)     Epithelial Cells, UA 10 (*)     All other components within normal limits   CULTURE, BLOOD 1   CULTURE, BLOOD 2   CULTURE, URINE   BLOOD OCCULT STOOL DIAGNOSTIC    Narrative:     ORDER#: [id number]                          ORDERED BY: SIA PRINCE  SOURCE: Stool                              COLLECTED:  09/13/22 21:45  ANTIBIOTICS AT ART.:                      RECEIVED :  09/13/22 22:08   PREGNANCY, URINE   LACTIC ACID       When ordered only abnormal lab results are displayed. All other labs were within normal range or not returned as of this dictation. EKG: When ordered, EKG's are interpreted by the Emergency Department Physician in the absence of a cardiologist.  Please see their note for interpretation of EKG. RADIOLOGY:   Non-plain film images such as CT, Ultrasound and MRI are read by the radiologist. Plain radiographic images are visualized and preliminarily interpreted by the ED Provider with the below findings:        Interpretation per the Radiologist below, if available at the time of this note:    CT ABDOMEN PELVIS W IV CONTRAST Additional Contrast? None   Final Result   1. Suspected fecal impaction as demonstrated on the previous examination with   a large volume of stool noted in the rectosigmoid colon. 2. Constipation with large volume of stool throughout. 3. Interval development of mild left-sided hydroureteronephrosis felt   secondary to ureteral compression from the fecal impaction. XR CHEST PORTABLE   Final Result   Mild cardiomegaly which is more prominent with no acute pulmonary abnormality.            XR CHEST PORTABLE    Result Date: 9/13/2022  EXAMINATION: ONE XRAY VIEW OF THE CHEST 9/13/2022 10:30 pm COMPARISON: 08/21/2017 HISTORY: ORDERING SYSTEM PROVIDED HISTORY: sepsis TECHNOLOGIST PROVIDED HISTORY: Reason for exam:->sepsis Reason for Exam: fever FINDINGS: The heart is mildly enlarged and more prominent. The pulmonary vessels are normal.  The lungs are hypoinflated. There is mild scoliosis which is unchanged. No consolidation or effusion is seen. Mild cardiomegaly which is more prominent with no acute pulmonary abnormality. PROCEDURES   Unless otherwise noted below, none     Procedures    CRITICAL CARE TIME       CONSULTS:  IP CONSULT TO DIETITIAN      EMERGENCY DEPARTMENT COURSE and DIFFERENTIAL DIAGNOSIS/MDM:   Vitals:    Vitals:    09/13/22 2227 09/14/22 0017 09/14/22 0138 09/14/22 0144   BP:       Pulse:   (!) 105    Resp:   15 18   Temp:  99.2 °F (37.3 °C)     TempSrc:  Oral     SpO2:    98%   Weight: 110 lb (49.9 kg)          Patient was given the following medications:  Medications   cefTRIAXone (ROCEPHIN) 1000 mg in sterile water 10 mL IV syringe (1,000 mg IntraVENous Given 9/14/22 0132)   milk and molasses enema 240 mL (has no administration in time range)   0.9 % sodium chloride bolus (0 mL/kg × 49.9 kg IntraVENous Stopped 9/14/22 0133)   acetaminophen (TYLENOL) suppository 650 mg (650 mg Rectal Given 9/13/22 2252)   iopamidol (ISOVUE-370) 76 % injection 75 mL (75 mLs IntraVENous Given 9/13/22 2255)   milk and molasses enema 240 mL (240 mLs Rectal Given 9/14/22 0132)         Is this patient to be included in the SEP-1 Core Measure due to severe sepsis or septic shock? No   Exclusion criteria - the patient is NOT to be included for SEP-1 Core Measure due to:  May have criteria for sepsis, but does not meet criteria for severe sepsis or septic shock    Briefly, this is a 57-year-old female who presents to the emergency department today from [address] for concern for fever which began today. Patient nonverbal, history of an anoxic brain injury following a cardiac arrest and heroin overdose in 2016. On exam, she does have a PEG tube in place, she did seem to have some discomfort across her abdomen.   Crouch catheter was placed and she did have over 800 mL drained. Febrile here 1-1.3    EKG will be documented by my attending, please see his note for further details. CBC shows no evidence of leukocytosis or anemia. CMP is unremarkable. Lactic acid initially is 2.4 likely secondary to dehydration, after fluids this is normal.  Urine does show large leukocytes, 121 white blood cells, will treat with Rocephin    CT obtained which shows a fecal impaction as demonstrated on the previous exam with large volume of stool noted in the rectosigmoid colon. Reviewed the images, too far up to manually disimpact. Will order enema. Patient has interval development of mild left-sided hydronephrosis likely due to compression from the fecal impaction. Crouch catheter is in place. Due to her sepsis, fecal impaction, and UTI, she will need to be admitted for further care and evaluation, hospitalist has been paged for admission patient is stable for admission    FINAL IMPRESSION      1. Septicemia ([address]    2. Constipation, unspecified constipation type    3. Acute cystitis without hematuria          DISPOSITION/PLAN   DISPOSITION Admitted 09/14/2022 01:30:20 AM      PATIENT REFERRED TO:  No follow-up provider specified.     DISCHARGE MEDICATIONS:  New Prescriptions    No medications on file       DISCONTINUED MEDICATIONS:  Discontinued Medications    No medications on file              (Please note that portions of this note were completed with a voice recognition program.  Efforts were made to edit the dictations but occasionally words are mis-transcribed.)    Ross Moore PA-C (electronically signed)                Ross Moore PA-C  09/14/22 0211

## 2022-09-14 NOTE — H&P
HOSPITALISTS HISTORY AND PHYSICAL    9/14/2022 1:14 AM    Patient Information:  Varsha Warren is a 27 y.o. female [id number]  PCP:  Carole Bates MD (Tel: [phone] )    Chief complaint:    Chief Complaint   Patient presents with    Fever     Pt from [facility]. Fever, bruise on right knee        History of Present Illness:  Robinson Michel is a 27 y.o. female with hx anoxic brain injury from heroin overdose 2016, viral hepatitis, cardiac arrest, seizures, and constipation. Patient was transferred from [facility] ([place], [place]) for any fever. Patient has a history of anoxic brain injury in 2016 after drug overdose. She is nonverbal and responds only to pain. Per ER nursing staff this is her baseline. She was febrile 101.3 in the emergency room, she was found to have a UTI. As well as fecal impaction. History obtained from patient     REVIEW OF SYSTEMS:   Review of Systems   Unable to perform ROS: Patient nonverbal     Past Medical History:   has a past medical history of Acute hepatitis C without hepatic coma, Acute hepatitis C without hepatic coma, Anoxic brain damage ([address] Cardiac arrest ([address] Chronic kidney disease, Chronic viral hepatitis C ([address] Diffuse traumatic brain injury with loss of consciousness greater than 24 hours without return to pre-existing conscious level with patient surviving ([address] Major depressive disorder, recurrent, unspecified ([address] MRSA (methicillin resistant staph aureus) culture positive, Nicotine dependence, other tobacco product, uncomplicated, Overdose, Poisoning by heroin, undetermined, initial encounter ([address] Schizophrenia, unspecified ([address] Seizures ([address] Streptococcal sepsis, unspecified ([address] TBI (traumatic brain injury) ([address] Unspecified asthma, uncomplicated, and Unspecified sexually transmitted disease.      Past Surgical History:   has a past surgical history that includes Gastrostomy tube placement; Gastrostomy tube placement; Upper gastrointestinal endoscopy (N/A, 11/26/2019); and Gastrostomy tube placement (N/A, 11/26/2019). Medications:  No current facility-administered medications on file prior to encounter. Current Outpatient Medications on File Prior to Encounter   Medication Sig Dispense Refill    midodrine (PROAMATINE) 5 MG tablet 10 mg by Per G Tube route 3 times daily      LACTOBACILLUS PO 1 tablet by Per G Tube route in the morning and at bedtime      sertraline (ZOLOFT) 100 MG tablet 100 mg by Per G Tube route daily Give 2 tablets      polyethylene glycol (GLYCOLAX) 17 GM/SCOOP powder Take 17 g by mouth daily 1530 g 1    docusate sodium (COLACE) 100 MG capsule Take 1 capsule by mouth 2 times daily 60 capsule 0    erythromycin (ROMYCIN) 5 MG/GM ophthalmic ointment Place 1 applicator into the left eye in the morning, at noon, and at bedtime (Patient not taking: Reported on 6/26/2022)      ferrous sulfate 220 (44 Fe) MG/5ML solution 220 mg by Per G Tube route 3 times daily      Sodium Phosphates (FLEET) 7-19 GM/118ML Place 1 enema rectally daily as needed      senna (SENOKOT) 8.6 MG tablet 2 tablets by Per G Tube route daily       acetaminophen (TYLENOL) 325 MG tablet 650 mg by Per G Tube route 2 times daily as needed for Pain       valproate (DEPACON) 100 MG/ML injection 250 mg by Per G Tube route every 12 hours Give 10 ml every 12 hours for sz activity      lacosamide (VIMPAT) 100 MG TABS tablet Take 100 mg by mouth 2 times daily.       Baclofen 5 MG TABS 5 mg by Per G Tube route 2 times daily       paliperidone palmitate ER (INVEGA SUSTENNA) 117 MG/0.75ML MOSES IM injection Inject 117 mg into the muscle every 30 days At bedtime on the 8th of the month      levETIRAcetam (KEPPRA) 100 MG/ML solution 5 mLs by PEG Tube route 2 times daily 1 Bottle 3    QUEtiapine (SEROQUEL) 25 MG tablet 1 tablet by PEG Tube route 2 times daily (Patient taking differently: 25 mg by Per G Tube route 2 times daily ) 60 tablet 3    famotidine (PEPCID) 20 MG tablet 1 tablet by PEG Tube route 2 times daily 60 tablet 3    benztropine (COGENTIN) 1 MG tablet Take 2 tablets by mouth 2 times daily 120 tablet 3       Allergies:  No Known Allergies     Social History:  [facility]   reports that she quit smoking about 11 years ago. Her smoking use included cigarettes. She has never used smokeless tobacco. She reports that she does not drink alcohol and does not use drugs. Family History:  family history includes COPD in her mother; Cancer in her paternal grandmother; High Cholesterol in her father. ,     Physical Exam:  /62   Pulse 90   Temp 99.2 °F (37.3 °C) (Oral)   Resp 15   Wt 110 lb (49.9 kg)   SpO2 97%   BMI 15.78 kg/m²   Physical Exam  Vitals and nursing note reviewed. Constitutional:       General: She is not in acute distress. Comments: Patient lying in bed does not respond to verbal stimuli  Upper and lower extremities contractures  Nonverbal    HENT:      Head: Normocephalic and atraumatic. Nose: Nose normal.      Mouth/Throat:      Mouth: Mucous membranes are dry. Eyes:      Extraocular Movements: Extraocular movements intact. Pupils: Pupils are equal, round, and reactive to light. Cardiovascular:      Rate and Rhythm: Normal rate and regular rhythm. Pulses: Normal pulses. Heart sounds: No murmur heard. Pulmonary:      Effort: Pulmonary effort is normal. No respiratory distress. Breath sounds: Normal breath sounds. Abdominal:      General: Abdomen is flat. Bowel sounds are normal. There is no distension. Palpations: Abdomen is soft. Tenderness: There is no abdominal tenderness. Musculoskeletal:      Cervical back: Normal range of motion. Right lower leg: No edema. Left lower leg: No edema. Comments: Upper and lower extremity contractures      Skin:     General: Skin is warm and dry.       Capillary Refill: Capillary refill takes less than 2 seconds. Coloration: Skin is not jaundiced. Neurological:      Comments: Patient nonverbal. Will not open eyes for examiner, no commands followed  Withdrawals to pain only        Labs:  CBC:   Lab Results   Component Value Date/Time    WBC 10.9 09/13/2022 09:45 PM    RBC 3.93 09/13/2022 09:45 PM    HGB 12.0 09/13/2022 09:45 PM    HCT 36.1 09/13/2022 09:45 PM    MCV 91.9 09/13/2022 09:45 PM    MCH 30.5 09/13/2022 09:45 PM    MCHC 33.2 09/13/2022 09:45 PM    RDW 13.7 09/13/2022 09:45 PM     09/13/2022 09:45 PM    MPV 10.4 09/13/2022 09:45 PM     BMP:    Lab Results   Component Value Date/Time     09/13/2022 09:45 PM    K 3.7 09/13/2022 09:45 PM    K 4.3 06/26/2022 07:30 AM     09/13/2022 09:45 PM    CO2 24 09/13/2022 09:45 PM    BUN 13 09/13/2022 09:45 PM    CREATININE 0.6 09/13/2022 09:45 PM    CALCIUM 9.5 09/13/2022 09:45 PM    GFRAA >60 09/13/2022 09:45 PM    GFRAA >60 09/20/2011 12:35 PM    LABGLOM >60 09/13/2022 09:45 PM    GLUCOSE 141 09/13/2022 09:45 PM     CT ABDOMEN PELVIS W IV CONTRAST Additional Contrast? None   Final Result   1. Suspected fecal impaction as demonstrated on the previous examination with   a large volume of stool noted in the rectosigmoid colon. 2. Constipation with large volume of stool throughout. 3. Interval development of mild left-sided hydroureteronephrosis felt   secondary to ureteral compression from the fecal impaction. XR CHEST PORTABLE   Final Result   Mild cardiomegaly which is more prominent with no acute pulmonary abnormality. Chest Xray:   EKG:    I visualized CXR images and EKG strips      Problem List  Active Problems:    * No active hospital problems. *  Resolved Problems:    * No resolved hospital problems. *        Assessment/Plan:   UTI with Fevers  Admit inpatient   IV rocephin  Blood cx, urine cx  Crouch placed in ER.      2. Fecal Impaction   Enema ordered in ER will repeat enema later today  If no results may need bowel prep. She was on miralax and colace, senokot at [facility]  Previous admissions on May and June for fecal impaction ? If recurrence or fecal impaction never completely resolved on last admission     3. Seizures  Continue home meds per g tube    4. Urinary Retention   Crouch placed in ER    5. Left Hydroureteronephrosis secondary to Fecal Impaction   Monitor urine output    DVT prophylaxis Lovenox   Code status Full   Diet NPO   IV access peripheral   Crouch Catheter in place    Admit as inpatient. I anticipate hospitalization spanning more than two midnights for investigation and treatment of the above medically necessary diagnoses. Please note that some part of this chart was generated using Dragon dictation software. Although every effort was made to ensure the accuracy of this automated transcription, some errors in transcription may have occurred inadvertently. If you may need any clarification, please do not hesitate to contact me through [facility].        FRANCK Montesinos CNP    9/14/2022 1:14 AM

## 2022-09-14 NOTE — PROGRESS NOTES
Hospitalist Progress Note      PCP: Rosalinda Hooper MD    Date of Admission: 9/13/2022    Chief Complaint: Fever    Hospital Course: Marianne Leal is a 27 y.o. female with hx anoxic brain injury from heroin overdose 2016, viral hepatitis, cardiac arrest, seizures, and constipation. Patient was transferred from [facility] for any fever. Patient has a history of anoxic brain injury in 2016 after drug overdose. She is nonverbal and responds only to pain. Per ER nursing staff this is her baseline. She was febrile 101.3 in the emergency room, she was found to have a UTI. As well as fecal impaction. Subjective: Patient seen and examined. ROS UTO due to anoxic brain injury.   Nonverbal.         Medications:  Reviewed    Infusion Medications    sodium chloride      lactated ringers 100 mL/hr at 09/14/22 0854     Scheduled Medications    cefTRIAXone (ROCEPHIN) IV  1,000 mg IntraVENous Q24H    Baclofen  5 mg Per G Tube BID    docusate  100 mg Per G Tube BID    famotidine  20 mg PEG Tube BID    ferrous sulfate  220 mg Per G Tube TID    lacosamide  100 mg Per G Tube BID    midodrine  10 mg Per G Tube TID    polyethylene glycol  17 g Per G Tube Daily    QUEtiapine  25 mg PEG Tube BID    senna  2 tablet Per G Tube BID    sertraline  100 mg Per G Tube Daily    milk and molasses  240 mL Rectal Once    sodium chloride flush  5-40 mL IntraVENous 2 times per day    enoxaparin  30 mg SubCUTAneous Daily    levETIRAcetam  500 mg IntraVENous Q12H    benztropine  2 mg Per G Tube BID    valproic acid  250 mg Per G Tube BID     PRN Meds: [START ON 9/15/2022] magnesium hydroxide, sodium chloride flush, sodium chloride, ondansetron **OR** ondansetron, polyethylene glycol, acetaminophen **OR** acetaminophen, morphine      Intake/Output Summary (Last 24 hours) at 9/14/2022 1401  Last data filed at 9/14/2022 1155  Gross per 24 hour   Intake 1497 ml   Output 4125 ml   Net -2628 ml       Physical Exam Performed:    /79   Pulse 96   Temp 99.3 °F (37.4 °C) (Axillary)   Resp 19   Ht 5' 5\" (1.651 m)   Wt 112 lb (50.8 kg)   SpO2 95%   BMI 18.64 kg/m²     General appearance: No apparent distress, appears stated age and cooperative. HEENT: Pupils equal, round, and reactive to light. Conjunctivae/corneas clear. Neck: Supple, with full range of motion. No jugular venous distention. Trachea midline. Respiratory:  Normal respiratory effort. Clear to auscultation, bilaterally without Rales/Wheezes/Rhonchi. Cardiovascular: Regular rate and rhythm with normal S1/S2 without murmurs, rubs or gallops. Abdomen: Soft, non-tender, non-distended with normal bowel sounds. Musculoskeletal: No clubbing, cyanosis or edema bilaterally. Full range of motion without deformity. Skin: Skin color, texture, turgor normal.  No rashes or lesions. Neurologic:  Neurovascularly intact without any focal sensory/motor deficits. Cranial nerves: II-XII intact, grossly non-focal.  Psychiatric: Alert and oriented, thought content appropriate, normal insight  Capillary Refill: Brisk, 3 seconds, normal   Peripheral Pulses: +2 palpable, equal bilaterally       Labs:   Recent Labs     09/13/22 2145   WBC 10.9   HGB 12.0   HCT 36.1        Recent Labs     09/13/22 2145      K 3.7      CO2 24   BUN 13   CREATININE 0.6   CALCIUM 9.5     Recent Labs     09/13/22 2145   AST 57*   ALT 22   BILITOT <0.2   ALKPHOS 50     No results for input(s): INR in the last 72 hours. No results for input(s): Vicenta Kaska in the last 72 hours.     Urinalysis:      Lab Results   Component Value Date/Time    NITRU Negative 09/13/2022 09:45 PM    WBCUA 121 09/13/2022 09:45 PM    BACTERIA Rare 09/13/2022 09:45 PM    RBCUA 12 09/13/2022 09:45 PM    BLOODU SMALL 09/13/2022 09:45 PM    SPECGRAV 1.015 09/13/2022 09:45 PM    GLUCOSEU Negative 09/13/2022 09:45 PM    GLUCOSEU NEGATIVE 09/20/2011 04:50 PM       Radiology:  CT ABDOMEN PELVIS W IV CONTRAST Additional Contrast? None   Final Result   1. Suspected fecal impaction as demonstrated on the previous examination with   a large volume of stool noted in the rectosigmoid colon. 2. Constipation with large volume of stool throughout. 3. Interval development of mild left-sided hydroureteronephrosis felt   secondary to ureteral compression from the fecal impaction. XR CHEST PORTABLE   Final Result   Mild cardiomegaly which is more prominent with no acute pulmonary abnormality. Assessment/Plan:    Active Hospital Problems    Diagnosis     UTI (urinary tract infection) [N39.0]      Priority: Medium       UTI with Fevers  Continue IV rocephin. Blood cx, urine cx pending. Crouch placed in ER. Fecal Impaction:  Continue bowel regimen with enema & Bowel prep. Follow-up TSH. Previous admissions on May and June for fecal impaction ? If recurrence or fecal impaction never completely resolved on last admission. Seizures: Continue home meds per g tube     Urinary Retention:   Crouch placed in ER. Will discontinue when having adequate bowel movements. Left Hydroureteronephrosis secondary to Fecal Impaction:  Monitor urine output. Renal ultrasound when constipation resolves. Anoxic brain injury: Currently at baseline. Nutrition: s/p gastrostomy tube. Wound care consulted for PEG site care. Dietitian following for tube feeding.         DVT Prophylaxis: Lovenox  Diet: Diet NPO  ADULT TUBE FEEDING; PEG; Standard with Fiber; Continuous; 25; Yes; 25; Q 4 hours; 50; 30; Q 4 hours  Code Status: Full Code  PT/OT Eval Status: Currently at baseline    Dispo - ECF when stable      Rajan Francisco MD

## 2022-09-14 NOTE — ED NOTES
Pt had large stool output with liquid as well.  Pt is cleaned up new diaper and pads placed underneath     Adrian Ulloa RN  09/14/22 0929

## 2022-09-15 LAB
A/G RATIO: 1.1 (ref 1.1–2.2)
ALBUMIN SERPL-MCNC: 3.2 G/DL (ref 3.4–5)
ALP BLD-CCNC: 41 U/L (ref 40–129)
ALT SERPL-CCNC: 26 U/L (ref 10–40)
ANION GAP SERPL CALCULATED.3IONS-SCNC: 9 MMOL/L (ref 3–16)
AST SERPL-CCNC: 83 U/L (ref 15–37)
BASOPHILS ABSOLUTE: 0 K/UL (ref 0–0.2)
BASOPHILS RELATIVE PERCENT: 0.4 %
BILIRUB SERPL-MCNC: <0.2 MG/DL (ref 0–1)
BUN BLDV-MCNC: 6 MG/DL (ref 7–20)
CALCIUM SERPL-MCNC: 9.1 MG/DL (ref 8.3–10.6)
CHLORIDE BLD-SCNC: 103 MMOL/L (ref 99–110)
CO2: 26 MMOL/L (ref 21–32)
CREAT SERPL-MCNC: <0.5 MG/DL (ref 0.6–1.1)
EOSINOPHILS ABSOLUTE: 0.2 K/UL (ref 0–0.6)
EOSINOPHILS RELATIVE PERCENT: 2.1 %
GFR AFRICAN AMERICAN: >60
GFR NON-AFRICAN AMERICAN: >60
GLUCOSE BLD-MCNC: 130 MG/DL (ref 70–99)
HCT VFR BLD CALC: 33.8 % (ref 36–48)
HEMOGLOBIN: 11.1 G/DL (ref 12–16)
LYMPHOCYTES ABSOLUTE: 1.1 K/UL (ref 1–5.1)
LYMPHOCYTES RELATIVE PERCENT: 12.4 %
MCH RBC QN AUTO: 29.9 PG (ref 26–34)
MCHC RBC AUTO-ENTMCNC: 32.8 G/DL (ref 31–36)
MCV RBC AUTO: 91.1 FL (ref 80–100)
MONOCYTES ABSOLUTE: 1.1 K/UL (ref 0–1.3)
MONOCYTES RELATIVE PERCENT: 12.2 %
NEUTROPHILS ABSOLUTE: 6.5 K/UL (ref 1.7–7.7)
NEUTROPHILS RELATIVE PERCENT: 72.9 %
ORGANISM: ABNORMAL
PDW BLD-RTO: 13.7 % (ref 12.4–15.4)
PHOSPHORUS: 3.2 MG/DL (ref 2.5–4.9)
PLATELET # BLD: 131 K/UL (ref 135–450)
PMV BLD AUTO: 9.5 FL (ref 5–10.5)
POTASSIUM REFLEX MAGNESIUM: 4 MMOL/L (ref 3.5–5.1)
RBC # BLD: 3.71 M/UL (ref 4–5.2)
SODIUM BLD-SCNC: 138 MMOL/L (ref 136–145)
TOTAL PROTEIN: 6.2 G/DL (ref 6.4–8.2)
URINE CULTURE, ROUTINE: ABNORMAL
WBC # BLD: 9 K/UL (ref 4–11)

## 2022-09-15 PROCEDURE — 2580000003 HC RX 258: Performed by: NURSE PRACTITIONER

## 2022-09-15 PROCEDURE — 6370000000 HC RX 637 (ALT 250 FOR IP): Performed by: INTERNAL MEDICINE

## 2022-09-15 PROCEDURE — 6370000000 HC RX 637 (ALT 250 FOR IP): Performed by: NURSE PRACTITIONER

## 2022-09-15 PROCEDURE — 93970 EXTREMITY STUDY: CPT

## 2022-09-15 PROCEDURE — 6360000002 HC RX W HCPCS: Performed by: NURSE PRACTITIONER

## 2022-09-15 PROCEDURE — 36415 COLL VENOUS BLD VENIPUNCTURE: CPT

## 2022-09-15 PROCEDURE — 6360000002 HC RX W HCPCS: Performed by: INTERNAL MEDICINE

## 2022-09-15 PROCEDURE — 6370000000 HC RX 637 (ALT 250 FOR IP): Performed by: PHYSICIAN ASSISTANT

## 2022-09-15 PROCEDURE — 1200000000 HC SEMI PRIVATE

## 2022-09-15 PROCEDURE — 84100 ASSAY OF PHOSPHORUS: CPT

## 2022-09-15 PROCEDURE — 80053 COMPREHEN METABOLIC PANEL: CPT

## 2022-09-15 PROCEDURE — 85025 COMPLETE CBC W/AUTO DIFF WBC: CPT

## 2022-09-15 RX ORDER — MORPHINE SULFATE 2 MG/ML
2 INJECTION, SOLUTION INTRAMUSCULAR; INTRAVENOUS EVERY 4 HOURS PRN
Status: DISCONTINUED | OUTPATIENT
Start: 2022-09-15 | End: 2022-09-19 | Stop reason: HOSPADM

## 2022-09-15 RX ORDER — BACLOFEN 10 MG/1
5 TABLET ORAL 3 TIMES DAILY
Status: DISCONTINUED | OUTPATIENT
Start: 2022-09-15 | End: 2022-09-19 | Stop reason: HOSPADM

## 2022-09-15 RX ORDER — OXYCODONE HYDROCHLORIDE AND ACETAMINOPHEN 5; 325 MG/1; MG/1
1 TABLET ORAL EVERY 4 HOURS PRN
Status: DISCONTINUED | OUTPATIENT
Start: 2022-09-15 | End: 2022-09-19 | Stop reason: HOSPADM

## 2022-09-15 RX ADMIN — LACOSAMIDE 100 MG: 50 TABLET, FILM COATED ORAL at 21:37

## 2022-09-15 RX ADMIN — BACLOFEN 5 MG: 10 TABLET ORAL at 21:36

## 2022-09-15 RX ADMIN — MINERAL SUPPLEMENT IRON 300 MG / 5 ML STRENGTH LIQUID 100 PER BOX UNFLAVORED 220 MG: at 09:32

## 2022-09-15 RX ADMIN — FAMOTIDINE 20 MG: 20 TABLET, FILM COATED ORAL at 09:32

## 2022-09-15 RX ADMIN — LINACLOTIDE 290 MCG: 145 CAPSULE, GELATIN COATED ORAL at 15:24

## 2022-09-15 RX ADMIN — ENOXAPARIN SODIUM 30 MG: 100 INJECTION SUBCUTANEOUS at 09:31

## 2022-09-15 RX ADMIN — MORPHINE SULFATE 2 MG: 2 INJECTION, SOLUTION INTRAMUSCULAR; INTRAVENOUS at 22:01

## 2022-09-15 RX ADMIN — QUETIAPINE FUMARATE 25 MG: 25 TABLET ORAL at 09:33

## 2022-09-15 RX ADMIN — MIDODRINE HYDROCHLORIDE 10 MG: 5 TABLET ORAL at 15:13

## 2022-09-15 RX ADMIN — DOCUSATE SODIUM LIQUID 100 MG: 100 LIQUID ORAL at 09:32

## 2022-09-15 RX ADMIN — Medication 1000 MG: at 00:12

## 2022-09-15 RX ADMIN — BACLOFEN 5 MG: 10 TABLET ORAL at 15:17

## 2022-09-15 RX ADMIN — VALPROIC ACID 250 MG: 250 SOLUTION ORAL at 09:32

## 2022-09-15 RX ADMIN — BENZTROPINE MESYLATE 2 MG: 1 TABLET ORAL at 21:36

## 2022-09-15 RX ADMIN — SENNOSIDES 17.2 MG: 8.6 TABLET, COATED ORAL at 21:36

## 2022-09-15 RX ADMIN — DOCUSATE SODIUM LIQUID 100 MG: 100 LIQUID ORAL at 21:37

## 2022-09-15 RX ADMIN — LEVETIRACETAM 500 MG: 5 INJECTION INTRAVENOUS at 12:27

## 2022-09-15 RX ADMIN — SENNOSIDES 17.2 MG: 8.6 TABLET, COATED ORAL at 09:33

## 2022-09-15 RX ADMIN — MINERAL SUPPLEMENT IRON 300 MG / 5 ML STRENGTH LIQUID 100 PER BOX UNFLAVORED 220 MG: at 15:13

## 2022-09-15 RX ADMIN — MINERAL SUPPLEMENT IRON 300 MG / 5 ML STRENGTH LIQUID 100 PER BOX UNFLAVORED 220 MG: at 21:44

## 2022-09-15 RX ADMIN — BENZTROPINE MESYLATE 2 MG: 1 TABLET ORAL at 09:33

## 2022-09-15 RX ADMIN — LEVETIRACETAM 500 MG: 5 INJECTION INTRAVENOUS at 00:39

## 2022-09-15 RX ADMIN — MORPHINE SULFATE 2 MG: 2 INJECTION, SOLUTION INTRAMUSCULAR; INTRAVENOUS at 10:00

## 2022-09-15 RX ADMIN — MIDODRINE HYDROCHLORIDE 10 MG: 5 TABLET ORAL at 09:32

## 2022-09-15 RX ADMIN — Medication 10 ML: at 09:59

## 2022-09-15 RX ADMIN — LACOSAMIDE 100 MG: 50 TABLET, FILM COATED ORAL at 09:34

## 2022-09-15 RX ADMIN — LEVETIRACETAM 500 MG: 5 INJECTION INTRAVENOUS at 23:43

## 2022-09-15 RX ADMIN — Medication 10 ML: at 21:45

## 2022-09-15 RX ADMIN — SERTRALINE 100 MG: 50 TABLET, FILM COATED ORAL at 09:33

## 2022-09-15 RX ADMIN — MORPHINE SULFATE 2 MG: 2 INJECTION, SOLUTION INTRAMUSCULAR; INTRAVENOUS at 18:52

## 2022-09-15 RX ADMIN — SODIUM CHLORIDE: 9 INJECTION, SOLUTION INTRAVENOUS at 12:24

## 2022-09-15 RX ADMIN — QUETIAPINE FUMARATE 25 MG: 25 TABLET ORAL at 21:43

## 2022-09-15 RX ADMIN — VALPROIC ACID 250 MG: 250 SOLUTION ORAL at 21:37

## 2022-09-15 RX ADMIN — FAMOTIDINE 20 MG: 20 TABLET, FILM COATED ORAL at 21:37

## 2022-09-15 ASSESSMENT — PAIN DESCRIPTION - LOCATION
LOCATION: LEG

## 2022-09-15 ASSESSMENT — PAIN SCALES - GENERAL
PAINLEVEL_OUTOF10: 0
PAINLEVEL_OUTOF10: 7
PAINLEVEL_OUTOF10: 0

## 2022-09-15 ASSESSMENT — PAIN SCALES - WONG BAKER
WONGBAKER_NUMERICALRESPONSE: 0
WONGBAKER_NUMERICALRESPONSE: 6;8
WONGBAKER_NUMERICALRESPONSE: 0
WONGBAKER_NUMERICALRESPONSE: 4
WONGBAKER_NUMERICALRESPONSE: 0

## 2022-09-15 ASSESSMENT — PAIN DESCRIPTION - ORIENTATION
ORIENTATION: RIGHT

## 2022-09-15 ASSESSMENT — PAIN - FUNCTIONAL ASSESSMENT: PAIN_FUNCTIONAL_ASSESSMENT: PREVENTS OR INTERFERES WITH MANY ACTIVE NOT PASSIVE ACTIVITIES

## 2022-09-15 ASSESSMENT — PAIN DESCRIPTION - DESCRIPTORS
DESCRIPTORS: ACHING;BURNING
DESCRIPTORS: ACHING;BURNING

## 2022-09-15 NOTE — CARE COORDINATION
Case Management Assessment  Initial Evaluation    Date/Time of Evaluation: 9/15/2022 8:11 AM  Assessment Completed by: Radha Villalobos RN    If patient is discharged prior to next notation, then this note serves as note for discharge by case management. Patient Name: Murphy Morris                   YOB: 1995  Diagnosis: UTI (urinary tract infection) [N39.0]  Septicemia ([address] [A41.9]  Acute cystitis without hematuria [N30.00]  Constipation, unspecified constipation type [K59.00]                   Date / Time: 9/13/2022  9:26 PM    Patient Admission Status: Inpatient     Current PCP: Kurt Petty MD  PCP verified by CM? Yes (Facility attending)    Chart Reviewed: Yes      Patient Interviewed: No   Family Interviewed:  No   Patient Orientation: Unable to Assess    Patient Cognition: Severely Impaired (Anoxic brain injury 2/2 cardiac arrest)  History Provided by: Medical Record, Other (see comment) ([id number] [employer])    Hospitalization in the last 30 days (Readmission):  No    If yes, Readmission Assessment in  Navigator will be completed. Advance Directives:     Code Status: Full Code       Discharge Planning  Patient lives with:  (LTC staff) Type of Home: Long-Term Care   Primary Caregiver: Other (Comment) (From LTC)  Patient Support Systems include: Family Members   Current Financial resources: Medicare  Current community resources:    Current services prior to admission: Extended Care Facility   Type of Home Care services:  Nursing Services    ADLS  Prior functional level: Other (see comment) (dependent for all care)  Current functional level: Other (see comment) (dependent for all care)    PT AM-PAC:   /24  OT AM-PAC:   /24    Family can provide assistance at DC: Would you like Case Management to discuss the discharge plan with any other family members/significant others, and if so, who?  Yes (facility staff and guardian)  Plans to Return to Present Housing: Yes  Other Identified Issues/Barriers to RETURNING to current housing: None  Potential Assistance needed at discharge: Smith Bautista  Patient expects to discharge to: Long-term care  Plan for transportation at discharge:      Financial  Payor: Beronica Single / Plan: MEDICARE PART A AND B / Product Type: *No Product type* /     Does insurance require precert for SNF: No    Potential assistance Purchasing Medications: No  Meds-to-Beds request:        [address] [facility] [address], [address] [place] [phone] Lalo Egan [phone]  [phone]  Phone: [phone] Fax: [phone]      Factors facilitating achievement of predicted outcomes: Has needed Durable Medical Equipment at home and Home is wheelchair accessible    Barriers to discharge: Cognitive deficit and Anoxic brain injury, dependent for all care    Additional Case Management Notes: Pt will return to LTC at the [facility] when medically ready. Pt has PEG for nutrition, non-verbal, non-ambulatory. The Plan for Transition of Care is related to the following treatment goals of UTI (urinary tract infection) [N39.0]  Septicemia ([facility] [address] [A41.9]  Acute cystitis without hematuria [N30.00]  Constipation, unspecified constipation type [K59.00]      The Patient and/or Patient Representative Agree with the Discharge Plan?  Yes    Electronically signed by Radha Villalobos RN on 9/15/2022 at 8:15 AM.

## 2022-09-15 NOTE — PROGRESS NOTES
Hospitalist Progress Note      PCP: Mario Reeves MD    Date of Admission: 9/13/2022    Chief Complaint: Fever    Hospital Course: Mateo Yu is a 27 y.o. female with hx anoxic brain injury from heroin overdose 2016, viral hepatitis, cardiac arrest, seizures, and constipation. Patient was transferred from [facility] for any fever. Patient has a history of anoxic brain injury in 2016 after drug overdose. She is nonverbal and responds only to pain. Per ER nursing staff this is her baseline. She was febrile 101.3 in the emergency room, she was found to have a UTI. As well as fecal impaction. Subjective: Patient seen and examined. ROS UTO due to anoxic brain injury. Nonverbal.  Screaming in pain. Having BM per RN.          Medications:  Reviewed    Infusion Medications    sodium chloride       Scheduled Medications    linaclotide  290 mcg Per G Tube QAM AC    Baclofen  5 mg Per G Tube TID    cefTRIAXone (ROCEPHIN) IV  1,000 mg IntraVENous Q24H    docusate  100 mg Per G Tube BID    famotidine  20 mg PEG Tube BID    ferrous sulfate  220 mg Per G Tube TID    lacosamide  100 mg Per G Tube BID    midodrine  10 mg Per G Tube TID    QUEtiapine  25 mg PEG Tube BID    senna  2 tablet Per G Tube BID    sertraline  100 mg Per G Tube Daily    sodium chloride flush  5-40 mL IntraVENous 2 times per day    enoxaparin  30 mg SubCUTAneous Daily    levETIRAcetam  500 mg IntraVENous Q12H    benztropine  2 mg Per G Tube BID    valproic acid  250 mg Per G Tube BID     PRN Meds: oxyCODONE-acetaminophen, morphine, magnesium hydroxide, sodium chloride flush, sodium chloride, ondansetron **OR** ondansetron, polyethylene glycol, acetaminophen **OR** acetaminophen, morphine      Intake/Output Summary (Last 24 hours) at 9/15/2022 1050  Last data filed at 9/15/2022 0428  Gross per 24 hour   Intake --   Output 1525 ml   Net -1525 ml       Physical Exam Performed:    /73   Pulse (!) 106   Temp (!) 100.8 °F (38.2 °C) (Axillary)   Resp 17   Ht 5' 5\" (1.651 m)   Wt 107 lb 11.2 oz (48.9 kg)   SpO2 91%   BMI 17.92 kg/m²     General appearance: No apparent distress, appears stated age and cooperative. HEENT: Pupils equal, round, and reactive to light. Conjunctivae clear. Neck: Trachea midline. Respiratory:  Normal respiratory effort. Clear to auscultation, bilaterally without Rales/Wheezes/Rhonchi. Cardiovascular: Regular rate and rhythm with normal S1/S2 without murmurs, rubs or gallops. Abdomen: Soft, non-tender, non-distended with normal bowel sounds. Musculoskeletal: No clubbing, cyanosis. RLE edema and erythema. Skin: Skin color, texture, turgor normal.   Neurologic:  UTO. Non-verbal. No spontaneous movement noted. Psychiatric: Non-verbal.  Capillary Refill: Brisk, 3 seconds, normal   Peripheral Pulses: +2 palpable, equal bilaterally     PEG site erythema noted. Crouch+    Labs:   Recent Labs     09/13/22  2145 09/15/22  0627   WBC 10.9 9.0   HGB 12.0 11.1*   HCT 36.1 33.8*    131*     Recent Labs     09/13/22  2145 09/15/22  0626    138   K 3.7 4.0    103   CO2 24 26   BUN 13 6*   CREATININE 0.6 <0.5*   CALCIUM 9.5 9.1   PHOS  --  3.2     Recent Labs     09/13/22  2145 09/15/22  0626   AST 57* 83*   ALT 22 26   BILITOT <0.2 <0.2   ALKPHOS 50 41     No results for input(s): INR in the last 72 hours. No results for input(s): Cristobal Rachael in the last 72 hours. Urinalysis:      Lab Results   Component Value Date/Time    NITRU Negative 09/13/2022 09:45 PM    WBCUA 121 09/13/2022 09:45 PM    BACTERIA Rare 09/13/2022 09:45 PM    RBCUA 12 09/13/2022 09:45 PM    BLOODU SMALL 09/13/2022 09:45 PM    SPECGRAV 1.015 09/13/2022 09:45 PM    GLUCOSEU Negative 09/13/2022 09:45 PM    GLUCOSEU NEGATIVE 09/20/2011 04:50 PM       Radiology:  CT ABDOMEN PELVIS W IV CONTRAST Additional Contrast? None   Final Result   1.  Suspected fecal impaction as demonstrated on the previous examination with   a large volume of stool noted in the rectosigmoid colon. 2. Constipation with large volume of stool throughout. 3. Interval development of mild left-sided hydroureteronephrosis felt   secondary to ureteral compression from the fecal impaction. XR CHEST PORTABLE   Final Result   Mild cardiomegaly which is more prominent with no acute pulmonary abnormality. VL Extremity Venous Bilateral    (Results Pending)           Assessment/Plan:    Active Hospital Problems    Diagnosis     UTI (urinary tract infection) [N39.0]      Priority: Medium       UTI with Sepsis POA:  Continue IV rocephin. Blood cx, urine cx pending. Crouch placed in ER. Constipation with Fecal Impaction:  Continue bowel regimen & Linzess. TSH wnl. Previous admissions on May and June for fecal impaction ? If recurrence or fecal impaction never completely resolved on last admission. Seizures: Continue home meds. Urinary Retention:   Crouch placed in ER. Will discontinue when having adequate bowel movements. Left Hydroureteronephrosis secondary to Fecal Impaction:  Monitor urine output. Renal ultrasound when constipation resolves. Anoxic brain injury: Currently at baseline. Nutrition: s/p gastrostomy tube. Wound care consulted for PEG site care. Dietitian following for tube feeding. RLE erythema and Pain:  r/o DVT. Follow up Venous Duplex. Pain control.       DVT Prophylaxis: Lovenox  Diet: Diet NPO  ADULT TUBE FEEDING; PEG; Standard with Fiber; Continuous; 25; Yes; 25; Q 4 hours; 50; 30; Q 4 hours  Code Status: Full Code  PT/OT Eval Status: Currently at baseline    Dispo - ECF when stable      Nathaniel Hollis MD

## 2022-09-15 NOTE — CONSULTS
EGD PEG TUBE PLACEMENT performed by Rashaad Ronquillo MD at [address] [place] [address] N/A 11/26/2019    EGD FOREIGN BODY REMOVAL performed by Rashaad Ronquillo MD at [facility]      Past Endoscopic History  EGD with PEG tube placement 11/26/2019 with dr. ruiz     Esophagus: The esophagus appeared normal without evidence of Harper's esophagus or reflux esophagitis. Stomach: The stomach was examined on forward and retroflexed views. The old PEG tube internal bumper was in place and was mobile. The tube was initially grasped with a [employer] net around the internal bumper, and the external PEG tube was cut at the 4 cm mohinder. The bumper and distal stump of the PEG tube were pulled into the gastric lumen, and a replacement PEG tube was advanced through the lumen with ease. The internal balloon was inflated with 10 mL and the external bumper was placed 3.5 mL from the skin. The PEG bumper and distal tube stump was eventually removed with foreign body forceps. Duodenum: The first and 2nd portions of the duodenum appeared normal with normal villous pattern     The scope was then withdrawn back into the stomach, it was decompressed, and the scope was completely withdrawn. The patient tolerated the procedure well and was taken to the post anesthesia care unit in good condition. Admission Meds  No current facility-administered medications on file prior to encounter.      Current Outpatient Medications on File Prior to Encounter   Medication Sig Dispense Refill    midodrine (PROAMATINE) 5 MG tablet 10 mg by Per G Tube route 3 times daily      LACTOBACILLUS PO 1 tablet by Per G Tube route in the morning and at bedtime      sertraline (ZOLOFT) 100 MG tablet 100 mg by Per G Tube route daily Give 2 tablets      polyethylene glycol (GLYCOLAX) 17 GM/SCOOP powder Take 17 g by mouth daily 1530 g 1    docusate sodium (COLACE) 100 MG capsule Take 1 capsule by mouth 2 times daily 60 capsule 0    erythromycin [facility]) 5 MG/GM ophthalmic ointment Place 1 applicator into the left eye in the morning, at noon, and at bedtime (Patient not taking: No sig reported)      ferrous sulfate 220 (44 Fe) MG/5ML solution 220 mg by Per G Tube route 3 times daily      Sodium Phosphates (FLEET) 7-19 GM/118ML Place 1 enema rectally daily as needed      senna (SENOKOT) 8.6 MG tablet 2 tablets by Per G Tube route daily       acetaminophen (TYLENOL) 325 MG tablet 650 mg by Per G Tube route 2 times daily as needed for Pain       valproate (DEPACON) 100 MG/ML injection 250 mg by Per G Tube route every 12 hours Give 10 ml every 12 hours for sz activity      lacosamide (VIMPAT) 100 MG TABS tablet Take 100 mg by mouth 2 times daily. Baclofen 5 MG TABS 5 mg by Per G Tube route 2 times daily       paliperidone palmitate ER (INVEGA SUSTENNA) 117 MG/0.75ML MOSES IM injection Inject 117 mg into the muscle every 30 days At bedtime on the  of the month      levETIRAcetam (KEPPRA) 100 MG/ML solution 5 mLs by PEG Tube route 2 times daily 1 Bottle 3    QUEtiapine (SEROQUEL) 25 MG tablet 1 tablet by PEG Tube route 2 times daily (Patient taking differently: 25 mg by Per G Tube route 2 times daily) 60 tablet 3    famotidine (PEPCID) 20 MG tablet 1 tablet by PEG Tube route 2 times daily 60 tablet 3    benztropine (COGENTIN) 1 MG tablet Take 2 tablets by mouth 2 times daily 120 tablet 3         Allergies  No Known Allergies   Social   Social History     Tobacco Use    Smoking status: Former     Years: 2.00     Types: Cigarettes     Quit date: 3/23/2011     Years since quittin.4    Smokeless tobacco: Never   Substance Use Topics    Alcohol use: No        Family History   Problem Relation Age of Onset    High Cholesterol Father     Cancer Paternal Grandmother     COPD Mother            Review of Systems  Review of systems not obtained due to patient factors.        Physical Exam  Blood pressure 108/73, pulse (!) 106, temperature (!) 100.8 °F (38.2 °C), temperature source Axillary, resp. rate 17, height 5' 5\" (1.651 m), weight 107 lb 11.2 oz (48.9 kg), SpO2 91 %, not currently breastfeeding. General appearance: alert, nonverbal, cooperative, no distress, appears stated age  Eyes: Anicteric  Head: Normocephalic, without obvious abnormality  Lungs: clear to auscultation bilaterally, Normal Effort  Heart: regular rate and rhythm, normal S1 and S2, no murmurs or rubs  Abdomen: soft, non-tender. Bowel sounds normal. No masses,  no organomegaly. Extremities: atraumatic, no cyanosis or edema  Skin: warm and dry, no jaundice  Neuro: nonverbal  Rectal: incontinent of liquid, brown stool. no stool in rectal vault. Data Review:    Recent Labs     09/13/22  2145 09/15/22  0627   WBC 10.9 9.0   HGB 12.0 11.1*   HCT 36.1 33.8*   MCV 91.9 91.1    131*     Recent Labs     09/13/22  2145 09/15/22  0626    138   K 3.7 4.0    103   CO2 24 26   PHOS  --  3.2   BUN 13 6*   CREATININE 0.6 <0.5*     Recent Labs     09/13/22  2145 09/15/22  0626   AST 57* 83*   ALT 22 26   BILITOT <0.2 <0.2   ALKPHOS 50 41     No results for input(s): LIPASE, AMYLASE in the last 72 hours. No results for input(s): PROTIME, INR in the last 72 hours. No results for input(s): PTT in the last 72 hours. No results for input(s): OCCULTBLD in the last 72 hours. Imaging Studies:                             CT-scan of abdomen and pelvis w iv contrast 9/13/22:     Impression   1. Suspected fecal impaction as demonstrated on the previous examination with   a large volume of stool noted in the rectosigmoid colon. 2. Constipation with large volume of stool throughout. 3. Interval development of mild left-sided hydroureteronephrosis felt   secondary to ureteral compression from the fecal impaction. Assessment:     Fecal impaction, chronic constipation -CT with large volume of stool in colon and fecal impaction.   Impaction resolved with milk and molasses enemas x2 and 1 L movi prep. Recommendations:   -Start Linzess 290 mcg daily    Discussed with Dr. Earlene Blakely, PA-C  [facility]  I have personally performed a face to face diagnostic evaluation on this patient. I have interviewed and examined the patient and I agree with the findings and recommended plan of care. In summary, my findings and plan are the following: As above, unfortunate young woman with anoxic brain injury admitted with UTI/urosepsis found to have severe constipation with fecal impaction compressing left ureter causing left hydronephrosis. SHe is improved as above after enemas and colon prep. We will start Linzess in addition to Colace BID and MIralax. Can titrate regimen to daily BM's. Do not believe colonoscopy required unless she fails to improve.     Lewis Brewer, [address] [place] [address]  9/15/2022

## 2022-09-16 ENCOUNTER — APPOINTMENT (OUTPATIENT)
Dept: GENERAL RADIOLOGY | Age: 27
DRG: 871 | End: 2022-09-16
Payer: MEDICARE

## 2022-09-16 LAB — PHOSPHORUS: 5.5 MG/DL (ref 2.5–4.9)

## 2022-09-16 PROCEDURE — 2580000003 HC RX 258: Performed by: NURSE PRACTITIONER

## 2022-09-16 PROCEDURE — 74018 RADEX ABDOMEN 1 VIEW: CPT

## 2022-09-16 PROCEDURE — 6370000000 HC RX 637 (ALT 250 FOR IP): Performed by: NURSE PRACTITIONER

## 2022-09-16 PROCEDURE — 6360000002 HC RX W HCPCS: Performed by: NURSE PRACTITIONER

## 2022-09-16 PROCEDURE — 6370000000 HC RX 637 (ALT 250 FOR IP): Performed by: PHYSICIAN ASSISTANT

## 2022-09-16 PROCEDURE — 6370000000 HC RX 637 (ALT 250 FOR IP): Performed by: INTERNAL MEDICINE

## 2022-09-16 PROCEDURE — 36415 COLL VENOUS BLD VENIPUNCTURE: CPT

## 2022-09-16 PROCEDURE — 6360000002 HC RX W HCPCS: Performed by: INTERNAL MEDICINE

## 2022-09-16 PROCEDURE — 84100 ASSAY OF PHOSPHORUS: CPT

## 2022-09-16 PROCEDURE — 1200000000 HC SEMI PRIVATE

## 2022-09-16 RX ORDER — PEG-3350, SODIUM SULFATE, SODIUM CHLORIDE, POTASSIUM CHLORIDE, SODIUM ASCORBATE AND ASCORBIC ACID 7.5-2.691G
100 KIT ORAL ONCE
Status: COMPLETED | OUTPATIENT
Start: 2022-09-16 | End: 2022-09-17

## 2022-09-16 RX ORDER — PEG-3350, SODIUM SULFATE, SODIUM CHLORIDE, POTASSIUM CHLORIDE, SODIUM ASCORBATE AND ASCORBIC ACID 7.5-2.691G
100 KIT ORAL ONCE
Status: COMPLETED | OUTPATIENT
Start: 2022-09-16 | End: 2022-09-16

## 2022-09-16 RX ORDER — LEVETIRACETAM 100 MG/ML
500 SOLUTION ORAL 2 TIMES DAILY
Status: DISCONTINUED | OUTPATIENT
Start: 2022-09-16 | End: 2022-09-19 | Stop reason: HOSPADM

## 2022-09-16 RX ORDER — POLYETHYLENE GLYCOL 3350 17 G/17G
17 POWDER, FOR SOLUTION ORAL 2 TIMES DAILY
Status: DISCONTINUED | OUTPATIENT
Start: 2022-09-16 | End: 2022-09-19 | Stop reason: HOSPADM

## 2022-09-16 RX ADMIN — MIDODRINE HYDROCHLORIDE 10 MG: 5 TABLET ORAL at 16:18

## 2022-09-16 RX ADMIN — SENNOSIDES 17.2 MG: 8.6 TABLET, COATED ORAL at 11:04

## 2022-09-16 RX ADMIN — LINACLOTIDE 290 MCG: 145 CAPSULE, GELATIN COATED ORAL at 11:05

## 2022-09-16 RX ADMIN — VALPROIC ACID 250 MG: 250 SOLUTION ORAL at 11:04

## 2022-09-16 RX ADMIN — Medication 1000 MG: at 00:14

## 2022-09-16 RX ADMIN — BACLOFEN 5 MG: 10 TABLET ORAL at 22:22

## 2022-09-16 RX ADMIN — MINERAL SUPPLEMENT IRON 300 MG / 5 ML STRENGTH LIQUID 100 PER BOX UNFLAVORED 220 MG: at 22:27

## 2022-09-16 RX ADMIN — Medication 10 ML: at 11:06

## 2022-09-16 RX ADMIN — DOCUSATE SODIUM LIQUID 100 MG: 100 LIQUID ORAL at 22:22

## 2022-09-16 RX ADMIN — MORPHINE SULFATE 2 MG: 2 INJECTION, SOLUTION INTRAMUSCULAR; INTRAVENOUS at 16:20

## 2022-09-16 RX ADMIN — POLYETHYLENE GLYCOL 3350, SODIUM SULFATE, SODIUM CHLORIDE, POTASSIUM CHLORIDE, ASCORBIC ACID, SODIUM ASCORBATE 100 G: KIT at 22:24

## 2022-09-16 RX ADMIN — DOCUSATE SODIUM LIQUID 100 MG: 100 LIQUID ORAL at 11:04

## 2022-09-16 RX ADMIN — BENZTROPINE MESYLATE 2 MG: 1 TABLET ORAL at 11:04

## 2022-09-16 RX ADMIN — FAMOTIDINE 20 MG: 20 TABLET, FILM COATED ORAL at 11:04

## 2022-09-16 RX ADMIN — MINERAL SUPPLEMENT IRON 300 MG / 5 ML STRENGTH LIQUID 100 PER BOX UNFLAVORED 220 MG: at 11:04

## 2022-09-16 RX ADMIN — LACOSAMIDE 100 MG: 50 TABLET, FILM COATED ORAL at 11:05

## 2022-09-16 RX ADMIN — QUETIAPINE FUMARATE 25 MG: 25 TABLET ORAL at 11:09

## 2022-09-16 RX ADMIN — SERTRALINE 100 MG: 50 TABLET, FILM COATED ORAL at 11:05

## 2022-09-16 RX ADMIN — LEVETIRACETAM 500 MG: 5 INJECTION INTRAVENOUS at 10:59

## 2022-09-16 RX ADMIN — MIDODRINE HYDROCHLORIDE 10 MG: 5 TABLET ORAL at 11:05

## 2022-09-16 RX ADMIN — BENZTROPINE MESYLATE 2 MG: 1 TABLET ORAL at 22:23

## 2022-09-16 RX ADMIN — ENOXAPARIN SODIUM 30 MG: 100 INJECTION SUBCUTANEOUS at 11:06

## 2022-09-16 RX ADMIN — Medication 10 ML: at 21:00

## 2022-09-16 RX ADMIN — QUETIAPINE FUMARATE 25 MG: 25 TABLET ORAL at 22:22

## 2022-09-16 RX ADMIN — MORPHINE SULFATE 2 MG: 2 INJECTION, SOLUTION INTRAMUSCULAR; INTRAVENOUS at 06:17

## 2022-09-16 RX ADMIN — Medication 240 ML: at 16:19

## 2022-09-16 RX ADMIN — FAMOTIDINE 20 MG: 20 TABLET, FILM COATED ORAL at 22:22

## 2022-09-16 RX ADMIN — BACLOFEN 5 MG: 10 TABLET ORAL at 16:19

## 2022-09-16 RX ADMIN — VALPROIC ACID 250 MG: 250 SOLUTION ORAL at 22:22

## 2022-09-16 RX ADMIN — MINERAL SUPPLEMENT IRON 300 MG / 5 ML STRENGTH LIQUID 100 PER BOX UNFLAVORED 220 MG: at 16:18

## 2022-09-16 RX ADMIN — POLYETHYLENE GLYCOL 3350 17 G: 17 POWDER, FOR SOLUTION ORAL at 11:06

## 2022-09-16 RX ADMIN — BACLOFEN 5 MG: 10 TABLET ORAL at 11:05

## 2022-09-16 RX ADMIN — LEVETIRACETAM 500 MG: 100 SOLUTION ORAL at 22:22

## 2022-09-16 RX ADMIN — LACOSAMIDE 100 MG: 50 TABLET, FILM COATED ORAL at 22:23

## 2022-09-16 ASSESSMENT — PAIN SCALES - WONG BAKER
WONGBAKER_NUMERICALRESPONSE: 0

## 2022-09-16 ASSESSMENT — PAIN DESCRIPTION - DESCRIPTORS: DESCRIPTORS: DISCOMFORT

## 2022-09-16 ASSESSMENT — PAIN DESCRIPTION - ORIENTATION: ORIENTATION: RIGHT

## 2022-09-16 ASSESSMENT — PAIN DESCRIPTION - LOCATION: LOCATION: LEG

## 2022-09-16 ASSESSMENT — PAIN SCALES - GENERAL: PAINLEVEL_OUTOF10: 0

## 2022-09-16 NOTE — PROGRESS NOTES
Comprehensive Nutrition Assessment    Type and Reason for Visit:  Reassess    Nutrition Recommendations/Plan:   Diet: NPO   2.  .Recommend continue Jevity 1. 5(Standard with Fiber Formula) at 50 ml/hr. order . 3.   Recommend continue 145 ml H2O q 4  hours. mL H20 q 4 hours. Increase flush if Na+ increases greater   Monitor closely and correct lytes (K, Phos, Mg) . Ensure head of bed is 30 - 45 degrees during continuous or bolus gastric feeding and for one hour after bolus. Turn off the feeding if head of bed is lowered less than 30 degrees. Monitor for tolerance (bowel habits, N/V, cramping, abdominal exam findings: distended, firm, tense, guarded, discomfort). Irrigate tube with 30 mL water before, between and after each medication. Closed System Guidelines:  Change tubing and feeding container every 24 hours. Malnutrition Assessment:  Malnutrition Status:  Insufficient data (09/16/22 1327)    Context:  Acute Illness         Nutrition Assessment:    Pt remains NPO with nutrition delivered via Peg tube. Jevity 1.5 now at goal rate of 50ml/hr. Tolerating per RN. Admitted with fecal impaction; resolved with enema per GI. Continue to monitor tolerance of enteral nutrition. Nutrition Related Findings:    No edema. 9/15LBM.  K+ 4.0 glu 130 Phos 3.2. IV d/c Wound Type:  (pretibial soft tissue necrosis)       Current Nutrition Intake & Therapies:    Average Meal Intake: NPO  Average Supplements Intake: NPO  Current Tube Feeding (TF) Orders:  Feeding Route: PEG  Formula: Standard with Fiber  Schedule: Continuous  Feeding Regimen:    Additives/Modulars: None  Water Flushes:    Current TF & Flush Orders Provides: See below  Goal TF & Flush Orders Provides: Jevity 1.5 at 50 ml/hr provides 1200 TV, 1800 kcal, 77 gm protein, 912 ml free water. Water flush 145 ml q 4 hours. .    Anthropometric Measures:  Height: 5' 5\" (165.1 cm)  Ideal Body Weight (IBW): 125 lbs (57 kg)    Admission Body Weight: 110 lb (49.9 kg)  Current Body Weight: 109 lb (49.4 kg), 87.2 % IBW. Weight Source: Bed Scale  Current BMI (kg/m2): 18.1  Usual Body Weight: 110 lb (49.9 kg) (5/20-hosp)  % Weight Change (Calculated): 1.8  Weight Adjustment For: No Adjustment                 BMI Categories: Underweight (BMI less than 18.5)    Estimated Daily Nutrient Needs:  Energy Requirements Based On: Kcal/kg (30-35 kcal/kg/112)  Weight Used for Energy Requirements: Current  Energy (kcal/day): 4303-3104  Weight Used for Protein Requirements: Current (1.5-. 1.8 g/kg/cbw)  Protein (g/day): 76-91  Method Used for Fluid Requirements: 1 ml/kcal  Fluid (ml/day): 3303-8720    Nutrition Diagnosis:   Inadequate oral intake related to swallowing difficulty as evidenced by NPO or clear liquid status due to medical condition  Increased nutrient needs related to increase demand for energy/nutrients as evidenced by wounds    Nutrition Interventions:   Food and/or Nutrient Delivery: Continue Current Tube Feeding  Nutrition Education/Counseling: Education not indicated  Coordination of Nutrition Care: Continue to monitor while inpatient       Goals:     Goals: Tolerate nutrition support at goal rate       Nutrition Monitoring and Evaluation:   Behavioral-Environmental Outcomes: None Identified  Food/Nutrient Intake Outcomes: Enteral Nutrition Intake/Tolerance  Physical Signs/Symptoms Outcomes: GI Status, Weight    Discharge Planning:     Too soon to determine     Maura Nayak, [address] [place] [address]  Contact: [phone]

## 2022-09-16 NOTE — PROGRESS NOTES
Hospitalist Progress Note      PCP: Angela Bowles MD    Date of Admission: 9/13/2022    Chief Complaint: Fever    Hospital Course: Manjinder Gil is a 27 y.o. female with hx anoxic brain injury from heroin overdose 2016, viral hepatitis, cardiac arrest, seizures, and constipation. Patient was transferred from [facility] for any fever. Patient has a history of anoxic brain injury in 2016 after drug overdose. She is nonverbal and responds only to pain. Per ER nursing staff this is her baseline. She was febrile 101.3 in the emergency room, she was found to have a UTI. As well as fecal impaction. Subjective: Patient seen and examined. ROS UTO due to anoxic brain injury. Nonverbal.  No BM since last night.          Medications:  Reviewed    Infusion Medications    sodium chloride 20 mL/hr at 09/15/22 1224     Scheduled Medications    polyethylene glycol  17 g Oral BID    levETIRAcetam  500 mg PEG Tube BID    linaclotide  290 mcg Per G Tube QAM AC    Baclofen  5 mg Per G Tube TID    cefTRIAXone (ROCEPHIN) IV  1,000 mg IntraVENous Q24H    docusate  100 mg Per G Tube BID    famotidine  20 mg PEG Tube BID    ferrous sulfate  220 mg Per G Tube TID    lacosamide  100 mg Per G Tube BID    midodrine  10 mg Per G Tube TID    QUEtiapine  25 mg PEG Tube BID    senna  2 tablet Per G Tube BID    sertraline  100 mg Per G Tube Daily    sodium chloride flush  5-40 mL IntraVENous 2 times per day    enoxaparin  30 mg SubCUTAneous Daily    benztropine  2 mg Per G Tube BID    valproic acid  250 mg Per G Tube BID     PRN Meds: oxyCODONE-acetaminophen, morphine, magnesium hydroxide, sodium chloride flush, sodium chloride, ondansetron **OR** ondansetron, acetaminophen **OR** acetaminophen, morphine      Intake/Output Summary (Last 24 hours) at 9/16/2022 1334  Last data filed at 9/16/2022 0018  Gross per 24 hour   Intake 966 ml   Output 900 ml   Net 66 ml       Physical Exam Performed:    /67   Pulse 83   Temp 98 °F (36.7 °C) (Oral)   Resp 17   Ht 5' 5\" (1.651 m)   Wt 109 lb 12.8 oz (49.8 kg)   SpO2 93%   BMI 18.27 kg/m²     General appearance: No apparent distress, appears stated age and cooperative. HEENT: Pupils equal, round, and reactive to light. Conjunctivae clear. Neck: Trachea midline. Respiratory:  Normal respiratory effort. Clear to auscultation, bilaterally without Rales/Wheezes/Rhonchi. Cardiovascular: Regular rate and rhythm with normal S1/S2 without murmurs, rubs or gallops. Abdomen: Soft, non-tender, non-distended with normal bowel sounds. Musculoskeletal: No clubbing, cyanosis. RLE edema and erythema. Skin: Skin color, texture, turgor normal.   Neurologic:  UTO. Non-verbal. No spontaneous movement noted. Psychiatric: Non-verbal.  Capillary Refill: Brisk, 3 seconds, normal   Peripheral Pulses: +2 palpable, equal bilaterally     PEG site erythema & Granulation tissue noted. Crouch+    Labs:   Recent Labs     09/13/22  2145 09/15/22  0627   WBC 10.9 9.0   HGB 12.0 11.1*   HCT 36.1 33.8*    131*     Recent Labs     09/13/22  2145 09/15/22  0626 09/16/22  0551    138  --    K 3.7 4.0  --     103  --    CO2 24 26  --    BUN 13 6*  --    CREATININE 0.6 <0.5*  --    CALCIUM 9.5 9.1  --    PHOS  --  3.2 5.5*     Recent Labs     09/13/22  2145 09/15/22  0626   AST 57* 83*   ALT 22 26   BILITOT <0.2 <0.2   ALKPHOS 50 41     No results for input(s): INR in the last 72 hours. No results for input(s): Savanah Horn in the last 72 hours. Urinalysis:      Lab Results   Component Value Date/Time    NITRU Negative 09/13/2022 09:45 PM    WBCUA 121 09/13/2022 09:45 PM    BACTERIA Rare 09/13/2022 09:45 PM    RBCUA 12 09/13/2022 09:45 PM    BLOODU SMALL 09/13/2022 09:45 PM    SPECGRAV 1.015 09/13/2022 09:45 PM    GLUCOSEU Negative 09/13/2022 09:45 PM    GLUCOSEU NEGATIVE 09/20/2011 04:50 PM       Radiology:  XR ABDOMEN (KUB) (SINGLE AP VIEW)   Final Result   1. Persistent constipation.   Moderate stool in the colon is overall improved   from most recent comparison and is predominantly located at the rectosigmoid   with persistent moderate to severe distension of the rectum. 2. No evidence of small-bowel obstruction. VL Extremity Venous Bilateral         CT ABDOMEN PELVIS W IV CONTRAST Additional Contrast? None   Final Result   1. Suspected fecal impaction as demonstrated on the previous examination with   a large volume of stool noted in the rectosigmoid colon. 2. Constipation with large volume of stool throughout. 3. Interval development of mild left-sided hydroureteronephrosis felt   secondary to ureteral compression from the fecal impaction. XR CHEST PORTABLE   Final Result   Mild cardiomegaly which is more prominent with no acute pulmonary abnormality. Assessment/Plan:    Active Hospital Problems    Diagnosis     UTI (urinary tract infection) [N39.0]      Priority: Medium         E. coli UTI with Sepsis POA:  Continue IV rocephin. Blood cx no growth to date. Crouch placed in ER. Constipation with Fecal Impaction:  Previous admissions on May and June for fecal impaction ? If recurrence or fecal impaction never completely resolved on last admission. Repeat KUB: Still with severe constipation dilation of the rectum. Continue bowel regimen & Linzess. TSH wnl. Seizures: Continue home meds. Urinary Retention:   Crouch placed in ER. Will discontinue when having adequate bowel movements. Left Hydroureteronephrosis secondary to Fecal Impaction:  Monitor urine output. Renal ultrasound when constipation resolves. Anoxic brain injury: Currently at baseline. Nutrition: s/p gastrostomy tube. Wound care consulted for PEG site care. Dietitian following for tube feeding. RLE erythema and Pain:  r/o DVT. Follow up Venous Duplex. Pain control.       DVT Prophylaxis: Lovenox  Diet: Diet NPO  ADULT TUBE FEEDING; PEG; Standard with Fiber; Continuous; 25; Yes; 25; Q 4 hours; 50; 30; Q 4 hours  Code Status: Full Code  PT/OT Eval Status: Currently at baseline    Dispo - ECF when stable      Shannon Rodriguez MD

## 2022-09-16 NOTE — CARE COORDINATION
[facility] Nurse  Consult Note       NAME:  Julián Judd  MEDICAL RECORD NUMBER:  [id number]  AGE: 27 y.o. GENDER: female  : 1995  TODAY'S DATE:  2022    Subjective   Reason for WOCN Evaluation and Assessment: possible dti to right lower leg and hypergranulation tissue around  gtube. Julián Judd is a 27 y.o. female referred by:   [x] Physician  [x] Nursing  [] Other:     Wound Identification:  Wound Type: pressure  Contributing Factors: none    Wound History: present on admission  Current Wound Care Treatment:  none    Patient Goal of Care:  [x] Wound Healing  [] Odor Control  [] Palliative Care  [] Pain Control   [] Other:         PAST MEDICAL HISTORY        Diagnosis Date    Acute hepatitis C without hepatic coma     Acute hepatitis C without hepatic coma     Anoxic brain damage (HCC)     Cardiac arrest ([facility]     s/p overdose 2016.     Chronic kidney disease     \"a few\" UTIs with pregnancy    Chronic viral hepatitis C ([facility]     Diffuse traumatic brain injury with loss of consciousness greater than 24 hours without return to pre-existing conscious level with patient surviving [facility])     sequela    Major depressive disorder, recurrent, unspecified ([address]     MRSA (methicillin resistant staph aureus) culture positive 2017    arm    Nicotine dependence, other tobacco product, uncomplicated     Overdose     Poisoning by heroin, undetermined, initial encounter ([address]     Schizophrenia, unspecified ([address]     Seizures ([address]     Streptococcal sepsis, unspecified ([address]     TBI (traumatic brain injury) ([address]     Unspecified asthma, uncomplicated     Unspecified sexually transmitted disease        PAST SURGICAL HISTORY    Past Surgical History:   Procedure Laterality Date    GASTROSTOMY TUBE PLACEMENT      GASTROSTOMY TUBE PLACEMENT      2016    GASTROSTOMY TUBE PLACEMENT N/A 2019    EGD PEG TUBE PLACEMENT performed by Fozia Guajardo MD at [address] [place] [address] UPPER GASTROINTESTINAL ENDOSCOPY N/A 2019    EGD FOREIGN BODY REMOVAL performed by Erika Barron MD at [address] HISTORY    Family History   Problem Relation Age of Onset    High Cholesterol Father     Cancer Paternal Grandmother     COPD Mother        SOCIAL HISTORY    Social History     Tobacco Use    Smoking status: Former     Years: 2.00     Types: Cigarettes     Quit date: 3/23/2011     Years since quittin.4    Smokeless tobacco: Never   Substance Use Topics    Alcohol use: No    Drug use: No     Types: IV     Comment: Hx MJ use 3/3/11 and heroin use       ALLERGIES    No Known Allergies    MEDICATIONS    No current facility-administered medications on file prior to encounter.      Current Outpatient Medications on File Prior to Encounter   Medication Sig Dispense Refill    midodrine (PROAMATINE) 5 MG tablet 10 mg by Per G Tube route 3 times daily      LACTOBACILLUS PO 1 tablet by Per G Tube route in the morning and at bedtime      sertraline (ZOLOFT) 100 MG tablet 100 mg by Per G Tube route daily Give 2 tablets      polyethylene glycol (GLYCOLAX) 17 GM/SCOOP powder Take 17 g by mouth daily 1530 g 1    docusate sodium (COLACE) 100 MG capsule Take 1 capsule by mouth 2 times daily 60 capsule 0    erythromycin (ROMYCIN) 5 MG/GM ophthalmic ointment Place 1 applicator into the left eye in the morning, at noon, and at bedtime (Patient not taking: No sig reported)      ferrous sulfate 220 (44 Fe) MG/5ML solution 220 mg by Per G Tube route 3 times daily      Sodium Phosphates (FLEET) 7-19 GM/118ML Place 1 enema rectally daily as needed      senna (SENOKOT) 8.6 MG tablet 2 tablets by Per G Tube route daily       acetaminophen (TYLENOL) 325 MG tablet 650 mg by Per G Tube route 2 times daily as needed for Pain       valproate (DEPACON) 100 MG/ML injection 250 mg by Per G Tube route every 12 hours Give 10 ml every 12 hours for sz activity      lacosamide (VIMPAT) 100 MG TABS tablet Take 100 mg by mouth 2 times daily.       Baclofen 5 MG TABS 5 mg by Per G Tube route 2 times daily       paliperidone palmitate ER (INVEGA SUSTENNA) 117 MG/0.75ML MOSES IM injection Inject 117 mg into the muscle every 30 days At bedtime on the 8th of the month      levETIRAcetam (KEPPRA) 100 MG/ML solution 5 mLs by PEG Tube route 2 times daily 1 Bottle 3    QUEtiapine (SEROQUEL) 25 MG tablet 1 tablet by PEG Tube route 2 times daily (Patient taking differently: 25 mg by Per G Tube route 2 times daily) 60 tablet 3    famotidine (PEPCID) 20 MG tablet 1 tablet by PEG Tube route 2 times daily 60 tablet 3    benztropine (COGENTIN) 1 MG tablet Take 2 tablets by mouth 2 times daily 120 tablet 3       Objective    /67   Pulse 83   Temp 98 °F (36.7 °C) (Oral)   Resp 17   Ht 5' 5\" (1.651 m)   Wt 109 lb 12.8 oz (49.8 kg)   SpO2 93%   BMI 18.27 kg/m²     LABS:  WBC:    Lab Results   Component Value Date/Time    WBC 9.0 09/15/2022 06:27 AM     H/H:    Lab Results   Component Value Date/Time    HGB 11.1 09/15/2022 06:27 AM    HCT 33.8 09/15/2022 06:27 AM     PTT:    Lab Results   Component Value Date/Time    APTT 29.7 02/24/2022 06:16 AM   [APTT}  PT/INR:    Lab Results   Component Value Date/Time    PROTIME 13.7 02/24/2022 06:16 AM    INR 1.20 02/24/2022 06:16 AM     HgBA1c:  No results found for: LABA1C    Assessment   Josiah Risk Score: Josiah Scale Score: 13    Patient Active Problem List   Diagnosis Code    Drug overdose T50.901A    Cardiac arrest due to respiratory disorder (HCC) J98.9, I46.8    Anoxic brain injury ([facility] [address] G93.1    Acute respiratory failure with hypoxia ([facility]) J96.01    Aspiration pneumonia of both lungs (HCC) J69.0    Cor pulmonale (HCC) I27.81    Seizure (HCC) R56.9    Status epilepticus (HCC) G40.901    Non-traumatic rhabdomyolysis M62.82    Dystonia G24.9    Non compliance w medication regimen Z91.14    Failure to thrive in adult R62.7    Hypokalemia E87.6    Hypernatremia E87.0    Agitation R45.1 Anxiety F41.9    Chronic hepatitis C without hepatic coma (HCC) B18.2    Dysphagia, oropharyngeal R13.12    Choreoathetoid limb movements G25.5    Hepatic encephalopathy (HCC) K72.90    Abscess of forearm, left L02.414    Severe protein-calorie malnutrition Mackenzie Johnson: less than 60% of standard weight) (HCC) E43    Colonic obstruction (HCC) K56.609    Fecal impaction (HCC) K56.41    Dislodged gastrostomy tube Q37.457D    Bladder outlet obstruction N32.0    Hydroureteronephrosis N13.30    Proctitis K62.89    Abdominal pain R10.9    UTI (urinary tract infection) N39.0       Measurements:  Wound 09/14/22 Pretibial Proximal;Right (Active)   Wound Etiology Other 09/16/22 1327   Dressing/Treatment Open to air 09/16/22 1327   Number of days: 2      Patient seen for possible dti to right leg and gtube. Right leg assessed, no skin changes noted, may have resolved. There is hypergranulation around g-tube os. Surrounding skin cleansed, external bumper rotated and moved closer to skin. Discussed case with nurse Marilyn Enriquez, recommend consult GI, may be able to use silver nitrate skin to hypergranulation tissue. No skin changes noted to right leg. Continue to help patient reposition in bed to prevent pressure injuries. Response to treatment:  Well tolerated by patient. Pain Assessment:  Severity:  0 / 10  Quality of pain: N/A  Wound Pain Timing/Severity: none  Premedicated: No    Plan   Plan of Care: Wound 09/14/22 Pretibial Proximal;Right-Dressing/Treatment: Open to air  Assist patient to reposition every two hours and as needed  Elevate heels off of bed with heel protector boots or placing pillows under calves. Apply preventive foam dressings to bony prominences. Provide gretchen- care after each episode of incontinence  Apply barrier cream twice daily and as needed to protect buttocks/ sacral area. Gretchen-tubular skin of gtube. Keep skin dry. Keep external bumper near skin to prevent tube migration.     Specialty Bed

## 2022-09-16 NOTE — CARE COORDINATION
Case Management Assessment  Initial Evaluation    Date/Time of Evaluation: 9/16/2022 11:53 AM  Assessment Completed by: Marsha Elizalde RN    If patient is discharged prior to next notation, then this note serves as note for discharge by case management. Patient Name: Vinod Stephens                   YOB: 1995  Diagnosis: UTI (urinary tract infection) [N39.0]  Septicemia ([address] [A41.9]  Acute cystitis without hematuria [N30.00]  Constipation, unspecified constipation type [K59.00]                   Date / Time: 9/13/2022  9:26 PM    Patient Admission Status: Inpatient     Current PCP: Marquise Wood MD  PCP verified by CM? Yes (Facility attending)    Chart Reviewed: Yes      Patient Interviewed: No   Family Interviewed:  No   Patient Orientation: Unable to Assess    Patient Cognition: Severely Impaired (Anoxic brain injury 2/2 cardiac arrest)  History Provided by: EHR, [facility]    Hospitalization in the last 30 days (Readmission):  No    If yes, Readmission Assessment in  Navigator will be completed. Advance Directives:     Code Status: Full Code       Discharge Planning  Patient lives with:  (LTC staff) Type of Home: Long-Term Care   Primary Caregiver: Other (Comment) (From LT)  Patient Support Systems include: Family Members   Current Financial resources: Medicare  Current community resources:    Current services prior to admission: Other (Comment) ([facility] by Carroll Hayden)   Type of Home Care services:  Nursing Services    ADLS  Prior functional level: Other (see comment) (dependent for all care)  Current functional level: Other (see comment) (dependent for all care)    PT AM-PAC:   /24  OT AM-PAC:   /24    Family can provide assistance at DC: Would you like Case Management to discuss the discharge plan with any other family members/significant others, and if so, who?  Yes (facility staff and guardian)  Plans to Return to Present Housing: Yes  Other Identified Issues/Barriers to RETURNING to current housing: None  Potential Assistance needed at discharge: Other (Comment) (Return to group home)  Patient expects to discharge to: correction  Plan for transportation at discharge:      Financial  Payor: Crow Montero / Plan: MEDICARE PART A AND B / Product Type: *No Product type* /     Does insurance require precert for SNF: No    Potential assistance Purchasing Medications: No  Meds-to-Beds request:        [address] [place] [address] [address], [address] [facility] [place] [phone] Hank Cones [phone]  [address] [address]  Phone: [phone] Fax: [phone]      Factors facilitating achievement of predicted outcomes: Caregiver support and Has needed Durable Medical Equipment at home    Barriers to discharge: Cognitive deficit and Long standing deficits    Additional Case Management Notes: Will return to [address] [place] [address] by Rosy Yarbrough    The Plan for Transition of Care is related to the following treatment goals of UTI (urinary tract infection) [N39.0]  Septicemia ([address] [A41.9]  Acute cystitis without hematuria [N30.00]  Constipation, unspecified constipation type [K59.00]      The Patient and/or Patient Representative Agree with the Discharge Plan?       Electronically signed by Carter Sanford RN on 9/16/2022 at 11:54 AM

## 2022-09-16 NOTE — PROGRESS NOTES
BID  - consider holding iron as this can cause constipation    Discussed with Dr. Tom Nixon, LUCIA  [facility]  I have personally performed a face to face diagnostic evaluation on this patient. I have interviewed and examined the patient and I agree with the findings and recommended plan of care. In summary, my findings and plan are the following: As above, having BM with enemas and catharsis. Will add additional Movi prep today. Started on Linzess daily and Miralax twice daily for ongoing bowel regimen - titrate to daily BM. Will follow from a distance. Please call with questions.     Diana Goncalves, [address] [place] [address]  9/16/2022

## 2022-09-17 LAB
BLOOD CULTURE, ROUTINE: NORMAL
PHOSPHORUS: 4.2 MG/DL (ref 2.5–4.9)

## 2022-09-17 PROCEDURE — 2580000003 HC RX 258: Performed by: NURSE PRACTITIONER

## 2022-09-17 PROCEDURE — 6370000000 HC RX 637 (ALT 250 FOR IP): Performed by: INTERNAL MEDICINE

## 2022-09-17 PROCEDURE — 6360000002 HC RX W HCPCS: Performed by: INTERNAL MEDICINE

## 2022-09-17 PROCEDURE — 6370000000 HC RX 637 (ALT 250 FOR IP): Performed by: PHYSICIAN ASSISTANT

## 2022-09-17 PROCEDURE — 6360000002 HC RX W HCPCS: Performed by: NURSE PRACTITIONER

## 2022-09-17 PROCEDURE — 84100 ASSAY OF PHOSPHORUS: CPT

## 2022-09-17 PROCEDURE — 36415 COLL VENOUS BLD VENIPUNCTURE: CPT

## 2022-09-17 PROCEDURE — 1200000000 HC SEMI PRIVATE

## 2022-09-17 PROCEDURE — 6370000000 HC RX 637 (ALT 250 FOR IP): Performed by: NURSE PRACTITIONER

## 2022-09-17 RX ADMIN — ENOXAPARIN SODIUM 30 MG: 100 INJECTION SUBCUTANEOUS at 08:53

## 2022-09-17 RX ADMIN — QUETIAPINE FUMARATE 25 MG: 25 TABLET ORAL at 08:53

## 2022-09-17 RX ADMIN — POLYETHYLENE GLYCOL 3350 17 G: 17 POWDER, FOR SOLUTION ORAL at 08:52

## 2022-09-17 RX ADMIN — LACOSAMIDE 100 MG: 50 TABLET, FILM COATED ORAL at 21:57

## 2022-09-17 RX ADMIN — BENZTROPINE MESYLATE 2 MG: 1 TABLET ORAL at 21:58

## 2022-09-17 RX ADMIN — Medication 240 ML: at 15:03

## 2022-09-17 RX ADMIN — POLYETHYLENE GLYCOL 3350, SODIUM SULFATE, SODIUM CHLORIDE, POTASSIUM CHLORIDE, ASCORBIC ACID, SODIUM ASCORBATE 100 G: KIT at 00:07

## 2022-09-17 RX ADMIN — LACOSAMIDE 100 MG: 50 TABLET, FILM COATED ORAL at 08:52

## 2022-09-17 RX ADMIN — MORPHINE SULFATE 2 MG: 2 INJECTION, SOLUTION INTRAMUSCULAR; INTRAVENOUS at 15:34

## 2022-09-17 RX ADMIN — BACLOFEN 5 MG: 10 TABLET ORAL at 13:46

## 2022-09-17 RX ADMIN — POLYETHYLENE GLYCOL 3350 17 G: 17 POWDER, FOR SOLUTION ORAL at 21:59

## 2022-09-17 RX ADMIN — LEVETIRACETAM 500 MG: 100 SOLUTION ORAL at 21:56

## 2022-09-17 RX ADMIN — FAMOTIDINE 20 MG: 20 TABLET, FILM COATED ORAL at 08:53

## 2022-09-17 RX ADMIN — SENNOSIDES 17.2 MG: 8.6 TABLET, COATED ORAL at 22:01

## 2022-09-17 RX ADMIN — VALPROIC ACID 250 MG: 250 SOLUTION ORAL at 08:51

## 2022-09-17 RX ADMIN — Medication 1000 MG: at 00:49

## 2022-09-17 RX ADMIN — MINERAL SUPPLEMENT IRON 300 MG / 5 ML STRENGTH LIQUID 100 PER BOX UNFLAVORED 220 MG: at 08:51

## 2022-09-17 RX ADMIN — MIDODRINE HYDROCHLORIDE 10 MG: 5 TABLET ORAL at 08:52

## 2022-09-17 RX ADMIN — MIDODRINE HYDROCHLORIDE 10 MG: 5 TABLET ORAL at 13:46

## 2022-09-17 RX ADMIN — MINERAL SUPPLEMENT IRON 300 MG / 5 ML STRENGTH LIQUID 100 PER BOX UNFLAVORED 220 MG: at 21:56

## 2022-09-17 RX ADMIN — BACLOFEN 5 MG: 10 TABLET ORAL at 08:52

## 2022-09-17 RX ADMIN — Medication 10 ML: at 21:59

## 2022-09-17 RX ADMIN — QUETIAPINE FUMARATE 25 MG: 25 TABLET ORAL at 21:58

## 2022-09-17 RX ADMIN — DOCUSATE SODIUM LIQUID 100 MG: 100 LIQUID ORAL at 21:56

## 2022-09-17 RX ADMIN — OXYCODONE AND ACETAMINOPHEN 1 TABLET: 5; 325 TABLET ORAL at 08:59

## 2022-09-17 RX ADMIN — SENNOSIDES 17.2 MG: 8.6 TABLET, COATED ORAL at 08:52

## 2022-09-17 RX ADMIN — FAMOTIDINE 20 MG: 20 TABLET, FILM COATED ORAL at 21:58

## 2022-09-17 RX ADMIN — BACLOFEN 5 MG: 10 TABLET ORAL at 21:58

## 2022-09-17 RX ADMIN — MORPHINE SULFATE 2 MG: 2 INJECTION, SOLUTION INTRAMUSCULAR; INTRAVENOUS at 01:17

## 2022-09-17 RX ADMIN — SERTRALINE 100 MG: 50 TABLET, FILM COATED ORAL at 08:52

## 2022-09-17 RX ADMIN — Medication 20 ML: at 08:53

## 2022-09-17 RX ADMIN — MINERAL SUPPLEMENT IRON 300 MG / 5 ML STRENGTH LIQUID 100 PER BOX UNFLAVORED 220 MG: at 13:46

## 2022-09-17 RX ADMIN — LEVETIRACETAM 500 MG: 100 SOLUTION ORAL at 08:51

## 2022-09-17 RX ADMIN — VALPROIC ACID 250 MG: 250 SOLUTION ORAL at 22:01

## 2022-09-17 RX ADMIN — DOCUSATE SODIUM LIQUID 100 MG: 100 LIQUID ORAL at 08:51

## 2022-09-17 RX ADMIN — BENZTROPINE MESYLATE 2 MG: 1 TABLET ORAL at 08:52

## 2022-09-17 RX ADMIN — LINACLOTIDE 290 MCG: 145 CAPSULE, GELATIN COATED ORAL at 06:18

## 2022-09-17 NOTE — PROGRESS NOTES
Hospitalist Progress Note      PCP: Slava Montano MD    Date of Admission: 9/13/2022    Chief Complaint: Fever    Hospital Course: Radha Posey is a 27 y.o. female with hx anoxic brain injury from heroin overdose 2016, viral hepatitis, cardiac arrest, seizures, and constipation. Patient was transferred from [facility] for any fever. Patient has a history of anoxic brain injury in 2016 after drug overdose. She is nonverbal and responds only to pain. Per ER nursing staff this is her baseline. She was febrile 101.3 in the emergency room, she was found to have a UTI. As well as fecal impaction. Subjective: Patient seen and examined. ROS UTO due to anoxic brain injury. Nonverbal.  No BM since last night.          Medications:  Reviewed    Infusion Medications    sodium chloride 20 mL/hr at 09/15/22 1224     Scheduled Medications    polyethylene glycol  17 g Oral BID    levETIRAcetam  500 mg PEG Tube BID    linaclotide  290 mcg Per G Tube QAM AC    Baclofen  5 mg Per G Tube TID    cefTRIAXone (ROCEPHIN) IV  1,000 mg IntraVENous Q24H    docusate  100 mg Per G Tube BID    famotidine  20 mg PEG Tube BID    ferrous sulfate  220 mg Per G Tube TID    lacosamide  100 mg Per G Tube BID    midodrine  10 mg Per G Tube TID    QUEtiapine  25 mg PEG Tube BID    senna  2 tablet Per G Tube BID    sertraline  100 mg Per G Tube Daily    sodium chloride flush  5-40 mL IntraVENous 2 times per day    enoxaparin  30 mg SubCUTAneous Daily    benztropine  2 mg Per G Tube BID    valproic acid  250 mg Per G Tube BID     PRN Meds: oxyCODONE-acetaminophen, morphine, magnesium hydroxide, sodium chloride flush, sodium chloride, ondansetron **OR** ondansetron, acetaminophen **OR** acetaminophen, morphine      Intake/Output Summary (Last 24 hours) at 9/17/2022 1344  Last data filed at 9/17/2022 0849  Gross per 24 hour   Intake 758 ml   Output 2250 ml   Net -1492 ml       Physical Exam Performed:    /71   Pulse (!) 108   Temp 97.7 °F (36.5 °C) (Tympanic)   Resp 20   Ht 5' 5\" (1.651 m)   Wt 114 lb 5 oz (51.9 kg)   SpO2 95%   BMI 19.02 kg/m²     General appearance: No apparent distress, appears stated age and cooperative. HEENT: Pupils equal, round, and reactive to light. Conjunctivae clear. Neck: Trachea midline. Respiratory:  Normal respiratory effort. Clear to auscultation, bilaterally without Rales/Wheezes/Rhonchi. Cardiovascular: Regular rate and rhythm with normal S1/S2 without murmurs, rubs or gallops. Abdomen: Soft, non-tender, non-distended with normal bowel sounds. Musculoskeletal: No clubbing, cyanosis. RLE edema and erythema. Skin: Skin color, texture, turgor normal.   Neurologic:  UTO. Non-verbal. No spontaneous movement noted. Psychiatric: Non-verbal.  Capillary Refill: Brisk, 3 seconds, normal   Peripheral Pulses: +2 palpable, equal bilaterally     PEG site with minimal erythema. Crouch+    Labs:   Recent Labs     09/15/22  0627   WBC 9.0   HGB 11.1*   HCT 33.8*   *     Recent Labs     09/15/22  0626 09/16/22  0551 09/17/22  0608     --   --    K 4.0  --   --      --   --    CO2 26  --   --    BUN 6*  --   --    CREATININE <0.5*  --   --    CALCIUM 9.1  --   --    PHOS 3.2 5.5* 4.2     Recent Labs     09/15/22  0626   AST 83*   ALT 26   BILITOT <0.2   ALKPHOS 41     No results for input(s): INR in the last 72 hours. No results for input(s): Kathyrn Belch in the last 72 hours. Urinalysis:      Lab Results   Component Value Date/Time    NITRU Negative 09/13/2022 09:45 PM    WBCUA 121 09/13/2022 09:45 PM    BACTERIA Rare 09/13/2022 09:45 PM    RBCUA 12 09/13/2022 09:45 PM    BLOODU SMALL 09/13/2022 09:45 PM    SPECGRAV 1.015 09/13/2022 09:45 PM    GLUCOSEU Negative 09/13/2022 09:45 PM    GLUCOSEU NEGATIVE 09/20/2011 04:50 PM       Radiology:  XR ABDOMEN (KUB) (SINGLE AP VIEW)   Final Result   1. Persistent constipation.   Moderate stool in the colon is overall improved   from most recent comparison and is predominantly located at the rectosigmoid   with persistent moderate to severe distension of the rectum. 2. No evidence of small-bowel obstruction. VL Extremity Venous Bilateral         CT ABDOMEN PELVIS W IV CONTRAST Additional Contrast? None   Final Result   1. Suspected fecal impaction as demonstrated on the previous examination with   a large volume of stool noted in the rectosigmoid colon. 2. Constipation with large volume of stool throughout. 3. Interval development of mild left-sided hydroureteronephrosis felt   secondary to ureteral compression from the fecal impaction. XR CHEST PORTABLE   Final Result   Mild cardiomegaly which is more prominent with no acute pulmonary abnormality. Assessment/Plan:    Active Hospital Problems    Diagnosis     UTI (urinary tract infection) [N39.0]      Priority: Medium         E. coli UTI with Sepsis POA:  Continue IV rocephin. Blood cx no growth to date. Crouch placed in ER. Constipation with Fecal Impaction:  Previous admissions on May and June for fecal impaction ? If recurrence or fecal impaction never completely resolved on last admission. Repeat KUB: Still with severe constipation dilation of the rectum. Continue bowel regimen & Linzess. TSH wnl. Seizures: Continue home meds. Urinary Retention:   Crouch placed in ER. Will discontinue when having adequate bowel movements. Left Hydroureteronephrosis secondary to Fecal Impaction:  Monitor urine output. Renal ultrasound when constipation resolves. Anoxic brain injury: Currently at baseline. Nutrition: s/p gastrostomy tube. Wound care consulted for PEG site care. Dietitian following for tube feeding. RLE erythema and Pain:  No DVT on venous Duplex. Pain control.       DVT Prophylaxis: Lovenox  Diet: Diet NPO  ADULT TUBE FEEDING; PEG; Standard with Fiber; Continuous; 25; Yes; 25; Q 4 hours; 50; 30; Q 4 hours  Code Status: Full Code  PT/OT Eval Status: Currently at baseline    Dispo - ECF when stable      Darian Cruz MD

## 2022-09-18 ENCOUNTER — APPOINTMENT (OUTPATIENT)
Dept: GENERAL RADIOLOGY | Age: 27
DRG: 871 | End: 2022-09-18
Payer: MEDICARE

## 2022-09-18 LAB — CULTURE, BLOOD 2: NORMAL

## 2022-09-18 PROCEDURE — 6360000002 HC RX W HCPCS: Performed by: INTERNAL MEDICINE

## 2022-09-18 PROCEDURE — 6370000000 HC RX 637 (ALT 250 FOR IP): Performed by: PHYSICIAN ASSISTANT

## 2022-09-18 PROCEDURE — 6370000000 HC RX 637 (ALT 250 FOR IP): Performed by: INTERNAL MEDICINE

## 2022-09-18 PROCEDURE — 6360000002 HC RX W HCPCS: Performed by: NURSE PRACTITIONER

## 2022-09-18 PROCEDURE — 2580000003 HC RX 258: Performed by: NURSE PRACTITIONER

## 2022-09-18 PROCEDURE — 6370000000 HC RX 637 (ALT 250 FOR IP): Performed by: NURSE PRACTITIONER

## 2022-09-18 PROCEDURE — 1200000000 HC SEMI PRIVATE

## 2022-09-18 PROCEDURE — 74018 RADEX ABDOMEN 1 VIEW: CPT

## 2022-09-18 RX ADMIN — SERTRALINE 100 MG: 50 TABLET, FILM COATED ORAL at 09:14

## 2022-09-18 RX ADMIN — VALPROIC ACID 250 MG: 250 SOLUTION ORAL at 09:16

## 2022-09-18 RX ADMIN — MORPHINE SULFATE 2 MG: 2 INJECTION, SOLUTION INTRAMUSCULAR; INTRAVENOUS at 05:53

## 2022-09-18 RX ADMIN — POLYETHYLENE GLYCOL 3350 17 G: 17 POWDER, FOR SOLUTION ORAL at 09:15

## 2022-09-18 RX ADMIN — MINERAL SUPPLEMENT IRON 300 MG / 5 ML STRENGTH LIQUID 100 PER BOX UNFLAVORED 220 MG: at 09:16

## 2022-09-18 RX ADMIN — DOCUSATE SODIUM LIQUID 100 MG: 100 LIQUID ORAL at 09:15

## 2022-09-18 RX ADMIN — BACLOFEN 5 MG: 10 TABLET ORAL at 22:17

## 2022-09-18 RX ADMIN — MIDODRINE HYDROCHLORIDE 10 MG: 5 TABLET ORAL at 09:14

## 2022-09-18 RX ADMIN — BENZTROPINE MESYLATE 2 MG: 1 TABLET ORAL at 09:15

## 2022-09-18 RX ADMIN — BACLOFEN 5 MG: 10 TABLET ORAL at 09:14

## 2022-09-18 RX ADMIN — DOCUSATE SODIUM LIQUID 100 MG: 100 LIQUID ORAL at 22:17

## 2022-09-18 RX ADMIN — MIDODRINE HYDROCHLORIDE 10 MG: 5 TABLET ORAL at 22:17

## 2022-09-18 RX ADMIN — LACOSAMIDE 100 MG: 50 TABLET, FILM COATED ORAL at 22:18

## 2022-09-18 RX ADMIN — QUETIAPINE FUMARATE 25 MG: 25 TABLET ORAL at 09:14

## 2022-09-18 RX ADMIN — SENNOSIDES 17.2 MG: 8.6 TABLET, COATED ORAL at 09:15

## 2022-09-18 RX ADMIN — MIDODRINE HYDROCHLORIDE 10 MG: 5 TABLET ORAL at 13:58

## 2022-09-18 RX ADMIN — LINACLOTIDE 290 MCG: 145 CAPSULE, GELATIN COATED ORAL at 05:10

## 2022-09-18 RX ADMIN — SENNOSIDES 17.2 MG: 8.6 TABLET, COATED ORAL at 22:17

## 2022-09-18 RX ADMIN — LACOSAMIDE 100 MG: 50 TABLET, FILM COATED ORAL at 09:14

## 2022-09-18 RX ADMIN — MORPHINE SULFATE 2 MG: 2 INJECTION, SOLUTION INTRAMUSCULAR; INTRAVENOUS at 17:04

## 2022-09-18 RX ADMIN — FAMOTIDINE 20 MG: 20 TABLET, FILM COATED ORAL at 22:18

## 2022-09-18 RX ADMIN — POLYETHYLENE GLYCOL 3350 17 G: 17 POWDER, FOR SOLUTION ORAL at 22:16

## 2022-09-18 RX ADMIN — LEVETIRACETAM 500 MG: 100 SOLUTION ORAL at 22:17

## 2022-09-18 RX ADMIN — QUETIAPINE FUMARATE 25 MG: 25 TABLET ORAL at 22:17

## 2022-09-18 RX ADMIN — Medication 1000 MG: at 01:08

## 2022-09-18 RX ADMIN — ENOXAPARIN SODIUM 30 MG: 100 INJECTION SUBCUTANEOUS at 09:16

## 2022-09-18 RX ADMIN — Medication 10 ML: at 09:13

## 2022-09-18 RX ADMIN — FAMOTIDINE 20 MG: 20 TABLET, FILM COATED ORAL at 09:16

## 2022-09-18 RX ADMIN — MINERAL SUPPLEMENT IRON 300 MG / 5 ML STRENGTH LIQUID 100 PER BOX UNFLAVORED 220 MG: at 22:17

## 2022-09-18 RX ADMIN — VALPROIC ACID 250 MG: 250 SOLUTION ORAL at 22:17

## 2022-09-18 RX ADMIN — OXYCODONE AND ACETAMINOPHEN 1 TABLET: 5; 325 TABLET ORAL at 09:15

## 2022-09-18 RX ADMIN — BACLOFEN 5 MG: 10 TABLET ORAL at 13:58

## 2022-09-18 RX ADMIN — BENZTROPINE MESYLATE 2 MG: 1 TABLET ORAL at 22:17

## 2022-09-18 RX ADMIN — MINERAL SUPPLEMENT IRON 300 MG / 5 ML STRENGTH LIQUID 100 PER BOX UNFLAVORED 220 MG: at 13:58

## 2022-09-18 RX ADMIN — LEVETIRACETAM 500 MG: 100 SOLUTION ORAL at 09:13

## 2022-09-18 ASSESSMENT — PAIN DESCRIPTION - LOCATION
LOCATION: GENERALIZED
LOCATION: LEG

## 2022-09-18 ASSESSMENT — PAIN DESCRIPTION - DESCRIPTORS: DESCRIPTORS: DISCOMFORT

## 2022-09-18 ASSESSMENT — PAIN DESCRIPTION - ORIENTATION: ORIENTATION: RIGHT

## 2022-09-18 ASSESSMENT — PAIN SCALES - WONG BAKER
WONGBAKER_NUMERICALRESPONSE: 10
WONGBAKER_NUMERICALRESPONSE: 10

## 2022-09-18 NOTE — PROGRESS NOTES
Hospitalist Progress Note      PCP: Destiny Balderas MD    Date of Admission: 9/13/2022    Chief Complaint: Fever    Hospital Course: Deloris Ramirez is a 27 y.o. female with hx anoxic brain injury from heroin overdose 2016, viral hepatitis, cardiac arrest, seizures, and constipation. Patient was transferred from [facility] for any fever. Patient has a history of anoxic brain injury in 2016 after drug overdose. She is nonverbal and responds only to pain. Per ER nursing staff this is her baseline. She was febrile 101.3 in the emergency room, she was found to have a UTI. As well as fecal impaction. Subjective: Patient seen and examined. ROS UTO due to anoxic brain injury. Nonverbal.  No BM since last night.          Medications:  Reviewed    Infusion Medications    sodium chloride 20 mL/hr at 09/15/22 1224     Scheduled Medications    polyethylene glycol  17 g Oral BID    levETIRAcetam  500 mg PEG Tube BID    linaclotide  290 mcg Per G Tube QAM AC    Baclofen  5 mg Per G Tube TID    cefTRIAXone (ROCEPHIN) IV  1,000 mg IntraVENous Q24H    docusate  100 mg Per G Tube BID    famotidine  20 mg PEG Tube BID    ferrous sulfate  220 mg Per G Tube TID    lacosamide  100 mg Per G Tube BID    midodrine  10 mg Per G Tube TID    QUEtiapine  25 mg PEG Tube BID    senna  2 tablet Per G Tube BID    sertraline  100 mg Per G Tube Daily    sodium chloride flush  5-40 mL IntraVENous 2 times per day    enoxaparin  30 mg SubCUTAneous Daily    benztropine  2 mg Per G Tube BID    valproic acid  250 mg Per G Tube BID     PRN Meds: oxyCODONE-acetaminophen, morphine, magnesium hydroxide, sodium chloride flush, sodium chloride, ondansetron **OR** ondansetron, acetaminophen **OR** acetaminophen, morphine      Intake/Output Summary (Last 24 hours) at 9/18/2022 1137  Last data filed at 9/18/2022 0900  Gross per 24 hour   Intake 1785 ml   Output 1300 ml   Net 485 ml       Physical Exam Performed:    /67   Pulse 79   Temp 98.3 °F (36.8 °C) (Oral)   Resp 17   Ht 5' 5\" (1.651 m)   Wt 114 lb 5 oz (51.9 kg)   SpO2 95%   BMI 19.02 kg/m²     General appearance: No apparent distress, appears stated age and cooperative. HEENT: Pupils equal, round, and reactive to light. Conjunctivae clear. Neck: Trachea midline. Respiratory:  Normal respiratory effort. Clear to auscultation, bilaterally without Rales/Wheezes/Rhonchi. Cardiovascular: Regular rate and rhythm with normal S1/S2 without murmurs, rubs or gallops. Abdomen: Soft, non-tender, non-distended with normal bowel sounds. Musculoskeletal: No clubbing, cyanosis. RLE edema and erythema. Skin: Skin color, texture, turgor normal.   Neurologic:  UTO. Non-verbal. No spontaneous movement noted. Psychiatric: Non-verbal.  Capillary Refill: Brisk, 3 seconds, normal   Peripheral Pulses: +2 palpable, equal bilaterally     PEG site erythema & Granulation tissue noted. Crouch+    Labs:   No results for input(s): WBC, HGB, HCT, PLT in the last 72 hours. Recent Labs     09/16/22  0551 09/17/22  0608   PHOS 5.5* 4.2     No results for input(s): AST, ALT, BILIDIR, BILITOT, ALKPHOS in the last 72 hours. No results for input(s): INR in the last 72 hours. No results for input(s): Cherre Gash in the last 72 hours. Urinalysis:      Lab Results   Component Value Date/Time    NITRU Negative 09/13/2022 09:45 PM    WBCUA 121 09/13/2022 09:45 PM    BACTERIA Rare 09/13/2022 09:45 PM    RBCUA 12 09/13/2022 09:45 PM    BLOODU SMALL 09/13/2022 09:45 PM    SPECGRAV 1.015 09/13/2022 09:45 PM    GLUCOSEU Negative 09/13/2022 09:45 PM    GLUCOSEU NEGATIVE 09/20/2011 04:50 PM       Radiology:  XR ABDOMEN (KUB) (SINGLE AP VIEW)   Final Result   Decreased stool burden. Diffuse air-filled colon on current exam that is   increased from prior imaging. XR ABDOMEN (KUB) (SINGLE AP VIEW)   Final Result   1. Persistent constipation.   Moderate stool in the colon is overall improved   from most recent comparison and is predominantly located at the rectosigmoid   with persistent moderate to severe distension of the rectum. 2. No evidence of small-bowel obstruction. VL Extremity Venous Bilateral         CT ABDOMEN PELVIS W IV CONTRAST Additional Contrast? None   Final Result   1. Suspected fecal impaction as demonstrated on the previous examination with   a large volume of stool noted in the rectosigmoid colon. 2. Constipation with large volume of stool throughout. 3. Interval development of mild left-sided hydroureteronephrosis felt   secondary to ureteral compression from the fecal impaction. XR CHEST PORTABLE   Final Result   Mild cardiomegaly which is more prominent with no acute pulmonary abnormality. Assessment/Plan:    Active Hospital Problems    Diagnosis     UTI (urinary tract infection) [N39.0]      Priority: Medium       E. coli UTI with Sepsis POA:  Continue IV rocephin. Blood cx no growth to date. Crouch placed in ER. Constipation with Fecal Impaction:  Previous admissions on May and June for fecal impaction ? If recurrence or fecal impaction never completely resolved on last admission. Repeat KUB: Decreased stool burden with increasing with filled colon. Continue bowel regimen & Linzess. TSH wnl. Unable to get OOB given brain injury. Seizures: Continue home meds. Urinary Retention:   Crouch placed in ER. Will discontinue when having adequate bowel movements. Left Hydroureteronephrosis secondary to Fecal Impaction:  Monitor urine output. Renal ultrasound when constipation resolves. Anoxic brain injury: Currently at baseline. Nutrition: s/p gastrostomy tube. Wound care consulted for PEG site care. Dietitian following for tube feeding. RLE erythema and Pain: resolved. No DVT on Venous Duplex.        DVT Prophylaxis: Lovenox  Diet: Diet NPO  ADULT TUBE FEEDING; PEG; Standard with Fiber; Continuous; 25; Yes; 25; Q 4 hours; 50; 30; Q 4 hours  Code Status: Full Code  PT/OT Eval Status: Currently at baseline    Dispo - ECF when stable      Ade Wilson MD

## 2022-09-18 NOTE — PLAN OF CARE
Problem: Pain  Goal: Verbalizes/displays adequate comfort level or baseline comfort level  9/18/2022 1032 by Latoya Bai RN  Outcome: Progressing  9/18/2022 0640 by Nubia Syed RN  Outcome: Progressing     Problem: Skin/Tissue Integrity  Goal: Absence of new skin breakdown  Description: 1. Monitor for areas of redness and/or skin breakdown  2. Assess vascular access sites hourly  3. Every 4-6 hours minimum:  Change oxygen saturation probe site  4. Every 4-6 hours:  If on nasal continuous positive airway pressure, respiratory therapy assess nares and determine need for appliance change or resting period.   9/18/2022 0640 by Nubia Syed RN  Outcome: Progressing     Problem: Safety - Adult  Goal: Free from fall injury  9/18/2022 1032 by Latoya Bai RN  Outcome: Progressing  9/18/2022 0640 by Nubia Syed RN  Outcome: Progressing     Problem: ABCDS Injury Assessment  Goal: Absence of physical injury  Recent Flowsheet Documentation  Taken 9/18/2022 0743 by Latoya Bai RN  Absence of Physical Injury: Implement safety measures based on patient assessment  9/18/2022 0640 by Nubia Syed RN  Outcome: Progressing     Problem: Nutrition Deficit:  Goal: Optimize nutritional status  9/18/2022 0640 by Nubia Syed RN  Outcome: Progressing

## 2022-09-19 ENCOUNTER — APPOINTMENT (OUTPATIENT)
Dept: ULTRASOUND IMAGING | Age: 27
DRG: 871 | End: 2022-09-19
Payer: MEDICARE

## 2022-09-19 VITALS
HEIGHT: 65 IN | SYSTOLIC BLOOD PRESSURE: 123 MMHG | BODY MASS INDEX: 18.9 KG/M2 | OXYGEN SATURATION: 94 % | RESPIRATION RATE: 19 BRPM | DIASTOLIC BLOOD PRESSURE: 74 MMHG | HEART RATE: 120 BPM | WEIGHT: 113.44 LBS | TEMPERATURE: 98.2 F

## 2022-09-19 PROBLEM — N39.0 SEPSIS DUE TO GRAM-NEGATIVE UTI (HCC): Status: ACTIVE | Noted: 2022-09-19

## 2022-09-19 PROBLEM — K59.00 CONSTIPATION: Status: ACTIVE | Noted: 2022-09-19

## 2022-09-19 PROBLEM — R33.9 URINARY RETENTION: Status: ACTIVE | Noted: 2022-09-19

## 2022-09-19 PROBLEM — A41.50 SEPSIS DUE TO GRAM-NEGATIVE UTI (HCC): Status: ACTIVE | Noted: 2022-09-19

## 2022-09-19 LAB
A/G RATIO: 0.9 (ref 1.1–2.2)
ALBUMIN SERPL-MCNC: 3.6 G/DL (ref 3.4–5)
ALP BLD-CCNC: 51 U/L (ref 40–129)
ALT SERPL-CCNC: 22 U/L (ref 10–40)
ANION GAP SERPL CALCULATED.3IONS-SCNC: 12 MMOL/L (ref 3–16)
AST SERPL-CCNC: 32 U/L (ref 15–37)
BILIRUB SERPL-MCNC: 0.4 MG/DL (ref 0–1)
BUN BLDV-MCNC: 17 MG/DL (ref 7–20)
CALCIUM SERPL-MCNC: 10 MG/DL (ref 8.3–10.6)
CHLORIDE BLD-SCNC: 107 MMOL/L (ref 99–110)
CO2: 26 MMOL/L (ref 21–32)
CREAT SERPL-MCNC: 0.5 MG/DL (ref 0.6–1.1)
GFR AFRICAN AMERICAN: >60
GFR NON-AFRICAN AMERICAN: >60
GLUCOSE BLD-MCNC: 120 MG/DL (ref 70–99)
HCT VFR BLD CALC: 36.1 % (ref 36–48)
HEMOGLOBIN: 11.9 G/DL (ref 12–16)
MAGNESIUM: 2.4 MG/DL (ref 1.8–2.4)
MCH RBC QN AUTO: 29.8 PG (ref 26–34)
MCHC RBC AUTO-ENTMCNC: 32.9 G/DL (ref 31–36)
MCV RBC AUTO: 90.6 FL (ref 80–100)
PDW BLD-RTO: 13.1 % (ref 12.4–15.4)
PHOSPHORUS: 5 MG/DL (ref 2.5–4.9)
PLATELET # BLD: 289 K/UL (ref 135–450)
PMV BLD AUTO: 8.2 FL (ref 5–10.5)
POTASSIUM SERPL-SCNC: 4.3 MMOL/L (ref 3.5–5.1)
RBC # BLD: 3.98 M/UL (ref 4–5.2)
SODIUM BLD-SCNC: 145 MMOL/L (ref 136–145)
TOTAL PROTEIN: 7.5 G/DL (ref 6.4–8.2)
WBC # BLD: 7.9 K/UL (ref 4–11)

## 2022-09-19 PROCEDURE — 6360000002 HC RX W HCPCS: Performed by: NURSE PRACTITIONER

## 2022-09-19 PROCEDURE — 80053 COMPREHEN METABOLIC PANEL: CPT

## 2022-09-19 PROCEDURE — 2580000003 HC RX 258: Performed by: NURSE PRACTITIONER

## 2022-09-19 PROCEDURE — 84100 ASSAY OF PHOSPHORUS: CPT

## 2022-09-19 PROCEDURE — 36415 COLL VENOUS BLD VENIPUNCTURE: CPT

## 2022-09-19 PROCEDURE — 85027 COMPLETE CBC AUTOMATED: CPT

## 2022-09-19 PROCEDURE — 51798 US URINE CAPACITY MEASURE: CPT

## 2022-09-19 PROCEDURE — 6370000000 HC RX 637 (ALT 250 FOR IP): Performed by: NURSE PRACTITIONER

## 2022-09-19 PROCEDURE — 6370000000 HC RX 637 (ALT 250 FOR IP): Performed by: INTERNAL MEDICINE

## 2022-09-19 PROCEDURE — 76770 US EXAM ABDO BACK WALL COMP: CPT

## 2022-09-19 PROCEDURE — 6370000000 HC RX 637 (ALT 250 FOR IP): Performed by: PHYSICIAN ASSISTANT

## 2022-09-19 PROCEDURE — 83735 ASSAY OF MAGNESIUM: CPT

## 2022-09-19 RX ORDER — SENNA PLUS 8.6 MG/1
2 TABLET ORAL 2 TIMES DAILY
Qty: 30 TABLET | Refills: 0
Start: 2022-09-19

## 2022-09-19 RX ORDER — QUETIAPINE FUMARATE 25 MG/1
25 TABLET, FILM COATED ORAL 2 TIMES DAILY
Qty: 30 TABLET | Refills: 0
Start: 2022-09-19

## 2022-09-19 RX ORDER — POLYETHYLENE GLYCOL 3350 17 G/17G
17 POWDER, FOR SOLUTION ORAL 2 TIMES DAILY
Qty: 1530 G | Refills: 1
Start: 2022-09-19 | End: 2023-03-18

## 2022-09-19 RX ADMIN — ACETAMINOPHEN 650 MG: 325 TABLET ORAL at 13:50

## 2022-09-19 RX ADMIN — Medication 10 ML: at 08:00

## 2022-09-19 RX ADMIN — LINACLOTIDE 290 MCG: 145 CAPSULE, GELATIN COATED ORAL at 07:59

## 2022-09-19 RX ADMIN — QUETIAPINE FUMARATE 25 MG: 25 TABLET ORAL at 07:59

## 2022-09-19 RX ADMIN — Medication 1000 MG: at 00:09

## 2022-09-19 RX ADMIN — VALPROIC ACID 250 MG: 250 SOLUTION ORAL at 07:55

## 2022-09-19 RX ADMIN — MINERAL SUPPLEMENT IRON 300 MG / 5 ML STRENGTH LIQUID 100 PER BOX UNFLAVORED 220 MG: at 07:59

## 2022-09-19 RX ADMIN — POLYETHYLENE GLYCOL 3350 17 G: 17 POWDER, FOR SOLUTION ORAL at 08:00

## 2022-09-19 RX ADMIN — MIDODRINE HYDROCHLORIDE 10 MG: 5 TABLET ORAL at 07:56

## 2022-09-19 RX ADMIN — MIDODRINE HYDROCHLORIDE 10 MG: 5 TABLET ORAL at 13:50

## 2022-09-19 RX ADMIN — OXYCODONE AND ACETAMINOPHEN 1 TABLET: 5; 325 TABLET ORAL at 13:49

## 2022-09-19 RX ADMIN — MINERAL SUPPLEMENT IRON 300 MG / 5 ML STRENGTH LIQUID 100 PER BOX UNFLAVORED 220 MG: at 13:49

## 2022-09-19 RX ADMIN — ENOXAPARIN SODIUM 30 MG: 100 INJECTION SUBCUTANEOUS at 08:00

## 2022-09-19 RX ADMIN — LEVETIRACETAM 500 MG: 100 SOLUTION ORAL at 07:59

## 2022-09-19 RX ADMIN — LACOSAMIDE 100 MG: 50 TABLET, FILM COATED ORAL at 07:58

## 2022-09-19 RX ADMIN — FAMOTIDINE 20 MG: 20 TABLET, FILM COATED ORAL at 07:59

## 2022-09-19 RX ADMIN — BENZTROPINE MESYLATE 2 MG: 1 TABLET ORAL at 07:58

## 2022-09-19 RX ADMIN — SENNOSIDES 17.2 MG: 8.6 TABLET, COATED ORAL at 07:59

## 2022-09-19 RX ADMIN — BACLOFEN 5 MG: 10 TABLET ORAL at 13:50

## 2022-09-19 RX ADMIN — SERTRALINE 100 MG: 50 TABLET, FILM COATED ORAL at 07:58

## 2022-09-19 RX ADMIN — BACLOFEN 5 MG: 10 TABLET ORAL at 07:55

## 2022-09-19 RX ADMIN — DOCUSATE SODIUM LIQUID 100 MG: 100 LIQUID ORAL at 07:59

## 2022-09-19 RX ADMIN — Medication 10 ML: at 00:08

## 2022-09-19 ASSESSMENT — PAIN SCALES - GENERAL
PAINLEVEL_OUTOF10: 2
PAINLEVEL_OUTOF10: 0
PAINLEVEL_OUTOF10: 0

## 2022-09-19 ASSESSMENT — PAIN SCALES - WONG BAKER
WONGBAKER_NUMERICALRESPONSE: 4
WONGBAKER_NUMERICALRESPONSE: 0

## 2022-09-19 NOTE — PROGRESS NOTES
Transport here for pt. IV removed. Site WNL. Peg tube flushed with 30 cc tap water and clamped. Purewick removed. Pt incontinent of stool. Pericare complete. New brief and gown applied. Report given to transporters.

## 2022-09-19 NOTE — PROGRESS NOTES
Pt resting in bed with eyes closed. No signs of distress noted. Safety camera in place. Bed alarm set.

## 2022-09-19 NOTE — CARE COORDINATION
Discharge note:      CM/SW has been notified of discharge. Patient noted to have the following needs at discharge. CM/SW has coordinated the following services:       Discharge Destination:   [address], [place] [address]  Phone [phone]  Fax [phone]     Transportation: [facility] [facility]  ([phone])       . Discharge Time: 5:45pm  AVS faxed and agency notified: Fax [phone]   The following prescriptions sent with patient:  Nurse to call report to facility: Phone [phone] left with the Guardian         All CM/SW needs met, will sign off.

## 2022-09-19 NOTE — DISCHARGE SUMMARY
[facility]    [facility] Demographics    Patient. Deloris Ramirez  Date of Birth. 1995  MRN. [id number]     Primary care provider. Destiny Balderas MD  (Tel: [phone])    Admit date: 9/13/2022    Discharge date (blank if same as Note Date): Note Date: 9/19/2022     Reason for Hospitalization. Chief Complaint   Patient presents with    Fever     Pt from [facility]. Fever, bruise on right knee         Significant Findings. Principal Problem:    Constipation  Active Problems:    UTI (urinary tract infection)    Urinary retention    Sepsis due to gram-negative UTI (HCC)    Fecal impaction in rectum (HCC)    Hydronephrosis  Resolved Problems:    * No resolved hospital problems. *       Problems and results from this hospitalization that need follow up. Severe constipation with fecal impaction  UTI with sepsis    Significant test results and incidental findings. XR ABDOMEN (KUB) (SINGLE AP VIEW)   Final Result   Decreased stool burden. Diffuse air-filled colon on current exam that is   increased from prior imaging. XR ABDOMEN (KUB) (SINGLE AP VIEW)   Final Result   1. Persistent constipation. Moderate stool in the colon is overall improved   from most recent comparison and is predominantly located at the rectosigmoid   with persistent moderate to severe distension of the rectum. 2. No evidence of small-bowel obstruction. VL Extremity Venous Bilateral         CT ABDOMEN PELVIS W IV CONTRAST Additional Contrast? None   Final Result   1. Suspected fecal impaction as demonstrated on the previous examination with   a large volume of stool noted in the rectosigmoid colon. 2. Constipation with large volume of stool throughout. 3. Interval development of mild left-sided hydroureteronephrosis felt   secondary to ureteral compression from the fecal impaction.          XR CHEST PORTABLE   Final Result   Mild cardiomegaly which is more prominent with no acute pulmonary abnormality. US RENAL LIMITED    (Results Pending)     Invasive procedures and treatments. None     [facility] Course. Francy Mukherjee is a 27 y.o. female with hx anoxic brain injury from heroin overdose 2016, viral hepatitis, cardiac arrest, seizures, and constipation. Patient was transferred from [facility] for any fever. Patient has a history of anoxic brain injury in 2016 after drug overdose. She is nonverbal and responds only to pain. Per ER nursing staff this is her baseline. She was febrile 101.3 in the emergency room, she was found to have a UTI. As well as fecal impaction. E. coli UTI with Sepsis POA:  Continued IV rocephin patient and discharged on Keflex. Blood cx no growth to date. Constipation with Fecal Impaction:  With recurrent admissions for constipation. Repeat KUB: Decreased stool burden with increasing with gas filled colon. Discussed with GI: No megacolon noted. Continue bowel regimen & Linzess. TSH wnl. Mobilize in bed as much as tolerated        Seizures: Continued home meds. Urinary Retention:   Crouch placed in ER. Passed voiding trial 9/18/2022      Left Hydroureteronephrosis secondary to Fecal Impaction:  Repeat renal ultrasound without hydronephrosis. Anoxic brain injury: Currently at baseline. Nutrition: s/p gastrostomy tube. Continued PEG site care and tube feeding. RLE erythema and Pain: resolved. No DVT on Venous Duplex. Consults. IP CONSULT TO DIETITIAN  IP CONSULT TO GI    Physical examination on discharge day. /73   Pulse (!) 137   Temp 98.4 °F (36.9 °C) (Oral)   Resp 18   Ht 5' 5\" (1.651 m)   Wt 113 lb 7 oz (51.5 kg)   SpO2 94%   BMI 18.88 kg/m²     General appearance: No apparent distress, appears stated age and cooperative. HEENT: Pupils equal, round, and reactive to light. Conjunctivae clear. Neck: Trachea midline. Respiratory:  Normal respiratory effort. Clear to auscultation, bilaterally without Rales/Wheezes/Rhonchi. Cardiovascular: Regular rate and rhythm with normal S1/S2 without murmurs, rubs or gallops. Abdomen: Soft, non-tender, non-distended with normal bowel sounds. Musculoskeletal: No clubbing, cyanosis. Contracted in the extremities Skin: Skin color, texture, turgor normal.   Neurologic:  UTO. Non-verbal. No spontaneous movement noted. Psychiatric: Non-verbal.  Capillary Refill: Brisk, 3 seconds, normal   Peripheral Pulses: +2 palpable, equal bilaterally      PEG site erythema & Granulation tissue noted. Condition at time of discharge: Stable. Medication instructions provided to patient at discharge.      Medication List        START taking these medications      linaclotide 290 MCG Caps capsule  Commonly known as: LINZESS  1 capsule by Per G Tube route every morning (before breakfast)  Start taking on: September 20, 2022            CHANGE how you take these medications      polyethylene glycol 17 GM/SCOOP powder  Commonly known as: GLYCOLAX  Take 17 g by mouth 2 times daily  What changed: when to take this     senna 8.6 MG tablet  Commonly known as: SENOKOT  2 tablets by Per G Tube route 2 times daily  What changed: when to take this            CONTINUE taking these medications      acetaminophen 325 MG tablet  Commonly known as: TYLENOL     Baclofen 5 MG tablet  Commonly known as: LIORESAL     benztropine 1 MG tablet  Commonly known as: COGENTIN  Take 2 tablets by mouth 2 times daily     docusate sodium 100 MG capsule  Commonly known as: Colace  Take 1 capsule by mouth 2 times daily     famotidine 20 MG tablet  Commonly known as: PEPCID  1 tablet by PEG Tube route 2 times daily     ferrous sulfate 220 (44 Fe) MG/5ML solution     fleet 7-19 GM/118ML     lacosamide 100 MG Tabs tablet  Commonly known as: VIMPAT     LACTOBACILLUS PO     levETIRAcetam 100 MG/ML solution  Commonly known as: KEPPRA  5 mLs by PEG Tube route 2 times daily     midodrine 5 MG tablet  Commonly known as: PROAMATINE     paliperidone palmitate  MG/0.75ML Radha IM injection  Commonly known as: INVEGA SUSTENNA     QUEtiapine 25 MG tablet  Commonly known as: SEROQUEL  1 tablet by Per G Tube route 2 times daily     sertraline 100 MG tablet  Commonly known as: ZOLOFT     valproate 100 MG/ML injection  Commonly known as: DEPACON            STOP taking these medications      erythromycin 5 MG/GM ophthalmic ointment  Commonly known as: ROMYCIN               Where to Get Your Medications        Information about where to get these medications is not yet available    Ask your nurse or doctor about these medications  linaclotide 290 MCG Caps capsule  polyethylene glycol 17 GM/SCOOP powder  QUEtiapine 25 MG tablet  senna 8.6 MG tablet         Discharge recommendations given to patient. Follow Up. PCP in 1 week   Disposition. long term care facility  Activity. activity as tolerated  Diet: Diet NPO  ADULT TUBE FEEDING; PEG; Standard with Fiber; Continuous; 25; Yes; 25; Q 4 hours; 50; 30; Q 4 hours      Spent 35 minutes in discharge process.     Signed:  Grzegorz Zarate MD     9/19/2022 12:46 PM

## 2022-09-23 NOTE — PROGRESS NOTES
Physician Progress Note      PATIENT:               Allan Johnson  CSN #:                  [id number]  :                       1995  ADMIT DATE:       2022 9:26 PM  [address] DATE:        2022 6:00 PM  RESPONDING  PROVIDER #:        Janet Cosby MD          QUERY TEXT:    Patient admitted with Sepsis due to UTI, noted to have Anoxic Brain Damage   from previous Heroin Overdose with contractures to all extremities. If   possible, please document in progress notes and/or discharge summary whether   the patient also has: The medical record reflects the following:  Risk Factors: Noted to be Non-Verbal with contractures to all extremities. Clinical Indicators: Per Nursing: Current functional level: dependent for all   care  Treatment: Nursing staff provides for all ADLs    Functional quadriplegia (or quadriparesis) is defined as the complete   inability to move due to severe disability or frailty caused by another   medical condition without physical injury or damage to the brain or spinal   cord. Source: https://acphospitalist.org  Options provided:  -- Functional quadriplegia secondary to anoxic brain injury. -- Advanced debility in the setting of anoxic brain injury without functional   quadriplegia. -- Other - I will add my own diagnosis  -- Disagree - Not applicable / Not valid  -- Disagree - Clinically unable to determine / Unknown  -- Refer to Clinical Documentation Reviewer    PROVIDER RESPONSE TEXT:    This patient has Functional quadriplegia secondary to anoxic brain injury.     Query created by: Filipe Bob on 2022 7:29 AM      Electronically signed by:  Janet Cosby MD 2022 7:35 AM

## 2022-10-04 ENCOUNTER — APPOINTMENT (OUTPATIENT)
Dept: CT IMAGING | Age: 27
End: 2022-10-04
Payer: MEDICARE

## 2022-10-04 ENCOUNTER — APPOINTMENT (OUTPATIENT)
Dept: GENERAL RADIOLOGY | Age: 27
End: 2022-10-04
Payer: MEDICARE

## 2022-10-04 ENCOUNTER — HOSPITAL ENCOUNTER (EMERGENCY)
Age: 27
Discharge: HOME | End: 2022-10-04
Payer: MEDICARE

## 2022-10-04 VITALS
TEMPERATURE: 98.6 F | DIASTOLIC BLOOD PRESSURE: 74 MMHG | SYSTOLIC BLOOD PRESSURE: 105 MMHG | HEART RATE: 89 BPM | RESPIRATION RATE: 11 BRPM | OXYGEN SATURATION: 98 %

## 2022-10-04 DIAGNOSIS — K59.00 CONSTIPATION, UNSPECIFIED CONSTIPATION TYPE: Primary | ICD-10-CM

## 2022-10-04 LAB
A/G RATIO: 1.1 (ref 1.1–2.2)
ALBUMIN SERPL-MCNC: 3.9 G/DL (ref 3.4–5)
ALP BLD-CCNC: 204 U/L (ref 40–129)
ALT SERPL-CCNC: 11 U/L (ref 10–40)
ANION GAP SERPL CALCULATED.3IONS-SCNC: 10 MMOL/L (ref 3–16)
AST SERPL-CCNC: 24 U/L (ref 15–37)
BASOPHILS ABSOLUTE: 0.1 K/UL (ref 0–0.2)
BASOPHILS RELATIVE PERCENT: 0.9 %
BILIRUB SERPL-MCNC: 0.3 MG/DL (ref 0–1)
BILIRUBIN URINE: NEGATIVE
BLOOD, URINE: NEGATIVE
BUN BLDV-MCNC: 17 MG/DL (ref 7–20)
CALCIUM SERPL-MCNC: 9.3 MG/DL (ref 8.3–10.6)
CHLORIDE BLD-SCNC: 100 MMOL/L (ref 99–110)
CLARITY: CLEAR
CO2: 27 MMOL/L (ref 21–32)
COLOR: ABNORMAL
CREAT SERPL-MCNC: 0.5 MG/DL (ref 0.6–1.1)
CRYSTALS, UA: ABNORMAL /HPF
EOSINOPHILS ABSOLUTE: 0.2 K/UL (ref 0–0.6)
EOSINOPHILS RELATIVE PERCENT: 2.4 %
EPITHELIAL CELLS, UA: ABNORMAL /HPF (ref 0–5)
GFR AFRICAN AMERICAN: >60
GFR NON-AFRICAN AMERICAN: >60
GLUCOSE BLD-MCNC: 98 MG/DL (ref 70–99)
GLUCOSE URINE: NEGATIVE MG/DL
HCG(URINE) PREGNANCY TEST: NEGATIVE
HCT VFR BLD CALC: 39.7 % (ref 36–48)
HEMOGLOBIN: 12.8 G/DL (ref 12–16)
KETONES, URINE: ABNORMAL MG/DL
LACTIC ACID: 1.1 MMOL/L (ref 0.4–2)
LEUKOCYTE ESTERASE, URINE: ABNORMAL
LYMPHOCYTES ABSOLUTE: 2.5 K/UL (ref 1–5.1)
LYMPHOCYTES RELATIVE PERCENT: 32.6 %
MCH RBC QN AUTO: 29 PG (ref 26–34)
MCHC RBC AUTO-ENTMCNC: 32.2 G/DL (ref 31–36)
MCV RBC AUTO: 90.1 FL (ref 80–100)
MICROSCOPIC EXAMINATION: YES
MONOCYTES ABSOLUTE: 0.7 K/UL (ref 0–1.3)
MONOCYTES RELATIVE PERCENT: 9 %
NEUTROPHILS ABSOLUTE: 4.2 K/UL (ref 1.7–7.7)
NEUTROPHILS RELATIVE PERCENT: 55.1 %
NITRITE, URINE: NEGATIVE
PDW BLD-RTO: 13.7 % (ref 12.4–15.4)
PH UA: 5.5 (ref 5–8)
PLATELET # BLD: 312 K/UL (ref 135–450)
PMV BLD AUTO: 9.3 FL (ref 5–10.5)
POTASSIUM REFLEX MAGNESIUM: 4.8 MMOL/L (ref 3.5–5.1)
PROTEIN UA: NEGATIVE MG/DL
RBC # BLD: 4.41 M/UL (ref 4–5.2)
RBC UA: ABNORMAL /HPF (ref 0–4)
SODIUM BLD-SCNC: 137 MMOL/L (ref 136–145)
SPECIFIC GRAVITY UA: >=1.03 (ref 1–1.03)
TOTAL PROTEIN: 7.4 G/DL (ref 6.4–8.2)
URINE REFLEX TO CULTURE: ABNORMAL
URINE TYPE: ABNORMAL
UROBILINOGEN, URINE: 1 E.U./DL
WBC # BLD: 7.5 K/UL (ref 4–11)
WBC UA: ABNORMAL /HPF (ref 0–5)

## 2022-10-04 PROCEDURE — 80053 COMPREHEN METABOLIC PANEL: CPT

## 2022-10-04 PROCEDURE — 36415 COLL VENOUS BLD VENIPUNCTURE: CPT

## 2022-10-04 PROCEDURE — 81001 URINALYSIS AUTO W/SCOPE: CPT

## 2022-10-04 PROCEDURE — 74176 CT ABD & PELVIS W/O CONTRAST: CPT

## 2022-10-04 PROCEDURE — 85025 COMPLETE CBC W/AUTO DIFF WBC: CPT

## 2022-10-04 PROCEDURE — 71045 X-RAY EXAM CHEST 1 VIEW: CPT

## 2022-10-04 PROCEDURE — 99284 EMERGENCY DEPT VISIT MOD MDM: CPT

## 2022-10-04 PROCEDURE — 84703 CHORIONIC GONADOTROPIN ASSAY: CPT

## 2022-10-04 PROCEDURE — 83605 ASSAY OF LACTIC ACID: CPT

## 2022-10-04 RX ADMIN — Medication 240 ML: at 21:08

## 2022-10-04 ASSESSMENT — PAIN - FUNCTIONAL ASSESSMENT: PAIN_FUNCTIONAL_ASSESSMENT: FACE, LEGS, ACTIVITY, CRY, AND CONSOLABILITY (FLACC)

## 2022-10-04 NOTE — ED TRIAGE NOTES
Pt arrived with no paperwork regarding medical dxs, surgeries, allergies and home medications from [facility]. Pt is non verbal.  Unable to ask screening questions.

## 2022-10-04 NOTE — ED NOTES
Pt is on a continuous pulse oximetry and telemetry monitoring. Pt continued on cycling blood pressure. Fall risk precautions in place, call light in reach, bed side table within reach, will continue to monitor.            Loretta Cordero RN  10/04/22 6006

## 2022-10-04 NOTE — ED PROVIDER NOTES
**ADVANCED PRACTICE PROVIDER, I HAVE EVALUATED THIS PATIENT**        [facility]      Pt Name: Alejandra PATEL:[id number]  Larrytrongfhelio 1995  Date of evaluation: 10/4/2022  Provider: FRANCK Pierre CNP      Chief Complaint:    Chief Complaint   Patient presents with    Fever     [facility] Fever of 101.8, cellulitis. Nursing Notes, Past Medical Hx, Past Surgical Hx, Social Hx, Allergies, and Family Hx were all reviewed and agreed with or any disagreements were addressed in the HPI.    HPI: (Location, Duration, Timing, Severity, Quality, Assoc Sx, Context, Modifying factors)    Chief Complaint of patient is nonverbal has no complaints but patient was sent from the nursing facility for cellulitis lower extremities    This is a  27 y.o. female who presents today from [facility] for a fever and cellulitis, evidently the patient's temperature was 101.8, however she arrives to the emergency department awake and alert, she is not tachycardic, she is in no acute respiratory distress and she is afebrile. Patient however, is nonverbal.  She has had an anoxic brain injury in the past, she is smiling, bright eyed and in no acute distress or toxic appearance. There is no redness or swelling is abnormal to her lower extremities. She is very bright eyed during exam, she is appears to be very pleasant does not appear in any acute distress or toxic appearance, FLACC score essentially would be a 0. Otherwise patient is nonverbal therefore unsure of any acute complaints, therefore no additional aggravating relieving factors.   She presents in no acute respiratory distress or toxic appearance    PastMedical/Surgical History:      Diagnosis Date    Acute hepatitis C without hepatic coma     Acute hepatitis C without hepatic coma     Anoxic brain damage (HCC)     Cardiac arrest (HCC)     s/p sertraline (ZOLOFT) 100 MG tablet 100 mg by Per G Tube route daily Give 2 tabletsHistorical Med      ferrous sulfate 220 (44 Fe) MG/5ML solution 220 mg by Per G Tube route 3 times dailyHistorical Med      Sodium Phosphates (FLEET) 7-19 GM/118ML Place 1 enema rectally daily as neededHistorical Med      acetaminophen (TYLENOL) 325 MG tablet 650 mg by Per G Tube route 2 times daily as needed for Pain Historical Med      valproate (DEPACON) 100 MG/ML injection 250 mg by Per G Tube route every 12 hours Give 10 ml every 12 hours for sz activityHistorical Med      lacosamide (VIMPAT) 100 MG TABS tablet Take 100 mg by mouth 2 times daily. Historical Med      Baclofen 5 MG TABS 5 mg by Per G Tube route 2 times daily Historical Med      paliperidone palmitate ER (INVEGA SUSTENNA) 117 MG/0.75ML MOSES IM injection Inject 117 mg into the muscle every 30 days At bedtime on the 8th of the monthHistorical Med      levETIRAcetam (KEPPRA) 100 MG/ML solution 5 mLs by PEG Tube route 2 times daily, Disp-1 Bottle, R-3DC to SNF      famotidine (PEPCID) 20 MG tablet 1 tablet by PEG Tube route 2 times daily, Disp-60 tablet, R-3DC to SNF      benztropine (COGENTIN) 1 MG tablet Take 2 tablets by mouth 2 times daily, Disp-120 tablet, R-3Print               Review of Systems:  (2-9 systems needed)  Review of Systems   Unable to perform ROS: Patient nonverbal (Complete review of systems is not able to be obtained by the patient secondary to her being nonverbal, anoxic brain injury however she does not appear in any acute distress)     \"Positives and Pertinent negatives as per HPI\"    Physical Exam:  Physical Exam  Vitals and nursing note reviewed. Constitutional:       Appearance: She is well-developed. She is not diaphoretic. HENT:      Head: Normocephalic. Right Ear: External ear normal.      Left Ear: External ear normal.   Eyes:      General: No scleral icterus. Right eye: No discharge. Left eye: No discharge. Cardiovascular:      Rate and Rhythm: Normal rate. Comments: Normal S1 and 2, peripheral pulses are 2+, no edema observed  Pulmonary:      Effort: Pulmonary effort is normal. No respiratory distress. Comments: Normal S1 and 2, peripheral pulses are 2+, no peripheral edema observed  Abdominal:      Palpations: Abdomen is soft. Comments: Abdomen is flat soft and nondistended. G-tube is in place. Bowel sounds are hypoactive, there is no abdominal guarding but no ascites or rigidity   Musculoskeletal:         General: Normal range of motion. Cervical back: Normal range of motion and neck supple. Skin:     General: Skin is warm. Capillary Refill: Capillary refill takes less than 2 seconds. Coloration: Skin is not pale. Comments: Skin is warm dry and natural, there is no visible rashes or lesions, I guess there was concern at the facility the patient had cellulitis of her lower extremities, I cannot find any erythema, break in skin integrity in her lower extremities that would correlate with what they are saying. Her skin is actually unremarkable, there is no visible rashes or lesions, no signs of cellulitis anywhere. Neurological:      General: No focal deficit present. Mental Status: She is alert. Mental status is at baseline.       Comments: Patient presents at baseline, she does not appear in any acute distress or toxic appearance   Psychiatric:         Behavior: Behavior normal.       MEDICAL DECISION MAKING    Vitals:    Vitals:    10/04/22 1538 10/04/22 2007   BP: 112/80 105/74   Pulse: 96 89   Resp: 15 11   Temp: 98.6 °F (37 °C)    TempSrc: Rectal    SpO2: 97% 98%       LABS:  Labs Reviewed   COMPREHENSIVE METABOLIC PANEL W/ REFLEX TO MG FOR LOW K - Abnormal; Notable for the following components:       Result Value    Creatinine 0.5 (*)     Alkaline Phosphatase 204 (*)     All other components within normal limits   URINALYSIS WITH REFLEX TO CULTURE - Abnormal; Notable for the following components:    Color, UA DARK YELLOW (*)     Ketones, Urine TRACE (*)     Leukocyte Esterase, Urine TRACE (*)     All other components within normal limits   MICROSCOPIC URINALYSIS - Abnormal; Notable for the following components:    Crystals, UA 2+ Ca. Oxalate (*)     All other components within normal limits   CBC WITH AUTO DIFFERENTIAL   LACTIC ACID   PREGNANCY, URINE        Remainder of labs reviewed and were negative at this time or not returned at the time of this note. RADIOLOGY:   Non-plain film images such as CT, Ultrasound and MRI are read by the radiologist. Amaris ZIEGLER, FRANCK - CNP have directly visualized the radiologic plain film image(s) with the below findings:      Interpretation per the Radiologist below, if available at the time of this note:    CT ABDOMEN PELVIS WO CONTRAST Additional Contrast? None   Final Result   No evidence of obstructive uropathy. No evidence of renal stones. Severe constipation and stool impaction in the rectum. Stable gastrostomy tube and Crouch catheter. XR CHEST PORTABLE   Final Result   No radiographic evidence of acute pulmonary disease. MEDICAL DECISION MAKING / ED COURSE:    Because of high probability of sudden clinical deterioration of the patient's condition and risk of further deterioration, critical care time required my full attention to the patient's condition; which included chart data review, documentation, medication ordering, reviewing the patient's old records, reevaluation patient's cardiac, pulmonary and neurological status. Reevaluation of vital signs. Consultations with ED attending and admitting physician. Ordering, interpreting reviewing diagnostic testing.   Therefore, I personally saw the patient and independently provided 32 minutes of non-concurrent critical care out of the total shared critical care time provided, direct attention to the patient's condition did not include time spent on procedures. PROCEDURES:   Procedures    None    Patient was given:  Medications   milk and molasses enema 240 mL (240 mLs Rectal Given 10/4/22 2108)       Patient presents from [facility] for a fever and cellulitis, evidently the patient's temperature was 101.8, however she arrives to the emergency department awake and alert, she is not tachycardic, she is in no acute respiratory distress and she is afebrile. Patient however, is nonverbal.  She has had an anoxic brain injury in the past, she is smiling, bright eyed and in no acute distress or toxic appearance. After evaluation and examination the patient her vital signs are completely stable, there is no signs of erythema or cellulitis in the lower extremities which is why the [facility] home sent her here, I even had the nursing staff call back to the facility to clarify what they were sending her in for as I cannot find any acute findings on physical exam.  Patient did suffer an anoxic brain injury from a drug overdose back in 2016 however she appears to be at baseline, her vital signs are stable she has no signs of sepsis however I did do some basic labs. CBC shows no sepsis or anemia. Metabolic panel shows no electrolyte disturbances or renal failure. Liver functions are normal.  Lactic acid is 1.1. Urine is slightly dark but no acute infection, however there are 2+ calcium oxalate stones, I did do a CT scan to evaluate for potential renal stone. Pregnancy is negative. CT the abdomen shows no evidence of obstructive uropathy, no evidence of renal stones however she does have severe's constipation and stool impaction within the rectum. I then ordered milk of molasses enema, patient had much success with a bowel movement after enema, at this time I do not think that steroids do any additional diagnostic studies and patient can go back to the facility.   Nursing staff was educated to call report to the nursing [place], they need to provide clarity when they send patients in the future. Therefore, shared medical decision was made by myself for the patient to go back to [facility] [place] with outpatient follow-up with her PCP, I did start her on liquid Colace via her G-tube to help prevent constipation like this again. The patient tolerated their visit well. I evaluated the patient. The physician was available for consultation as needed. The patient and / or the family were informed of the results of any tests, a time was given to answer questions, a plan was proposed and they agreed with plan. Patient was stable on departure from the ER. I am the Primary Clinician of Record. CLINICAL IMPRESSION:  1.  Constipation, unspecified constipation type        DISPOSITION Decision To Discharge 10/04/2022 09:37:35 PM      PATIENT REFERRED TO:  Fely Monge MD  [place] [phone]      Please have patient follow-up with the PCP in the next 3 days for reevaluation    [facility] Emergency Department  [place] [address]  [phone]  Go to   If symptoms worsen    DISCHARGE MEDICATIONS:  Discharge Medication List as of 10/4/2022  9:40 PM        START taking these medications    Details   docusate (COLACE) 50 MG/5ML liquid 5 mLs by Per G Tube route 2 times daily, Disp-300 mL, R-1Print             DISCONTINUED MEDICATIONS:  Discharge Medication List as of 10/4/2022  9:40 PM        STOP taking these medications       docusate sodium (COLACE) 100 MG capsule Comments:   Reason for Stopping:                      (Please note the MDM and HPI sections of this note were completed with a voice recognition program.  Efforts were made to edit the dictations but occasionally words are mis-transcribed.)    Electronically signed, FRANCK Herr CNP,           FRANCK Herr CNP  10/06/22 2518

## 2022-10-05 NOTE — ED NOTES
Positive for large BM. Patient report given to EMS crew, patient left this ER in stable condition.       Elisha Kawasaki, RN  10/04/22 9882

## 2022-10-07 ENCOUNTER — HOSPITAL ENCOUNTER (EMERGENCY)
Age: 27
Discharge: NURSING, REHAB OR LONG-TERM CARE | End: 2022-10-07
Payer: MEDICARE

## 2022-10-07 ENCOUNTER — APPOINTMENT (OUTPATIENT)
Dept: GENERAL RADIOLOGY | Age: 27
End: 2022-10-07
Payer: MEDICARE

## 2022-10-07 VITALS
DIASTOLIC BLOOD PRESSURE: 80 MMHG | RESPIRATION RATE: 16 BRPM | HEART RATE: 102 BPM | SYSTOLIC BLOOD PRESSURE: 122 MMHG | OXYGEN SATURATION: 98 % | TEMPERATURE: 98.8 F

## 2022-10-07 DIAGNOSIS — K59.00 CONSTIPATION, UNSPECIFIED CONSTIPATION TYPE: Primary | ICD-10-CM

## 2022-10-07 PROCEDURE — 2500000003 HC RX 250 WO HCPCS: Performed by: PHYSICIAN ASSISTANT

## 2022-10-07 PROCEDURE — 74018 RADEX ABDOMEN 1 VIEW: CPT

## 2022-10-07 PROCEDURE — 99283 EMERGENCY DEPT VISIT LOW MDM: CPT

## 2022-10-07 RX ADMIN — Medication 330 ML: at 18:25

## 2022-10-07 NOTE — ED PROVIDER NOTES
[facility]        Pt Name: Sigrid Acuna  MRN: [id number]  [place] 1995  Date of evaluation: 10/7/2022  Provider: Destiny Villanueva PA-C  PCP: Sudhakar Gonzalez MD  Note Started: 4:28 PM EDT       JENNIFER. I have evaluated this patient. My supervising physician was available for consultation. CHIEF COMPLAINT       Chief Complaint   Patient presents with    Constipation     [facility]; Pt is non-verbal but awake; has feeding tube       HISTORY OF PRESENT ILLNESS   (Location, Timing/Onset, Context/Setting, Quality, Duration, Modifying Factors, Severity, Associated Signs and Symptoms)  Note limiting factors. Chief Complaint: Constipation    Sigrid Acuna is a 27 y.o. female who presents by EMS from [facility] with constipation. Patient seen a few days ago noted to have severe constipation potential impaction. Comes in today with continuation of constipation. Patient nonverbal due to anoxic brain injury. Nursing Notes were all reviewed and agreed with or any disagreements were addressed in the HPI. REVIEW OF SYSTEMS    (2-9 systems for level 4, 10 or more for level 5)     Review of Systems    Positives and Pertinent negatives as per HPI. Except as noted above in the ROS, all other systems were reviewed and negative. PAST MEDICAL HISTORY     Past Medical History:   Diagnosis Date    Acute hepatitis C without hepatic coma     Acute hepatitis C without hepatic coma     Anoxic brain damage ([facility]     Cardiac arrest ([facility]     s/p overdose 08/2016.     Chronic kidney disease     \"a few\" UTIs with pregnancy    Chronic viral hepatitis C ([facility]     Diffuse traumatic brain injury with loss of consciousness greater than 24 hours without return to pre-existing conscious level with patient surviving [facility])     sequela    Major depressive disorder, recurrent, unspecified ([address]     MRSA (methicillin resistant staph aureus) culture positive 08/25/2017    arm    Nicotine dependence, other tobacco product, uncomplicated     Overdose     Poisoning by heroin, undetermined, initial encounter ([facility]     Schizophrenia, unspecified ([facility]     Seizures ([facility]     Streptococcal sepsis, unspecified ([facility]     TBI (traumatic brain injury) ([facility]     Unspecified asthma, uncomplicated     Unspecified sexually transmitted disease          SURGICAL HISTORY     Past Surgical History:   Procedure Laterality Date    GASTROSTOMY TUBE PLACEMENT      GASTROSTOMY TUBE PLACEMENT      08/02/2016    GASTROSTOMY TUBE PLACEMENT N/A 11/26/2019    EGD PEG TUBE PLACEMENT performed by Hannah Juan MD at [address] [place] [address] 11/26/2019    EGD FOREIGN BODY REMOVAL performed by Hannah Juan MD at \[facility]\""       Previous Medications    ACETAMINOPHEN (TYLENOL) 325 MG TABLET    650 mg by Per G Tube route 2 times daily as needed for Pain     BACLOFEN 5 MG TABS    5 mg by Per G Tube route 2 times daily     BENZTROPINE (COGENTIN) 1 MG TABLET    Take 2 tablets by mouth 2 times daily    DOCUSATE (COLACE) 50 MG/5ML LIQUID    5 mLs by Per G Tube route 2 times daily    FAMOTIDINE (PEPCID) 20 MG TABLET    1 tablet by PEG Tube route 2 times daily    FERROUS SULFATE 220 (44 FE) MG/5ML SOLUTION    220 mg by Per G Tube route 3 times daily    LACOSAMIDE (VIMPAT) 100 MG TABS TABLET    Take 100 mg by mouth 2 times daily.     LACTOBACILLUS PO    1 tablet by Per G Tube route in the morning and at bedtime    LEVETIRACETAM (KEPPRA) 100 MG/ML SOLUTION    5 mLs by PEG Tube route 2 times daily    LINACLOTIDE (LINZESS) 290 MCG CAPS CAPSULE    1 capsule by Per G Tube route every morning (before breakfast)    MIDODRINE (PROAMATINE) 5 MG TABLET    10 mg by Per G Tube route 3 times daily    PALIPERIDONE PALMITATE ER (INVEGA SUSTENNA) 117 MG/0.75ML MOSES IM INJECTION    Inject 117 mg into the muscle every 30 days At bedtime on the  of the month    POLYETHYLENE GLYCOL (GLYCOLAX) 17 GM/SCOOP POWDER    Take 17 g by mouth 2 times daily    QUETIAPINE (SEROQUEL) 25 MG TABLET    1 tablet by Per G Tube route 2 times daily    SENNA (SENOKOT) 8.6 MG TABLET    2 tablets by Per G Tube route 2 times daily    SERTRALINE (ZOLOFT) 100 MG TABLET    100 mg by Per G Tube route daily Give 2 tablets    SODIUM PHOSPHATES (FLEET) 7-19 GM/118ML    Place 1 enema rectally daily as needed    VALPROATE (DEPACON) 100 MG/ML INJECTION    250 mg by Per G Tube route every 12 hours Give 10 ml every 12 hours for sz activity         ALLERGIES     Patient has no known allergies. FAMILYHISTORY       Family History   Problem Relation Age of Onset    High Cholesterol Father     Cancer Paternal Grandmother     COPD Mother           SOCIAL HISTORY       Social History     Tobacco Use    Smoking status: Former     Years: 2.00     Types: Cigarettes     Quit date: 3/23/2011     Years since quittin.5    Smokeless tobacco: Never   Substance Use Topics    Alcohol use: No    Drug use: No     Types: IV     Comment: Hx MJ use 3/3/11 and heroin use       SCREENINGS             PHYSICAL EXAM    (up to 7 for level 4, 8 or more for level 5)     ED Triage Vitals [10/07/22 1319]   BP Temp Temp src Heart Rate Resp SpO2 Height Weight   104/73 98.8 °F (37.1 °C) -- 97 12 98 % -- --       Physical Exam  Vitals and nursing note reviewed. Constitutional:       Appearance: Normal appearance. She is well-developed and normal weight. Comments: The patient is awake. Eyes move around the room. No clear focus. Nonverbal.  Unclear if she understands. She does have a G-tube in place. HENT:      Head: Normocephalic and atraumatic. Right Ear: External ear normal.      Left Ear: External ear normal.   Eyes:      General: No scleral icterus. Right eye: No discharge. Left eye: No discharge.       Conjunctiva/sclera: Conjunctivae normal. Cardiovascular:      Rate and Rhythm: Normal rate. Pulmonary:      Effort: Pulmonary effort is normal.   Abdominal:      General: Abdomen is flat. Bowel sounds are normal. There is no distension. Palpations: Abdomen is soft. There is no mass. Tenderness: no abdominal tenderness   Genitourinary:     Comments: A digital rectal exam was performed with nurse in the room. Rectal vault empty. No obvious impaction within the vault. Musculoskeletal:         General: Normal range of motion. Cervical back: Normal range of motion and neck supple. Right lower leg: No edema. Left lower leg: No edema. Skin:     General: Skin is warm and dry. Neurological:      General: No focal deficit present. Mental Status: She is alert. Mental status is at baseline. Psychiatric:         Mood and Affect: Mood normal.         Behavior: Behavior normal.       DIAGNOSTIC RESULTS   LABS:    Labs Reviewed - No data to display    When ordered only abnormal lab results are displayed. All other labs were within normal range or not returned as of this dictation. EKG: When ordered, EKG's are interpreted by the Emergency Department Physician in the absence of a cardiologist.  Please see their note for interpretation of EKG. RADIOLOGY:   Non-plain film images such as CT, Ultrasound and MRI are read by the radiologist. Plain radiographic images are visualized and preliminarily interpreted by the ED Provider with the below findings:        Interpretation per the Radiologist below, if available at the time of this note:    XR ABDOMEN (KUB) (SINGLE AP VIEW)   Final Result   Large amount of stool in the rectum and distal colon.            CT ABDOMEN PELVIS WO CONTRAST Additional Contrast? None    Result Date: 10/4/2022  EXAMINATION: STONE PROTOCOL CT OF THE ABDOMEN AND PELVIS 10/4/2022 7:46 pm TECHNIQUE: CT of the abdomen and pelvis was performed without the administration of intravenous contrast. Multiplanar reformatted images are provided for review. Automated exposure control, iterative reconstruction, and/or weight based adjustment of the mA/kV was utilized to reduce the radiation dose to as low as reasonably achievable. COMPARISON: September 13, 2022 HISTORY: ORDERING SYSTEM PROVIDED HISTORY: questionable stone TECHNOLOGIST PROVIDED HISTORY: Reason for exam:->questionable stone Additional Contrast?->None Decision Support Exception - unselect if not a suspected or confirmed emergency medical condition->Emergency Medical Condition (MA) Is the patient pregnant?->No Reason for Exam: questionable stone FINDINGS: LOWER CHEST:  Visualized portion of the lower chest demonstrates no acute abnormality. KIDNEYS AND URINARY TRACT: No renal calculi are identified. There is no evidence for hydronephrosis. The ureters are of normal course and caliber. ORGANS: Visualized portions of the unenhanced liver, spleen, pancreas, and adrenal glands demonstrate no acute abnormality. No inflammatory changes in the visualized portion of the gallbladder fossa. GI/BOWEL: No bowel obstruction. No evidence of acute appendicitis. Gastrostomy tube stable in the stomach. Severe constipation with stool impaction in the rectum. PELVIS: The bladder and pelvic organs are unremarkable. Crouch catheter in the urinary bladder appear stable. PERITONEUM/RETROPERITONEUM: No lymphadenopathy is noted. BONES/SOFT TISSUES: The osseous structures demonstrate no acute abnormality. No evidence of obstructive uropathy. No evidence of renal stones. Severe constipation and stool impaction in the rectum. Stable gastrostomy tube and Crouch catheter. XR ABDOMEN (KUB) (SINGLE AP VIEW)    Result Date: 10/7/2022  EXAMINATION: ONE SUPINE XRAY VIEW(S) OF THE ABDOMEN 10/7/2022 1:48 pm COMPARISON: CT abdomen pelvis dated 10/04/2022 HISTORY: ORDERING SYSTEM PROVIDED HISTORY: Patient and impaction potential per CT scan few days ago.  TECHNOLOGIST PROVIDED HISTORY: Reason for exam:->Patient and impaction potential per CT scan few days ago. Reason for Exam: Constipation (From [facility]- per [facility] squad they got the report she is constipated; Pt is non-verbal but awake; has feeding tube) FINDINGS: Large amount of stool in the rectum and distal colon. Nonobstructive bowel gas pattern. Gastrostomy tube overlies the mid abdomen. Visualized osseous structures are unremarkable. No abnormal calcifications. Large amount of stool in the rectum and distal colon. XR CHEST PORTABLE    Result Date: 10/4/2022  EXAMINATION: ONE XRAY VIEW OF THE CHEST 10/4/2022 4:00 pm COMPARISON: Chest x-ray dated 09/13/2022. HISTORY: ORDERING SYSTEM PROVIDED HISTORY: fever at nursing home TECHNOLOGIST PROVIDED HISTORY: Reason for exam:->fever at nursing home Reason for Exam: fever FINDINGS: HEART/MEDIASTINUM: The cardiomediastinal silhouette is within normal limits. PLEURA/LUNGS: There are no focal consolidations or pleural effusions. There is no appreciable pneumothorax. BONES/SOFT TISSUE: No acute abnormality. No radiographic evidence of acute pulmonary disease. PROCEDURES   Unless otherwise noted below, none     Procedures    CRITICAL CARE TIME       CONSULTS:  None      EMERGENCY DEPARTMENT COURSE and DIFFERENTIAL DIAGNOSIS/MDM:   Vitals:    Vitals:    10/07/22 1410 10/07/22 1715 10/07/22 1729 10/07/22 1745   BP: 119/76 112/74 97/70 123/82   Pulse: (!) 115 100 (!) 117 (!) 110   Resp: 18 15 17 20   Temp:       SpO2: 97% 97%  94%       Patient was given the following medications:  Medications   SMOG Enema (330 mLs Rectal Given 10/7/22 1825)         Is this patient to be included in the SEP-1 Core Measure due to severe sepsis or septic shock? No   Exclusion criteria - the patient is NOT to be included for SEP-1 Core Measure due to: Infection is not suspected    This patient returning with constipation. Seen a few days ago with severe constipation and concern for impaction.   KUB x-ray showing evidence of constipation. SHAYY performed showing no evidence of impaction or stool in rectal vault. Small, given. Patient had had #4 stools. The patient is safe to be discharged back to [facility]. I did recommend an improved bowel regimen to maintain good stool productivity. I recommended the healthcare provider at the [facility] be involved to reevaluate the bowel care regimen. FINAL IMPRESSION      1. Constipation, unspecified constipation type          DISPOSITION/PLAN   DISPOSITION Decision To Discharge 10/07/2022 07:58:15 PM      PATIENT REFERRED TO:  Roxi Mercado MD  [facility] [address] [facility]  [phone]    Schedule an appointment as soon as possible for a visit in 3 days      [facility] Emergency Department  [address] [facility]  [phone]  Go to   If symptoms worsen    DISCHARGE MEDICATIONS:  New Prescriptions    No medications on file       DISCONTINUED MEDICATIONS:  Discontinued Medications    No medications on file              (Please note that portions of this note were completed with a voice recognition program.  Efforts were made to edit the dictations but occasionally words are mis-transcribed. )    Olesya Rogers PA-C (electronically signed)            Olesya Rogers PA-C  10/07/22 2001

## 2022-10-07 NOTE — DISCHARGE INSTRUCTIONS
KUB x-ray showed evidence of severe constipation. No evidence of impaction. A digital rectal exam was performed showing no evidence of impaction. Small gram and given. Bowel movement #4 noted in ED. Patient safe to return to care facility.   I do recommend adherence to bowel regimen or improvement of bowel regimen under direction of the [facility] healthcare provider team.

## 2022-10-07 NOTE — ED NOTES
Pt has had 3 massive bowel movements. Bed had to be changed completely twice.      Debra Elliott RN  10/07/22 1920

## 2022-10-17 ENCOUNTER — APPOINTMENT (OUTPATIENT)
Dept: GENERAL RADIOLOGY | Age: 27
End: 2022-10-17
Payer: MEDICARE

## 2022-10-17 ENCOUNTER — HOSPITAL ENCOUNTER (EMERGENCY)
Age: 27
Discharge: HOME | End: 2022-10-17
Attending: EMERGENCY MEDICINE
Payer: MEDICARE

## 2022-10-17 VITALS
TEMPERATURE: 97.6 F | BODY MASS INDEX: 19.16 KG/M2 | HEART RATE: 100 BPM | SYSTOLIC BLOOD PRESSURE: 107 MMHG | HEIGHT: 65 IN | OXYGEN SATURATION: 99 % | RESPIRATION RATE: 19 BRPM | WEIGHT: 115 LBS | DIASTOLIC BLOOD PRESSURE: 88 MMHG

## 2022-10-17 DIAGNOSIS — K94.23 GASTROSTOMY TUBE DYSFUNCTION (HCC): Primary | ICD-10-CM

## 2022-10-17 PROCEDURE — 99284 EMERGENCY DEPT VISIT MOD MDM: CPT

## 2022-10-17 PROCEDURE — 43762 RPLC GTUBE NO REVJ TRC: CPT

## 2022-10-17 PROCEDURE — 6360000004 HC RX CONTRAST MEDICATION: Performed by: EMERGENCY MEDICINE

## 2022-10-17 PROCEDURE — 49465 FLUORO EXAM OF G/COLON TUBE: CPT

## 2022-10-17 RX ADMIN — IOHEXOL 100 ML: 350 INJECTION, SOLUTION INTRAVENOUS at 18:20

## 2022-10-17 ASSESSMENT — PAIN - FUNCTIONAL ASSESSMENT: PAIN_FUNCTIONAL_ASSESSMENT: WONG-BAKER FACES

## 2022-10-17 ASSESSMENT — PAIN SCALES - WONG BAKER: WONGBAKER_NUMERICALRESPONSE: 0

## 2022-10-17 NOTE — ED NOTES
Pt arrived via  EMS from [facility] d/t gtube complication. Upon assessing tube the end of the tube outside of body was torn. MD removed and replaced tube with 18f 10 cc balloon. No distress noted.          Vesta Kaur RN  10/17/22 2676

## 2022-10-17 NOTE — ED PROVIDER NOTES
[address] [facility] Emergency Department      Pt Name: Edwin Amaya  MRN: [id number]  [place] 1995  Date of evaluation: 10/17/2022  Provider: Micha Lester MD  CHIEF COMPLAINT  Chief Complaint   Patient presents with    G Tube Complications     Arrived via  EMS from [facility] d/t gtube complication; VSS     HPI  Edwin Amaya is a 27 y.o. female who presents because of concern for broken g tube. Has a history of anoxic brain injury and feeding tube. She was just transferred to [facility] today and they noted a break in her NG tube. She was sent in for evaluation of that. Patient is unable to communicate any other needs but appears comfortable. REVIEW OF SYSTEMS:  See HPI for further details. Remainder of review of systems limited by patient ability to provide history. Nursing notes reviewed. PAST MEDICAL HISTORY  Past Medical History:   Diagnosis Date    Acute hepatitis C without hepatic coma     Acute hepatitis C without hepatic coma     Anoxic brain damage ([address]     Cardiac arrest ([address]     s/p overdose 08/2016.     Chronic kidney disease     \"a few\" UTIs with pregnancy    Chronic viral hepatitis C ([address]     Diffuse traumatic brain injury with loss of consciousness greater than 24 hours without return to pre-existing conscious level with patient surviving [facility])     sequela    Major depressive disorder, recurrent, unspecified ([address]     MRSA (methicillin resistant staph aureus) culture positive 08/25/2017    arm    Nicotine dependence, other tobacco product, uncomplicated     Overdose     Poisoning by heroin, undetermined, initial encounter ([address]     Schizophrenia, unspecified ([address]     Seizures ([address]     Streptococcal sepsis, unspecified ([address]     TBI (traumatic brain injury) ([address]     Unspecified asthma, uncomplicated     Unspecified sexually transmitted disease      SURGICAL HISTORY  Past Surgical History:   Procedure Laterality Date    GASTROSTOMY TUBE PLACEMENT      GASTROSTOMY TUBE PLACEMENT      08/02/2016    GASTROSTOMY TUBE PLACEMENT N/A 11/26/2019    EGD PEG TUBE PLACEMENT performed by Estephania Larson MD at [facility] N/A 11/26/2019    EGD FOREIGN BODY REMOVAL performed by Estephania Larson MD at [facility]:  No current facility-administered medications on file prior to encounter. Current Outpatient Medications on File Prior to Encounter   Medication Sig Dispense Refill    docusate (COLACE) 50 MG/5ML liquid 5 mLs by Per G Tube route 2 times daily 300 mL 1    linaclotide (LINZESS) 290 MCG CAPS capsule 1 capsule by Per G Tube route every morning (before breakfast) 30 capsule 0    polyethylene glycol (GLYCOLAX) 17 GM/SCOOP powder Take 17 g by mouth 2 times daily 1530 g 1    senna (SENOKOT) 8.6 MG tablet 2 tablets by Per G Tube route 2 times daily 30 tablet 0    QUEtiapine (SEROQUEL) 25 MG tablet 1 tablet by Per G Tube route 2 times daily 30 tablet 0    midodrine (PROAMATINE) 5 MG tablet 10 mg by Per G Tube route 3 times daily      LACTOBACILLUS PO 1 tablet by Per G Tube route in the morning and at bedtime      sertraline (ZOLOFT) 100 MG tablet 100 mg by Per G Tube route daily Give 2 tablets      ferrous sulfate 220 (44 Fe) MG/5ML solution 220 mg by Per G Tube route 3 times daily      Sodium Phosphates (FLEET) 7-19 GM/118ML Place 1 enema rectally daily as needed      acetaminophen (TYLENOL) 325 MG tablet 650 mg by Per G Tube route 2 times daily as needed for Pain       valproate (DEPACON) 100 MG/ML injection 250 mg by Per G Tube route every 12 hours Give 10 ml every 12 hours for sz activity      lacosamide (VIMPAT) 100 MG TABS tablet Take 100 mg by mouth 2 times daily.       Baclofen 5 MG TABS 5 mg by Per G Tube route 2 times daily       paliperidone palmitate ER (INVEGA SUSTENNA) 117 MG/0.75ML MOSES IM injection Inject 117 mg into the muscle every 30 days At bedtime on the 8th of the month      levETIRAcetam (KEPPRA) 100 MG/ML solution 5 mLs by PEG Tube route 2 times daily 1 Bottle 3    famotidine (PEPCID) 20 MG tablet 1 tablet by PEG Tube route 2 times daily 60 tablet 3    benztropine (COGENTIN) 1 MG tablet Take 2 tablets by mouth 2 times daily 120 tablet 3     ALLERGIES  Patient has no known allergies. FAMILY HISTORY:  Family History   Problem Relation Age of Onset    High Cholesterol Father     Cancer Paternal Grandmother     COPD Mother      SOCIAL HISTORY:  Social History     Tobacco Use    Smoking status: Former     Years: 2.00     Types: Cigarettes     Quit date: 3/23/2011     Years since quittin.5    Smokeless tobacco: Never   Substance Use Topics    Alcohol use: No    Drug use: No     Types: IV     Comment: Hx MJ use 3/3/11 and heroin use     IMMUNIZATIONS:  Noncontributory    PHYSICAL EXAM  VITAL SIGNS:  Blood pressure 117/67, pulse 86, temperature 97.6 °F (36.4 °C), temperature source Axillary, resp. rate 20, height 5' 5\" (1.651 m), weight 115 lb (52.2 kg), SpO2 99 %, not currently breastfeeding. Constitutional:  27 y.o. female with facies consistent with her known disability  HENT:  Atraumatic, mucous membranes dry  Eyes:  Conjunctiva clear, no discharge, no icterus  Neck:  Without masses, no adenopathy, supple  Cardiovascular:  Regular, no rubs  Thorax & Lungs:  No accessory muscle usage, clear  Abdomen:  Soft, non distended, bowel sounds present, g tube present, broken at the portion distal to the abdomen and it has caused leakage of gastric juices onto her brief and pants  Genitalia:  Deferred  Rectal:  Deferred  Extremities:  No cyanosis, no edema  Skin:  Warm, dry  Neurologic:  nonverbal, can mouth some responses but not understandable, contractures of extremities  Psychiatric:  Not assessable    DIAGNOSTIC RESULTS:    RADIOLOGY:  Plain x-rays were viewed by me:   XR INJ CONTRAST GASTRIC TUBE PERC   Final Result   Contrast is seen within the gastrostomy tube and stomach. No gross contrast   extravasation demonstrated. ED COURSE:  Uneventful     PROCEDURES: I was able to remove the current gastric tube without difficulty and replaced it with an 25 [place] [address] similar sized gastric tube. The balloon was inflated to 10 mL of saline. She tolerated the procedure well and went for imaging as documented above. CRITICAL CARE:  None    CONSULTATIONS:  None     MEDICAL DECISION MAKING: Cassie Hoover is a 27 y.o. female who presented because of concern for broken g tube. This has been replaced and has proper positioning verified. Caregiver was given appropriate discharge instructions, verbal and written. We encouraged the caregiver to return the patient to the ED promptly if she develops worsening symptoms. Referral to follow up provider. FOLLOW UP:    Jamaica Aceves MD  [address] [address]  [phone]    Schedule an appointment as soon as possible for a visit       [facility] Emergency Department  [address]  Go to   If symptoms worsen    FINAL IMPRESSION:    1. Gastrostomy tube dysfunction ([address]        (Please note that I used voice recognition software to generate this note.   Occasionally words are mistranscribed despite my efforts to edit errors.)        Jim Smith MD  10/17/22 2037

## 2022-10-18 NOTE — ED NOTES
Pt report given to CMT transport at this time, all questions answered during handoff, Pt transported back to [address] [place] [address] in stable condition.      Zac Arechiga RN  10/17/22 0266

## 2022-10-19 PROBLEM — N39.0 UTI (URINARY TRACT INFECTION): Status: RESOLVED | Noted: 2022-09-14 | Resolved: 2022-10-19

## 2022-11-08 ENCOUNTER — HOSPITAL ENCOUNTER (EMERGENCY)
Age: 27
Discharge: HOME | End: 2022-11-08
Payer: MEDICARE

## 2022-11-08 VITALS
HEART RATE: 96 BPM | BODY MASS INDEX: 19.16 KG/M2 | RESPIRATION RATE: 16 BRPM | DIASTOLIC BLOOD PRESSURE: 67 MMHG | OXYGEN SATURATION: 94 % | SYSTOLIC BLOOD PRESSURE: 112 MMHG | TEMPERATURE: 97.9 F | HEIGHT: 65 IN | WEIGHT: 115 LBS

## 2022-11-08 DIAGNOSIS — T74.21XA REPORTED SEXUAL ASSAULT OF ADULT: Primary | ICD-10-CM

## 2022-11-08 PROCEDURE — 99283 EMERGENCY DEPT VISIT LOW MDM: CPT

## 2022-11-08 ASSESSMENT — PAIN - FUNCTIONAL ASSESSMENT: PAIN_FUNCTIONAL_ASSESSMENT: NONE - DENIES PAIN

## 2022-11-08 NOTE — ED NOTES
Obtained verbal consent from legal guardian, Donta Miles for SANE examination     Alysha Sandoval RN  11/08/22 1738
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
## 2022-11-09 NOTE — ED NOTES
Report given to CMT and charge nurse [place] at [facility]     Shelly Eaton RN  11/08/22 1919

## 2023-01-10 ENCOUNTER — HOSPITAL ENCOUNTER (EMERGENCY)
Age: 28
Discharge: HOSPICE | End: 2023-01-10
Attending: EMERGENCY MEDICINE
Payer: MEDICARE

## 2023-01-10 VITALS
DIASTOLIC BLOOD PRESSURE: 60 MMHG | HEART RATE: 74 BPM | RESPIRATION RATE: 18 BRPM | SYSTOLIC BLOOD PRESSURE: 99 MMHG | OXYGEN SATURATION: 96 % | TEMPERATURE: 97.7 F

## 2023-01-10 DIAGNOSIS — Z00.8 ENCOUNTER FOR MEDICAL ASSESSMENT: Primary | ICD-10-CM

## 2023-01-10 PROCEDURE — 99283 EMERGENCY DEPT VISIT LOW MDM: CPT

## 2023-01-10 NOTE — ED PROVIDER NOTES
[facility]  eMERGENCY dEPARTMENT eNCOUnter        Pt Name: Aileen Decker  MRN: [id number]  [place] 1995  Date of evaluation: 1/10/2023  Provider: Karly Turner MD  PCP: Giselle Lugo MD      [address] [place] [address]       Chief Complaint   Patient presents with    Ingestion     C/o pt was found holding  with vomit around mouth. Resident at [facility]. HISTORY OFPRESENT ILLNESS   (Location/Symptom, Timing/Onset, Context/Setting, Quality, Duration, Modifying Factors,Severity)  Note limiting factors. Aileen Decker is a 32 y.o. female is a resident of [facility] and has severe cerebellar ataxia and generalized muscle weakness with some contractures of the arms patient apparently had a bottle of  with chlorine in her hand but it does not appear that she could have taken the bottle apart or have ingested any significant amount of material and it appears to be nontoxic    Nursing Notes were all reviewed and agreed with or any disagreements were addressed  in the HPI. REVIEW OF SYSTEMS    (2-9 systems for level 4, 10 or more for level 5)       REVIEW OF SYSTEMS    Patient is aphasic and unable to speak    Positives and Pertinent negatives as per HPI. Except as noted above in the ROS, all other systems were reviewed andnegative. PASTMEDICAL HISTORY     Past Medical History:   Diagnosis Date    Acute hepatitis C without hepatic coma     Acute hepatitis C without hepatic coma     Anoxic brain damage (HCC)     Cardiac arrest ([facility]     s/p overdose 08/2016.     Chronic kidney disease     \"a few\" UTIs with pregnancy    Chronic viral hepatitis C ([facility]     Diffuse traumatic brain injury with loss of consciousness greater than 24 hours without return to pre-existing conscious level with patient surviving [facility])     sequela    Major depressive disorder, recurrent, unspecified ([facility]     MRSA (methicillin resistant staph aureus) culture positive 08/25/2017    arm    Nicotine dependence, other tobacco product, uncomplicated     Overdose     Poisoning by heroin, undetermined, initial encounter ([facility]     Schizophrenia, unspecified ([facility]     Seizures ([facility]     Streptococcal sepsis, unspecified ([facility]     TBI (traumatic brain injury)     Unspecified asthma, uncomplicated     Unspecified sexually transmitted disease          SURGICAL HISTORY       Past Surgical History:   Procedure Laterality Date    GASTROSTOMY TUBE PLACEMENT      GASTROSTOMY TUBE PLACEMENT      08/02/2016    GASTROSTOMY TUBE PLACEMENT N/A 11/26/2019    EGD PEG TUBE PLACEMENT performed by Kayla Haines MD at [address] [place] [address] 11/26/2019    EGD FOREIGN BODY REMOVAL performed by Kayla Haines MD at [facility]       Previous Medications    ACETAMINOPHEN (TYLENOL) 325 MG TABLET    650 mg by Per G Tube route 2 times daily as needed for Pain     BACLOFEN 5 MG TABS    5 mg by Per G Tube route 2 times daily     BENZTROPINE (COGENTIN) 1 MG TABLET    Take 2 tablets by mouth 2 times daily    FAMOTIDINE (PEPCID) 20 MG TABLET    1 tablet by PEG Tube route 2 times daily    FERROUS SULFATE 220 (44 FE) MG/5ML SOLUTION    220 mg by Per G Tube route 3 times daily    LACOSAMIDE (VIMPAT) 100 MG TABS TABLET    Take 100 mg by mouth 2 times daily.     LACTOBACILLUS PO    1 tablet by Per G Tube route in the morning and at bedtime    LEVETIRACETAM (KEPPRA) 100 MG/ML SOLUTION    5 mLs by PEG Tube route 2 times daily    LINACLOTIDE (LINZESS) 290 MCG CAPS CAPSULE    1 capsule by Per G Tube route every morning (before breakfast)    MIDODRINE (PROAMATINE) 5 MG TABLET    10 mg by Per G Tube route 3 times daily    PALIPERIDONE PALMITATE ER (INVEGA SUSTENNA) 117 MG/0.75ML MOSES IM INJECTION    Inject 117 mg into the muscle every 30 days At bedtime on the 8th of the month    POLYETHYLENE GLYCOL (GLYCOLAX) 17 GM/SCOOP POWDER    Take 17 g by mouth 2 times daily QUETIAPINE (SEROQUEL) 25 MG TABLET    1 tablet by Per G Tube route 2 times daily    SENNA (SENOKOT) 8.6 MG TABLET    2 tablets by Per G Tube route 2 times daily    SERTRALINE (ZOLOFT) 100 MG TABLET    100 mg by Per G Tube route daily Give 2 tablets    SODIUM PHOSPHATES (FLEET) 7-19 GM/118ML    Place 1 enema rectally daily as needed    VALPROATE (DEPACON) 100 MG/ML INJECTION    250 mg by Per G Tube route every 12 hours Give 10 ml every 12 hours for sz activity       ALLERGIES     Patient has no known allergies. FAMILY HISTORY       Family History   Problem Relation Age of Onset    High Cholesterol Father     Cancer Paternal Grandmother     COPD Mother           SOCIAL HISTORY       Social History     Socioeconomic History    Marital status: Single   Tobacco Use    Smoking status: Former     Years: 2.00     Types: Cigarettes     Quit date: 3/23/2011     Years since quittin.8    Smokeless tobacco: Never   Substance and Sexual Activity    Alcohol use: No    Drug use: No     Types: IV     Comment: Hx MJ use 3/3/11 and heroin use    Sexual activity: Never     Partners: Male     Comment: heroin   Social History Narrative    ** Merged History Encounter **            SCREENINGS    [place] Coma Scale  Eye Opening: Spontaneous  Best Verbal Response: Incomprehensible speech  Best Motor Response: Obeys commands  Jerrod Coma Scale Score: 12        PHYSICAL EXAM    (up to 7 for level 4, 8 or more for level 5)     ED Triage Vitals [01/10/23 0537]   BP Temp Temp Source Pulse Resp SpO2 Height Weight   -- 97.7 °F (36.5 °C) Oral -- -- -- -- --           General Appearance:  Alert, cooperative, no distress, appears stated age. Head:  Normocephalic, without obvious abnormality, atraumatic. Eyes:  conjunctiva/corneas clear,  Sclera anicteric. ENT: Mucous membranes moist.   Neck: Supple, symmetrical, trachea midline, no adenopathy. No jugular venous distention. Lungs:   No Respiratory Distress. no rales  rhonchi rub Chest Wall:  Nontender  no deformity   Heart:  Rsr no murmer gallop    Abdomen:   Soft nontender no organomegally    Extremities:  Full range of motion. no deformity   Pulses: Equal  upper and lower    Skin:  No rashes or lesions to exposed skin. Neurologic: Alert and oriented X 1. Pressure of the upper arms with very poor coordination or hand activity patient cannot speak       DIAGNOSTIC RESULTS   LABS:    Labs Reviewed - No data to display    All other labs were within normal range or not returned as of thisdictation. EKG: All EKG's are interpreted by the Emergency Department Physician who either signs or Co-signs this chart in the absence of a cardiologist.        RADIOLOGY:   Non-plain film images such as CT, Ultrasound and MRI are read by the radiologist. Clarine Degree images are visualized and preliminarily interpreted by the  ED Provider with the belowfindings:        Interpretation per the Radiologist below, if available at the time of this note:    No orders to display         PROCEDURES   Unless otherwise noted below, none     Procedures    CRITICAL CARE TIME   N/A      CONSULTS:  None    EMERGENCY DEPARTMENT COURSE and DIFFERENTIAL DIAGNOSIS/MDM:   Vitals:    Vitals:    01/10/23 0537 01/10/23 0600   BP:  99/60   Pulse: 74    Resp: 18    Temp: 97.7 °F (36.5 °C)    TempSrc: Oral    SpO2:  96%       Patient was given the following medications:  Medications - No data to display        Is this patient to be included in the SEP-1 Core Measure due to severe sepsis or septic shock? No   Exclusion criteria - the patient is NOT to be included for SEP-1 Core Measure due to: Infection is not suspected  Patient does not appear to have the ability to open a spray bottle that was presented to us with a large handle and if she could ingest anything it would be nontoxic the  that has chlorine in it will be discharged back to [address] [place] [address]      1.  Encounter for medical assessment        DISPOSITION/PLAN   DISPOSITION Decision To Discharge 01/10/2023 06:02:01 AM      PATIENT REFERRED TO:  Pippa Asif MD  [address] [place] [address]  [phone]          DISCHARGE MEDICATIONS:  New Prescriptions    No medications on file       DISCONTINUED MEDICATIONS:  Discontinued Medications    No medications on file              (Please note that portions of this note were completed with a voice recognition program.  Efforts were made to edit the dictations but occasionally words aremis-transcribed.)    Baron Linder MD (electronically signed)          Baron Linder MD  01/10/23 2861

## 2023-01-10 NOTE — PROGRESS NOTES
D report given to Mecca correia at [facility]. All questions answered. All belonging with pt.  Transportation due to arrive at [facility]

## 2023-10-01 ENCOUNTER — APPOINTMENT (OUTPATIENT)
Dept: GENERAL RADIOLOGY | Age: 28
End: 2023-10-01
Payer: MEDICARE

## 2023-10-01 ENCOUNTER — HOSPITAL ENCOUNTER (EMERGENCY)
Age: 28
Discharge: HOME | End: 2023-10-01
Payer: MEDICARE

## 2023-10-01 VITALS
TEMPERATURE: 98.2 F | DIASTOLIC BLOOD PRESSURE: 70 MMHG | OXYGEN SATURATION: 96 % | SYSTOLIC BLOOD PRESSURE: 107 MMHG | RESPIRATION RATE: 18 BRPM | HEART RATE: 88 BPM

## 2023-10-01 DIAGNOSIS — K94.23 GASTROSTOMY TUBE DYSFUNCTION (HCC): Primary | ICD-10-CM

## 2023-10-01 PROCEDURE — 49465 FLUORO EXAM OF G/COLON TUBE: CPT

## 2023-10-01 PROCEDURE — 99283 EMERGENCY DEPT VISIT LOW MDM: CPT

## 2023-10-01 NOTE — ED PROVIDER NOTES
[facility]        Pt Name: Micky Wyatt  MRN: [id number]  [address] [place] 1995  Date of evaluation: 10/1/2023  Provider: Hung Bauman PA-C  PCP: Tyree Hills MD  Note Started: 4:51 PM EDT 10/1/23       I have seen and evaluated this patient with my supervising physician Dr. Bravo Batista. CHIEF COMPLAINT       Chief Complaint   Patient presents with    G Tube Complications     Pt did not want tube feedings from [place] [employer], pulled out G tube       HISTORY OF PRESENT ILLNESS: 1 or more Elements     History From: ecf, ems  Limitations to history : nonverbal    Micky Wyatt is a 32 y.o. female who presents who presents for evaluation after she pulled out her own G-tube. Per ECF, they were feeding her when she became agitated and pulled out her G-tube. Staff was initially concerned stating the patient normally does not get agitated, however, now appears to be acting back at baseline. No additional information is able to be obtained at this time. Nursing Notes were all reviewed and agreed with or any disagreements were addressed in the HPI. REVIEW OF SYSTEMS :      Review of Systems   Unable to perform ROS: Patient nonverbal       Positives and Pertinent negatives as per HPI.      SURGICAL HISTORY     Past Surgical History:   Procedure Laterality Date    GASTROSTOMY TUBE PLACEMENT      GASTROSTOMY TUBE PLACEMENT      08/02/2016    GASTROSTOMY TUBE PLACEMENT N/A 11/26/2019    EGD PEG TUBE PLACEMENT performed by Elena Mora MD at [address] 11/26/2019    EGD FOREIGN BODY REMOVAL performed by Elena Mora MD at [address] [facility]       Discharge Medication List as of 10/1/2023  6:28 PM        CONTINUE these medications which have NOT CHANGED    Details   linaclotide (LINZESS) 290 MCG CAPS capsule 1 capsule by Per G Tube route every morning (before breakfast),

## 2023-10-01 NOTE — ED NOTES
Attempted report to [facility], no answer at this time     Jeremiah Coronado RN  10/01/23 7391

## 2024-05-04 ENCOUNTER — APPOINTMENT (OUTPATIENT)
Dept: CT IMAGING | Age: 29
End: 2024-05-04
Payer: MEDICARE

## 2024-05-04 ENCOUNTER — HOSPITAL ENCOUNTER (EMERGENCY)
Age: 29
Discharge: HOME | End: 2024-05-04
Payer: MEDICARE

## 2024-05-04 VITALS
WEIGHT: 125 LBS | BODY MASS INDEX: 22.15 KG/M2 | OXYGEN SATURATION: 99 % | DIASTOLIC BLOOD PRESSURE: 67 MMHG | HEIGHT: 63 IN | RESPIRATION RATE: 15 BRPM | SYSTOLIC BLOOD PRESSURE: 107 MMHG | TEMPERATURE: 99 F | HEART RATE: 89 BPM

## 2024-05-04 DIAGNOSIS — K56.41 FECAL IMPACTION (HCC): Primary | ICD-10-CM

## 2024-05-04 DIAGNOSIS — N30.00 ACUTE CYSTITIS WITHOUT HEMATURIA: ICD-10-CM

## 2024-05-04 LAB
ALBUMIN SERPL-MCNC: 4.1 G/DL (ref 3.4–5)
ALBUMIN/GLOB SERPL: 1.1 {RATIO} (ref 1.1–2.2)
ALP SERPL-CCNC: 70 U/L (ref 40–129)
ALT SERPL-CCNC: 29 U/L (ref 10–40)
ANION GAP SERPL CALCULATED.3IONS-SCNC: 11 MMOL/L (ref 3–16)
AST SERPL-CCNC: 31 U/L (ref 15–37)
BACTERIA URNS QL MICRO: ABNORMAL /HPF
BASOPHILS # BLD: 0.1 K/UL (ref 0–0.2)
BASOPHILS NFR BLD: 0.7 %
BILIRUB SERPL-MCNC: <0.2 MG/DL (ref 0–1)
BILIRUB UR QL STRIP.AUTO: NEGATIVE
BUN SERPL-MCNC: 21 MG/DL (ref 7–20)
CALCIUM SERPL-MCNC: 9.2 MG/DL (ref 8.3–10.6)
CHLORIDE SERPL-SCNC: 106 MMOL/L (ref 99–110)
CLARITY UR: CLEAR
CO2 SERPL-SCNC: 21 MMOL/L (ref 21–32)
COLOR UR: ABNORMAL
CREAT SERPL-MCNC: 0.6 MG/DL (ref 0.6–1.1)
DEPRECATED RDW RBC AUTO: 14 % (ref 12.4–15.4)
EOSINOPHIL # BLD: 0.4 K/UL (ref 0–0.6)
EOSINOPHIL NFR BLD: 6.1 %
EPI CELLS #/AREA URNS AUTO: 13 /HPF (ref 0–5)
FLUAV RNA RESP QL NAA+PROBE: NOT DETECTED
FLUBV RNA RESP QL NAA+PROBE: NOT DETECTED
GFR SERPLBLD CREATININE-BSD FMLA CKD-EPI: >90 ML/MIN/{1.73_M2}
GLUCOSE SERPL-MCNC: 111 MG/DL (ref 70–99)
GLUCOSE UR STRIP.AUTO-MCNC: NEGATIVE MG/DL
HCG UR QL: NEGATIVE
HCT VFR BLD AUTO: 42.1 % (ref 36–48)
HGB BLD-MCNC: 13.9 G/DL (ref 12–16)
HGB UR QL STRIP.AUTO: NEGATIVE
HYALINE CASTS #/AREA URNS AUTO: 1 /LPF (ref 0–8)
KETONES UR STRIP.AUTO-MCNC: ABNORMAL MG/DL
LEUKOCYTE ESTERASE UR QL STRIP.AUTO: ABNORMAL
LYMPHOCYTES # BLD: 2.2 K/UL (ref 1–5.1)
LYMPHOCYTES NFR BLD: 31.3 %
MCH RBC QN AUTO: 29.4 PG (ref 26–34)
MCHC RBC AUTO-ENTMCNC: 32.9 G/DL (ref 31–36)
MCV RBC AUTO: 89.3 FL (ref 80–100)
MONOCYTES # BLD: 0.5 K/UL (ref 0–1.3)
MONOCYTES NFR BLD: 6.6 %
NEUTROPHILS # BLD: 3.9 K/UL (ref 1.7–7.7)
NEUTROPHILS NFR BLD: 55.3 %
NITRITE UR QL STRIP.AUTO: NEGATIVE
PH UR STRIP.AUTO: 7 [PH] (ref 5–8)
PLATELET # BLD AUTO: 222 K/UL (ref 135–450)
PMV BLD AUTO: 10 FL (ref 5–10.5)
POTASSIUM SERPL-SCNC: 4.9 MMOL/L (ref 3.5–5.1)
PROT SERPL-MCNC: 8 G/DL (ref 6.4–8.2)
PROT UR STRIP.AUTO-MCNC: ABNORMAL MG/DL
RBC # BLD AUTO: 4.72 M/UL (ref 4–5.2)
RBC CLUMPS #/AREA URNS AUTO: 4 /HPF (ref 0–4)
SARS-COV-2 RNA RESP QL NAA+PROBE: NOT DETECTED
SODIUM SERPL-SCNC: 138 MMOL/L (ref 136–145)
SP GR UR STRIP.AUTO: >=1.03 (ref 1–1.03)
UA COMPLETE W REFLEX CULTURE PNL UR: ABNORMAL
UA DIPSTICK W REFLEX MICRO PNL UR: YES
URN SPEC COLLECT METH UR: ABNORMAL
UROBILINOGEN UR STRIP-ACNC: 1 E.U./DL
WBC # BLD AUTO: 7.1 K/UL (ref 4–11)
WBC #/AREA URNS AUTO: 6 /HPF (ref 0–5)

## 2024-05-04 PROCEDURE — 71250 CT THORAX DX C-: CPT

## 2024-05-04 PROCEDURE — 2580000003 HC RX 258: Performed by: PHYSICIAN ASSISTANT

## 2024-05-04 PROCEDURE — 85025 COMPLETE CBC W/AUTO DIFF WBC: CPT

## 2024-05-04 PROCEDURE — 80053 COMPREHEN METABOLIC PANEL: CPT

## 2024-05-04 PROCEDURE — 6360000002 HC RX W HCPCS: Performed by: PHYSICIAN ASSISTANT

## 2024-05-04 PROCEDURE — A4216 STERILE WATER/SALINE, 10 ML: HCPCS | Performed by: PHYSICIAN ASSISTANT

## 2024-05-04 PROCEDURE — 87636 SARSCOV2 & INF A&B AMP PRB: CPT

## 2024-05-04 PROCEDURE — 99284 EMERGENCY DEPT VISIT MOD MDM: CPT

## 2024-05-04 PROCEDURE — 96374 THER/PROPH/DIAG INJ IV PUSH: CPT

## 2024-05-04 PROCEDURE — 84703 CHORIONIC GONADOTROPIN ASSAY: CPT

## 2024-05-04 PROCEDURE — 81001 URINALYSIS AUTO W/SCOPE: CPT

## 2024-05-04 RX ORDER — POLYETHYLENE GLYCOL 3350 17 G/17G
17 POWDER, FOR SOLUTION ORAL DAILY
Qty: 238 G | Refills: 0 | Status: SHIPPED | OUTPATIENT
Start: 2024-05-04 | End: 2024-05-04

## 2024-05-04 RX ORDER — CEPHALEXIN 250 MG/5ML
500 POWDER, FOR SUSPENSION ORAL 4 TIMES DAILY
Qty: 400 ML | Refills: 0 | Status: SHIPPED | OUTPATIENT
Start: 2024-05-04 | End: 2024-05-14

## 2024-05-04 RX ORDER — SELENIUM 50 MCG
1 TABLET ORAL 2 TIMES DAILY
COMMUNITY

## 2024-05-04 RX ORDER — MIRTAZAPINE 7.5 MG/1
7.5 TABLET, FILM COATED ORAL NIGHTLY
COMMUNITY

## 2024-05-04 RX ORDER — POLYETHYLENE GLYCOL 3350 17 G/17G
17 POWDER, FOR SOLUTION ORAL 2 TIMES DAILY
COMMUNITY

## 2024-05-04 RX ORDER — VALPROIC ACID 250 MG/5ML
500 SOLUTION ORAL 2 TIMES DAILY
COMMUNITY

## 2024-05-04 RX ORDER — DOCUSATE SODIUM 100 MG/1
100 CAPSULE, LIQUID FILLED ORAL EVERY MORNING
COMMUNITY

## 2024-05-04 RX ORDER — CEPHALEXIN 250 MG/5ML
500 POWDER, FOR SUSPENSION ORAL 4 TIMES DAILY
Qty: 400 ML | Refills: 0 | Status: SHIPPED | OUTPATIENT
Start: 2024-05-04 | End: 2024-05-04

## 2024-05-04 RX ORDER — BENZTROPINE MESYLATE 2 MG/1
2 TABLET ORAL 2 TIMES DAILY
COMMUNITY

## 2024-05-04 RX ORDER — MIDODRINE HYDROCHLORIDE 10 MG/1
10 TABLET ORAL EVERY MORNING
COMMUNITY

## 2024-05-04 RX ORDER — ACETAMINOPHEN 325 MG/1
650 TABLET ORAL 2 TIMES DAILY
COMMUNITY

## 2024-05-04 RX ADMIN — SODIUM CHLORIDE 1 MG: 9 INJECTION INTRAMUSCULAR; INTRAVENOUS; SUBCUTANEOUS at 15:00

## 2024-05-04 RX ADMIN — Medication 240 ML: at 16:31

## 2024-05-04 ASSESSMENT — PAIN - FUNCTIONAL ASSESSMENT: PAIN_FUNCTIONAL_ASSESSMENT: 0-10

## 2024-05-04 ASSESSMENT — PAIN SCALES - GENERAL: PAINLEVEL_OUTOF10: 0

## 2024-05-04 NOTE — ED PROVIDER NOTES
[facility] EMERGENCY DEPARTMENT  EMERGENCY DEPARTMENT ENCOUNTER        Pt Name: Chepe Clay  MRN: [id number]  Birthdate 1995  Date of evaluation: 5/4/2024  Provider: GRABIEL Nowak  PCP: Bart Arora MD  Note Started: 1:50 PM EDT 5/4/24      JENNIFER. I have evaluated this patient.        CHIEF COMPLAINT       Chief Complaint   Patient presents with    Fever     Pt brought in per [facility] from [facility], [place], operated by [facility], staff reports fever today, virus going around facility, staff concerned for fecal impaction due to hx.  Tube feed with pureed diet. Tylenol given at 1225       HISTORY OF PRESENT ILLNESS: 1 or more Elements     History From: Patient  Limitations to history : Other - TBI/Nonverbal    Chepe Clay is a 28 y.o. female with reported history of hepatitis, anoxic brain injury, chronic kidney disease, depression, schizophrenia, seizures and asthma who presents ED with complaint of a fever.  Patient was sent in by EMS from [facility], [place], operated by [facility] for concern for bowel obstruction.  Apparently today they noticed that her abdomen was distended.  Unsure when her last bowel movement was.  Patient has a feeding tube that they use for medications but does do puréed diet by mouth.  No reported nausea or vomiting.  Reports today patient had temperature of 100.9.  Facility did give Tylenol.  Sent to the ED for further evaluation treatment.  No reported cough.  Reports that there has been a viral infection going around the facility.  No reported seizures or changes in baseline mental status.  No reported decreases in urine output.  No reported rashes or lesions.    Nursing Notes were all reviewed and agreed with or any disagreements were addressed in the HPI.    REVIEW OF SYSTEMS :      Review of Systems   Unable to perform ROS: Other       Positives and Pertinent negatives as per HPI.     SURGICAL HISTORY     Past Surgical History:   Procedure Laterality Date    GASTROSTOMY TUBE PLACEMENT       No erythema or rash.   Neurological:      General: No focal deficit present.      Mental Status: She is alert. Mental status is at baseline.      Comments: Patient alert.  Moves all 4 extremities without difficulty.   Psychiatric:         Behavior: Behavior normal.         DIAGNOSTIC RESULTS   LABS:    Labs Reviewed   COMPREHENSIVE METABOLIC PANEL W/ REFLEX TO MG FOR LOW K - Abnormal; Notable for the following components:       Result Value    Glucose 111 (*)     BUN 21 (*)     All other components within normal limits   URINALYSIS WITH REFLEX TO CULTURE - Abnormal; Notable for the following components:    Color, UA DARK YELLOW (*)     Ketones, Urine TRACE (*)     Protein, UA TRACE (*)     Leukocyte Esterase, Urine SMALL (*)     All other components within normal limits   MICROSCOPIC URINALYSIS - Abnormal; Notable for the following components:    Bacteria, UA 1+ (*)     WBC, UA 6 (*)     Epithelial Cells, UA 13 (*)     All other components within normal limits   COVID-19 & INFLUENZA COMBO   CBC WITH AUTO DIFFERENTIAL   PREGNANCY, URINE       When ordered only abnormal lab results are displayed. All other labs were within normal range or not returned as of this dictation.    EKG: When ordered, EKG's are interpreted by the Emergency Department Physician in the absence of a cardiologist.  Please see their note for interpretation of EKG.    RADIOLOGY:   Non-plain film images such as CT, Ultrasound and MRI are read by the radiologist. Plain radiographic images are visualized and preliminarily interpreted by the ED Provider with the below findings:      Interpretation per the Radiologist below, if available at the time of this note:    CT CHEST ABDOMEN PELVIS WO CONTRAST Additional Contrast? None   Final Result   Chest      Trace left-sided pleural effusion with adjacent left basilar consolidation,   with atelectasis favored over pneumonia.      Abdomen and pelvis      Fecal impaction, similar to prior studies, with

## 2024-05-04 NOTE — PROGRESS NOTES
Pharmacy Home Medication Reconciliation Note    A medication reconciliation has been completed for Chepe Clay 1995    Pharmacy: [facility], [address] [place], [place]  Information provided by: facility    The patient's home medication list is as follows:  No current facility-administered medications on file prior to encounter.     Current Outpatient Medications on File Prior to Encounter   Medication Sig Dispense Refill    mirtazapine (REMERON) 7.5 MG tablet 1 tablet by Per G Tube route nightly Indications: For appetite stimulant.      polyethylene glycol (MIRALAX) 17 g PACK packet 17 g by Per G Tube route 2 times daily Dissolve 17 grams in 8 oz of water and give per G-tube two times a day for constipation.      midodrine (PROAMATINE) 10 MG tablet 1 tablet every morning Give one tablet per G-tube every morning at 6:00 am for low blood pressure.      valproic acid (DEPAKENE) 250 MG/5ML SOLN oral solution 10 mLs by Per G Tube route 2 times daily Indications: Seizure disorder.      acetaminophen (TYLENOL) 325 MG tablet Take 2 tablets by mouth 2 times daily Give patient two tablets by mouth two times a day for pain/discomfort.      Lactobacillus (ACIDOPHILUS) CAPS capsule 1 capsule by Per G Tube route 2 times daily      benztropine (COGENTIN) 2 MG tablet 1 tablet by Per G Tube route 2 times daily      docusate sodium (COLACE) 100 MG capsule Take 1 capsule by mouth every morning      linaclotide (LINZESS) 290 MCG CAPS capsule 1 capsule by Per G Tube route every morning (before breakfast) 30 capsule 0    senna (SENOKOT) 8.6 MG tablet 2 tablets by Per G Tube route 2 times daily 30 tablet 0    QUEtiapine (SEROQUEL) 25 MG tablet 1 tablet by Per G Tube route 2 times daily (Patient not taking: Reported on 5/4/2024) 30 tablet 0    midodrine (PROAMATINE) 5 MG tablet 2 tablets by Per G Tube route every 8 hours as needed (For SBP less than 100.)      sertraline (ZOLOFT) 100 MG tablet 2 tablets  by Per G Tube route daily Give 2 tablets per G-tube one time a day for schizophrenia.      [DISCONTINUED] LACTOBACILLUS PO 1 tablet by Per G Tube route in the morning and at bedtime      ferrous sulfate 220 (44 Fe) MG/5ML solution 5 mLs by Per G Tube route 3 times daily      Sodium Phosphates (FLEET) 7-19 GM/118ML Place 1 enema rectally daily as needed (Patient not taking: Reported on 5/4/2024)      acetaminophen (TYLENOL) 325 MG tablet 2 tablets by Per G Tube route 2 times daily as needed for Pain or Fever      valproate (DEPACON) 100 MG/ML injection 250 mg by Per G Tube route every 12 hours Give 10 ml every 12 hours for sz activity (Patient not taking: Reported on 5/4/2024)      lacosamide (VIMPAT) 100 MG TABS tablet 1 tablet by Per G Tube route 2 times daily.      Baclofen 5 MG TABS 1 tablet by Per G Tube route 2 times daily      paliperidone palmitate ER (INVEGA SUSTENNA) 117 MG/0.75ML MOSES IM injection Inject 117 mg into the muscle every 30 days At bedtime on the 15th of the month.      levETIRAcetam (KEPPRA) 100 MG/ML solution 5 mLs by PEG Tube route 2 times daily (Patient taking differently: 5 mLs by Per G Tube route 2 times daily) 1 Bottle 3    famotidine (PEPCID) 20 MG tablet 1 tablet by PEG Tube route 2 times daily (Patient taking differently: 1 tablet by Per G Tube route 2 times daily) 60 tablet 3    [DISCONTINUED] benztropine (COGENTIN) 1 MG tablet Take 2 tablets by mouth 2 times daily (Patient not taking: Reported on 5/4/2024) 120 tablet 3       Patient is no longer taking quetiapine or fleet enemas.    Of note, patient receives Valproic Acid 500 mg oral solution BID and Keppra 500 mg solution BID per G-tube: patient received all AM meds prior to ED arrival.    Timing of last doses updated.    Thank you,  Nadira Freeman, hT

## 2024-05-04 NOTE — ED NOTES
During triage pt became agitated and appeared to be uncomfortable   Straining possibly for a BM.  Abd soft non tender  Small amount of stool present in her brief, pt cleaned up new brief placed.

## 2024-05-04 NOTE — ED NOTES
Patient discharged  to [facility] in stable condition [facility]. All belongings in tow including discharge paperwork.

## 2024-05-04 NOTE — ED NOTES
Report to Charge Nurse Florence at [facility], [place], operated by [facility]

## 2025-04-03 ENCOUNTER — APPOINTMENT (OUTPATIENT)
Dept: GENERAL RADIOLOGY | Age: 30
DRG: 698 | End: 2025-04-03
Payer: MEDICARE

## 2025-04-03 ENCOUNTER — APPOINTMENT (OUTPATIENT)
Dept: CT IMAGING | Age: 30
DRG: 698 | End: 2025-04-03
Payer: MEDICARE

## 2025-04-03 ENCOUNTER — HOSPITAL ENCOUNTER (INPATIENT)
Age: 30
LOS: 7 days | Discharge: OTHER INSTITUTION | DRG: 698 | End: 2025-04-10
Attending: INTERNAL MEDICINE | Admitting: INTERNAL MEDICINE
Payer: MEDICARE

## 2025-04-03 DIAGNOSIS — K85.90 ACUTE PANCREATITIS, UNSPECIFIED COMPLICATION STATUS, UNSPECIFIED PANCREATITIS TYPE: Primary | ICD-10-CM

## 2025-04-03 DIAGNOSIS — T85.528A GASTROJEJUNOSTOMY TUBE DISLODGEMENT: ICD-10-CM

## 2025-04-03 DIAGNOSIS — K85.00 IDIOPATHIC ACUTE PANCREATITIS WITHOUT INFECTION OR NECROSIS: ICD-10-CM

## 2025-04-03 DIAGNOSIS — N39.0 URINARY TRACT INFECTION ASSOCIATED WITH CATHETERIZATION OF URINARY TRACT, UNSPECIFIED INDWELLING URINARY CATHETER TYPE, INITIAL ENCOUNTER: ICD-10-CM

## 2025-04-03 DIAGNOSIS — T83.511A URINARY TRACT INFECTION ASSOCIATED WITH CATHETERIZATION OF URINARY TRACT, UNSPECIFIED INDWELLING URINARY CATHETER TYPE, INITIAL ENCOUNTER: ICD-10-CM

## 2025-04-03 PROBLEM — F79 INTELLECTUAL DISABILITY: Status: ACTIVE | Noted: 2025-04-03

## 2025-04-03 PROBLEM — G80.0 SPASTIC QUADRIPLEGIC CEREBRAL PALSY (HCC): Status: ACTIVE | Noted: 2025-04-03

## 2025-04-03 PROBLEM — R41.82 ALTERED MENTAL STATUS: Status: ACTIVE | Noted: 2025-04-03

## 2025-04-03 LAB
ALBUMIN SERPL-MCNC: 4.1 G/DL (ref 3.4–5)
ALBUMIN/GLOB SERPL: 1.3 {RATIO} (ref 1.1–2.2)
ALP SERPL-CCNC: 85 U/L (ref 40–129)
ALT SERPL-CCNC: 118 U/L (ref 10–40)
AMORPHOUS: PRESENT
ANION GAP SERPL CALCULATED.3IONS-SCNC: 12 MMOL/L (ref 3–16)
AST SERPL-CCNC: 163 U/L (ref 15–37)
BACTERIA URNS QL MICRO: ABNORMAL /HPF
BASOPHILS # BLD: 0 K/UL (ref 0–0.2)
BASOPHILS NFR BLD: 0.1 %
BILIRUB SERPL-MCNC: 0.3 MG/DL (ref 0–1)
BILIRUB UR QL STRIP.AUTO: ABNORMAL
BUN SERPL-MCNC: 15 MG/DL (ref 7–20)
CALCIUM SERPL-MCNC: 9.1 MG/DL (ref 8.3–10.6)
CHARACTER UR: ABNORMAL
CHLORIDE SERPL-SCNC: 103 MMOL/L (ref 99–110)
CLARITY UR: ABNORMAL
CO2 SERPL-SCNC: 23 MMOL/L (ref 21–32)
COLOR UR: ABNORMAL
CREAT SERPL-MCNC: 0.5 MG/DL (ref 0.6–1.1)
DEPRECATED RDW RBC AUTO: 13 % (ref 12.4–15.4)
EOSINOPHIL # BLD: 0.1 K/UL (ref 0–0.6)
EOSINOPHIL NFR BLD: 0.7 %
EPI CELLS #/AREA URNS AUTO: 14 /HPF (ref 0–5)
GFR SERPLBLD CREATININE-BSD FMLA CKD-EPI: >90 ML/MIN/{1.73_M2}
GLUCOSE SERPL-MCNC: 122 MG/DL (ref 70–99)
GLUCOSE UR STRIP.AUTO-MCNC: NEGATIVE MG/DL
HCG SERPL QL: NEGATIVE
HCT VFR BLD AUTO: 41.9 % (ref 36–48)
HGB BLD-MCNC: 13.6 G/DL (ref 12–16)
HGB UR QL STRIP.AUTO: ABNORMAL
HYALINE CASTS #/AREA URNS AUTO: 8 /LPF (ref 0–8)
HYALINE CASTS: PRESENT
KETONES UR STRIP.AUTO-MCNC: ABNORMAL MG/DL
LACTATE BLDV-SCNC: 1.6 MMOL/L (ref 0.4–1.9)
LEUKOCYTE ESTERASE UR QL STRIP.AUTO: ABNORMAL
LIPASE SERPL-CCNC: 2891 U/L (ref 13–60)
LYMPHOCYTES # BLD: 0.9 K/UL (ref 1–5.1)
LYMPHOCYTES NFR BLD: 8 %
MCH RBC QN AUTO: 29.8 PG (ref 26–34)
MCHC RBC AUTO-ENTMCNC: 32.5 G/DL (ref 31–36)
MCV RBC AUTO: 91.7 FL (ref 80–100)
MONOCYTES # BLD: 1 K/UL (ref 0–1.3)
MONOCYTES NFR BLD: 8.8 %
NEUTROPHILS # BLD: 9.7 K/UL (ref 1.7–7.7)
NEUTROPHILS NFR BLD: 82.4 %
NITRITE UR QL STRIP.AUTO: NEGATIVE
PH UR STRIP.AUTO: 7 [PH] (ref 5–8)
PLATELET # BLD AUTO: 149 K/UL (ref 135–450)
PMV BLD AUTO: 10 FL (ref 5–10.5)
POTASSIUM SERPL-SCNC: 4 MMOL/L (ref 3.5–5.1)
PROT SERPL-MCNC: 7.2 G/DL (ref 6.4–8.2)
PROT UR STRIP.AUTO-MCNC: 30 MG/DL
RBC # BLD AUTO: 4.57 M/UL (ref 4–5.2)
RBC #/AREA URNS HPF: ABNORMAL /HPF (ref 0–4)
SODIUM SERPL-SCNC: 138 MMOL/L (ref 136–145)
SP GR UR STRIP.AUTO: >=1.03 (ref 1–1.03)
UA COMPLETE W REFLEX CULTURE PNL UR: YES
UA DIPSTICK W REFLEX MICRO PNL UR: YES
URN SPEC COLLECT METH UR: ABNORMAL
UROBILINOGEN UR STRIP-ACNC: 1 E.U./DL
WBC # BLD AUTO: 11.8 K/UL (ref 4–11)
WBC #/AREA URNS AUTO: 156 /HPF (ref 0–5)

## 2025-04-03 PROCEDURE — 81001 URINALYSIS AUTO W/SCOPE: CPT

## 2025-04-03 PROCEDURE — 80053 COMPREHEN METABOLIC PANEL: CPT

## 2025-04-03 PROCEDURE — 2500000003 HC RX 250 WO HCPCS: Performed by: PHYSICIAN ASSISTANT

## 2025-04-03 PROCEDURE — 6360000002 HC RX W HCPCS: Performed by: PHYSICIAN ASSISTANT

## 2025-04-03 PROCEDURE — 85025 COMPLETE CBC W/AUTO DIFF WBC: CPT

## 2025-04-03 PROCEDURE — 99285 EMERGENCY DEPT VISIT HI MDM: CPT

## 2025-04-03 PROCEDURE — 6370000000 HC RX 637 (ALT 250 FOR IP): Performed by: INTERNAL MEDICINE

## 2025-04-03 PROCEDURE — 2580000003 HC RX 258: Performed by: INTERNAL MEDICINE

## 2025-04-03 PROCEDURE — 1200000000 HC SEMI PRIVATE

## 2025-04-03 PROCEDURE — 96361 HYDRATE IV INFUSION ADD-ON: CPT

## 2025-04-03 PROCEDURE — 87086 URINE CULTURE/COLONY COUNT: CPT

## 2025-04-03 PROCEDURE — 96375 TX/PRO/DX INJ NEW DRUG ADDON: CPT

## 2025-04-03 PROCEDURE — 74177 CT ABD & PELVIS W/CONTRAST: CPT

## 2025-04-03 PROCEDURE — 96374 THER/PROPH/DIAG INJ IV PUSH: CPT

## 2025-04-03 PROCEDURE — 83605 ASSAY OF LACTIC ACID: CPT

## 2025-04-03 PROCEDURE — 6360000004 HC RX CONTRAST MEDICATION: Performed by: PHYSICIAN ASSISTANT

## 2025-04-03 PROCEDURE — 2580000003 HC RX 258: Performed by: PHYSICIAN ASSISTANT

## 2025-04-03 PROCEDURE — 84703 CHORIONIC GONADOTROPIN ASSAY: CPT

## 2025-04-03 PROCEDURE — 83690 ASSAY OF LIPASE: CPT

## 2025-04-03 PROCEDURE — 6360000004 HC RX CONTRAST MEDICATION

## 2025-04-03 PROCEDURE — 49465 FLUORO EXAM OF G/COLON TUBE: CPT

## 2025-04-03 RX ORDER — POLYETHYLENE GLYCOL 3350 17 G/17G
17 POWDER, FOR SOLUTION ORAL 2 TIMES DAILY
Status: DISCONTINUED | OUTPATIENT
Start: 2025-04-03 | End: 2025-04-11 | Stop reason: HOSPADM

## 2025-04-03 RX ORDER — MORPHINE SULFATE 4 MG/ML
4 INJECTION, SOLUTION INTRAMUSCULAR; INTRAVENOUS ONCE
Refills: 0 | Status: COMPLETED | OUTPATIENT
Start: 2025-04-03 | End: 2025-04-03

## 2025-04-03 RX ORDER — ONDANSETRON 2 MG/ML
4 INJECTION INTRAMUSCULAR; INTRAVENOUS EVERY 6 HOURS PRN
Status: DISCONTINUED | OUTPATIENT
Start: 2025-04-03 | End: 2025-04-11 | Stop reason: HOSPADM

## 2025-04-03 RX ORDER — BENZTROPINE MESYLATE 1 MG/1
2 TABLET ORAL 2 TIMES DAILY
Status: DISCONTINUED | OUTPATIENT
Start: 2025-04-03 | End: 2025-04-11 | Stop reason: HOSPADM

## 2025-04-03 RX ORDER — VALPROIC ACID 250 MG/5ML
500 SOLUTION ORAL 2 TIMES DAILY
Status: DISCONTINUED | OUTPATIENT
Start: 2025-04-03 | End: 2025-04-11 | Stop reason: HOSPADM

## 2025-04-03 RX ORDER — FAMOTIDINE 20 MG/1
20 TABLET, FILM COATED ORAL 2 TIMES DAILY
Status: DISCONTINUED | OUTPATIENT
Start: 2025-04-03 | End: 2025-04-11 | Stop reason: HOSPADM

## 2025-04-03 RX ORDER — IOPAMIDOL 755 MG/ML
75 INJECTION, SOLUTION INTRAVASCULAR
Status: COMPLETED | OUTPATIENT
Start: 2025-04-03 | End: 2025-04-03

## 2025-04-03 RX ORDER — ACETAMINOPHEN 325 MG/1
650 TABLET ORAL 2 TIMES DAILY PRN
Status: DISCONTINUED | OUTPATIENT
Start: 2025-04-03 | End: 2025-04-11 | Stop reason: HOSPADM

## 2025-04-03 RX ORDER — DEXTROSE MONOHYDRATE AND SODIUM CHLORIDE 5; .9 G/100ML; G/100ML
INJECTION, SOLUTION INTRAVENOUS CONTINUOUS
Status: DISCONTINUED | OUTPATIENT
Start: 2025-04-03 | End: 2025-04-06

## 2025-04-03 RX ORDER — LACOSAMIDE 100 MG/1
100 TABLET ORAL 2 TIMES DAILY
Status: DISCONTINUED | OUTPATIENT
Start: 2025-04-03 | End: 2025-04-11 | Stop reason: HOSPADM

## 2025-04-03 RX ORDER — BACLOFEN 10 MG/1
5 TABLET ORAL 2 TIMES DAILY
Status: DISCONTINUED | OUTPATIENT
Start: 2025-04-03 | End: 2025-04-11 | Stop reason: HOSPADM

## 2025-04-03 RX ORDER — ONDANSETRON 2 MG/ML
4 INJECTION INTRAMUSCULAR; INTRAVENOUS EVERY 6 HOURS PRN
Status: DISCONTINUED | OUTPATIENT
Start: 2025-04-03 | End: 2025-04-03

## 2025-04-03 RX ORDER — FERROUS SULFATE 300 MG/5ML
300 LIQUID (ML) ORAL 3 TIMES DAILY
Status: DISCONTINUED | OUTPATIENT
Start: 2025-04-03 | End: 2025-04-11 | Stop reason: HOSPADM

## 2025-04-03 RX ORDER — LEVETIRACETAM 100 MG/ML
500 SOLUTION ORAL 2 TIMES DAILY
Status: DISCONTINUED | OUTPATIENT
Start: 2025-04-03 | End: 2025-04-11 | Stop reason: HOSPADM

## 2025-04-03 RX ORDER — SODIUM CHLORIDE, SODIUM LACTATE, POTASSIUM CHLORIDE, AND CALCIUM CHLORIDE .6; .31; .03; .02 G/100ML; G/100ML; G/100ML; G/100ML
1000 INJECTION, SOLUTION INTRAVENOUS ONCE
Status: COMPLETED | OUTPATIENT
Start: 2025-04-03 | End: 2025-04-03

## 2025-04-03 RX ORDER — SENNOSIDES A AND B 8.6 MG/1
2 TABLET, FILM COATED ORAL 2 TIMES DAILY
Status: DISCONTINUED | OUTPATIENT
Start: 2025-04-03 | End: 2025-04-11 | Stop reason: HOSPADM

## 2025-04-03 RX ORDER — MIDODRINE HYDROCHLORIDE 5 MG/1
5 TABLET ORAL 3 TIMES DAILY PRN
Status: DISCONTINUED | OUTPATIENT
Start: 2025-04-03 | End: 2025-04-06

## 2025-04-03 RX ORDER — MIRTAZAPINE 15 MG/1
7.5 TABLET, FILM COATED ORAL NIGHTLY
Status: DISCONTINUED | OUTPATIENT
Start: 2025-04-03 | End: 2025-04-11 | Stop reason: HOSPADM

## 2025-04-03 RX ORDER — ONDANSETRON 2 MG/ML
4 INJECTION INTRAMUSCULAR; INTRAVENOUS ONCE
Status: COMPLETED | OUTPATIENT
Start: 2025-04-03 | End: 2025-04-03

## 2025-04-03 RX ORDER — LACTOBACILLUS RHAMNOSUS GG 10B CELL
1 CAPSULE ORAL 2 TIMES DAILY
Status: DISCONTINUED | OUTPATIENT
Start: 2025-04-03 | End: 2025-04-11 | Stop reason: HOSPADM

## 2025-04-03 RX ADMIN — VALPROIC ACID 500 MG: 250 SOLUTION ORAL at 21:29

## 2025-04-03 RX ADMIN — BENZTROPINE MESYLATE 2 MG: 1 TABLET ORAL at 21:29

## 2025-04-03 RX ADMIN — MIRTAZAPINE 7.5 MG: 15 TABLET, FILM COATED ORAL at 21:29

## 2025-04-03 RX ADMIN — IOPAMIDOL 75 ML: 755 INJECTION, SOLUTION INTRAVENOUS at 14:42

## 2025-04-03 RX ADMIN — ONDANSETRON 4 MG: 2 INJECTION, SOLUTION INTRAMUSCULAR; INTRAVENOUS at 13:58

## 2025-04-03 RX ADMIN — FAMOTIDINE 20 MG: 20 TABLET, FILM COATED ORAL at 21:30

## 2025-04-03 RX ADMIN — SENNOSIDES 17.2 MG: 8.6 TABLET, COATED ORAL at 21:31

## 2025-04-03 RX ADMIN — MINERAL SUPPLEMENT IRON 300 MG / 5 ML STRENGTH LIQUID 100 PER BOX UNFLAVORED 300 MG: at 21:29

## 2025-04-03 RX ADMIN — IOHEXOL: 350 INJECTION, SOLUTION INTRAVENOUS at 16:26

## 2025-04-03 RX ADMIN — MORPHINE SULFATE 4 MG: 4 INJECTION, SOLUTION INTRAMUSCULAR; INTRAVENOUS at 15:56

## 2025-04-03 RX ADMIN — DEXTROSE AND SODIUM CHLORIDE: 5; 900 INJECTION, SOLUTION INTRAVENOUS at 21:36

## 2025-04-03 RX ADMIN — Medication 1 CAPSULE: at 21:29

## 2025-04-03 RX ADMIN — POLYETHYLENE GLYCOL 3350 17 G: 17 POWDER, FOR SOLUTION ORAL at 21:30

## 2025-04-03 RX ADMIN — WATER 1000 MG: 1 INJECTION INTRAMUSCULAR; INTRAVENOUS; SUBCUTANEOUS at 16:55

## 2025-04-03 RX ADMIN — SODIUM CHLORIDE, POTASSIUM CHLORIDE, SODIUM LACTATE AND CALCIUM CHLORIDE 1000 ML: 600; 310; 30; 20 INJECTION, SOLUTION INTRAVENOUS at 15:55

## 2025-04-03 RX ADMIN — BACLOFEN 5 MG: 10 TABLET ORAL at 21:30

## 2025-04-03 RX ADMIN — LACOSAMIDE 100 MG: 100 TABLET, FILM COATED ORAL at 21:31

## 2025-04-03 RX ADMIN — LEVETIRACETAM 500 MG: 100 SOLUTION ORAL at 21:28

## 2025-04-03 ASSESSMENT — PAIN SCALES - WONG BAKER: WONGBAKER_NUMERICALRESPONSE: HURTS A LITTLE BIT

## 2025-04-03 ASSESSMENT — PAIN SCALES - GENERAL: PAINLEVEL_OUTOF10: 0

## 2025-04-03 ASSESSMENT — PAIN - FUNCTIONAL ASSESSMENT: PAIN_FUNCTIONAL_ASSESSMENT: 0-10

## 2025-04-03 NOTE — ED NOTES
Patient straight cath'd for UA, tolerated well. Patient cleaned and new brief applied at this time.

## 2025-04-03 NOTE — ED PROVIDER NOTES
specified.    DISCHARGE MEDICATIONS:  New Prescriptions    No medications on file       DISCONTINUED MEDICATIONS:  Discontinued Medications    MIDODRINE (PROAMATINE) 5 MG TABLET    2 tablets by Per G Tube route every 8 hours as needed (For SBP less than 100.)    SODIUM PHOSPHATES (FLEET) 7-19 GM/118ML    Place 1 enema rectally daily as needed    VALPROATE (DEPACON) 100 MG/ML INJECTION    250 mg by Per G Tube route every 12 hours Give 10 ml every 12 hours for sz activity              (Please note that portions of this note were completed with a voice recognition program.  Efforts were made to edit the dictations but occasionally words are mis-transcribed.)    Deanna Montanez PA-C (electronically signed)        Deanna Montanez PA-C  04/03/25 [id number]

## 2025-04-03 NOTE — ED NOTES
11/28/22                            Shade Mccartney  [address]  [facility] [address]    To Whom It May Concern:    This is to certify Shade Mccartney was evaluated with Jono Dooley MD on 11/28/22 and can return to school on 11/30/22.               Electronically signed by:  Jono Dooley MD  [facility]  [address] [place] [id number]  Dept Phone: [phone]      Patient arrived incontinent of large amount of stool, patient cleaned and new brief applied at this time.

## 2025-04-04 PROBLEM — F79 INTELLECTUAL DISABILITY: Status: RESOLVED | Noted: 2025-04-03 | Resolved: 2025-04-04

## 2025-04-04 PROBLEM — R53.2 FUNCTIONAL QUADRIPLEGIA (HCC): Status: ACTIVE | Noted: 2025-04-04

## 2025-04-04 PROBLEM — K94.23 GASTROSTOMY MALFUNCTION (HCC): Status: ACTIVE | Noted: 2025-04-04

## 2025-04-04 LAB
ANION GAP SERPL CALCULATED.3IONS-SCNC: 9 MMOL/L (ref 3–16)
BUN SERPL-MCNC: 8 MG/DL (ref 7–20)
CALCIUM SERPL-MCNC: 8.6 MG/DL (ref 8.3–10.6)
CHLORIDE SERPL-SCNC: 104 MMOL/L (ref 99–110)
CO2 SERPL-SCNC: 26 MMOL/L (ref 21–32)
CREAT SERPL-MCNC: 0.5 MG/DL (ref 0.6–1.1)
DEPRECATED RDW RBC AUTO: 13.1 % (ref 12.4–15.4)
GFR SERPLBLD CREATININE-BSD FMLA CKD-EPI: >90 ML/MIN/{1.73_M2}
GLUCOSE SERPL-MCNC: 98 MG/DL (ref 70–99)
HCT VFR BLD AUTO: 37.2 % (ref 36–48)
HGB BLD-MCNC: 12.6 G/DL (ref 12–16)
LIPASE SERPL-CCNC: 169 U/L (ref 13–60)
MCH RBC QN AUTO: 29.9 PG (ref 26–34)
MCHC RBC AUTO-ENTMCNC: 33.8 G/DL (ref 31–36)
MCV RBC AUTO: 88.6 FL (ref 80–100)
PLATELET # BLD AUTO: 128 K/UL (ref 135–450)
PMV BLD AUTO: 9.5 FL (ref 5–10.5)
POTASSIUM SERPL-SCNC: 3.8 MMOL/L (ref 3.5–5.1)
RBC # BLD AUTO: 4.2 M/UL (ref 4–5.2)
SODIUM SERPL-SCNC: 139 MMOL/L (ref 136–145)
WBC # BLD AUTO: 7 K/UL (ref 4–11)

## 2025-04-04 PROCEDURE — 1200000000 HC SEMI PRIVATE

## 2025-04-04 PROCEDURE — 85027 COMPLETE CBC AUTOMATED: CPT

## 2025-04-04 PROCEDURE — 83690 ASSAY OF LIPASE: CPT

## 2025-04-04 PROCEDURE — 6360000002 HC RX W HCPCS: Performed by: INTERNAL MEDICINE

## 2025-04-04 PROCEDURE — 2500000003 HC RX 250 WO HCPCS: Performed by: INTERNAL MEDICINE

## 2025-04-04 PROCEDURE — 2580000003 HC RX 258: Performed by: INTERNAL MEDICINE

## 2025-04-04 PROCEDURE — 6370000000 HC RX 637 (ALT 250 FOR IP): Performed by: INTERNAL MEDICINE

## 2025-04-04 PROCEDURE — 80048 BASIC METABOLIC PNL TOTAL CA: CPT

## 2025-04-04 RX ORDER — OXYCODONE HYDROCHLORIDE 5 MG/1
5 TABLET ORAL EVERY 4 HOURS PRN
Refills: 0 | Status: DISCONTINUED | OUTPATIENT
Start: 2025-04-04 | End: 2025-04-11 | Stop reason: HOSPADM

## 2025-04-04 RX ORDER — RISPERIDONE 0.5 MG/1
0.5 TABLET ORAL 2 TIMES DAILY
Status: DISCONTINUED | OUTPATIENT
Start: 2025-04-04 | End: 2025-04-06

## 2025-04-04 RX ADMIN — MINERAL SUPPLEMENT IRON 300 MG / 5 ML STRENGTH LIQUID 100 PER BOX UNFLAVORED 300 MG: at 09:32

## 2025-04-04 RX ADMIN — DEXTROSE AND SODIUM CHLORIDE: 5; 900 INJECTION, SOLUTION INTRAVENOUS at 09:20

## 2025-04-04 RX ADMIN — MIRTAZAPINE 7.5 MG: 15 TABLET, FILM COATED ORAL at 20:31

## 2025-04-04 RX ADMIN — FAMOTIDINE 20 MG: 20 TABLET, FILM COATED ORAL at 20:30

## 2025-04-04 RX ADMIN — LACOSAMIDE 100 MG: 100 TABLET, FILM COATED ORAL at 20:30

## 2025-04-04 RX ADMIN — BACLOFEN 5 MG: 10 TABLET ORAL at 20:30

## 2025-04-04 RX ADMIN — LACOSAMIDE 100 MG: 100 TABLET, FILM COATED ORAL at 09:21

## 2025-04-04 RX ADMIN — BENZTROPINE MESYLATE 2 MG: 1 TABLET ORAL at 09:21

## 2025-04-04 RX ADMIN — LEVETIRACETAM 500 MG: 100 SOLUTION ORAL at 09:32

## 2025-04-04 RX ADMIN — LEVETIRACETAM 500 MG: 100 SOLUTION ORAL at 20:31

## 2025-04-04 RX ADMIN — WATER 1000 MG: 1 INJECTION INTRAMUSCULAR; INTRAVENOUS; SUBCUTANEOUS at 17:16

## 2025-04-04 RX ADMIN — RISPERIDONE 0.5 MG: 0.5 TABLET, FILM COATED ORAL at 20:30

## 2025-04-04 RX ADMIN — Medication 1 CAPSULE: at 20:31

## 2025-04-04 RX ADMIN — VALPROIC ACID 500 MG: 250 SOLUTION ORAL at 09:30

## 2025-04-04 RX ADMIN — SENNOSIDES 17.2 MG: 8.6 TABLET, COATED ORAL at 09:22

## 2025-04-04 RX ADMIN — BACLOFEN 5 MG: 10 TABLET ORAL at 09:22

## 2025-04-04 RX ADMIN — FAMOTIDINE 20 MG: 20 TABLET, FILM COATED ORAL at 09:22

## 2025-04-04 RX ADMIN — DEXTROSE AND SODIUM CHLORIDE: 5; 900 INJECTION, SOLUTION INTRAVENOUS at 20:13

## 2025-04-04 RX ADMIN — BENZTROPINE MESYLATE 2 MG: 1 TABLET ORAL at 20:31

## 2025-04-04 RX ADMIN — MINERAL SUPPLEMENT IRON 300 MG / 5 ML STRENGTH LIQUID 100 PER BOX UNFLAVORED 300 MG: at 20:31

## 2025-04-04 RX ADMIN — DOCUSATE SODIUM LIQUID 100 MG: 100 LIQUID ORAL at 09:32

## 2025-04-04 RX ADMIN — VALPROIC ACID 500 MG: 250 SOLUTION ORAL at 20:31

## 2025-04-04 RX ADMIN — SERTRALINE 200 MG: 50 TABLET, FILM COATED ORAL at 09:22

## 2025-04-04 RX ADMIN — MINERAL SUPPLEMENT IRON 300 MG / 5 ML STRENGTH LIQUID 100 PER BOX UNFLAVORED 300 MG: at 15:27

## 2025-04-04 RX ADMIN — POLYETHYLENE GLYCOL 3350 17 G: 17 POWDER, FOR SOLUTION ORAL at 09:38

## 2025-04-04 RX ADMIN — Medication 1 CAPSULE: at 09:22

## 2025-04-04 ASSESSMENT — PAIN SCALES - GENERAL
PAINLEVEL_OUTOF10: 0
PAINLEVEL_OUTOF10: 2

## 2025-04-04 NOTE — DISCHARGE INSTR - COC
Continuity of Care Form    Patient Name: Chepe Clay   :  1995  MRN:  [id number]    Admit date:  4/3/2025  Discharge date:  2025    Code Status Order: Full Code   Advance Directives:     Admitting Physician:  Satnam Ojeda MD  PCP: Bart Arora MD    Discharging Nurse: Ayah  [facility] Hospital Unit/Room#: 4TN-4480/4480-01  Discharging Unit Phone Number: [phone]    Emergency Contact:   Extended Emergency Contact Information  Primary Emergency Contact: Stephanie Covarrubias  Home Phone: [phone]  Relation: Legal Guardian    Past Surgical History:  Past Surgical History:   Procedure Laterality Date    GASTROSTOMY TUBE PLACEMENT      GASTROSTOMY TUBE PLACEMENT      2016    GASTROSTOMY TUBE PLACEMENT N/A 2019    EGD PEG TUBE PLACEMENT performed by Chucho Quinonez MD at [facility] ENDOSCOPY    UPPER GASTROINTESTINAL ENDOSCOPY N/A 2019    EGD FOREIGN BODY REMOVAL performed by Chucho Quinonez MD at [facility] ENDOSCOPY       Immunization History:   Immunization History   Administered Date(s) Administered    COVID-19, MODERNA BLUE border, Primary or Immunocompromised, (age 12y+), IM, 100 mcg/0.5mL 2021    COVID-19, PFIZER PURPLE top, DILUTE for use, (age 12 y+), 30mcg/0.3mL 2021, 2021    Influenza Virus Vaccine 10/24/2011    TDaP, ADACEL (age 10y-64y), BOOSTRIX (age 10y+), IM, 0.5mL 10/24/2011, 2015       Active Problems:  Patient Active Problem List   Diagnosis Code    Drug overdose T50.901A    Cardiac arrest due to respiratory disorder J98.9, I46.8    Chronic anoxic encephalopathy (HCC) G93.1    Acute respiratory failure with hypoxia J96.01    Aspiration pneumonia of both lungs (HCC) J69.0    Cor pulmonale (HCC) I27.81    Convulsions/seizures (HCC) R56.9    Status epilepticus (HCC) G40.901    Non-traumatic rhabdomyolysis M62.82    Dystonia G24.9    Non compliance w medication regimen Z91.148    Failure to thrive in adult R62.7    Hypokalemia E87.6    Hypernatremia

## 2025-04-04 NOTE — CASE COMMUNICATION
Case Management Assessment  Initial Evaluation    Date/Time of Evaluation: 4/4/2025 3:12 PM  Assessment Completed by: Vernell Gonsales    If patient is discharged prior to next notation, then this note serves as note for discharge by case management.    Patient Name: Chepe Clay                   YOB: 1995  Diagnosis: Gastrojejunostomy tube dislodgement [T85.528A]  Acute pancreatitis without infection or necrosis [K85.90]  Urinary tract infection associated with catheterization of urinary tract, unspecified indwelling urinary catheter type, initial encounter [T83.511A, N39.0]  Acute pancreatitis, unspecified complication status, unspecified pancreatitis type [K85.90]                   Date / Time: 4/3/2025  1:31 PM    Patient Admission Status: Inpatient   Readmission Risk (Low < 19, Mod (19-27), High > 27): Readmission Risk Score: 13.5    Current PCP: Bart Arora MD  PCP verified by CM? No (follow my group home MD)    Chart Reviewed: Yes      History [facility]see comment) [facility]  Patient Orientation: Unable to Assess    Patient Cognition: Other (see comment) (Patient can speak as few works but per staff at [facility] she is very difficult to understand)    Hospitalization in the last 30 days (Readmission):  No    If yes, Readmission Assessment in  Navigator will be completed.    Advance Directives:      Code Status: Full Code   Patient's Primary Decision Maker is: Legal Next of Kin (has a guardian)      Discharge Planning:    Patient lives with: Other (Comment) (From Natasha Y[facility]rough) Type of Home: Group Home (From [facility])  Primary Care Giver: Other (Comment) (From Natasha Yarbrough)  Patient Support Systems include: Other (Comment) (From Natasha Yarbrough Has a guardian)   Current Financial resources: Medicare, Medicaid  Current community resources: Other (Comment) (From Natasha Yarbrough)  Current services prior to admission: Other (Comment) (From Natasha Yarbrough)

## 2025-04-04 NOTE — H&P
This H&P was voided , Please see the other H&P on the chart           
scan of the abdomen and pelvis shows malpositioned gastrostomy tube with retention balloon insufflated in the duodenum.  Repositioning is recommended.  Chronic marked stool burden in the rectum.    ASSESSMENT:  Acute pancreatitis, urinary tract infection, gastrostomy malposition and malfunction, fecal impaction, chronic anoxic encephalopathy, functional quadriplegia.    PLAN:  Get her admitted.  Treat her with IV hydration.  Keep her n.p.o.  Hold the tube feedings.  Follow the pancreatic enzymes.  IV antibiotics.    As always it is a pleasure and privilege to take care of your patient at [facility].          DANA CLIFFORD MD      D:  04/04/2025 05:25:52     T:  04/04/2025 06:19:06     MICHELLE/MICHELLE  Job #:  [id number]     Doc#:  [id number]    CC:   Deloris Muñiz

## 2025-04-05 LAB
ANION GAP SERPL CALCULATED.3IONS-SCNC: 12 MMOL/L (ref 3–16)
BACTERIA UR CULT: NORMAL
BUN SERPL-MCNC: 4 MG/DL (ref 7–20)
CALCIUM SERPL-MCNC: 9.2 MG/DL (ref 8.3–10.6)
CHLORIDE SERPL-SCNC: 105 MMOL/L (ref 99–110)
CO2 SERPL-SCNC: 22 MMOL/L (ref 21–32)
CREAT SERPL-MCNC: 0.5 MG/DL (ref 0.6–1.1)
DEPRECATED RDW RBC AUTO: 13 % (ref 12.4–15.4)
GFR SERPLBLD CREATININE-BSD FMLA CKD-EPI: >90 ML/MIN/{1.73_M2}
GLUCOSE SERPL-MCNC: 105 MG/DL (ref 70–99)
HCT VFR BLD AUTO: 40.1 % (ref 36–48)
HGB BLD-MCNC: 13.3 G/DL (ref 12–16)
LIPASE SERPL-CCNC: 37 U/L (ref 13–60)
MCH RBC QN AUTO: 30 PG (ref 26–34)
MCHC RBC AUTO-ENTMCNC: 33.2 G/DL (ref 31–36)
MCV RBC AUTO: 90.3 FL (ref 80–100)
PLATELET # BLD AUTO: 137 K/UL (ref 135–450)
PMV BLD AUTO: 10 FL (ref 5–10.5)
POTASSIUM SERPL-SCNC: 3.8 MMOL/L (ref 3.5–5.1)
RBC # BLD AUTO: 4.44 M/UL (ref 4–5.2)
SODIUM SERPL-SCNC: 139 MMOL/L (ref 136–145)
WBC # BLD AUTO: 7.9 K/UL (ref 4–11)

## 2025-04-05 PROCEDURE — 6370000000 HC RX 637 (ALT 250 FOR IP): Performed by: INTERNAL MEDICINE

## 2025-04-05 PROCEDURE — 85027 COMPLETE CBC AUTOMATED: CPT

## 2025-04-05 PROCEDURE — 80048 BASIC METABOLIC PNL TOTAL CA: CPT

## 2025-04-05 PROCEDURE — 6360000002 HC RX W HCPCS: Performed by: INTERNAL MEDICINE

## 2025-04-05 PROCEDURE — 2500000003 HC RX 250 WO HCPCS: Performed by: INTERNAL MEDICINE

## 2025-04-05 PROCEDURE — 83690 ASSAY OF LIPASE: CPT

## 2025-04-05 PROCEDURE — 36415 COLL VENOUS BLD VENIPUNCTURE: CPT

## 2025-04-05 PROCEDURE — 1200000000 HC SEMI PRIVATE

## 2025-04-05 RX ADMIN — VALPROIC ACID 500 MG: 250 SOLUTION ORAL at 20:09

## 2025-04-05 RX ADMIN — LEVETIRACETAM 500 MG: 100 SOLUTION ORAL at 20:09

## 2025-04-05 RX ADMIN — FAMOTIDINE 20 MG: 20 TABLET, FILM COATED ORAL at 20:08

## 2025-04-05 RX ADMIN — Medication 1 CAPSULE: at 09:39

## 2025-04-05 RX ADMIN — LEVETIRACETAM 500 MG: 100 SOLUTION ORAL at 09:44

## 2025-04-05 RX ADMIN — RISPERIDONE 0.5 MG: 0.5 TABLET, FILM COATED ORAL at 09:44

## 2025-04-05 RX ADMIN — MINERAL SUPPLEMENT IRON 300 MG / 5 ML STRENGTH LIQUID 100 PER BOX UNFLAVORED 300 MG: at 20:10

## 2025-04-05 RX ADMIN — MINERAL SUPPLEMENT IRON 300 MG / 5 ML STRENGTH LIQUID 100 PER BOX UNFLAVORED 300 MG: at 14:56

## 2025-04-05 RX ADMIN — MINERAL SUPPLEMENT IRON 300 MG / 5 ML STRENGTH LIQUID 100 PER BOX UNFLAVORED 300 MG: at 09:38

## 2025-04-05 RX ADMIN — OXYCODONE HYDROCHLORIDE 5 MG: 5 TABLET ORAL at 06:04

## 2025-04-05 RX ADMIN — BENZTROPINE MESYLATE 2 MG: 1 TABLET ORAL at 20:08

## 2025-04-05 RX ADMIN — BENZTROPINE MESYLATE 2 MG: 1 TABLET ORAL at 09:40

## 2025-04-05 RX ADMIN — LACOSAMIDE 100 MG: 100 TABLET, FILM COATED ORAL at 09:39

## 2025-04-05 RX ADMIN — OXYCODONE HYDROCHLORIDE 5 MG: 5 TABLET ORAL at 18:37

## 2025-04-05 RX ADMIN — DOCUSATE SODIUM LIQUID 100 MG: 100 LIQUID ORAL at 09:38

## 2025-04-05 RX ADMIN — ONDANSETRON 4 MG: 2 INJECTION, SOLUTION INTRAMUSCULAR; INTRAVENOUS at 18:38

## 2025-04-05 RX ADMIN — FAMOTIDINE 20 MG: 20 TABLET, FILM COATED ORAL at 09:39

## 2025-04-05 RX ADMIN — POLYETHYLENE GLYCOL 3350 17 G: 17 POWDER, FOR SOLUTION ORAL at 20:09

## 2025-04-05 RX ADMIN — MIRTAZAPINE 7.5 MG: 15 TABLET, FILM COATED ORAL at 20:08

## 2025-04-05 RX ADMIN — BACLOFEN 5 MG: 10 TABLET ORAL at 20:09

## 2025-04-05 RX ADMIN — POLYETHYLENE GLYCOL 3350 17 G: 17 POWDER, FOR SOLUTION ORAL at 09:39

## 2025-04-05 RX ADMIN — SERTRALINE 200 MG: 50 TABLET, FILM COATED ORAL at 09:39

## 2025-04-05 RX ADMIN — SENNOSIDES 17.2 MG: 8.6 TABLET, COATED ORAL at 20:09

## 2025-04-05 RX ADMIN — Medication 1 CAPSULE: at 20:09

## 2025-04-05 RX ADMIN — OXYCODONE HYDROCHLORIDE 5 MG: 5 TABLET ORAL at 00:40

## 2025-04-05 RX ADMIN — OXYCODONE HYDROCHLORIDE 5 MG: 5 TABLET ORAL at 10:04

## 2025-04-05 RX ADMIN — WATER 1000 MG: 1 INJECTION INTRAMUSCULAR; INTRAVENOUS; SUBCUTANEOUS at 18:26

## 2025-04-05 RX ADMIN — LACOSAMIDE 100 MG: 100 TABLET, FILM COATED ORAL at 20:09

## 2025-04-05 RX ADMIN — SENNOSIDES 17.2 MG: 8.6 TABLET, COATED ORAL at 09:39

## 2025-04-05 RX ADMIN — BACLOFEN 5 MG: 10 TABLET ORAL at 09:39

## 2025-04-05 RX ADMIN — RISPERIDONE 0.5 MG: 0.5 TABLET, FILM COATED ORAL at 20:09

## 2025-04-05 RX ADMIN — VALPROIC ACID 500 MG: 250 SOLUTION ORAL at 09:39

## 2025-04-05 ASSESSMENT — PAIN SCALES - GENERAL
PAINLEVEL_OUTOF10: 0
PAINLEVEL_OUTOF10: 0
PAINLEVEL_OUTOF10: 6
PAINLEVEL_OUTOF10: 0

## 2025-04-05 ASSESSMENT — PAIN SCALES - WONG BAKER
WONGBAKER_NUMERICALRESPONSE: HURTS EVEN MORE
WONGBAKER_NUMERICALRESPONSE: HURTS LITTLE MORE

## 2025-04-05 ASSESSMENT — PAIN DESCRIPTION - LOCATION
LOCATION: ABDOMEN
LOCATION: ABDOMEN

## 2025-04-05 ASSESSMENT — PAIN DESCRIPTION - DESCRIPTORS
DESCRIPTORS: ACHING
DESCRIPTORS: ACHING

## 2025-04-05 ASSESSMENT — PAIN DESCRIPTION - ORIENTATION
ORIENTATION: MID
ORIENTATION: RIGHT

## 2025-04-06 LAB
GLUCOSE BLD-MCNC: 89 MG/DL (ref 70–99)
PERFORMED ON: NORMAL

## 2025-04-06 PROCEDURE — 2580000003 HC RX 258: Performed by: NURSE PRACTITIONER

## 2025-04-06 PROCEDURE — 1200000000 HC SEMI PRIVATE

## 2025-04-06 PROCEDURE — 6370000000 HC RX 637 (ALT 250 FOR IP): Performed by: INTERNAL MEDICINE

## 2025-04-06 PROCEDURE — 51798 US URINE CAPACITY MEASURE: CPT

## 2025-04-06 PROCEDURE — 2580000003 HC RX 258: Performed by: INTERNAL MEDICINE

## 2025-04-06 RX ORDER — RISPERIDONE 0.5 MG/1
0.25 TABLET ORAL 2 TIMES DAILY
Status: DISCONTINUED | OUTPATIENT
Start: 2025-04-06 | End: 2025-04-06

## 2025-04-06 RX ORDER — 0.9 % SODIUM CHLORIDE 0.9 %
250 INTRAVENOUS SOLUTION INTRAVENOUS ONCE
Status: COMPLETED | OUTPATIENT
Start: 2025-04-06 | End: 2025-04-06

## 2025-04-06 RX ORDER — CIPROFLOXACIN 500 MG/1
500 TABLET, FILM COATED ORAL EVERY 12 HOURS SCHEDULED
Status: DISCONTINUED | OUTPATIENT
Start: 2025-04-06 | End: 2025-04-08

## 2025-04-06 RX ORDER — MIDODRINE HYDROCHLORIDE 5 MG/1
5 TABLET ORAL
Status: DISCONTINUED | OUTPATIENT
Start: 2025-04-06 | End: 2025-04-07

## 2025-04-06 RX ORDER — RISPERIDONE 0.5 MG/1
0.25 TABLET ORAL 2 TIMES DAILY
Status: DISCONTINUED | OUTPATIENT
Start: 2025-04-06 | End: 2025-04-11 | Stop reason: HOSPADM

## 2025-04-06 RX ORDER — 0.9 % SODIUM CHLORIDE 0.9 %
500 INTRAVENOUS SOLUTION INTRAVENOUS ONCE
Status: COMPLETED | OUTPATIENT
Start: 2025-04-06 | End: 2025-04-06

## 2025-04-06 RX ADMIN — VALPROIC ACID 500 MG: 250 SOLUTION ORAL at 22:38

## 2025-04-06 RX ADMIN — SENNOSIDES 17.2 MG: 8.6 TABLET, COATED ORAL at 21:36

## 2025-04-06 RX ADMIN — BENZTROPINE MESYLATE 2 MG: 1 TABLET ORAL at 10:18

## 2025-04-06 RX ADMIN — LACOSAMIDE 100 MG: 100 TABLET, FILM COATED ORAL at 21:37

## 2025-04-06 RX ADMIN — LACOSAMIDE 100 MG: 100 TABLET, FILM COATED ORAL at 10:22

## 2025-04-06 RX ADMIN — OXYCODONE HYDROCHLORIDE 5 MG: 5 TABLET ORAL at 18:48

## 2025-04-06 RX ADMIN — MINERAL SUPPLEMENT IRON 300 MG / 5 ML STRENGTH LIQUID 100 PER BOX UNFLAVORED 300 MG: at 21:37

## 2025-04-06 RX ADMIN — Medication 1 CAPSULE: at 10:18

## 2025-04-06 RX ADMIN — POLYETHYLENE GLYCOL 3350 17 G: 17 POWDER, FOR SOLUTION ORAL at 21:37

## 2025-04-06 RX ADMIN — SODIUM CHLORIDE 250 ML: 0.9 INJECTION, SOLUTION INTRAVENOUS at 03:17

## 2025-04-06 RX ADMIN — ACETAMINOPHEN 650 MG: 325 TABLET ORAL at 23:50

## 2025-04-06 RX ADMIN — MIDODRINE HYDROCHLORIDE 5 MG: 5 TABLET ORAL at 18:15

## 2025-04-06 RX ADMIN — BENZTROPINE MESYLATE 2 MG: 1 TABLET ORAL at 21:37

## 2025-04-06 RX ADMIN — DOCUSATE SODIUM LIQUID 100 MG: 100 LIQUID ORAL at 10:19

## 2025-04-06 RX ADMIN — POLYETHYLENE GLYCOL 3350 17 G: 17 POWDER, FOR SOLUTION ORAL at 10:21

## 2025-04-06 RX ADMIN — LEVETIRACETAM 500 MG: 100 SOLUTION ORAL at 21:37

## 2025-04-06 RX ADMIN — LEVETIRACETAM 500 MG: 100 SOLUTION ORAL at 10:19

## 2025-04-06 RX ADMIN — DEXTROSE AND SODIUM CHLORIDE: 5; 900 INJECTION, SOLUTION INTRAVENOUS at 03:20

## 2025-04-06 RX ADMIN — MIRTAZAPINE 7.5 MG: 15 TABLET, FILM COATED ORAL at 21:37

## 2025-04-06 RX ADMIN — MINERAL SUPPLEMENT IRON 300 MG / 5 ML STRENGTH LIQUID 100 PER BOX UNFLAVORED 300 MG: at 13:44

## 2025-04-06 RX ADMIN — FAMOTIDINE 20 MG: 20 TABLET, FILM COATED ORAL at 10:23

## 2025-04-06 RX ADMIN — MINERAL SUPPLEMENT IRON 300 MG / 5 ML STRENGTH LIQUID 100 PER BOX UNFLAVORED 300 MG: at 10:19

## 2025-04-06 RX ADMIN — CIPROFLOXACIN HYDROCHLORIDE 500 MG: 500 TABLET, FILM COATED ORAL at 10:09

## 2025-04-06 RX ADMIN — OXYCODONE HYDROCHLORIDE 5 MG: 5 TABLET ORAL at 23:50

## 2025-04-06 RX ADMIN — VALPROIC ACID 500 MG: 250 SOLUTION ORAL at 10:37

## 2025-04-06 RX ADMIN — BACLOFEN 5 MG: 10 TABLET ORAL at 21:37

## 2025-04-06 RX ADMIN — BACLOFEN 5 MG: 10 TABLET ORAL at 10:18

## 2025-04-06 RX ADMIN — Medication 1 CAPSULE: at 21:36

## 2025-04-06 RX ADMIN — RISPERIDONE 0.25 MG: 0.5 TABLET, FILM COATED ORAL at 10:37

## 2025-04-06 RX ADMIN — FAMOTIDINE 20 MG: 20 TABLET, FILM COATED ORAL at 21:37

## 2025-04-06 RX ADMIN — SERTRALINE 200 MG: 50 TABLET, FILM COATED ORAL at 10:19

## 2025-04-06 RX ADMIN — SODIUM CHLORIDE 500 ML: 0.9 INJECTION, SOLUTION INTRAVENOUS at 00:18

## 2025-04-06 RX ADMIN — RISPERIDONE 0.25 MG: 0.5 TABLET, FILM COATED ORAL at 21:36

## 2025-04-06 RX ADMIN — SENNOSIDES 17.2 MG: 8.6 TABLET, COATED ORAL at 10:18

## 2025-04-06 RX ADMIN — CIPROFLOXACIN HYDROCHLORIDE 500 MG: 500 TABLET, FILM COATED ORAL at 21:36

## 2025-04-06 RX ADMIN — MIDODRINE HYDROCHLORIDE 5 MG: 5 TABLET ORAL at 12:01

## 2025-04-06 ASSESSMENT — PAIN SCALES - GENERAL
PAINLEVEL_OUTOF10: 6
PAINLEVEL_OUTOF10: 0

## 2025-04-06 ASSESSMENT — PAIN DESCRIPTION - ORIENTATION: ORIENTATION: MID

## 2025-04-06 ASSESSMENT — PAIN SCALES - WONG BAKER
WONGBAKER_NUMERICALRESPONSE: NO HURT
WONGBAKER_NUMERICALRESPONSE: HURTS WORST

## 2025-04-06 ASSESSMENT — PAIN DESCRIPTION - LOCATION: LOCATION: ABDOMEN

## 2025-04-07 ENCOUNTER — APPOINTMENT (OUTPATIENT)
Dept: GENERAL RADIOLOGY | Age: 30
DRG: 698 | End: 2025-04-07
Payer: MEDICARE

## 2025-04-07 LAB
ANION GAP SERPL CALCULATED.3IONS-SCNC: 10 MMOL/L (ref 3–16)
ANION GAP SERPL CALCULATED.3IONS-SCNC: 12 MMOL/L (ref 3–16)
BASOPHILS # BLD: 0.1 K/UL (ref 0–0.2)
BASOPHILS NFR BLD: 0.9 %
BUN SERPL-MCNC: 4 MG/DL (ref 7–20)
BUN SERPL-MCNC: 4 MG/DL (ref 7–20)
CALCIUM SERPL-MCNC: 9 MG/DL (ref 8.3–10.6)
CALCIUM SERPL-MCNC: 9 MG/DL (ref 8.3–10.6)
CHLORIDE SERPL-SCNC: 104 MMOL/L (ref 99–110)
CHLORIDE SERPL-SCNC: 106 MMOL/L (ref 99–110)
CO2 SERPL-SCNC: 23 MMOL/L (ref 21–32)
CO2 SERPL-SCNC: 25 MMOL/L (ref 21–32)
CREAT SERPL-MCNC: 0.5 MG/DL (ref 0.6–1.1)
CREAT SERPL-MCNC: 0.6 MG/DL (ref 0.6–1.1)
DEPRECATED RDW RBC AUTO: 12.7 % (ref 12.4–15.4)
DEPRECATED RDW RBC AUTO: 13 % (ref 12.4–15.4)
EOSINOPHIL # BLD: 0.3 K/UL (ref 0–0.6)
EOSINOPHIL NFR BLD: 3.8 %
GFR SERPLBLD CREATININE-BSD FMLA CKD-EPI: >90 ML/MIN/{1.73_M2}
GFR SERPLBLD CREATININE-BSD FMLA CKD-EPI: >90 ML/MIN/{1.73_M2}
GLUCOSE BLD-MCNC: 112 MG/DL (ref 70–99)
GLUCOSE BLD-MCNC: 82 MG/DL (ref 70–99)
GLUCOSE SERPL-MCNC: 101 MG/DL (ref 70–99)
GLUCOSE SERPL-MCNC: 97 MG/DL (ref 70–99)
HCT VFR BLD AUTO: 41.3 % (ref 36–48)
HCT VFR BLD AUTO: 41.8 % (ref 36–48)
HGB BLD-MCNC: 13.9 G/DL (ref 12–16)
HGB BLD-MCNC: 14.1 G/DL (ref 12–16)
LIPASE SERPL-CCNC: 38 U/L (ref 13–60)
LYMPHOCYTES # BLD: 2.8 K/UL (ref 1–5.1)
LYMPHOCYTES NFR BLD: 31.3 %
MCH RBC QN AUTO: 30.1 PG (ref 26–34)
MCH RBC QN AUTO: 30.2 PG (ref 26–34)
MCHC RBC AUTO-ENTMCNC: 33.6 G/DL (ref 31–36)
MCHC RBC AUTO-ENTMCNC: 33.6 G/DL (ref 31–36)
MCV RBC AUTO: 89.6 FL (ref 80–100)
MCV RBC AUTO: 89.7 FL (ref 80–100)
MONOCYTES # BLD: 0.7 K/UL (ref 0–1.3)
MONOCYTES NFR BLD: 8.2 %
NEUTROPHILS # BLD: 5.1 K/UL (ref 1.7–7.7)
NEUTROPHILS NFR BLD: 55.8 %
PERFORMED ON: ABNORMAL
PERFORMED ON: NORMAL
PLATELET # BLD AUTO: 155 K/UL (ref 135–450)
PLATELET # BLD AUTO: 163 K/UL (ref 135–450)
PMV BLD AUTO: 10.2 FL (ref 5–10.5)
PMV BLD AUTO: 10.4 FL (ref 5–10.5)
POTASSIUM SERPL-SCNC: 4 MMOL/L (ref 3.5–5.1)
POTASSIUM SERPL-SCNC: 4 MMOL/L (ref 3.5–5.1)
RBC # BLD AUTO: 4.6 M/UL (ref 4–5.2)
RBC # BLD AUTO: 4.66 M/UL (ref 4–5.2)
SODIUM SERPL-SCNC: 139 MMOL/L (ref 136–145)
SODIUM SERPL-SCNC: 141 MMOL/L (ref 136–145)
WBC # BLD AUTO: 9.1 K/UL (ref 4–11)
WBC # BLD AUTO: 9.1 K/UL (ref 4–11)

## 2025-04-07 PROCEDURE — 1200000000 HC SEMI PRIVATE

## 2025-04-07 PROCEDURE — 80048 BASIC METABOLIC PNL TOTAL CA: CPT

## 2025-04-07 PROCEDURE — 6370000000 HC RX 637 (ALT 250 FOR IP): Performed by: HOSPITALIST

## 2025-04-07 PROCEDURE — 51798 US URINE CAPACITY MEASURE: CPT

## 2025-04-07 PROCEDURE — 85025 COMPLETE CBC W/AUTO DIFF WBC: CPT

## 2025-04-07 PROCEDURE — 74019 RADEX ABDOMEN 2 VIEWS: CPT

## 2025-04-07 PROCEDURE — 36415 COLL VENOUS BLD VENIPUNCTURE: CPT

## 2025-04-07 PROCEDURE — 83690 ASSAY OF LIPASE: CPT

## 2025-04-07 PROCEDURE — 6370000000 HC RX 637 (ALT 250 FOR IP): Performed by: INTERNAL MEDICINE

## 2025-04-07 PROCEDURE — 2580000003 HC RX 258: Performed by: HOSPITALIST

## 2025-04-07 PROCEDURE — 2580000003 HC RX 258: Performed by: INTERNAL MEDICINE

## 2025-04-07 PROCEDURE — 85027 COMPLETE CBC AUTOMATED: CPT

## 2025-04-07 RX ORDER — MIDODRINE HYDROCHLORIDE 5 MG/1
5 TABLET ORAL
Status: DISCONTINUED | OUTPATIENT
Start: 2025-04-07 | End: 2025-04-11 | Stop reason: HOSPADM

## 2025-04-07 RX ORDER — DEXTROSE MONOHYDRATE AND SODIUM CHLORIDE 5; .45 G/100ML; G/100ML
INJECTION, SOLUTION INTRAVENOUS CONTINUOUS
Status: DISCONTINUED | OUTPATIENT
Start: 2025-04-07 | End: 2025-04-10

## 2025-04-07 RX ORDER — OXYCODONE HYDROCHLORIDE 5 MG/1
5 TABLET ORAL EVERY 8 HOURS PRN
Qty: 9 TABLET | Refills: 0 | Status: SHIPPED | OUTPATIENT
Start: 2025-04-07 | End: 2025-04-10

## 2025-04-07 RX ORDER — 0.9 % SODIUM CHLORIDE 0.9 %
500 INTRAVENOUS SOLUTION INTRAVENOUS ONCE
Status: COMPLETED | OUTPATIENT
Start: 2025-04-07 | End: 2025-04-07

## 2025-04-07 RX ORDER — ONDANSETRON 2 MG/ML
4 INJECTION INTRAMUSCULAR; INTRAVENOUS EVERY 6 HOURS PRN
Status: DISCONTINUED | OUTPATIENT
Start: 2025-04-07 | End: 2025-04-11 | Stop reason: HOSPADM

## 2025-04-07 RX ORDER — RISPERIDONE 0.25 MG/1
0.25 TABLET ORAL 2 TIMES DAILY PRN
Qty: 30 TABLET | Refills: 0 | Status: SHIPPED | OUTPATIENT
Start: 2025-04-07

## 2025-04-07 RX ORDER — CIPROFLOXACIN 500 MG/1
500 TABLET, FILM COATED ORAL EVERY 12 HOURS SCHEDULED
Qty: 10 TABLET | Refills: 0 | Status: SHIPPED | OUTPATIENT
Start: 2025-04-07 | End: 2025-04-12

## 2025-04-07 RX ORDER — MIDODRINE HYDROCHLORIDE 5 MG/1
10 TABLET ORAL ONCE
Status: COMPLETED | OUTPATIENT
Start: 2025-04-07 | End: 2025-04-07

## 2025-04-07 RX ADMIN — CIPROFLOXACIN HYDROCHLORIDE 500 MG: 500 TABLET, FILM COATED ORAL at 09:01

## 2025-04-07 RX ADMIN — DOCUSATE SODIUM LIQUID 100 MG: 100 LIQUID ORAL at 09:01

## 2025-04-07 RX ADMIN — MINERAL SUPPLEMENT IRON 300 MG / 5 ML STRENGTH LIQUID 100 PER BOX UNFLAVORED 300 MG: at 21:18

## 2025-04-07 RX ADMIN — LACOSAMIDE 100 MG: 100 TABLET, FILM COATED ORAL at 09:03

## 2025-04-07 RX ADMIN — BENZTROPINE MESYLATE 2 MG: 1 TABLET ORAL at 21:20

## 2025-04-07 RX ADMIN — Medication 1 CAPSULE: at 09:03

## 2025-04-07 RX ADMIN — MINERAL SUPPLEMENT IRON 300 MG / 5 ML STRENGTH LIQUID 100 PER BOX UNFLAVORED 300 MG: at 09:01

## 2025-04-07 RX ADMIN — VALPROIC ACID 500 MG: 250 SOLUTION ORAL at 21:17

## 2025-04-07 RX ADMIN — LACOSAMIDE 100 MG: 100 TABLET, FILM COATED ORAL at 21:19

## 2025-04-07 RX ADMIN — VALPROIC ACID 500 MG: 250 SOLUTION ORAL at 09:01

## 2025-04-07 RX ADMIN — FAMOTIDINE 20 MG: 20 TABLET, FILM COATED ORAL at 21:18

## 2025-04-07 RX ADMIN — BACLOFEN 5 MG: 10 TABLET ORAL at 09:02

## 2025-04-07 RX ADMIN — MIRTAZAPINE 7.5 MG: 15 TABLET, FILM COATED ORAL at 21:18

## 2025-04-07 RX ADMIN — LEVETIRACETAM 500 MG: 100 SOLUTION ORAL at 21:18

## 2025-04-07 RX ADMIN — RISPERIDONE 0.25 MG: 0.5 TABLET, FILM COATED ORAL at 09:02

## 2025-04-07 RX ADMIN — SODIUM CHLORIDE 500 ML: 0.9 INJECTION, SOLUTION INTRAVENOUS at 05:43

## 2025-04-07 RX ADMIN — MIDODRINE HYDROCHLORIDE 10 MG: 5 TABLET ORAL at 05:56

## 2025-04-07 RX ADMIN — BENZTROPINE MESYLATE 2 MG: 1 TABLET ORAL at 09:03

## 2025-04-07 RX ADMIN — POLYETHYLENE GLYCOL 3350 17 G: 17 POWDER, FOR SOLUTION ORAL at 21:20

## 2025-04-07 RX ADMIN — SENNOSIDES 17.2 MG: 8.6 TABLET, COATED ORAL at 09:01

## 2025-04-07 RX ADMIN — CIPROFLOXACIN HYDROCHLORIDE 500 MG: 500 TABLET, FILM COATED ORAL at 21:51

## 2025-04-07 RX ADMIN — Medication 1 CAPSULE: at 21:19

## 2025-04-07 RX ADMIN — LEVETIRACETAM 500 MG: 100 SOLUTION ORAL at 09:01

## 2025-04-07 RX ADMIN — SENNOSIDES 17.2 MG: 8.6 TABLET, COATED ORAL at 21:49

## 2025-04-07 RX ADMIN — RISPERIDONE 0.25 MG: 0.5 TABLET, FILM COATED ORAL at 21:51

## 2025-04-07 RX ADMIN — BACLOFEN 5 MG: 10 TABLET ORAL at 21:49

## 2025-04-07 RX ADMIN — FAMOTIDINE 20 MG: 20 TABLET, FILM COATED ORAL at 09:03

## 2025-04-07 RX ADMIN — DEXTROSE AND SODIUM CHLORIDE: 5; 450 INJECTION, SOLUTION INTRAVENOUS at 13:01

## 2025-04-07 RX ADMIN — POLYETHYLENE GLYCOL 3350 17 G: 17 POWDER, FOR SOLUTION ORAL at 09:01

## 2025-04-07 RX ADMIN — SERTRALINE 200 MG: 50 TABLET, FILM COATED ORAL at 09:03

## 2025-04-07 ASSESSMENT — PAIN SCALES - WONG BAKER
WONGBAKER_NUMERICALRESPONSE: HURTS A LITTLE BIT
WONGBAKER_NUMERICALRESPONSE: NO HURT
WONGBAKER_NUMERICALRESPONSE: NO HURT

## 2025-04-07 ASSESSMENT — PAIN SCALES - GENERAL: PAINLEVEL_OUTOF10: 2

## 2025-04-07 NOTE — CARE COORDINATION
04/07/25 1145   IMM Letter   IMM Letter given to Patient/Family/Significant other/Guardian/POA/by: Spoke with DARI Brown Short copy of IMM emailed to [email or web address]   IMM Letter date given: 04/07/25   IMM Letter time given: 1144

## 2025-04-08 LAB
ANION GAP SERPL CALCULATED.3IONS-SCNC: 11 MMOL/L (ref 3–16)
BUN SERPL-MCNC: 4 MG/DL (ref 7–20)
CALCIUM SERPL-MCNC: 8.8 MG/DL (ref 8.3–10.6)
CHLORIDE SERPL-SCNC: 103 MMOL/L (ref 99–110)
CO2 SERPL-SCNC: 24 MMOL/L (ref 21–32)
CREAT SERPL-MCNC: 0.5 MG/DL (ref 0.6–1.1)
DEPRECATED RDW RBC AUTO: 12.6 % (ref 12.4–15.4)
GFR SERPLBLD CREATININE-BSD FMLA CKD-EPI: >90 ML/MIN/{1.73_M2}
GLUCOSE BLD-MCNC: 106 MG/DL (ref 70–99)
GLUCOSE BLD-MCNC: 109 MG/DL (ref 70–99)
GLUCOSE BLD-MCNC: 125 MG/DL (ref 70–99)
GLUCOSE BLD-MCNC: 88 MG/DL (ref 70–99)
GLUCOSE SERPL-MCNC: 115 MG/DL (ref 70–99)
HCT VFR BLD AUTO: 39.8 % (ref 36–48)
HGB BLD-MCNC: 13.4 G/DL (ref 12–16)
LIPASE SERPL-CCNC: 32 U/L (ref 13–60)
MCH RBC QN AUTO: 29.8 PG (ref 26–34)
MCHC RBC AUTO-ENTMCNC: 33.7 G/DL (ref 31–36)
MCV RBC AUTO: 88.5 FL (ref 80–100)
PERFORMED ON: ABNORMAL
PERFORMED ON: NORMAL
PLATELET # BLD AUTO: 159 K/UL (ref 135–450)
PMV BLD AUTO: 9.9 FL (ref 5–10.5)
POTASSIUM SERPL-SCNC: 3.7 MMOL/L (ref 3.5–5.1)
RBC # BLD AUTO: 4.49 M/UL (ref 4–5.2)
SODIUM SERPL-SCNC: 138 MMOL/L (ref 136–145)
WBC # BLD AUTO: 8 K/UL (ref 4–11)

## 2025-04-08 PROCEDURE — 6370000000 HC RX 637 (ALT 250 FOR IP): Performed by: INTERNAL MEDICINE

## 2025-04-08 PROCEDURE — 6370000000 HC RX 637 (ALT 250 FOR IP): Performed by: HOSPITALIST

## 2025-04-08 PROCEDURE — 1200000000 HC SEMI PRIVATE

## 2025-04-08 PROCEDURE — 2580000003 HC RX 258: Performed by: INTERNAL MEDICINE

## 2025-04-08 PROCEDURE — 36415 COLL VENOUS BLD VENIPUNCTURE: CPT

## 2025-04-08 PROCEDURE — 83690 ASSAY OF LIPASE: CPT

## 2025-04-08 PROCEDURE — 80048 BASIC METABOLIC PNL TOTAL CA: CPT

## 2025-04-08 PROCEDURE — 85027 COMPLETE CBC AUTOMATED: CPT

## 2025-04-08 PROCEDURE — 6360000002 HC RX W HCPCS: Performed by: INTERNAL MEDICINE

## 2025-04-08 RX ORDER — CIPROFLOXACIN 500 MG/1
500 TABLET, FILM COATED ORAL EVERY 12 HOURS SCHEDULED
Status: DISCONTINUED | OUTPATIENT
Start: 2025-04-08 | End: 2025-04-11 | Stop reason: HOSPADM

## 2025-04-08 RX ORDER — METOCLOPRAMIDE 10 MG/1
5 TABLET ORAL
Status: DISCONTINUED | OUTPATIENT
Start: 2025-04-09 | End: 2025-04-08

## 2025-04-08 RX ADMIN — BACLOFEN 5 MG: 10 TABLET ORAL at 09:18

## 2025-04-08 RX ADMIN — ONDANSETRON 4 MG: 2 INJECTION, SOLUTION INTRAMUSCULAR; INTRAVENOUS at 15:43

## 2025-04-08 RX ADMIN — SERTRALINE 200 MG: 50 TABLET, FILM COATED ORAL at 09:17

## 2025-04-08 RX ADMIN — DEXTROSE AND SODIUM CHLORIDE: 5; 450 INJECTION, SOLUTION INTRAVENOUS at 00:41

## 2025-04-08 RX ADMIN — LEVETIRACETAM 500 MG: 100 SOLUTION ORAL at 20:43

## 2025-04-08 RX ADMIN — FAMOTIDINE 20 MG: 20 TABLET, FILM COATED ORAL at 09:16

## 2025-04-08 RX ADMIN — DOCUSATE SODIUM LIQUID 100 MG: 100 LIQUID ORAL at 09:14

## 2025-04-08 RX ADMIN — MIDODRINE HYDROCHLORIDE 5 MG: 5 TABLET ORAL at 13:39

## 2025-04-08 RX ADMIN — ONDANSETRON 4 MG: 2 INJECTION, SOLUTION INTRAMUSCULAR; INTRAVENOUS at 09:13

## 2025-04-08 RX ADMIN — LACOSAMIDE 100 MG: 100 TABLET, FILM COATED ORAL at 20:43

## 2025-04-08 RX ADMIN — CIPROFLOXACIN HYDROCHLORIDE 500 MG: 500 TABLET, FILM COATED ORAL at 20:42

## 2025-04-08 RX ADMIN — Medication 1 CAPSULE: at 09:17

## 2025-04-08 RX ADMIN — SENNOSIDES 17.2 MG: 8.6 TABLET, COATED ORAL at 20:42

## 2025-04-08 RX ADMIN — VALPROIC ACID 500 MG: 250 SOLUTION ORAL at 20:55

## 2025-04-08 RX ADMIN — RISPERIDONE 0.25 MG: 0.5 TABLET, FILM COATED ORAL at 20:42

## 2025-04-08 RX ADMIN — MIDODRINE HYDROCHLORIDE 5 MG: 5 TABLET ORAL at 09:15

## 2025-04-08 RX ADMIN — OXYCODONE HYDROCHLORIDE 5 MG: 5 TABLET ORAL at 09:18

## 2025-04-08 RX ADMIN — POLYETHYLENE GLYCOL 3350 17 G: 17 POWDER, FOR SOLUTION ORAL at 09:16

## 2025-04-08 RX ADMIN — MIRTAZAPINE 7.5 MG: 15 TABLET, FILM COATED ORAL at 20:42

## 2025-04-08 RX ADMIN — RISPERIDONE 0.25 MG: 0.5 TABLET, FILM COATED ORAL at 09:18

## 2025-04-08 RX ADMIN — MINERAL SUPPLEMENT IRON 300 MG / 5 ML STRENGTH LIQUID 100 PER BOX UNFLAVORED 300 MG: at 09:15

## 2025-04-08 RX ADMIN — BENZTROPINE MESYLATE 2 MG: 1 TABLET ORAL at 20:42

## 2025-04-08 RX ADMIN — CIPROFLOXACIN HYDROCHLORIDE 500 MG: 500 TABLET, FILM COATED ORAL at 09:15

## 2025-04-08 RX ADMIN — MIDODRINE HYDROCHLORIDE 5 MG: 5 TABLET ORAL at 15:43

## 2025-04-08 RX ADMIN — FAMOTIDINE 20 MG: 20 TABLET, FILM COATED ORAL at 20:42

## 2025-04-08 RX ADMIN — SENNOSIDES 17.2 MG: 8.6 TABLET, COATED ORAL at 09:16

## 2025-04-08 RX ADMIN — BACLOFEN 5 MG: 10 TABLET ORAL at 20:42

## 2025-04-08 RX ADMIN — MINERAL SUPPLEMENT IRON 300 MG / 5 ML STRENGTH LIQUID 100 PER BOX UNFLAVORED 300 MG: at 13:39

## 2025-04-08 RX ADMIN — MINERAL SUPPLEMENT IRON 300 MG / 5 ML STRENGTH LIQUID 100 PER BOX UNFLAVORED 300 MG: at 20:43

## 2025-04-08 RX ADMIN — LEVETIRACETAM 500 MG: 100 SOLUTION ORAL at 09:14

## 2025-04-08 RX ADMIN — LACOSAMIDE 100 MG: 100 TABLET, FILM COATED ORAL at 09:17

## 2025-04-08 RX ADMIN — VALPROIC ACID 500 MG: 250 SOLUTION ORAL at 09:14

## 2025-04-08 RX ADMIN — BENZTROPINE MESYLATE 2 MG: 1 TABLET ORAL at 09:17

## 2025-04-08 RX ADMIN — Medication 1 CAPSULE: at 20:42

## 2025-04-08 ASSESSMENT — PAIN SCALES - GENERAL
PAINLEVEL_OUTOF10: 0

## 2025-04-08 NOTE — CARE COORDINATION
Chart reviewed for discharge planning.  Barrier(s) to discharge-nausea/ vomiting, feeding tube is currently clamped  Tentative discharge plan-back to group home  Tentative discharge date- to be determined    *Case management will continue to follow progress and update discharge plan as needed.    Electronically signed by ALEXIS Juarez on 4/8/2025 at 11:35 AM

## 2025-04-08 NOTE — CONSULTS
[facility] Consult Note  Inpatient Setting - [facility]    Provider: FRANCK Tariq CNP  Patient ID:  Admission Date: 4/3/2025 Name: Chepe Clay  OR Date: n/a MRN: [id number]   Patient Location: [id number] : 1995  Attending: Satnam Ojeda MD Date of Service: 2025  PCP: Bart Arora MD     Diagnoses:  1. Acute pancreatitis, unspecified complication status, unspecified pancreatitis type    2. Gastrojejunostomy tube dislodgement    3. Urinary tract infection associated with catheterization of urinary tract, unspecified indwelling urinary catheter type, initial encounter    4. Idiopathic acute pancreatitis without infection or necrosis      28 yo female who is nonverbal in advanced stage of mental retardation with cerebral palsy in a chronically contracted state. Presents with acute pancreatitis and G tube dislodgement. Urology consulted for urinary retention. PVR at one point 450cc's. Now Pvr down to 200cc's. CT  shows no hydro and a non distended bladder. Ucx negative for infection- UA shows contamination. No mircoheme noted.     Assessment/Plan:  Risk of self harm with uribe. Would monitor output from purewick closely and can str cath residuals >400cc's. But unlikely this would be necessary. At this point would be ok to discharge without a catheter when medically cleared, but will continue to follow.     All the patients questions were answered in detail. She understands the plan as listed above.    Review/order of labs  Review/order of radiology studies  discussion with referring MD  Decision to obtain old records or supplemental information from family.   transferring data from old records into today's office visit record  Independent review and interpretation of test or study   Total time spent face-to-face with the patient 45min, including greater than 50% of this time in discussion with the patient/family concerning the following:  Recommended

## 2025-04-09 PROCEDURE — 6370000000 HC RX 637 (ALT 250 FOR IP): Performed by: INTERNAL MEDICINE

## 2025-04-09 PROCEDURE — 2580000003 HC RX 258: Performed by: INTERNAL MEDICINE

## 2025-04-09 PROCEDURE — 6370000000 HC RX 637 (ALT 250 FOR IP): Performed by: HOSPITALIST

## 2025-04-09 PROCEDURE — 1200000000 HC SEMI PRIVATE

## 2025-04-09 RX ADMIN — Medication 1 CAPSULE: at 09:44

## 2025-04-09 RX ADMIN — SERTRALINE 200 MG: 50 TABLET, FILM COATED ORAL at 09:44

## 2025-04-09 RX ADMIN — MINERAL SUPPLEMENT IRON 300 MG / 5 ML STRENGTH LIQUID 100 PER BOX UNFLAVORED 300 MG: at 09:44

## 2025-04-09 RX ADMIN — MIRTAZAPINE 7.5 MG: 15 TABLET, FILM COATED ORAL at 21:50

## 2025-04-09 RX ADMIN — LACOSAMIDE 100 MG: 100 TABLET, FILM COATED ORAL at 21:49

## 2025-04-09 RX ADMIN — RISPERIDONE 0.25 MG: 0.5 TABLET, FILM COATED ORAL at 21:51

## 2025-04-09 RX ADMIN — MIDODRINE HYDROCHLORIDE 5 MG: 5 TABLET ORAL at 22:02

## 2025-04-09 RX ADMIN — Medication 1 CAPSULE: at 21:49

## 2025-04-09 RX ADMIN — OXYCODONE HYDROCHLORIDE 5 MG: 5 TABLET ORAL at 21:59

## 2025-04-09 RX ADMIN — VALPROIC ACID 500 MG: 250 SOLUTION ORAL at 21:47

## 2025-04-09 RX ADMIN — DEXTROSE AND SODIUM CHLORIDE: 5; 450 INJECTION, SOLUTION INTRAVENOUS at 21:45

## 2025-04-09 RX ADMIN — BENZTROPINE MESYLATE 2 MG: 1 TABLET ORAL at 21:48

## 2025-04-09 RX ADMIN — MIDODRINE HYDROCHLORIDE 5 MG: 5 TABLET ORAL at 09:44

## 2025-04-09 RX ADMIN — LACOSAMIDE 100 MG: 100 TABLET, FILM COATED ORAL at 09:44

## 2025-04-09 RX ADMIN — CIPROFLOXACIN HYDROCHLORIDE 500 MG: 500 TABLET, FILM COATED ORAL at 09:44

## 2025-04-09 RX ADMIN — VALPROIC ACID 500 MG: 250 SOLUTION ORAL at 09:45

## 2025-04-09 RX ADMIN — FAMOTIDINE 20 MG: 20 TABLET, FILM COATED ORAL at 21:48

## 2025-04-09 RX ADMIN — RISPERIDONE 0.25 MG: 0.5 TABLET, FILM COATED ORAL at 09:44

## 2025-04-09 RX ADMIN — FAMOTIDINE 20 MG: 20 TABLET, FILM COATED ORAL at 09:44

## 2025-04-09 RX ADMIN — BENZTROPINE MESYLATE 2 MG: 1 TABLET ORAL at 09:44

## 2025-04-09 RX ADMIN — CIPROFLOXACIN HYDROCHLORIDE 500 MG: 500 TABLET, FILM COATED ORAL at 21:51

## 2025-04-09 RX ADMIN — BACLOFEN 5 MG: 10 TABLET ORAL at 21:49

## 2025-04-09 RX ADMIN — MIDODRINE HYDROCHLORIDE 5 MG: 5 TABLET ORAL at 15:33

## 2025-04-09 RX ADMIN — BACLOFEN 5 MG: 10 TABLET ORAL at 09:44

## 2025-04-09 RX ADMIN — MINERAL SUPPLEMENT IRON 300 MG / 5 ML STRENGTH LIQUID 100 PER BOX UNFLAVORED 300 MG: at 15:33

## 2025-04-09 RX ADMIN — LEVETIRACETAM 500 MG: 100 SOLUTION ORAL at 09:45

## 2025-04-09 RX ADMIN — MINERAL SUPPLEMENT IRON 300 MG / 5 ML STRENGTH LIQUID 100 PER BOX UNFLAVORED 300 MG: at 21:47

## 2025-04-09 RX ADMIN — LEVETIRACETAM 500 MG: 100 SOLUTION ORAL at 21:46

## 2025-04-09 ASSESSMENT — PAIN SCALES - WONG BAKER: WONGBAKER_NUMERICALRESPONSE: HURTS A LITTLE BIT

## 2025-04-09 ASSESSMENT — PAIN DESCRIPTION - LOCATION: LOCATION: GENERALIZED

## 2025-04-10 VITALS
HEIGHT: 63 IN | HEART RATE: 82 BPM | DIASTOLIC BLOOD PRESSURE: 70 MMHG | SYSTOLIC BLOOD PRESSURE: 104 MMHG | OXYGEN SATURATION: 94 % | TEMPERATURE: 97.7 F | RESPIRATION RATE: 17 BRPM | WEIGHT: 112.2 LBS | BODY MASS INDEX: 19.88 KG/M2

## 2025-04-10 PROCEDURE — 6370000000 HC RX 637 (ALT 250 FOR IP): Performed by: HOSPITALIST

## 2025-04-10 PROCEDURE — 6370000000 HC RX 637 (ALT 250 FOR IP): Performed by: INTERNAL MEDICINE

## 2025-04-10 RX ORDER — OXYCODONE HYDROCHLORIDE 5 MG/1
5 TABLET ORAL EVERY 8 HOURS PRN
Qty: 9 TABLET | Refills: 0 | Status: SHIPPED | OUTPATIENT
Start: 2025-04-10 | End: 2025-04-13

## 2025-04-10 RX ADMIN — MINERAL SUPPLEMENT IRON 300 MG / 5 ML STRENGTH LIQUID 100 PER BOX UNFLAVORED 300 MG: at 08:16

## 2025-04-10 RX ADMIN — VALPROIC ACID 500 MG: 250 SOLUTION ORAL at 08:16

## 2025-04-10 RX ADMIN — MINERAL SUPPLEMENT IRON 300 MG / 5 ML STRENGTH LIQUID 100 PER BOX UNFLAVORED 300 MG: at 14:50

## 2025-04-10 RX ADMIN — DOCUSATE SODIUM LIQUID 100 MG: 100 LIQUID ORAL at 08:16

## 2025-04-10 RX ADMIN — BENZTROPINE MESYLATE 2 MG: 1 TABLET ORAL at 08:15

## 2025-04-10 RX ADMIN — SENNOSIDES 17.2 MG: 8.6 TABLET, COATED ORAL at 08:15

## 2025-04-10 RX ADMIN — MIDODRINE HYDROCHLORIDE 5 MG: 5 TABLET ORAL at 14:50

## 2025-04-10 RX ADMIN — LEVETIRACETAM 500 MG: 100 SOLUTION ORAL at 08:16

## 2025-04-10 RX ADMIN — Medication 1 CAPSULE: at 08:15

## 2025-04-10 RX ADMIN — FAMOTIDINE 20 MG: 20 TABLET, FILM COATED ORAL at 08:15

## 2025-04-10 RX ADMIN — RISPERIDONE 0.25 MG: 0.5 TABLET, FILM COATED ORAL at 08:15

## 2025-04-10 RX ADMIN — LACOSAMIDE 100 MG: 100 TABLET, FILM COATED ORAL at 08:15

## 2025-04-10 RX ADMIN — SERTRALINE 200 MG: 50 TABLET, FILM COATED ORAL at 08:15

## 2025-04-10 RX ADMIN — CIPROFLOXACIN HYDROCHLORIDE 500 MG: 500 TABLET, FILM COATED ORAL at 08:15

## 2025-04-10 RX ADMIN — POLYETHYLENE GLYCOL 3350 17 G: 17 POWDER, FOR SOLUTION ORAL at 08:16

## 2025-04-10 RX ADMIN — OXYCODONE HYDROCHLORIDE 5 MG: 5 TABLET ORAL at 14:50

## 2025-04-10 RX ADMIN — BACLOFEN 5 MG: 10 TABLET ORAL at 08:15

## 2025-04-10 RX ADMIN — MIDODRINE HYDROCHLORIDE 5 MG: 5 TABLET ORAL at 08:15

## 2025-04-10 NOTE — CARE COORDINATION
04/10/25 1500   IMM Letter   IMM Letter given to Patient/Family/Significant other/Guardian/POA/by: Spoke with DARI Brown Short copy of IMM emailed to [email or web address]   IMM Letter date given: 04/10/25   IMM Letter time given: 1500

## 2025-04-10 NOTE — PROGRESS NOTES
Satnam Ojeda MD  [facility]                                            Advanced Care Planning Note.    Purpose of Encounter: Advanced care planning in light of advanced paralysis and anoxic encephalopathy  Parties In Attendance: PatientDARI Stephanie Covarrubias  Decisional Capacity: No  Subjective: Patient/family understand that this conversation is to address long term care goal  Objective:    CPR-Yes   Intubation and Mechanical Vent - Yes   Defibrillation- yes   Gastrostomy -Yes   Hemodialysis -Yes  Goals of Care Determination: Patient/POA  Stephanie Covarrubias  Code Status: Full code   Time spent on Advanced care Plannin minutes  Advanced Care Planning Documents: Completed advanced directives on chart, Stephaniekristan Covarrubias is the POA.    Satnam Ojeda MD  2025 7:14 PM  
4 Eyes Skin Assessment     NAME:  Chepe Clay  YOB: 1995  MEDICAL RECORD NUMBER:  [id number]    The patient is being assessed for  Admission    I agree that at least one RN has performed a thorough Head to Toe Skin Assessment on the patient. ALL assessment sites listed below have been assessed.      Areas assessed by both nurses:    Head, Face, Ears, Shoulders, Back, Chest, Arms, Elbows, Hands, Sacrum. Buttock, Coccyx, Ischium, and Legs. Feet and Heels        Does the Patient have a Wound? No noted wound(s)       Josiah Prevention initiated by RN: Yes  Wound Care Orders initiated by RN: No    Pressure Injury (Stage 3,4, Unstageable, DTI, NWPT, and Complex wounds) if present, place Wound referral order by RN under : No    New Ostomies, if present place, Ostomy referral order under : No     Nurse 1 eSignature: Electronically signed by Linda Lopez RN on 4/3/25 at 6:55 PM EDT    **SHARE this note so that the co-signing nurse can place an eSignature**    Nurse 2 eSignature: Electronically signed by Florence Rose RN on 4/3/25 at 6:56 PM EDT   
BRIEF NUTRITION NOTE    Phone call received from RN to start tube feeds; confirmed OK to order and manage tube feeds with Dr. Ojeda via perfect serve. Pt with PEG tube, typically receives bolus feeds of Isosource 1.5 at [facility]. Hospital equivalent is Jevity 1.5. Pt typically receives bolus feeds; will start with continuous today and can transition to bolus feeds tomorrow as tolerated. No labs available for review today. No weight loss noted per hx in EMR. D5 NS running at 75 mL/hr. Full nutrition assessment to follow tomorrow, 4/7.      Comparative Standards  Calories: [id number]  Protein: 50-60 grams    Recommendations:  Jevity 1.5 (standard formula with fiber) at goal rate 40 mL/hr.  Water bolus 120 mL q 4 hours.   Recommend to discontinue D5 NS when tube feeds are started.       Electronically signed by Teresita Sweet MS, RD, LD on 4/6/2025 at 2:04 PM       
Called report to Latricia at [facility]. Transportation here to get patient.   
Department of Internal Medicine  General Internal Medicine   Progress Note      SUBJECTIVE: had two large emesis     History obtained from chart review, the patient, and nursing staff   General ROS: positive for  - fatigue, malaise, sleep disturbance, and weight loss  negative for - chills, fever, or night sweats  Psychological ROS: positive for - anxiety, behavioral disorder, disorientation, hostility, and irritability  negative for - hallucinations  Ophthalmic ROS: negative  Respiratory ROS: no cough, shortness of breath, or wheezing  Cardiovascular ROS: positive for - dyspnea on exertion  negative for - chest pain, loss of consciousness, orthopnea, or palpitations  Gastrointestinal ROS: no abdominal pain, change in bowel habits, or black or bloody stools  Genito-Urinary ROS: incontinent   Musculoskeletal ROS: negative  Neurological ROS: functionally spastic quadriplegic   Dermatological ROS: negative    OBJECTIVE      Medications      Current Facility-Administered Medications: midodrine (PROAMATINE) tablet 5 mg, 5 mg, Oral, TID WC  ondansetron (ZOFRAN) injection 4 mg, 4 mg, IntraVENous, Q6H PRN  dextrose 5 % and 0.45 % sodium chloride infusion, , IntraVENous, Continuous  ciprofloxacin (CIPRO) tablet 500 mg, 500 mg, Oral, 2 times per day  risperiDONE (RISPERDAL) tablet 0.25 mg, 0.25 mg, Oral, BID  oxyCODONE (ROXICODONE) immediate release tablet 5 mg, 5 mg, Oral, Q4H PRN  acetaminophen (TYLENOL) tablet 650 mg, 650 mg, Per G Tube, BID PRN  baclofen (LIORESAL) tablet 5 mg, 5 mg, Per G Tube, BID  benztropine (COGENTIN) tablet 2 mg, 2 mg, Per G Tube, BID  docusate (COLACE) 50 MG/5ML liquid 100 mg, 100 mg, Per G Tube, QAM  famotidine (PEPCID) tablet 20 mg, 20 mg, Per G Tube, BID  ferrous Sulfate 300 (60 Fe) MG/5ML solution 300 mg, 300 mg, Per G Tube, TID  lacosamide (VIMPAT) tablet 100 mg, 100 mg, Per G Tube, BID  lactobacillus (CULTURELLE) capsule 1 capsule, 1 capsule, Per G Tube, BID  levETIRAcetam (KEPPRA) 100 MG/ML 
Department of Internal Medicine  General Internal Medicine   Progress Note      SUBJECTIVE: has had no reported emesis today no distress accidentally slid off  bed     History obtained from chart review, the patient, and nursing staff   General ROS: positive for  - fatigue, malaise, sleep disturbance, and weight loss  negative for - chills, fever, or night sweats  Psychological ROS: positive for - anxiety, behavioral disorder, disorientation, hostility, and irritability  negative for - hallucinations  Ophthalmic ROS: negative  Respiratory ROS: no cough, shortness of breath, or wheezing  Cardiovascular ROS: positive for - dyspnea on exertion  negative for - chest pain, loss of consciousness, orthopnea, or palpitations  Gastrointestinal ROS: no abdominal pain, change in bowel habits, or black or bloody stools  Genito-Urinary ROS: incontinent   Musculoskeletal ROS: negative  Neurological ROS: functionally spastic quadriplegic   Dermatological ROS: negative    OBJECTIVE      Medications      Current Facility-Administered Medications: ciprofloxacin (CIPRO) tablet 500 mg, 500 mg, Per G Tube, 2 times per day  midodrine (PROAMATINE) tablet 5 mg, 5 mg, Oral, TID WC  ondansetron (ZOFRAN) injection 4 mg, 4 mg, IntraVENous, Q6H PRN  dextrose 5 % and 0.45 % sodium chloride infusion, , IntraVENous, Continuous  risperiDONE (RISPERDAL) tablet 0.25 mg, 0.25 mg, Oral, BID  oxyCODONE (ROXICODONE) immediate release tablet 5 mg, 5 mg, Oral, Q4H PRN  acetaminophen (TYLENOL) tablet 650 mg, 650 mg, Per G Tube, BID PRN  baclofen (LIORESAL) tablet 5 mg, 5 mg, Per G Tube, BID  benztropine (COGENTIN) tablet 2 mg, 2 mg, Per G Tube, BID  docusate (COLACE) 50 MG/5ML liquid 100 mg, 100 mg, Per G Tube, QAM  famotidine (PEPCID) tablet 20 mg, 20 mg, Per G Tube, BID  ferrous Sulfate 300 (60 Fe) MG/5ML solution 300 mg, 300 mg, Per G Tube, TID  lacosamide (VIMPAT) tablet 100 mg, 100 mg, Per G Tube, BID  lactobacillus (CULTURELLE) capsule 1 capsule, 1 
Department of Internal Medicine  General Internal Medicine   Progress Note      SUBJECTIVE: no reported emesis so far tolerating tube feeding     History obtained from chart review, the patient, and nursing staff   General ROS: positive for  - fatigue, malaise, sleep disturbance, and weight loss  negative for - chills, fever, or night sweats  Psychological ROS: positive for - anxiety, behavioral disorder, disorientation, hostility, and irritability  negative for - hallucinations  Ophthalmic ROS: negative  Respiratory ROS: no cough, shortness of breath, or wheezing  Cardiovascular ROS: positive for - dyspnea on exertion  negative for - chest pain, loss of consciousness, orthopnea, or palpitations  Gastrointestinal ROS: no abdominal pain, change in bowel habits, or black or bloody stools  Genito-Urinary ROS: incontinent   Musculoskeletal ROS: negative  Neurological ROS: functionally spastic quadriplegic   Dermatological ROS: negative    OBJECTIVE      Medications      Current Facility-Administered Medications: ciprofloxacin (CIPRO) tablet 500 mg, 500 mg, Per G Tube, 2 times per day  midodrine (PROAMATINE) tablet 5 mg, 5 mg, Oral, TID WC  ondansetron (ZOFRAN) injection 4 mg, 4 mg, IntraVENous, Q6H PRN  dextrose 5 % and 0.45 % sodium chloride infusion, , IntraVENous, Continuous  risperiDONE (RISPERDAL) tablet 0.25 mg, 0.25 mg, Oral, BID  oxyCODONE (ROXICODONE) immediate release tablet 5 mg, 5 mg, Oral, Q4H PRN  acetaminophen (TYLENOL) tablet 650 mg, 650 mg, Per G Tube, BID PRN  baclofen (LIORESAL) tablet 5 mg, 5 mg, Per G Tube, BID  benztropine (COGENTIN) tablet 2 mg, 2 mg, Per G Tube, BID  docusate (COLACE) 50 MG/5ML liquid 100 mg, 100 mg, Per G Tube, QAM  famotidine (PEPCID) tablet 20 mg, 20 mg, Per G Tube, BID  ferrous Sulfate 300 (60 Fe) MG/5ML solution 300 mg, 300 mg, Per G Tube, TID  lacosamide (VIMPAT) tablet 100 mg, 100 mg, Per G Tube, BID  lactobacillus (CULTURELLE) capsule 1 capsule, 1 capsule, Per G Tube, 
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     
PT was bladder scanned at 875, Dr ordered to place uribe.   
Patient BP running soft, bradycardia, got an order for 750mL bolus, BP improved, patient slept through the night. BM X2 medium formed, retaining urine, bladder scan showed 475mL, straight cath done, output of 550 mL. All safety precaution in place.  
Patient is non-verbal, became upset at the beginning of the shift, kicking and crying, and trying to roll out of bed. Became calm after administered night meds and repositioned. Hx of seizure, seizure precaution in place. Purewick in place  All safety precaution in place  
Pharmacy Home Medication Reconciliation Note    A medication reconciliation has been completed for Chepe Clay 1995    Pharmacy: [facility] [address], [place], [place]  Information provided by: facility    The patient's home medication list is as follows:  No current facility-administered medications on file prior to encounter.     Current Outpatient Medications on File Prior to Encounter   Medication Sig Dispense Refill    mirtazapine (REMERON) 7.5 MG tablet 1 tablet by Per G Tube route nightly Indications: For appetite stimulant.      polyethylene glycol (MIRALAX) 17 g PACK packet 17 g by Per G Tube route 2 times daily Dissolve 17 grams in 8 oz of water and give per G-tube two times a day for constipation.      valproic acid (DEPAKENE) 250 MG/5ML SOLN oral solution 10 mLs by Per G Tube route 2 times daily Indications: Seizure disorder.      Lactobacillus (ACIDOPHILUS) CAPS capsule 1 capsule by Per G Tube route 2 times daily      benztropine (COGENTIN) 2 MG tablet 1 tablet by Per G Tube route 2 times daily      docusate sodium (COLACE) 100 MG capsule Take 1 capsule by mouth every morning      linaclotide (LINZESS) 290 MCG CAPS capsule 1 capsule by Per G Tube route every morning (before breakfast) 30 capsule 0    senna (SENOKOT) 8.6 MG tablet 2 tablets by Per G Tube route 2 times daily 30 tablet 0    sertraline (ZOLOFT) 100 MG tablet 2 tablets by Per G Tube route daily Give 2 tablets per G-tube one time a day for schizophrenia.      ferrous sulfate 220 (44 Fe) MG/5ML solution 5 mLs by Per G Tube route 3 times daily      acetaminophen (TYLENOL) 325 MG tablet 2 tablets by Per G Tube route 2 times daily Give 2 tablets by mouth twice daily for pain.      lacosamide (VIMPAT) 10 MG/ML SOLN oral solution 10 mLs by Per G Tube route 2 times daily.      Baclofen 5 MG TABS 1 tablet by Per G Tube route 2 times daily Indications: Spasicity.      paliperidone palmitate ER (INVEGA SUSTENNA) 117 
Pt resting in bed, med and vitals taken, bed at lowest postion, call light within reach of pt   
Pt was lethargic, BP was soft and pulse, Dr ordered midodrine and Risperdal, BP and pulse has improved during thi assessment.    
Shift assessment completed. A & O X 4. No vomiting over the night. Still NPO. Repositioned in bed. Call light within reach. Yossi meds administered. Tolerated well  
Shift assessment completed. Tube feed running at 40ml/hr. Patient cleaned up in bed. Patient monitored during the shift, no immediate concerns noted. Call light within reach  
Shift assessment completed. VSS. AM medications administered as ordered. Pt is non verbal w/ hx of TBI. BUE contractions. Is able to answer yes and no questions. Peg tube in place, feed clamped for 1 hour per order d/t Cipro administration. New linens and gown. Pt repositioned. Seizure precautions in place, safety measures in place.     1200: Crouch removed for voiding trial and discharge. Pt repositioned. Purewick in place.     1240: Pt found w/ large amount of lime green emesis all over herself and bed. Tube feed shut off. Pt has been NPO. MD notified, ordering abd XR and possibly NGT. Complete linen change provided.     1345: Spoke w/ Hector from Urology, they will see pt tomorrow. Monitor output.     1620: Pt to xray.     XR results not resulted yet. Pt has become restless and continues to pull at lines, purewick continuously removed by pt. Brief changed, pt voided large amount. PVR 209mL. Linen change provided. LVM for guardian to callback for update.   
Urology Progress Note  [facility]    Provider: Chriss Lopez PA-C  Patient ID:  Admission Date: 4/3/2025 Name: Chepe Clay  OR Date: 4/3/2025 MRN: [id number]   Patient Location: [id number] : 1995  Attending: Satnam Ojeda MD Date of Service: 2025  PCP: Bart Arora MD     Diagnoses:  1. Acute pancreatitis, unspecified complication status, unspecified pancreatitis type    2. Gastrojejunostomy tube dislodgement    3. Urinary tract infection associated with catheterization of urinary tract, unspecified indwelling urinary catheter type, initial encounter    4. Idiopathic acute pancreatitis without infection or necrosis      28 yo female who is nonverbal in advanced stage of mental retardation with cerebral palsy in a chronically contracted state. Presents with acute pancreatitis and G tube dislodgement. Urology consulted for urinary retention. PVR at one point 450cc's. Now Pvr down to 200cc's. CT  shows no hydro and a non distended bladder. Ucx negative for infection- UA shows contamination. No mircoheme noted.     Assessment/Plan:  Risk of self harm with uribe. Would monitor output from purewick closely and can str cath residuals >400cc's. But unlikely this would be necessary. At this point would be ok to discharge without a catheter when medically cleared. Urology will sign out. Please call if any issues arise and we will be happy to assist.     The patient had a chance to ask questions which were answered. she understands the above plan.    Subjective:   Chepe Clay is a 29 y.o. female. She was seen and examined this morning.     Objective:   Vitals:  Vitals:    25 0443   BP: 92/60   Pulse: 55   Resp: 16   Temp: 97.6 °F (36.4 °C)   SpO2: 94%       Intake/Output Summary (Last 24 hours) at 2025 0848  Last data filed at 2025 0600  Gross per 24 hour   Intake 380 ml   Output --   Net 380 ml     Physical Exam:  Gen: resting in bed comfortably  CV: 
    ADI SAUL RD, LD    Contact: [phone]      
Lactic Acid, Sepsis 1.6 0.4 - 1.9 mmol/L   Urinalysis with Reflex to Culture    Collection Time: 04/03/25  3:21 PM    Specimen: Urine   Result Value Ref Range    Color, UA DARK YELLOW (A) Straw/Yellow    Clarity, UA TURBID (A) Clear    Glucose, Ur Negative Negative mg/dL    Bilirubin, Urine SMALL (A) Negative    Ketones, Urine TRACE (A) Negative mg/dL    Specific Gravity, UA >=1.030 1.005 - 1.030    Blood, Urine TRACE (A) Negative    pH, Urine 7.0 5.0 - 8.0    Protein, UA 30 (A) Negative mg/dL    Urobilinogen, Urine 1.0 <2.0 E.U./dL    Nitrite, Urine Negative Negative    Leukocyte Esterase, Urine LARGE (A) Negative    Microscopic Examination YES     Urine Type Voided     Urine Reflex to Culture Yes    Microscopic Urinalysis    Collection Time: 04/03/25  3:21 PM   Result Value Ref Range    RBC, UA 0-2 0 - 4 /HPF    Bacteria, UA 4+ (A) None Seen /HPF    Urinalysis Comments see below     Hyaline Casts, UA 8 0 - 8 /LPF    WBC,  (H) 0 - 5 /HPF    Epithelial Cells, UA 14 (H) 0 - 5 /HPF    Hyaline Casts, UA Present (A) None Seen    Amorphous, UA Present (A) None Seen   Culture, Urine    Collection Time: 04/03/25  3:21 PM    Specimen: Urine, clean catch   Result Value Ref Range    Urine Culture, Routine       <50,000 CFU/ml mixed skin/urogenital juliane. No further workup   Lipase    Collection Time: 04/04/25  5:06 AM   Result Value Ref Range    Lipase 169.0 (H) 13.0 - 60.0 U/L   CBC    Collection Time: 04/04/25  5:06 AM   Result Value Ref Range    WBC 7.0 4.0 - 11.0 K/uL    RBC 4.20 4.00 - 5.20 M/uL    Hemoglobin 12.6 12.0 - 16.0 g/dL    Hematocrit 37.2 36.0 - 48.0 %    MCV 88.6 80.0 - 100.0 fL    MCH 29.9 26.0 - 34.0 pg    MCHC 33.8 31.0 - 36.0 g/dL    RDW 13.1 12.4 - 15.4 %    Platelets 128 (L) 135 - 450 K/uL    MPV 9.5 5.0 - 10.5 fL   Basic Metabolic Panel    Collection Time: 04/04/25  5:06 AM   Result Value Ref Range    Sodium 139 136 - 145 mmol/L    Potassium 3.8 3.5 - 5.1 mmol/L    Chloride 104 99 - 110 mmol/L    
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     
Creatinine 0.5 (L) 0.6 - 1.1 mg/dL    Est, Glom Filt Rate >90 >60    Calcium 8.6 8.3 - 10.6 mg/dL   CBC    Collection Time: 04/05/25 11:06 AM   Result Value Ref Range    WBC 7.9 4.0 - 11.0 K/uL    RBC 4.44 4.00 - 5.20 M/uL    Hemoglobin 13.3 12.0 - 16.0 g/dL    Hematocrit 40.1 36.0 - 48.0 %    MCV 90.3 80.0 - 100.0 fL    MCH 30.0 26.0 - 34.0 pg    MCHC 33.2 31.0 - 36.0 g/dL    RDW 13.0 12.4 - 15.4 %    Platelets 137 135 - 450 K/uL    MPV 10.0 5.0 - 10.5 fL   Basic Metabolic Panel    Collection Time: 04/05/25 11:06 AM   Result Value Ref Range    Sodium 139 136 - 145 mmol/L    Potassium 3.8 3.5 - 5.1 mmol/L    Chloride 105 99 - 110 mmol/L    CO2 22 21 - 32 mmol/L    Anion Gap 12 3 - 16    Glucose 105 (H) 70 - 99 mg/dL    BUN 4 (L) 7 - 20 mg/dL    Creatinine 0.5 (L) 0.6 - 1.1 mg/dL    Est, Glom Filt Rate >90 >60    Calcium 9.2 8.3 - 10.6 mg/dL   Lipase    Collection Time: 04/05/25 11:06 AM   Result Value Ref Range    Lipase 37.0 13.0 - 60.0 U/L   POCT Glucose    Collection Time: 04/06/25  4:45 AM   Result Value Ref Range    POC Glucose 89 70 - 99 mg/dl    Performed on ACCU-CHEK        ASSESSMENT AND PLAN     Hospital Problems           Last Modified POA    * (Principal) Acute pancreatitis without infection or necrosis 4/3/2025 Yes    UTI (urinary tract infection) 4/3/2025 Yes    Chronic anoxic encephalopathy (HCC) 4/4/2025 Yes    Convulsions/seizures (HCC) 4/4/2025 Yes    Altered mental status 4/3/2025 Yes    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

## 2025-04-10 NOTE — PLAN OF CARE
Problem: Discharge Planning  Goal: Discharge to home or other facility with appropriate resources  4/4/2025 2149 by Dulce Benson RN  Outcome: Progressing  4/4/2025 1106 by Amanda Alcocer RN  Outcome: Progressing     Problem: Safety - Adult  Goal: Free from fall injury  4/4/2025 2149 by Dulce Benson RN  Outcome: Progressing  4/4/2025 1106 by Amanda Alcocer RN  Outcome: Progressing     Problem: Skin/Tissue Integrity  Goal: Skin integrity remains intact  Description: 1.  Monitor for areas of redness and/or skin breakdown  2.  Assess vascular access sites hourly  3.  Every 4-6 hours minimum:  Change oxygen saturation probe site  4.  Every 4-6 hours:  If on nasal continuous positive airway pressure, respiratory therapy assess nares and determine need for appliance change or resting period  4/4/2025 2149 by Dulce Benson RN  Outcome: Progressing  4/4/2025 1106 by Amanda Alcocer RN  Outcome: Progressing     Problem: Pain  Goal: Verbalizes/displays adequate comfort level or baseline comfort level  4/4/2025 2149 by Dulce Benson RN  Outcome: Progressing  4/4/2025 1106 by Amanda Alcocer RN  Outcome: Progressing     
  Problem: Discharge Planning  Goal: Discharge to home or other facility with appropriate resources  4/5/2025 0954 by Kevin Urban RN  Outcome: Progressing  4/4/2025 2149 by Dulce Benson RN  Outcome: Progressing     Problem: Safety - Adult  Goal: Free from fall injury  4/5/2025 0954 by Kevin Urban RN  Outcome: Progressing  4/4/2025 2149 by Dulce Benson RN  Outcome: Progressing     Problem: Skin/Tissue Integrity  Goal: Skin integrity remains intact  Description: 1.  Monitor for areas of redness and/or skin breakdown  2.  Assess vascular access sites hourly  3.  Every 4-6 hours minimum:  Change oxygen saturation probe site  4.  Every 4-6 hours:  If on nasal continuous positive airway pressure, respiratory therapy assess nares and determine need for appliance change or resting period  4/5/2025 0954 by Kevin Urban RN  Outcome: Progressing  4/4/2025 2149 by Dulce Benson RN  Outcome: Progressing     Problem: Pain  Goal: Verbalizes/displays adequate comfort level or baseline comfort level  4/5/2025 0954 by Kevin Urban RN  Outcome: Progressing  4/4/2025 2149 by Dulce Benson RN  Outcome: Progressing     
  Problem: Discharge Planning  Goal: Discharge to home or other facility with appropriate resources  4/5/2025 2200 by Dulce Benson RN  Outcome: Progressing  4/5/2025 0954 by Kevin Urban RN  Outcome: Progressing     Problem: Safety - Adult  Goal: Free from fall injury  4/5/2025 2200 by Dulce Benson RN  Outcome: Progressing  4/5/2025 0954 by Kevin Urban RN  Outcome: Progressing     Problem: Skin/Tissue Integrity  Goal: Skin integrity remains intact  Description: 1.  Monitor for areas of redness and/or skin breakdown  2.  Assess vascular access sites hourly  3.  Every 4-6 hours minimum:  Change oxygen saturation probe site  4.  Every 4-6 hours:  If on nasal continuous positive airway pressure, respiratory therapy assess nares and determine need for appliance change or resting period  4/5/2025 2200 by Dulce Benson RN  Outcome: Progressing  4/5/2025 0954 by Kevin Urban RN  Outcome: Progressing     Problem: Pain  Goal: Verbalizes/displays adequate comfort level or baseline comfort level  4/5/2025 2200 by Dulce Benson RN  Outcome: Progressing  4/5/2025 0954 by Kevin Urban RN  Outcome: Progressing     
  Problem: Discharge Planning  Goal: Discharge to home or other facility with appropriate resources  4/8/2025 0858 by Kevin Urban RN  Outcome: Progressing  4/8/2025 0058 by Zuly Louis RN  Outcome: Progressing     Problem: Safety - Adult  Goal: Free from fall injury  4/8/2025 0858 by Kevin Urban RN  Outcome: Progressing  4/8/2025 0058 by Zuly Louis RN  Outcome: Progressing     Problem: Skin/Tissue Integrity  Goal: Skin integrity remains intact  Description: 1.  Monitor for areas of redness and/or skin breakdown  2.  Assess vascular access sites hourly  3.  Every 4-6 hours minimum:  Change oxygen saturation probe site  4.  Every 4-6 hours:  If on nasal continuous positive airway pressure, respiratory therapy assess nares and determine need for appliance change or resting period  4/8/2025 0858 by Kevin Urban RN  Outcome: Progressing  4/8/2025 0058 by Zuly Louis RN  Outcome: Progressing     Problem: Pain  Goal: Verbalizes/displays adequate comfort level or baseline comfort level  4/8/2025 0858 by Kevin Urban RN  Outcome: Progressing  4/8/2025 0058 by Zuly Louis RN  Outcome: Progressing     Problem: Nutrition Deficit:  Goal: Optimize nutritional status  4/8/2025 0858 by Kevin Urban RN  Outcome: Progressing  4/8/2025 0058 by Zuly Louis RN  Outcome: Progressing     
  Problem: Discharge Planning  Goal: Discharge to home or other facility with appropriate resources  Outcome: Progressing     Problem: Safety - Adult  Goal: Free from fall injury  Outcome: Progressing     Problem: Skin/Tissue Integrity  Goal: Skin integrity remains intact  Description: 1.  Monitor for areas of redness and/or skin breakdown  2.  Assess vascular access sites hourly  3.  Every 4-6 hours minimum:  Change oxygen saturation probe site  4.  Every 4-6 hours:  If on nasal continuous positive airway pressure, respiratory therapy assess nares and determine need for appliance change or resting period  Outcome: Progressing     Problem: Pain  Goal: Verbalizes/displays adequate comfort level or baseline comfort level  Outcome: Progressing     
  Problem: Discharge Planning  Goal: Discharge to home or other facility with appropriate resources  Outcome: Progressing     Problem: Safety - Adult  Goal: Free from fall injury  Outcome: Progressing     Problem: Skin/Tissue Integrity  Goal: Skin integrity remains intact  Description: 1.  Monitor for areas of redness and/or skin breakdown  2.  Assess vascular access sites hourly  3.  Every 4-6 hours minimum:  Change oxygen saturation probe site  4.  Every 4-6 hours:  If on nasal continuous positive airway pressure, respiratory therapy assess nares and determine need for appliance change or resting period  Outcome: Progressing     Problem: Pain  Goal: Verbalizes/displays adequate comfort level or baseline comfort level  Outcome: Progressing     
  Problem: Discharge Planning  Goal: Discharge to home or other facility with appropriate resources  Outcome: Progressing     Problem: Safety - Adult  Goal: Free from fall injury  Outcome: Progressing     Problem: Skin/Tissue Integrity  Goal: Skin integrity remains intact  Description: 1.  Monitor for areas of redness and/or skin breakdown  2.  Assess vascular access sites hourly  3.  Every 4-6 hours minimum:  Change oxygen saturation probe site  4.  Every 4-6 hours:  If on nasal continuous positive airway pressure, respiratory therapy assess nares and determine need for appliance change or resting period  Outcome: Progressing     Problem: Pain  Goal: Verbalizes/displays adequate comfort level or baseline comfort level  Outcome: Progressing     Problem: Nutrition Deficit:  Goal: Optimize nutritional status  Outcome: Progressing     
  Problem: Discharge Planning  Goal: Discharge to home or other facility with appropriate resources  Outcome: Progressing     Problem: Safety - Adult  Goal: Free from fall injury  Outcome: Progressing     Problem: Skin/Tissue Integrity  Goal: Skin integrity remains intact  Description: 1.  Monitor for areas of redness and/or skin breakdown  2.  Assess vascular access sites hourly  3.  Every 4-6 hours minimum:  Change oxygen saturation probe site  4.  Every 4-6 hours:  If on nasal continuous positive airway pressure, respiratory therapy assess nares and determine need for appliance change or resting period  Outcome: Progressing     Problem: Pain  Goal: Verbalizes/displays adequate comfort level or baseline comfort level  Outcome: Progressing     Problem: Nutrition Deficit:  Goal: Optimize nutritional status  Outcome: Progressing     
  Problem: Discharge Planning  Goal: Discharge to home or other facility with appropriate resources  Outcome: Progressing     Problem: Safety - Adult  Goal: Free from fall injury  Outcome: Progressing     Problem: Skin/Tissue Integrity  Goal: Skin integrity remains intact  Description: 1.  Monitor for areas of redness and/or skin breakdown  2.  Assess vascular access sites hourly  3.  Every 4-6 hours minimum:  Change oxygen saturation probe site  4.  Every 4-6 hours:  If on nasal continuous positive airway pressure, respiratory therapy assess nares and determine need for appliance change or resting period  Outcome: Progressing     Problem: Pain  Goal: Verbalizes/displays adequate comfort level or baseline comfort level  Outcome: Progressing     Problem: Nutrition Deficit:  Goal: Optimize nutritional status  Outcome: Progressing     Problem: Infection - Adult  Goal: Absence of infection at discharge  Outcome: Progressing  Goal: Absence of infection during hospitalization  Outcome: Progressing     Problem: Neurosensory - Adult  Goal: Achieves stable or improved neurological status  Outcome: Progressing     Problem: Cardiovascular - Adult  Goal: Maintains optimal cardiac output and hemodynamic stability  Outcome: Progressing  Goal: Absence of cardiac dysrhythmias or at baseline  Outcome: Progressing     Problem: Skin/Tissue Integrity - Adult  Goal: Skin integrity remains intact  Description: 1.  Monitor for areas of redness and/or skin breakdown  2.  Assess vascular access sites hourly  3.  Every 4-6 hours minimum:  Change oxygen saturation probe site  4.  Every 4-6 hours:  If on nasal continuous positive airway pressure, respiratory therapy assess nares and determine need for appliance change or resting period  Outcome: Progressing  Goal: Incisions, wounds, or drain sites healing without S/S of infection  Outcome: Progressing     Problem: Gastrointestinal - Adult  Goal: Minimal or absence of nausea and 
  Problem: Infection - Adult  Goal: Absence of infection at discharge  Outcome: Progressing  Goal: Absence of infection during hospitalization  Outcome: Progressing     
minimum:  Change oxygen saturation probe site  4.  Every 4-6 hours:  If on nasal continuous positive airway pressure, respiratory therapy assess nares and determine need for appliance change or resting period  4/10/2025 1745 by Ayah Box RN  Outcome: Completed     Problem: Skin/Tissue Integrity - Adult  Goal: Incisions, wounds, or drain sites healing without S/S of infection  4/10/2025 1745 by Ayah Box RN  Outcome: Completed     Problem: Gastrointestinal - Adult  Goal: Minimal or absence of nausea and vomiting  4/10/2025 1745 by Ayah Box RN  Outcome: Completed     Problem: Gastrointestinal - Adult  Goal: Maintains or returns to baseline bowel function  4/10/2025 1745 by Ayah Box RN  Outcome: Completed     Problem: Genitourinary - Adult  Goal: Absence of urinary retention  4/10/2025 1745 by Ayah Box RN  Outcome: Completed     Problem: Musculoskeletal - Adult  Goal: Return mobility to safest level of function  4/10/2025 1745 by Ayah Box RN  Outcome: Completed     Problem: Musculoskeletal - Adult  Goal: Maintain proper alignment of affected body part  4/10/2025 1745 by Ayah Box RN  Outcome: Completed     Problem: Anxiety  Goal: Will report anxiety at manageable levels  Description: INTERVENTIONS:  1. Administer medication as ordered  2. Teach and rehearse alternative coping skills  3. Provide emotional support with 1:1 interaction with staff  4/10/2025 1745 by Ayah Box RN  Outcome: Completed     Problem: Coping  Goal: Pt/Family able to verbalize concerns and demonstrate effective coping strategies  Description: INTERVENTIONS:  1. Assist patient/family to identify coping skills, available support systems and cultural and spiritual values  2. Provide emotional support, including active listening and acknowledgement of concerns of patient and caregivers  3. Reduce environmental stimuli, as able  4. Instruct patient/family in relaxation 
Specialist consults as needed  Outcome: Progressing

## 2025-04-10 NOTE — CARE COORDINATION
Case Management -  Discharge Note      Patient Name: Chepe Clay                   YOB: 1995  Room: [address] [place] [address]            Readmission Risk (Low < 19, Mod (19-27), High > 27): Readmission Risk Score: 13.7    Current PCP: Bart Arora MD      (IMM) Important Message from Medicare:    Has pt received appropriate compliance notices before being discharged if required: yes  Compliance doc:  [x] 2nd IMM; [] Code 44 [] Michael  Date Given: 4/10/25 Given By: VANESA     PT AM-PAC:   /24  OT AM-PAC:   /24    Patient discharging back to the [facility] number 7  [address] [place] [address]  Phone [phone]  Fax [phone]      [facility] agency notified of discharge:  [] Yes [] No  [x] NA    Family notified of discharge:  [x] Yes  [] No  [] NA    Facility notified of discharge:  [x] Yes  [] No  [] NA    Pt is being discharged with Outpt IV Antibiotics  [] Yes [] No  [x] NA  If yes, make sure DORIAN is faxed to [facility] agency, and meds are called in to pharmacy by RN from DORIAN orders only.      Financial    Payor: MEDICARE / Plan: MEDICARE PART A AND B / Product Type: *No Product type* /     Pharmacy:  Potential assistance Purchasing Medications: No  Meds-to-Beds request: No      [facility] - [place], [place] - [address] [phone] - F [phone]  [address] [address]  Phone: [phone] Fax: [phone]    [facility] - [place], [place] - [address] - P [phone] - F [phone]  [address] [facility] [address]  Phone: [phone] Fax: [phone]      Notes:    Additional Case Management Notes:    Patient's guardian -Stephanie  &  Nurse Latricia  with the group [facility] were informed of patient's pickup time.       Electronically signed by SWETHA FRANKLIN RN /BSN/CCM on 4/10/2025 at 4:20 PM

## 2025-04-10 NOTE — CARE COORDINATION
Discharge Planning;  Discharge order noted for patient to go back to [facility]. CM inform Nurse Latricia at the facility who stated ready to take patient back. Transport was scheduled with Pickup time of 1700 via [facility]  ([phone])  .   Patient's guardian -Stephanie  &  Nurse Latricia were informed of patient's pickup time.

## 2025-05-03 PROBLEM — N39.0 UTI (URINARY TRACT INFECTION): Status: RESOLVED | Noted: 2022-09-14 | Resolved: 2025-05-03

## 2025-05-23 ENCOUNTER — HOSPITAL ENCOUNTER (EMERGENCY)
Age: 30
Discharge: HOME | End: 2025-05-23
Payer: MEDICARE

## 2025-05-23 ENCOUNTER — APPOINTMENT (OUTPATIENT)
Dept: GENERAL RADIOLOGY | Age: 30
End: 2025-05-23
Payer: MEDICARE

## 2025-05-23 VITALS
DIASTOLIC BLOOD PRESSURE: 69 MMHG | SYSTOLIC BLOOD PRESSURE: 104 MMHG | HEART RATE: 63 BPM | HEIGHT: 63 IN | OXYGEN SATURATION: 98 % | TEMPERATURE: 97.6 F | WEIGHT: 112 LBS | RESPIRATION RATE: 16 BRPM | BODY MASS INDEX: 19.84 KG/M2

## 2025-05-23 DIAGNOSIS — K94.23 GASTROSTOMY TUBE DYSFUNCTION (HCC): Primary | ICD-10-CM

## 2025-05-23 PROCEDURE — 43762 RPLC GTUBE NO REVJ TRC: CPT

## 2025-05-23 PROCEDURE — 49465 FLUORO EXAM OF G/COLON TUBE: CPT

## 2025-05-23 PROCEDURE — 6360000004 HC RX CONTRAST MEDICATION

## 2025-05-23 PROCEDURE — 99283 EMERGENCY DEPT VISIT LOW MDM: CPT

## 2025-05-23 RX ADMIN — IOHEXOL 30 ML: 350 INJECTION, SOLUTION INTRAVENOUS at 17:41

## 2025-05-23 ASSESSMENT — PAIN - FUNCTIONAL ASSESSMENT: PAIN_FUNCTIONAL_ASSESSMENT: ADULT NONVERBAL PAIN SCALE (NPVS)

## 2025-05-23 NOTE — ED PROVIDER NOTES
[facility] EMERGENCY DEPARTMENT  EMERGENCY DEPARTMENT ENCOUNTER        Pt Name: Chepe Clay  MRN: [id number]  Birthdate 1995  Date of evaluation: 5/23/2025  Provider: Deanna Montanez PA-C  PCP: Bart Arora MD  Note Started: 5:20 PM EDT 5/23/25      JENNIFER. I have evaluated this patient.        CHIEF COMPLAINT       Chief Complaint   Patient presents with    G Tube Complications     From [facility] via [facility] cc g tube problem       HISTORY OF PRESENT ILLNESS: 1 or more Elements     History From: ems  Limitations to history : nonverbal    Chepe Clay is a 29 y.o. female who presents for G-tube replacement.  Her current G-tube broke and is leaking.  Patient is nonverbal, no additional information is able to be obtained at this time.    Nursing Notes were all reviewed and agreed with or any disagreements were addressed in the HPI.    REVIEW OF SYSTEMS :      Review of Systems   Unable to perform ROS: Patient nonverbal   Genitourinary:         G tube dysfunction       Positives and Pertinent negatives as per HPI.     SURGICAL HISTORY     Past Surgical History:   Procedure Laterality Date    GASTROSTOMY TUBE PLACEMENT      GASTROSTOMY TUBE PLACEMENT      08/02/2016    GASTROSTOMY TUBE PLACEMENT N/A 11/26/2019    EGD PEG TUBE PLACEMENT performed by Chucho Quinonez MD at [facility] ENDOSCOPY    UPPER GASTROINTESTINAL ENDOSCOPY N/A 11/26/2019    EGD FOREIGN BODY REMOVAL performed by Chucho Quinonez MD at [facility] ENDOSCOPY       CURRENTMEDICATIONS       Previous Medications    ACETAMINOPHEN (TYLENOL) 325 MG TABLET    2 tablets by Per G Tube route 2 times daily Give 2 tablets by mouth twice daily for pain.    BACLOFEN 5 MG TABS    1 tablet by Per G Tube route 2 times daily Indications: Spasicity.    BENZTROPINE (COGENTIN) 2 MG TABLET    1 tablet by Per G Tube route 2 times daily    DOCUSATE SODIUM (COLACE) 100 MG CAPSULE    Take 1 capsule by mouth every morning    FAMOTIDINE (PEPCID) 20 MG TABLET     returned as of this dictation.    EKG: When ordered, EKG's are interpreted by the Emergency Department Physician in the absence of a cardiologist.  Please see their note for interpretation of EKG.    RADIOLOGY:   Non-plain film images such as CT, Ultrasound and MRI are read by the radiologist.       Interpretation per the Radiologist below, if available at the time of this note:    XR INJ CONTRAST GASTRIC TUBE PERC    (Results Pending)     No results found.      ED Provider US Interpretation.    No results found.    PROCEDURES   Unless otherwise noted below, none     Procedures  Gastrostomy Tube Replacement Procedure Note    Indication: gastrostomy tube malfunction    Procedure: The patient was placed in the supine position and the patient's gastric tube was replaced using a 20 [place] gastrostomy tube and the bulb was inflated using 20 cc of normal saline.  The placement was verified by x-ray with contrast injection.    The patient tolerated the procedure well.    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 without return to pre-existing conscious level with patient surviving ([facility]), Major depressive disorder, recurrent, unspecified, MRSA (methicillin resistant staph aureus) culture positive (08/25/2017), Nicotine dependence, other tobacco product, uncomplicated, Overdose, Poisoning by heroin, undetermined, initial encounter ([facility]), Schizophrenia, unspecified ([facility]), Seizures ([facility]), Streptococcal sepsis, unspecified ([facility]), TBI (traumatic brain injury) ([facility]), Unspecified asthma, uncomplicated, and Unspecified sexually transmitted disease.     EMERGENCY DEPARTMENT COURSE and DIFFERENTIAL

## 2025-06-30 ENCOUNTER — APPOINTMENT (OUTPATIENT)
Dept: GENERAL RADIOLOGY | Age: 30
DRG: 871 | End: 2025-06-30
Payer: MEDICARE

## 2025-06-30 ENCOUNTER — APPOINTMENT (OUTPATIENT)
Dept: CT IMAGING | Age: 30
DRG: 871 | End: 2025-06-30
Payer: MEDICARE

## 2025-06-30 ENCOUNTER — HOSPITAL ENCOUNTER (INPATIENT)
Age: 30
LOS: 5 days | Discharge: OTHER INSTITUTION | DRG: 871 | End: 2025-07-06
Attending: EMERGENCY MEDICINE | Admitting: INTERNAL MEDICINE
Payer: MEDICARE

## 2025-06-30 DIAGNOSIS — R06.02 SHORTNESS OF BREATH: ICD-10-CM

## 2025-06-30 DIAGNOSIS — R04.0 LEFT-SIDED EPISTAXIS: ICD-10-CM

## 2025-06-30 DIAGNOSIS — J18.9 PNEUMONIA OF LEFT LOWER LOBE DUE TO INFECTIOUS ORGANISM: ICD-10-CM

## 2025-06-30 DIAGNOSIS — A41.9 SEPSIS, DUE TO UNSPECIFIED ORGANISM, UNSPECIFIED WHETHER ACUTE ORGAN DYSFUNCTION PRESENT (HCC): Primary | ICD-10-CM

## 2025-06-30 DIAGNOSIS — R25.2 SPASTICITY: ICD-10-CM

## 2025-06-30 LAB
ALT SERPL-CCNC: 34 U/L (ref 10–40)
BASOPHILS # BLD: 0.1 K/UL (ref 0–0.2)
BASOPHILS NFR BLD: 0.4 %
DEPRECATED RDW RBC AUTO: 13.8 % (ref 12.4–15.4)
EOSINOPHIL # BLD: 0.3 K/UL (ref 0–0.6)
EOSINOPHIL NFR BLD: 2.3 %
HCG SERPL QL: NEGATIVE
HCT VFR BLD AUTO: 41.9 % (ref 36–48)
HGB BLD-MCNC: 14.1 G/DL (ref 12–16)
LACTATE BLDV-SCNC: 1.2 MMOL/L (ref 0.4–1.9)
LIPASE SERPL-CCNC: 24 U/L (ref 13–60)
LYMPHOCYTES # BLD: 1.4 K/UL (ref 1–5.1)
LYMPHOCYTES NFR BLD: 11.8 %
MCH RBC QN AUTO: 30.1 PG (ref 26–34)
MCHC RBC AUTO-ENTMCNC: 33.7 G/DL (ref 31–36)
MCV RBC AUTO: 89.5 FL (ref 80–100)
MONOCYTES # BLD: 1.2 K/UL (ref 0–1.3)
MONOCYTES NFR BLD: 9.6 %
NEUTROPHILS # BLD: 9.2 K/UL (ref 1.7–7.7)
NEUTROPHILS NFR BLD: 75.9 %
NT-PROBNP SERPL-MCNC: 87 PG/ML (ref 0–124)
PLATELET # BLD AUTO: 137 K/UL (ref 135–450)
PMV BLD AUTO: 10.4 FL (ref 5–10.5)
POTASSIUM SERPL-SCNC: 3.9 MMOL/L (ref 3.5–5.1)
PROCALCITONIN SERPL IA-MCNC: 0.05 NG/ML (ref 0–0.15)
RBC # BLD AUTO: 4.68 M/UL (ref 4–5.2)
REASON FOR REJECTION: NORMAL
REJECTED TEST: NORMAL
TROPONIN, HIGH SENSITIVITY: 46 NG/L (ref 0–14)
WBC # BLD AUTO: 12.1 K/UL (ref 4–11)

## 2025-06-30 PROCEDURE — 87449 NOS EACH ORGANISM AG IA: CPT

## 2025-06-30 PROCEDURE — 84484 ASSAY OF TROPONIN QUANT: CPT

## 2025-06-30 PROCEDURE — 70450 CT HEAD/BRAIN W/O DYE: CPT

## 2025-06-30 PROCEDURE — 93005 ELECTROCARDIOGRAM TRACING: CPT | Performed by: PHYSICIAN ASSISTANT

## 2025-06-30 PROCEDURE — 83605 ASSAY OF LACTIC ACID: CPT

## 2025-06-30 PROCEDURE — 84132 ASSAY OF SERUM POTASSIUM: CPT

## 2025-06-30 PROCEDURE — 85025 COMPLETE CBC W/AUTO DIFF WBC: CPT

## 2025-06-30 PROCEDURE — 83690 ASSAY OF LIPASE: CPT

## 2025-06-30 PROCEDURE — 81001 URINALYSIS AUTO W/SCOPE: CPT

## 2025-06-30 PROCEDURE — 71045 X-RAY EXAM CHEST 1 VIEW: CPT

## 2025-06-30 PROCEDURE — 36415 COLL VENOUS BLD VENIPUNCTURE: CPT

## 2025-06-30 PROCEDURE — 80053 COMPREHEN METABOLIC PANEL: CPT

## 2025-06-30 PROCEDURE — 99285 EMERGENCY DEPT VISIT HI MDM: CPT

## 2025-06-30 PROCEDURE — 6370000000 HC RX 637 (ALT 250 FOR IP): Performed by: PHYSICIAN ASSISTANT

## 2025-06-30 PROCEDURE — 84145 PROCALCITONIN (PCT): CPT

## 2025-06-30 PROCEDURE — 87636 SARSCOV2 & INF A&B AMP PRB: CPT

## 2025-06-30 PROCEDURE — 84460 ALANINE AMINO (ALT) (SGPT): CPT

## 2025-06-30 PROCEDURE — 70486 CT MAXILLOFACIAL W/O DYE: CPT

## 2025-06-30 PROCEDURE — 83880 ASSAY OF NATRIURETIC PEPTIDE: CPT

## 2025-06-30 PROCEDURE — 84703 CHORIONIC GONADOTROPIN ASSAY: CPT

## 2025-06-30 PROCEDURE — 87040 BLOOD CULTURE FOR BACTERIA: CPT

## 2025-06-30 RX ORDER — 0.9 % SODIUM CHLORIDE 0.9 %
1000 INTRAVENOUS SOLUTION INTRAVENOUS ONCE
Status: COMPLETED | OUTPATIENT
Start: 2025-06-30 | End: 2025-07-01

## 2025-06-30 RX ORDER — ACETAMINOPHEN 650 MG/1
650 SUPPOSITORY RECTAL ONCE
Status: COMPLETED | OUTPATIENT
Start: 2025-06-30 | End: 2025-06-30

## 2025-06-30 RX ADMIN — ACETAMINOPHEN 650 MG: 650 SUPPOSITORY RECTAL at 23:11

## 2025-06-30 ASSESSMENT — PAIN SCALES - WONG BAKER: WONGBAKER_NUMERICALRESPONSE: NO HURT

## 2025-06-30 ASSESSMENT — PAIN - FUNCTIONAL ASSESSMENT: PAIN_FUNCTIONAL_ASSESSMENT: WONG-BAKER FACES

## 2025-07-01 ENCOUNTER — APPOINTMENT (OUTPATIENT)
Dept: CT IMAGING | Age: 30
DRG: 871 | End: 2025-07-01
Payer: MEDICARE

## 2025-07-01 ENCOUNTER — APPOINTMENT (OUTPATIENT)
Dept: GENERAL RADIOLOGY | Age: 30
DRG: 871 | End: 2025-07-01
Payer: MEDICARE

## 2025-07-01 ENCOUNTER — APPOINTMENT (OUTPATIENT)
Dept: CT IMAGING | Age: 30
DRG: 871 | End: 2025-07-01
Attending: INTERNAL MEDICINE
Payer: MEDICARE

## 2025-07-01 PROBLEM — G40.909 SEIZURE DISORDER (HCC): Status: ACTIVE | Noted: 2025-07-01

## 2025-07-01 PROBLEM — A41.9 SEPSIS (HCC): Status: ACTIVE | Noted: 2025-07-01

## 2025-07-01 PROBLEM — K56.41 FECAL IMPACTION (HCC): Status: ACTIVE | Noted: 2025-07-01

## 2025-07-01 PROBLEM — R79.89 ELEVATED TROPONIN: Status: ACTIVE | Noted: 2025-07-01

## 2025-07-01 LAB
ALBUMIN SERPL-MCNC: 4.3 G/DL (ref 3.4–5)
ALBUMIN/GLOB SERPL: 1.1 {RATIO} (ref 1.1–2.2)
ALP SERPL-CCNC: 72 U/L (ref 40–129)
ALT SERPL-CCNC: ABNORMAL U/L (ref 10–40)
ANION GAP SERPL CALCULATED.3IONS-SCNC: 13 MMOL/L (ref 3–16)
AST SERPL-CCNC: 70 U/L (ref 15–37)
BASE EXCESS BLDV CALC-SCNC: 3.8 MMOL/L (ref -3–3)
BILIRUB SERPL-MCNC: 0.3 MG/DL (ref 0–1)
BILIRUB UR QL STRIP.AUTO: NEGATIVE
BUN SERPL-MCNC: 12 MG/DL (ref 7–20)
CALCIUM SERPL-MCNC: 9.9 MG/DL (ref 8.3–10.6)
CHLORIDE SERPL-SCNC: 99 MMOL/L (ref 99–110)
CLARITY UR: CLEAR
CO2 BLDV-SCNC: 66 MMOL/L
CO2 SERPL-SCNC: 25 MMOL/L (ref 21–32)
COHGB MFR BLDV: 1.9 % (ref 0–1.5)
COLOR UR: ABNORMAL
CREAT SERPL-MCNC: 0.6 MG/DL (ref 0.6–1.1)
DO-HGB MFR BLDV: 4 %
EKG ATRIAL RATE: 125 BPM
EKG DIAGNOSIS: NORMAL
EKG P AXIS: 86 DEGREES
EKG P-R INTERVAL: 102 MS
EKG Q-T INTERVAL: 246 MS
EKG QRS DURATION: 64 MS
EKG QTC CALCULATION (BAZETT): 355 MS
EKG R AXIS: 12 DEGREES
EKG T AXIS: 45 DEGREES
EKG VENTRICULAR RATE: 125 BPM
EPI CELLS #/AREA URNS HPF: ABNORMAL /HPF (ref 0–5)
FLUAV RNA RESP QL NAA+PROBE: NOT DETECTED
FLUBV RNA RESP QL NAA+PROBE: NOT DETECTED
GFR SERPLBLD CREATININE-BSD FMLA CKD-EPI: >90 ML/MIN/{1.73_M2}
GLUCOSE SERPL-MCNC: 88 MG/DL (ref 70–99)
GLUCOSE UR STRIP.AUTO-MCNC: NEGATIVE MG/DL
HCO3 BLDV-SCNC: 28.1 MMOL/L (ref 23–29)
HGB UR QL STRIP.AUTO: ABNORMAL
KETONES UR STRIP.AUTO-MCNC: 15 MG/DL
LACTATE BLDV-SCNC: 0.8 MMOL/L (ref 0.4–1.9)
LEUKOCYTE ESTERASE UR QL STRIP.AUTO: ABNORMAL
METHGB MFR BLDV: 0.4 %
MUCOUS THREADS #/AREA URNS LPF: ABNORMAL /LPF
NITRITE UR QL STRIP.AUTO: NEGATIVE
O2 CT VFR BLDV CALC: 18 VOL %
O2 THERAPY: ABNORMAL
PCO2 BLDV: 40.2 MMHG (ref 40–50)
PH BLDV: 7.45 [PH] (ref 7.35–7.45)
PH UR STRIP.AUTO: 7.5 [PH] (ref 5–8)
PO2 BLDV: 80.4 MMHG (ref 25–40)
POTASSIUM SERPL-SCNC: ABNORMAL MMOL/L (ref 3.5–5.1)
PROT SERPL-MCNC: 8.1 G/DL (ref 6.4–8.2)
PROT UR STRIP.AUTO-MCNC: NEGATIVE MG/DL
RBC #/AREA URNS HPF: ABNORMAL /HPF (ref 0–4)
SAO2 % BLDV: 96 %
SARS-COV-2 RNA RESP QL NAA+PROBE: NOT DETECTED
SODIUM SERPL-SCNC: 137 MMOL/L (ref 136–145)
SP GR UR STRIP.AUTO: 1.02 (ref 1–1.03)
TROPONIN, HIGH SENSITIVITY: 39 NG/L (ref 0–14)
TROPONIN, HIGH SENSITIVITY: 39 NG/L (ref 0–14)
TROPONIN, HIGH SENSITIVITY: 46 NG/L (ref 0–14)
UA COMPLETE W REFLEX CULTURE PNL UR: ABNORMAL
UA DIPSTICK W REFLEX MICRO PNL UR: YES
URN SPEC COLLECT METH UR: ABNORMAL
UROBILINOGEN UR STRIP-ACNC: 1 E.U./DL
WBC #/AREA URNS HPF: ABNORMAL /HPF (ref 0–5)

## 2025-07-01 PROCEDURE — 2500000003 HC RX 250 WO HCPCS: Performed by: EMERGENCY MEDICINE

## 2025-07-01 PROCEDURE — 6360000004 HC RX CONTRAST MEDICATION: Performed by: INTERNAL MEDICINE

## 2025-07-01 PROCEDURE — 96367 TX/PROPH/DG ADDL SEQ IV INF: CPT

## 2025-07-01 PROCEDURE — 2500000003 HC RX 250 WO HCPCS: Performed by: INTERNAL MEDICINE

## 2025-07-01 PROCEDURE — 6370000000 HC RX 637 (ALT 250 FOR IP): Performed by: STUDENT IN AN ORGANIZED HEALTH CARE EDUCATION/TRAINING PROGRAM

## 2025-07-01 PROCEDURE — 74018 RADEX ABDOMEN 1 VIEW: CPT

## 2025-07-01 PROCEDURE — 2580000003 HC RX 258: Performed by: PHYSICIAN ASSISTANT

## 2025-07-01 PROCEDURE — 6370000000 HC RX 637 (ALT 250 FOR IP): Performed by: INTERNAL MEDICINE

## 2025-07-01 PROCEDURE — 99222 1ST HOSP IP/OBS MODERATE 55: CPT | Performed by: SURGERY

## 2025-07-01 PROCEDURE — 2580000003 HC RX 258: Performed by: EMERGENCY MEDICINE

## 2025-07-01 PROCEDURE — 99222 1ST HOSP IP/OBS MODERATE 55: CPT | Performed by: STUDENT IN AN ORGANIZED HEALTH CARE EDUCATION/TRAINING PROGRAM

## 2025-07-01 PROCEDURE — 71260 CT THORAX DX C+: CPT

## 2025-07-01 PROCEDURE — 6360000002 HC RX W HCPCS: Performed by: EMERGENCY MEDICINE

## 2025-07-01 PROCEDURE — 84484 ASSAY OF TROPONIN QUANT: CPT

## 2025-07-01 PROCEDURE — 96375 TX/PRO/DX INJ NEW DRUG ADDON: CPT

## 2025-07-01 PROCEDURE — 83605 ASSAY OF LACTIC ACID: CPT

## 2025-07-01 PROCEDURE — 2580000003 HC RX 258: Performed by: INTERNAL MEDICINE

## 2025-07-01 PROCEDURE — 93010 ELECTROCARDIOGRAM REPORT: CPT | Performed by: INTERNAL MEDICINE

## 2025-07-01 PROCEDURE — 1200000000 HC SEMI PRIVATE

## 2025-07-01 PROCEDURE — 74176 CT ABD & PELVIS W/O CONTRAST: CPT

## 2025-07-01 PROCEDURE — 82803 BLOOD GASES ANY COMBINATION: CPT

## 2025-07-01 PROCEDURE — 96366 THER/PROPH/DIAG IV INF ADDON: CPT

## 2025-07-01 PROCEDURE — 6360000002 HC RX W HCPCS: Performed by: INTERNAL MEDICINE

## 2025-07-01 PROCEDURE — 6360000004 HC RX CONTRAST MEDICATION: Performed by: EMERGENCY MEDICINE

## 2025-07-01 PROCEDURE — 96365 THER/PROPH/DIAG IV INF INIT: CPT

## 2025-07-01 PROCEDURE — 6360000002 HC RX W HCPCS

## 2025-07-01 RX ORDER — SODIUM CHLORIDE 9 MG/ML
INJECTION, SOLUTION INTRAVENOUS PRN
Status: DISCONTINUED | OUTPATIENT
Start: 2025-07-01 | End: 2025-07-06 | Stop reason: HOSPADM

## 2025-07-01 RX ORDER — RISPERIDONE 0.5 MG/1
0.25 TABLET ORAL 2 TIMES DAILY PRN
Status: DISCONTINUED | OUTPATIENT
Start: 2025-07-01 | End: 2025-07-06 | Stop reason: HOSPADM

## 2025-07-01 RX ORDER — MIDODRINE HYDROCHLORIDE 5 MG/1
10 TABLET ORAL 3 TIMES DAILY PRN
Status: DISCONTINUED | OUTPATIENT
Start: 2025-07-01 | End: 2025-07-06 | Stop reason: HOSPADM

## 2025-07-01 RX ORDER — POTASSIUM CHLORIDE 1500 MG/1
40 TABLET, EXTENDED RELEASE ORAL PRN
Status: DISCONTINUED | OUTPATIENT
Start: 2025-07-01 | End: 2025-07-06 | Stop reason: HOSPADM

## 2025-07-01 RX ORDER — MUPIROCIN 2 %
OINTMENT (GRAM) TOPICAL 2 TIMES DAILY
Status: DISCONTINUED | OUTPATIENT
Start: 2025-07-01 | End: 2025-07-06 | Stop reason: HOSPADM

## 2025-07-01 RX ORDER — BENZTROPINE MESYLATE 1 MG/1
2 TABLET ORAL 2 TIMES DAILY
Status: DISCONTINUED | OUTPATIENT
Start: 2025-07-01 | End: 2025-07-06 | Stop reason: HOSPADM

## 2025-07-01 RX ORDER — BACLOFEN 10 MG/1
5 TABLET ORAL 2 TIMES DAILY
Status: DISCONTINUED | OUTPATIENT
Start: 2025-07-01 | End: 2025-07-06 | Stop reason: HOSPADM

## 2025-07-01 RX ORDER — L. ACIDOPHILUS/L.BULGARICUS 100MM CELL
1 GRANULES IN PACKET (EA) ORAL 2 TIMES DAILY
Status: DISCONTINUED | OUTPATIENT
Start: 2025-07-01 | End: 2025-07-06 | Stop reason: HOSPADM

## 2025-07-01 RX ORDER — POLYETHYLENE GLYCOL 3350 17 G/17G
17 POWDER, FOR SOLUTION ORAL 2 TIMES DAILY
Status: DISCONTINUED | OUTPATIENT
Start: 2025-07-01 | End: 2025-07-06 | Stop reason: HOSPADM

## 2025-07-01 RX ORDER — ONDANSETRON 4 MG/1
4 TABLET, ORALLY DISINTEGRATING ORAL EVERY 8 HOURS PRN
Status: DISCONTINUED | OUTPATIENT
Start: 2025-07-01 | End: 2025-07-06 | Stop reason: HOSPADM

## 2025-07-01 RX ORDER — SENNOSIDES 8.6 MG/1
2 TABLET ORAL 2 TIMES DAILY
Status: DISCONTINUED | OUTPATIENT
Start: 2025-07-01 | End: 2025-07-01

## 2025-07-01 RX ORDER — SODIUM CHLORIDE 0.9 % (FLUSH) 0.9 %
5-40 SYRINGE (ML) INJECTION PRN
Status: DISCONTINUED | OUTPATIENT
Start: 2025-07-01 | End: 2025-07-06 | Stop reason: HOSPADM

## 2025-07-01 RX ORDER — FUROSEMIDE 10 MG/ML
40 INJECTION INTRAMUSCULAR; INTRAVENOUS ONCE
Status: COMPLETED | OUTPATIENT
Start: 2025-07-01 | End: 2025-07-01

## 2025-07-01 RX ORDER — ACETAMINOPHEN 325 MG/1
650 TABLET ORAL EVERY 6 HOURS PRN
Status: DISCONTINUED | OUTPATIENT
Start: 2025-07-01 | End: 2025-07-06 | Stop reason: HOSPADM

## 2025-07-01 RX ORDER — SODIUM CHLORIDE 0.9 % (FLUSH) 0.9 %
5-40 SYRINGE (ML) INJECTION PRN
Status: DISCONTINUED | OUTPATIENT
Start: 2025-07-01 | End: 2025-07-05

## 2025-07-01 RX ORDER — MIRTAZAPINE 15 MG/1
7.5 TABLET, FILM COATED ORAL NIGHTLY
Status: DISCONTINUED | OUTPATIENT
Start: 2025-07-01 | End: 2025-07-06 | Stop reason: HOSPADM

## 2025-07-01 RX ORDER — LACOSAMIDE 10 MG/ML
100 SOLUTION ORAL 2 TIMES DAILY
Status: DISCONTINUED | OUTPATIENT
Start: 2025-07-01 | End: 2025-07-06 | Stop reason: HOSPADM

## 2025-07-01 RX ORDER — GUAIFENESIN 200 MG/10ML
400 LIQUID ORAL EVERY 12 HOURS
Status: DISCONTINUED | OUTPATIENT
Start: 2025-07-01 | End: 2025-07-06 | Stop reason: HOSPADM

## 2025-07-01 RX ORDER — ACETAMINOPHEN 650 MG/1
650 SUPPOSITORY RECTAL EVERY 6 HOURS PRN
Status: DISCONTINUED | OUTPATIENT
Start: 2025-07-01 | End: 2025-07-06 | Stop reason: HOSPADM

## 2025-07-01 RX ORDER — SODIUM CHLORIDE, SODIUM LACTATE, POTASSIUM CHLORIDE, CALCIUM CHLORIDE 600; 310; 30; 20 MG/100ML; MG/100ML; MG/100ML; MG/100ML
INJECTION, SOLUTION INTRAVENOUS CONTINUOUS
Status: DISCONTINUED | OUTPATIENT
Start: 2025-07-01 | End: 2025-07-03

## 2025-07-01 RX ORDER — POLYETHYLENE GLYCOL 3350 17 G/17G
17 POWDER, FOR SOLUTION ORAL DAILY PRN
Status: DISCONTINUED | OUTPATIENT
Start: 2025-07-01 | End: 2025-07-06 | Stop reason: HOSPADM

## 2025-07-01 RX ORDER — LEVETIRACETAM 100 MG/ML
500 SOLUTION ORAL 2 TIMES DAILY
Status: DISCONTINUED | OUTPATIENT
Start: 2025-07-01 | End: 2025-07-06 | Stop reason: HOSPADM

## 2025-07-01 RX ORDER — ONDANSETRON 2 MG/ML
4 INJECTION INTRAMUSCULAR; INTRAVENOUS EVERY 6 HOURS PRN
Status: DISCONTINUED | OUTPATIENT
Start: 2025-07-01 | End: 2025-07-06 | Stop reason: HOSPADM

## 2025-07-01 RX ORDER — VALPROIC ACID 250 MG/5ML
500 SOLUTION ORAL 2 TIMES DAILY
Status: DISCONTINUED | OUTPATIENT
Start: 2025-07-01 | End: 2025-07-06 | Stop reason: HOSPADM

## 2025-07-01 RX ORDER — POTASSIUM CHLORIDE 7.45 MG/ML
10 INJECTION INTRAVENOUS PRN
Status: DISCONTINUED | OUTPATIENT
Start: 2025-07-01 | End: 2025-07-06 | Stop reason: HOSPADM

## 2025-07-01 RX ORDER — SODIUM CHLORIDE 0.9 % (FLUSH) 0.9 %
5-40 SYRINGE (ML) INJECTION EVERY 12 HOURS SCHEDULED
Status: DISCONTINUED | OUTPATIENT
Start: 2025-07-01 | End: 2025-07-06 | Stop reason: HOSPADM

## 2025-07-01 RX ORDER — MAGNESIUM SULFATE IN WATER 40 MG/ML
2000 INJECTION, SOLUTION INTRAVENOUS PRN
Status: DISCONTINUED | OUTPATIENT
Start: 2025-07-01 | End: 2025-07-06 | Stop reason: HOSPADM

## 2025-07-01 RX ORDER — FAMOTIDINE 20 MG/1
20 TABLET, FILM COATED ORAL 2 TIMES DAILY
Status: DISCONTINUED | OUTPATIENT
Start: 2025-07-01 | End: 2025-07-06 | Stop reason: HOSPADM

## 2025-07-01 RX ORDER — IOPAMIDOL 755 MG/ML
75 INJECTION, SOLUTION INTRAVASCULAR
Status: COMPLETED | OUTPATIENT
Start: 2025-07-01 | End: 2025-07-01

## 2025-07-01 RX ORDER — FLUTICASONE PROPIONATE 50 MCG
1 SPRAY, SUSPENSION (ML) NASAL DAILY PRN
Status: DISCONTINUED | OUTPATIENT
Start: 2025-07-01 | End: 2025-07-06 | Stop reason: HOSPADM

## 2025-07-01 RX ORDER — DOCUSATE SODIUM 100 MG/1
100 CAPSULE, LIQUID FILLED ORAL EVERY MORNING
Status: DISCONTINUED | OUTPATIENT
Start: 2025-07-01 | End: 2025-07-02 | Stop reason: SDUPTHER

## 2025-07-01 RX ORDER — FERROUS SULFATE 300 MG/5ML
300 LIQUID (ML) ORAL 3 TIMES DAILY
Status: DISCONTINUED | OUTPATIENT
Start: 2025-07-01 | End: 2025-07-06 | Stop reason: HOSPADM

## 2025-07-01 RX ADMIN — IOPAMIDOL 75 ML: 755 INJECTION, SOLUTION INTRAVENOUS at 04:01

## 2025-07-01 RX ADMIN — LACOSAMIDE 100 MG: 10 SOLUTION ORAL at 18:06

## 2025-07-01 RX ADMIN — FAMOTIDINE 20 MG: 20 TABLET, FILM COATED ORAL at 18:04

## 2025-07-01 RX ADMIN — Medication 2 SPRAY: at 22:11

## 2025-07-01 RX ADMIN — FUROSEMIDE 40 MG: 10 INJECTION, SOLUTION INTRAMUSCULAR; INTRAVENOUS at 22:20

## 2025-07-01 RX ADMIN — MUPIROCIN: 20 OINTMENT TOPICAL at 22:10

## 2025-07-01 RX ADMIN — AMPICILLIN SODIUM AND SULBACTAM SODIUM 3000 MG: 2; 1 INJECTION, POWDER, FOR SOLUTION INTRAMUSCULAR; INTRAVENOUS at 17:38

## 2025-07-01 RX ADMIN — SODIUM CHLORIDE, PRESERVATIVE FREE 10 ML: 5 INJECTION INTRAVENOUS at 22:24

## 2025-07-01 RX ADMIN — POLYETHYLENE GLYCOL 3350 17 G: 17 POWDER, FOR SOLUTION ORAL at 22:06

## 2025-07-01 RX ADMIN — BACLOFEN 5 MG: 10 TABLET ORAL at 22:07

## 2025-07-01 RX ADMIN — BACLOFEN 5 MG: 10 TABLET ORAL at 18:05

## 2025-07-01 RX ADMIN — GUAIFENESIN 400 MG: 100 SOLUTION ORAL at 22:06

## 2025-07-01 RX ADMIN — AMPICILLIN SODIUM AND SULBACTAM SODIUM 3000 MG: 2; 1 INJECTION, POWDER, FOR SOLUTION INTRAMUSCULAR; INTRAVENOUS at 10:32

## 2025-07-01 RX ADMIN — SERTRALINE 200 MG: 50 TABLET, FILM COATED ORAL at 18:04

## 2025-07-01 RX ADMIN — AMPICILLIN SODIUM AND SULBACTAM SODIUM 3000 MG: 2; 1 INJECTION, POWDER, FOR SOLUTION INTRAMUSCULAR; INTRAVENOUS at 22:20

## 2025-07-01 RX ADMIN — Medication 1 PACKET: at 22:10

## 2025-07-01 RX ADMIN — ACETAMINOPHEN 650 MG: 650 SUPPOSITORY RECTAL at 11:50

## 2025-07-01 RX ADMIN — FAMOTIDINE 20 MG: 20 TABLET, FILM COATED ORAL at 22:07

## 2025-07-01 RX ADMIN — SODIUM CHLORIDE 1250 MG: 0.9 INJECTION, SOLUTION INTRAVENOUS at 04:55

## 2025-07-01 RX ADMIN — MIRTAZAPINE 7.5 MG: 15 TABLET, FILM COATED ORAL at 22:06

## 2025-07-01 RX ADMIN — LORAZEPAM 2 MG: 2 INJECTION INTRAMUSCULAR; INTRAVENOUS at 05:44

## 2025-07-01 RX ADMIN — BENZTROPINE MESYLATE 2 MG: 1 TABLET ORAL at 22:06

## 2025-07-01 RX ADMIN — MINERAL SUPPLEMENT IRON 300 MG / 5 ML STRENGTH LIQUID 100 PER BOX UNFLAVORED 300 MG: at 22:06

## 2025-07-01 RX ADMIN — GUAIFENESIN 400 MG: 100 SOLUTION ORAL at 18:04

## 2025-07-01 RX ADMIN — LEVETIRACETAM 500 MG: 100 SOLUTION ORAL at 18:53

## 2025-07-01 RX ADMIN — SODIUM CHLORIDE 1000 ML: 0.9 INJECTION, SOLUTION INTRAVENOUS at 00:17

## 2025-07-01 RX ADMIN — SODIUM CHLORIDE, SODIUM LACTATE, POTASSIUM CHLORIDE, AND CALCIUM CHLORIDE: .6; .31; .03; .02 INJECTION, SOLUTION INTRAVENOUS at 11:23

## 2025-07-01 RX ADMIN — IOHEXOL 25 ML: 350 INJECTION, SOLUTION INTRAVENOUS at 11:53

## 2025-07-01 RX ADMIN — CEFEPIME 2000 MG: 2 INJECTION, POWDER, FOR SOLUTION INTRAVENOUS at 04:19

## 2025-07-01 RX ADMIN — VALPROIC ACID 500 MG: 250 SOLUTION ORAL at 18:54

## 2025-07-01 ASSESSMENT — PAIN SCALES - WONG BAKER
WONGBAKER_NUMERICALRESPONSE: NO HURT
WONGBAKER_NUMERICALRESPONSE: NO HURT

## 2025-07-01 NOTE — H&P
HOSPITALISTS HISTORY AND PHYSICAL    7/1/2025 8:38 AM    Patient Information:  KIRSTIE CLAY is a 29 y.o. female [id number]  PCP:  Bart Arora MD (Tel: None )    Chief complaint:    Chief Complaint   Patient presents with    Epistaxis     Pt sent from [facility] via squad for a nose bleed, per facility denies any injury, bleeding controlled upon arrival per squad, non verbal at baseline         History of Present Illness:  Kirstie Clay is a 29 y.o. female with history of cardiac arrest from respiratory disorder, anoxic encephalopathy, seizure disorder, spastic quadriplegia, dysphagia s/p G tube, Hep C, chronic constipation was sent from [facility] with epistaxis.  Patient is nonverbal and cannot provide any history.  Found to have sepsis secondary to LLL aspiration PNA with evidence of complete opacification of L maxillary sinus.  Patient has been given IVF and IV Abx in ER.  Has been sedated by Ativan IV.  Otherwise unable to obtain complete ROS as patient is nonverbal and sedated.      REVIEW OF SYSTEMS:   Pertinent positives as noted in HPI.  All other systems were reviewed and are negative.      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 without return to pre-existing conscious level with patient surviving ([facility]), Major depressive disorder, recurrent, unspecified, MRSA (methicillin resistant staph aureus) culture positive, Nicotine dependence, other tobacco product, uncomplicated, Overdose, Poisoning by heroin, undetermined, initial encounter ([facility]), Schizophrenia, unspecified ([facility]), Seizures ([facility]), Streptococcal sepsis,  3 times daily      acetaminophen (TYLENOL) 325 MG tablet 2 tablets by Per G Tube route 2 times daily Give 2 tablets by mouth twice daily for pain.      lacosamide (VIMPAT) 10 MG/ML SOLN oral solution 10 mLs by Per G Tube route 2 times daily.      Baclofen 5 MG TABS 1 tablet by Per G Tube route 2 times daily Indications: Spasicity.      paliperidone palmitate ER (INVEGA SUSTENNA) 117 MG/0.75ML MOSES IM injection Inject 117 mg into the muscle every 30 days At bedtime on the 15th of the month.      levETIRAcetam (KEPPRA) 100 MG/ML solution 5 mLs by PEG Tube route 2 times daily 1 Bottle 3    famotidine (PEPCID) 20 MG tablet 1 tablet by PEG Tube route 2 times daily 60 tablet 3       Allergies:  No Known Allergies     Social History:  Patient Lives at [facility]   reports that she quit smoking about 14 years ago. Her smoking use included cigarettes. She started smoking about 16 years ago. She has never used smokeless tobacco. She reports that she does not drink alcohol and does not use drugs.     Family History:  family history includes COPD in her mother; Cancer in her paternal grandmother; High Cholesterol in her father.     Physical Exam:  /67   Pulse (!) 105   Temp 98.9 °F (37.2 °C) (Rectal)   Resp 16   Wt 50.8 kg (111 lb 15.9 oz)   SpO2 93%   BMI 19.84 kg/m²     General appearance:  Quadriplegic, sedated  Eyes: Sclera clear, pupils equal  ENT: Dried blood noted in L nare  Cardiovascular: Tachycardia.  No murmur, gallop, rub. No edema in lower extremities  Respiratory: Coarse L rhonchi.  No wheezing or rales  Gastrointestinal: Abdomen soft, G tube, non-tender, distended, normal bowel sounds  Musculoskeletal: No cyanosis in digits, neck supple  Neurology: Sedatd  Psychiatry: Not agitated  Skin: Warm, dry, normal turgor, no rash  Brisk capillary refill, peripheral pulses palpable   Labs:  CBC:   Lab Results   Component Value Date/Time    WBC 12.1 06/30/2025 10:38 PM    RBC 4.68 06/30/2025 10:38 PM

## 2025-07-01 NOTE — ED PROVIDER NOTES
I have personally performed a face to face diagnostic evaluation on this patient. I have fully participated in the care of this patient I personally saw the patient and performed a substantive portion of the visit including all aspects of the medical decision making.  I have reviewed and agree with all pertinent clinical information including history, physical exam, diagnostic tests, and the plan.    Medical Decision Making  Patient with history of anoxic brain injury and TBI from [facility] presented today with epistatxis (since resolved) and fever. She was tachycardic and short of breath on arrival and developed hypoxia in the upper 80s (now stable in mid to low 90s). She met sepsis critira, but lactic acid was normal. She has also remained hemodynamically stable throughout her visit. Initial CXR was normal. I ordered a follow up CT as no cause of her sepsis was initially identified. This confirmed left lower lobe pneumonia. She has received treatment with 1.25 g of Vancomycin IV and 2 g of Cefepime IV.    Patient has also been experiencing intermittent episodes of muscle spasticity.  This is chronic and responds well to Ativan given in the emergency department    I consulted the hospitalist team through [facility] for admission. Dr. Baptiste reviewed the patient's history, physical exam, labs, imaging studies, and emergency department course and has decided to admit Chepe Clay for further evaluation and treatment.    I personally saw the patient and independently provided 15 minutes of nonconcurrent critical care out of the total shared critical care time provided This includes multiple reevaluations, vital sign monitoring, pulse oximetry monitoring, telemetry monitoring, clinical response to the IV medications, reviewing the nursing notes, consultation time, dictation/documentation time, and interpretation of the labwork. (This time excludes time spent performing procedures).    EKG  The Ekg interpreted  by me in the absence of a cardiologist shows.  sinus tachycardia, reme=617  Axis is   Normal  QTc is  normal  Intervals and Durations are unremarkable.      No specific ST-T wave changes appreciated.  No evidence of acute ischemia.   No significant change from prior EKG dated 9/13/2022      FINAL IMPRESSION  1. Sepsis, due to unspecified organism, unspecified whether acute organ dysfunction present (HCC)    2. Left-sided epistaxis    3. Pneumonia of left lower lobe due to infectious organism    4. Spasticity        Blood pressure 126/81, pulse 75, temperature 98.9 °F (37.2 °C), temperature source Rectal, resp. rate 17, weight 50.8 kg (111 lb 15.9 oz), SpO2 93%, not currently breastfeeding.     For further details of Chepe Clay's emergency department encounter, please see documentation by advanced practice provider, GRABIEL Salinas.        Troy Henao MD  07/01/25 [id number]

## 2025-07-01 NOTE — PROGRESS NOTES
Patient is from [facility].    Code Status:  Full code    Legal guardian:  Stephanie Covarrubias - no documentation on file with this legal status.    Diet:  Pureed with thin liquids for pleasure  Tube feed:  Isosource 1.5 bolus feed  375 ml per PEG at 0600 followed by 120 ml free water  250 ml per PEG at noon and 1600, each followed by 120 ml free water.    Patient seen in ED, room 15.  Patient is non-verbal.  Admission completed to the best of my ability with the paperwork received from [facility] with the following exceptions:  4 Eyes Assessment, Immunizations, Vaccines, Rights and Responsibilities, Orientation to room, Plan of Care, Education/Learning Assessment and Education Plan, white board, height and weight, pain assessment and head to toe assessment.  Patient appeared to be sleeping at the time of this admission process.  Patient is being admitted for sepsis.     Home Medication reconciliation will be/has been completed by Pharmacy Staff.      Unsure if patient has had any falls or recent episodes of diarrhea.

## 2025-07-01 NOTE — PROGRESS NOTES
Brief Nutrition Note    RECOMMENDATIONS  PO Diet: Dysphagia pureed diet with thickened liquids at baseline per RN at NH  Nutrition Support:   Bolus feeds of Jevity 1.5 (standard with fiber formula): 375 mL at 6 AM, 250 mL at 12PM, 250 mL at 4 PM.   Recommend water flush of 60 mL before and after each TF bolus.        Consult received for TF order and management. Pt with PEG tube. RN at NH reported pt typically receives bolus TF regimen of Isosource 1.5: 375 mL at 6 AM then 250 mL in the afternoon and evening and 120 mL water flush TID. Isosource 1.5 not on hospital formulary, will use Jevity 1.5 as equivalent formula. RD will place TF orders in chart with full assessment to follow tomorrow.     Irma Hope MS, RD, LD    Contact: [phone]

## 2025-07-01 NOTE — PROGRESS NOTES
Placed call to nursing on-call supervisor [phone], with request for paperwork on this patient.  Requested demographic info and current medication list.

## 2025-07-01 NOTE — PROGRESS NOTES
Troponin minimally elevated. Unknown significance. Pt presents with non cardiac complaints. No need for cardiac testing. Full consult tomorrow.    Kp Iglesias MD

## 2025-07-01 NOTE — ED PROVIDER NOTES
[facility] EMERGENCY DEPARTMENT  EMERGENCY DEPARTMENT ENCOUNTER        Pt Name: Chepe Clay  MRN: [id number]  Birthdate 1995  Date of evaluation: 6/30/2025  Provider: Jean Vergara PA-C  PCP: Bart Arora MD  Note Started: 11:05 PM EDT 6/30/25       I have seen and evaluated this patient with my supervising physician Troy Henao MD.      CHIEF COMPLAINT       Chief Complaint   Patient presents with    Epistaxis     [facility] squad for a nose bleed, per facility denies any injury, bleeding controlled upon arrival per squad, non verbal at baseline        HISTORY OF PRESENT ILLNESS: 1 or more Elements     History From: EMS    Limitations to history : Patient is nonverbal    Social Determinants Significantly Affecting Health : Patient is nonverbal    Chief Complaint: Left epistaxis    Chepe Clay is a 29 y.o. female with a history of traumatic brain injury, anoxic brain injury, spastic quadriplegic cerebral palsy, seizure disorder and CKD who presents to the emergency department [facility] with report of left-sided epistaxis that staff noticed just prior to arrival.  Patient does not have any active epistaxis on arrival.  She does have some dried blood around the left nares.  There is no obvious sign of injury to her face.  Patient is noted to be febrile with a temperature of 100.8 °F.        Nursing Notes were all reviewed and agreed with or any disagreements were addressed in the HPI.    REVIEW OF SYSTEMS :      Review of Systems   Unable to perform ROS: Patient nonverbal       Positives and Pertinent negatives as per HPI.     SURGICAL HISTORY     Past Surgical History:   Procedure Laterality Date    GASTROSTOMY TUBE PLACEMENT      GASTROSTOMY TUBE PLACEMENT      08/02/2016    GASTROSTOMY TUBE PLACEMENT N/A 11/26/2019    EGD PEG TUBE PLACEMENT performed by Chucho Quinonez MD at [facility] ENDOSCOPY    UPPER GASTROINTESTINAL ENDOSCOPY N/A  BLOOD 1   CULTURE, BLOOD 2   LIPASE    Narrative:     CALL  [facility]  [facility] tel. [phone],  Rejected Test Name k alt /Called to: HOLLY Narvaez, 06/30/2025 23:26, by  [facility]   HCG, SERUM, QUALITATIVE   LACTATE, SEPSIS   PROCALCITONIN    Narrative:     CALL  [facility] tel. [phone],  Rejected Test Name k alt /Called to: HOLLY Narvaez, 06/30/2025 23:26, by  [facility]   BRAIN NATRIURETIC PEPTIDE   SPECIMEN REJECTION    Narrative:     CALL  [facility]  [facility] tel. [phone],  Rejected Test Name k alt /Called to: HOLLY Narvaez, 06/30/2025 23:26, by  [facility]   POTASSIUM    Narrative:     Collection has been rescheduled by MELI at 06/30/2025 23:35 Reason:   Failed attempt at venipuncture  Blood cx got pst   ALT    Narrative:     Collection has been rescheduled by MELI at 06/30/2025 23:35 Reason:   Failed attempt at venipuncture  Blood cx got pst   TROPONIN   BLOOD GAS, VENOUS       When ordered only abnormal lab results are displayed. All other labs were within normal range or not returned as of this dictation.    EKG: When ordered, EKG's are interpreted by the Emergency Department Physician in the absence of a cardiologist.  Please see their note for interpretation of EKG.    RADIOLOGY:   Non-plain film images such as CT, Ultrasound and MRI are read by the radiologist.     Interpretation per the Radiologist below, if available at the time of this note:    XR CHEST PORTABLE    (Results Pending)   CT HEAD WO CONTRAST    (Results Pending)   CT FACIAL BONES WO CONTRAST    (Results Pending)       PROCEDURES   Unless otherwise noted below, none     Procedures      CRITICAL CARE TIME (.cctime)      See interventions in ED course.     EMERGENCY DEPARTMENT COURSE and DIFFERENTIAL DIAGNOSIS/MDM:   Vitals:    Vitals:    06/30/25 2330 06/30/25 2345 07/01/25 0000 07/01/25 0015   BP: (!) 123/90 114/60 108/77 108/71   Pulse: (!) 120 (!) 123 (!) 105 (!) 107   Resp: 18 21 20 18   Temp:       TempSrc:       SpO2:   94% 92%         Patient was

## 2025-07-01 NOTE — ED NOTES
Patient Name: Chepe Clay  : 1995 29 y.o.  MRN: [id number]  ED Room #: ED-0015/15     Chief complaint:   Chief Complaint   Patient presents with    Epistaxis     Pt sent from [facility] via squad for a nose bleed, per facility denies any injury, bleeding controlled upon arrival per squad, non verbal at baseline      Hospital Problem/Diagnosis:   Hospital Problems           Last Modified POA    * (Principal) Sepsis (HCC) 2025 Yes    Chronic anoxic encephalopathy (HCC) 2025 Yes    Aspiration pneumonia of left lower lobe (HCC) 2025 Yes    Spastic quadriplegic cerebral palsy (HCC) 2025 Yes    Functional quadriplegia ([facility]) 2025 Yes    Seizure disorder ([facility]) 2025 Yes    Elevated troponin 2025 Yes         O2 Flow Rate:O2 Device: None (Room air)   (if applicable)  Cardiac Rhythm:   (if applicable)  Active LDA's:   Peripheral IV 25 Left;Posterior Hand (Active)   Site Assessment Clean, dry & intact 254   Line Status Blood return noted 254       Peripheral IV 25 Right Hand (Active)      Gastrostomy/Enterostomy/Jejunostomy Tube Gastrostomy LUQ 1 20 fr (Active)          How does patient ambulate? Bed Bound    2. How does patient take pills? Other    3. Is patient alert? MRDD    4. Is patient oriented?     5.   Patient arrived from:  nursing home  Facility Name: ___________________________________________    6. If patient is disoriented or from a Skill Nursing Facility has family been notified of admission?     7. Patient belongings?     Disposition of belongings? Kept with Patient     8. Any specific patient or family belongings/needs/dynamics?   a.     9. Miscellaneous comments/pending orders?  a.       If there are any additional questions please reach out to the Emergency Department.

## 2025-07-01 NOTE — CONSULTS
[facility]  DIVISION OF OTOLARYNGOLOGY- HEAD & NECK SURGERY  CONSULT      Patient Name: Chepe Clay  Medical Record Number:  [id number]  Primary Care Physician:  Bart Arora MD  Date of Consultation: 7/1/2025    Chief Complaint: Epistaxis      HISTORY OF PRESENT ILLNESS  Chepe is a(n) 29 y.o. female who presents as a consult to the ENT service for left-sided epistaxis.  Patient has a history of brain injury and TBI who is nonverbal and there is no family or guardian at bedside so history was performed through the use of chart review.  Apparently she had a left-sided nosebleed at her living facility which was controlled upon arrival per the squad.  She is admitted for sepsis secondary to left lower lobe aspiration pneumonia.  Noted to have opacification within the paranasal sinuses most notably within the left maxillary sinus.      Patient Active Problem List   Diagnosis    Drug overdose    Cardiac arrest due to respiratory disorder (HCC)    Chronic anoxic encephalopathy (HCC)    Acute respiratory failure with hypoxia (HCC)    Aspiration pneumonia of left lower lobe (HCC)    Cor pulmonale (HCC)    Convulsions/seizures (HCC)    Status epilepticus (HCC)    Non-traumatic rhabdomyolysis    Dystonia    Non compliance w medication regimen    Failure to thrive in adult    Hypokalemia    Hypernatremia    Agitation    Anxiety    Chronic hepatitis C without hepatic coma (HCC)    Dysphagia, oropharyngeal    Choreoathetoid limb movements    Hepatic encephalopathy (HCC)    Abscess of forearm, left    Severe protein-calorie malnutrition (Guardado: less than 60% of standard weight)    Colonic obstruction (HCC)    Fecal impaction in rectum (HCC)    Dislodged gastrostomy tube    Bladder outlet obstruction    Hydronephrosis    Proctitis    Abdominal pain    Constipation    Urinary retention    Sepsis due to gram-negative UTI (HCC)    Acute pancreatitis without infection or necrosis    Altered mental status    Spastic  mastoid air cells and middle ears are well-aerated.      SOFT TISSUES AND SKULL:  No acute soft tissue abnormality. No skull fracture.      IMPRESSION:  1. No acute intracranial abnormality.  2. No acute facial bone abnormality.    Images from CT facial bones without contrast performed today independently reviewed which demonstrates near-total opacification left maxillary sinus and moderate to severe opacification of the left ethmoid sinuses.  Left frontal sinuses with mild to moderate mucosal opacifications.  There is minimal mucosal thickening along the medial wall of the right sphenoid sinus which is the dominant sphenoid sinus.  Bilateral abrahan bullosa present      ASSESSMENT/PLAN  Chepe is a very pleasant 29 y.o. female with epistaxis    Epistaxis  Dry nasal mucosa  -Nasal mucosa is diffusely dry.  Recommend humidifying supplemental O2.  Start nasal saline sprays 3 times daily and start mupirocin ointment twice daily to bilateral nasal passages.  Fortunately she is hemostatic on examination.  If she develops any active bleeding would first recommend use of Afrin nasal spray liberally in which ever side is bleeding and placement of a nasal clamp for at least 10 to 15 minutes.  This can be repeated, call ENT if bleeding does not stop.    Chronic pansinusitis  -Noted to have chronic pansinusitis within the left paranasal sinuses mainly on CT facial bones performed earlier today.  Likely incidental findings.  She is currently admitted for sepsis and pneumonia, currently on Unasyn which should cover for bacterial sinusitis.  -Do not hesitate to call ENT with any questions or concerns           Dr. Gary Moore, DO [facility]  Department of Otolaryngology/Head and Neck Surgery      Medical Decision Making:  The following items were considered in medical decision making:  Independent review of images  Review / order clinical lab tests  Review / order radiology tests  Decision

## 2025-07-01 NOTE — PROGRESS NOTES
Checked fax machine.  Still no paperwork for this patient.  Placed another call to Nursing Supervisor.  She stated that she had sent it but would check on it and send it again.

## 2025-07-01 NOTE — ED NOTES
Assumed care from night shift RN. Unopened tylenol suppository found on floor in room, patient still feels warm to touch. Febrile rectally. Had bowel movement. Re-connected to monitors, complete bed change done, new brief and gown placed. Pure wick catheter placed.

## 2025-07-01 NOTE — CONSULTS
uncomplicated     Unspecified sexually transmitted disease        Past Surgical History:        Procedure Laterality Date    GASTROSTOMY TUBE PLACEMENT      GASTROSTOMY TUBE PLACEMENT      08/02/2016    GASTROSTOMY TUBE PLACEMENT N/A 11/26/2019    EGD PEG TUBE PLACEMENT performed by Chucho Quinonez MD at [facility] ENDOSCOPY    UPPER GASTROINTESTINAL ENDOSCOPY N/A 11/26/2019    EGD FOREIGN BODY REMOVAL performed by Chucho Quinonez MD at [facility] ENDOSCOPY       Current Medications:   Current Facility-Administered Medications: baclofen (LIORESAL) tablet 5 mg, 5 mg, Per G Tube, BID  benztropine (COGENTIN) tablet 2 mg, 2 mg, Per G Tube, BID  docusate sodium (COLACE) capsule 100 mg, 100 mg, Per G Tube, QAM  famotidine (PEPCID) tablet 20 mg, 20 mg, PEG Tube, BID  ferrous Sulfate 300 (60 Fe) MG/5ML solution 300 mg, 300 mg, Per G Tube, TID  lacosamide (VIMPAT) 10 MG/ML oral solution 100 mg, 100 mg, Per G Tube, BID  acidophilus (LACTINEX/FLORANEX) 1 packet, 1 packet, Per G Tube, BID  levETIRAcetam (KEPPRA) 100 MG/ML oral solution 500 mg, 500 mg, PEG Tube, BID  [START ON 7/2/2025] linaclotide (LINZESS) capsule 290 mcg, 290 mcg, Per G Tube, QAM AC  midodrine (PROAMATINE) tablet 10 mg, 10 mg, Per G Tube, TID PRN  mirtazapine (REMERON) tablet 7.5 mg, 7.5 mg, Per G Tube, Nightly  polyethylene glycol (GLYCOLAX) packet 17 g, 17 g, Per G Tube, BID  risperiDONE (RisperDAL) tablet 0.25 mg, 0.25 mg, Oral, BID PRN  sertraline (ZOLOFT) tablet 200 mg, 200 mg, Per G Tube, Daily  valproic acid (DEPAKENE) 250 MG/5ML oral solution 500 mg, 500 mg, Per G Tube, BID  sodium chloride flush 0.9 % injection 5-40 mL, 5-40 mL, IntraVENous, 2 times per day  sodium chloride flush 0.9 % injection 5-40 mL, 5-40 mL, IntraVENous, PRN  0.9 % sodium chloride infusion, , IntraVENous, PRN  sodium chloride flush 0.9 % injection 5-40 mL, 5-40 mL, IntraVENous, 2 times per day  sodium chloride flush 0.9 % injection 5-40 mL, 5-40 mL, IntraVENous, PRN  0.9 % sodium chloride  CHEST PULMONARY EMBOLISM W CONTRAST  Result Date: 7/1/2025  EXAMINATION: CTA OF THE CHEST 7/1/2025 3:39 am TECHNIQUE: CTA of the chest was performed after the administration of intravenous contrast.  Multiplanar reformatted images are provided for review.  MIP images are provided for review. Automated exposure control, iterative reconstruction, and/or weight based adjustment of the mA/kV was utilized to reduce the radiation dose to as low as reasonably achievable. COMPARISON: Same day chest radiograph HISTORY: ORDERING SYSTEM PROVIDED HISTORY: hypoxia, fever, short of breath unknown etiology TECHNOLOGIST PROVIDED HISTORY: Reason for exam:->hypoxia, fever, short of breath unknown etiology Additional Contrast?->1 Reason for Exam: hypoxia, fever, short of breath unknown etiology Relevant Medical/Surgical History: hypoxia, fever, short of breath unknown etiology FINDINGS: Pulmonary Arteries: Pulmonary arteries are adequately opacified for evaluation.  No evidence of intraluminal filling defect to suggest pulmonary embolism.  Main pulmonary artery is normal in caliber. Mediastinum: No evidence of mediastinal lymphadenopathy.  Left hilar lymphadenopathy is likely reactive.  The heart and pericardium demonstrate no acute abnormality.  There is no acute abnormality of the thoracic aorta. Lungs/pleura: Left lower lobe pneumonia.  No evidence of pleural effusion or pneumothorax. Upper Abdomen: Limited images of the upper abdomen are unremarkable. Soft Tissues/Bones: No acute bone or soft tissue abnormality.     1. No evidence of pulmonary embolism. 2. Left lower lobe pneumonia.     CT HEAD WO CONTRAST  Result Date: 7/1/2025  EXAM: CT HEAD WITHOUT CONTRAST 06/30/2025 10:54:59 PM TECHNIQUE: CT of the head was performed without the administration of intravenous contrast. Automated exposure control, iterative reconstruction, and/or weight based adjustment of the mA/kV was utilized to reduce the radiation dose to as low as

## 2025-07-01 NOTE — PROGRESS NOTES
Pharmacy Home Medication Reconciliation Note    A medication reconciliation has been completed for Chepe Clay 1995    Pharmacy: [facility] [address], [place], [place]  Information provided by: facility ([facility])    The patient's home medication list is as follows:  No current facility-administered medications on file prior to encounter.     Current Outpatient Medications on File Prior to Encounter   Medication Sig Dispense Refill    mirtazapine (REMERON) 7.5 MG tablet 1 tablet by Per G Tube route every evening Indications: For appetite stimulant. Patient receives at 5 pm daily.      polyethylene glycol (MIRALAX) 17 g PACK packet 17 g by Per G Tube route 2 times daily Dissolve 17 grams in 8 oz of water and give per G-tube two times a day for constipation.      valproic acid (DEPAKENE) 250 MG/5ML SOLN oral solution 10 mLs by Per G Tube route 2 times daily Indications: Seizure disorder.      Lactobacillus (ACIDOPHILUS) CAPS capsule 1 capsule by Per G Tube route 2 times daily      benztropine (COGENTIN) 2 MG tablet 1 tablet by Per G Tube route 2 times daily      docusate sodium (COLACE) 100 MG capsule Take 1 capsule by mouth every morning      linaclotide (LINZESS) 290 MCG CAPS capsule 1 capsule by Per G Tube route every morning (before breakfast) 30 capsule 0    sertraline (ZOLOFT) 100 MG tablet 2 tablets by Per G Tube route daily Give 2 tablets per G-tube one time a day for schizophrenia.      ferrous sulfate 220 (44 Fe) MG/5ML solution 5 mLs by Per G Tube route 3 times daily      acetaminophen (TYLENOL) 325 MG tablet 2 tablets by Per G Tube route 2 times daily Give 2 tablets by mouth twice daily for pain.      lacosamide (VIMPAT) 10 MG/ML SOLN oral solution 10 mLs by Per G Tube route 2 times daily.      Baclofen 5 MG TABS 1 tablet by Per G Tube route 2 times daily Indications: Spasicity.      paliperidone palmitate ER (INVEGA SUSTENNA) 117 MG/0.75ML MOSES IM injection Inject

## 2025-07-02 ENCOUNTER — APPOINTMENT (OUTPATIENT)
Dept: GENERAL RADIOLOGY | Age: 30
DRG: 871 | End: 2025-07-02
Payer: MEDICARE

## 2025-07-02 PROBLEM — I26.93 SINGLE SUBSEGMENTAL PULMONARY EMBOLISM WITHOUT ACUTE COR PULMONALE (HCC): Status: ACTIVE | Noted: 2025-07-02

## 2025-07-02 PROBLEM — J18.9 PNEUMONIA OF LEFT LOWER LOBE DUE TO INFECTIOUS ORGANISM: Status: ACTIVE | Noted: 2025-07-02

## 2025-07-02 LAB
ANION GAP SERPL CALCULATED.3IONS-SCNC: 10 MMOL/L (ref 3–16)
BASOPHILS # BLD: 0 K/UL (ref 0–0.2)
BASOPHILS NFR BLD: 0.3 %
BUN SERPL-MCNC: 8 MG/DL (ref 7–20)
CALCIUM SERPL-MCNC: 8.8 MG/DL (ref 8.3–10.6)
CHLORIDE SERPL-SCNC: 104 MMOL/L (ref 99–110)
CO2 SERPL-SCNC: 27 MMOL/L (ref 21–32)
CREAT SERPL-MCNC: 0.4 MG/DL (ref 0.6–1.1)
DEPRECATED RDW RBC AUTO: 13.8 % (ref 12.4–15.4)
EKG ATRIAL RATE: 91 BPM
EKG DIAGNOSIS: NORMAL
EKG P AXIS: 53 DEGREES
EKG P-R INTERVAL: 136 MS
EKG Q-T INTERVAL: 368 MS
EKG QRS DURATION: 64 MS
EKG QTC CALCULATION (BAZETT): 452 MS
EKG R AXIS: 15 DEGREES
EKG T AXIS: 23 DEGREES
EKG VENTRICULAR RATE: 91 BPM
EOSINOPHIL # BLD: 0.5 K/UL (ref 0–0.6)
EOSINOPHIL NFR BLD: 6 %
GFR SERPLBLD CREATININE-BSD FMLA CKD-EPI: >90 ML/MIN/{1.73_M2}
GLUCOSE SERPL-MCNC: 80 MG/DL (ref 70–99)
HCT VFR BLD AUTO: 36.6 % (ref 36–48)
HGB BLD-MCNC: 12.3 G/DL (ref 12–16)
LEGIONELLA AG UR QL: NORMAL
LYMPHOCYTES # BLD: 2.1 K/UL (ref 1–5.1)
LYMPHOCYTES NFR BLD: 24.9 %
MCH RBC QN AUTO: 30.2 PG (ref 26–34)
MCHC RBC AUTO-ENTMCNC: 33.6 G/DL (ref 31–36)
MCV RBC AUTO: 90 FL (ref 80–100)
MONOCYTES # BLD: 1.2 K/UL (ref 0–1.3)
MONOCYTES NFR BLD: 14.4 %
NEUTROPHILS # BLD: 4.7 K/UL (ref 1.7–7.7)
NEUTROPHILS NFR BLD: 54.4 %
PLATELET # BLD AUTO: 125 K/UL (ref 135–450)
PMV BLD AUTO: 9.6 FL (ref 5–10.5)
POTASSIUM SERPL-SCNC: 4 MMOL/L (ref 3.5–5.1)
RBC # BLD AUTO: 4.07 M/UL (ref 4–5.2)
S PNEUM AG UR QL: NORMAL
SODIUM SERPL-SCNC: 141 MMOL/L (ref 136–145)
WBC # BLD AUTO: 8.6 K/UL (ref 4–11)

## 2025-07-02 PROCEDURE — 36415 COLL VENOUS BLD VENIPUNCTURE: CPT

## 2025-07-02 PROCEDURE — 99223 1ST HOSP IP/OBS HIGH 75: CPT | Performed by: INTERNAL MEDICINE

## 2025-07-02 PROCEDURE — 1200000000 HC SEMI PRIVATE

## 2025-07-02 PROCEDURE — 6360000002 HC RX W HCPCS: Performed by: INTERNAL MEDICINE

## 2025-07-02 PROCEDURE — 85025 COMPLETE CBC W/AUTO DIFF WBC: CPT

## 2025-07-02 PROCEDURE — 6370000000 HC RX 637 (ALT 250 FOR IP): Performed by: INTERNAL MEDICINE

## 2025-07-02 PROCEDURE — 74018 RADEX ABDOMEN 1 VIEW: CPT

## 2025-07-02 PROCEDURE — 2500000003 HC RX 250 WO HCPCS: Performed by: INTERNAL MEDICINE

## 2025-07-02 PROCEDURE — 2580000003 HC RX 258

## 2025-07-02 PROCEDURE — 93005 ELECTROCARDIOGRAM TRACING: CPT | Performed by: INTERNAL MEDICINE

## 2025-07-02 PROCEDURE — 93010 ELECTROCARDIOGRAM REPORT: CPT | Performed by: INTERNAL MEDICINE

## 2025-07-02 PROCEDURE — 6360000002 HC RX W HCPCS

## 2025-07-02 PROCEDURE — 80048 BASIC METABOLIC PNL TOTAL CA: CPT

## 2025-07-02 PROCEDURE — 99232 SBSQ HOSP IP/OBS MODERATE 35: CPT | Performed by: SURGERY

## 2025-07-02 PROCEDURE — 2580000003 HC RX 258: Performed by: INTERNAL MEDICINE

## 2025-07-02 RX ORDER — DOCUSATE SODIUM 50 MG/5ML
100 LIQUID ORAL EVERY MORNING
Status: DISCONTINUED | OUTPATIENT
Start: 2025-07-02 | End: 2025-07-06 | Stop reason: HOSPADM

## 2025-07-02 RX ORDER — KETOROLAC TROMETHAMINE 30 MG/ML
30 INJECTION, SOLUTION INTRAMUSCULAR; INTRAVENOUS ONCE
Status: COMPLETED | OUTPATIENT
Start: 2025-07-02 | End: 2025-07-02

## 2025-07-02 RX ADMIN — SODIUM CHLORIDE, PRESERVATIVE FREE 20 ML: 5 INJECTION INTRAVENOUS at 12:21

## 2025-07-02 RX ADMIN — LINACLOTIDE 290 MCG: 145 CAPSULE, GELATIN COATED ORAL at 05:10

## 2025-07-02 RX ADMIN — Medication 2 SPRAY: at 20:05

## 2025-07-02 RX ADMIN — LEVETIRACETAM 500 MG: 100 SOLUTION ORAL at 20:06

## 2025-07-02 RX ADMIN — SODIUM CHLORIDE, PRESERVATIVE FREE 10 ML: 5 INJECTION INTRAVENOUS at 20:07

## 2025-07-02 RX ADMIN — AMPICILLIN SODIUM AND SULBACTAM SODIUM 3000 MG: 2; 1 INJECTION, POWDER, FOR SOLUTION INTRAMUSCULAR; INTRAVENOUS at 18:03

## 2025-07-02 RX ADMIN — Medication 1 PACKET: at 20:21

## 2025-07-02 RX ADMIN — FAMOTIDINE 20 MG: 20 TABLET, FILM COATED ORAL at 12:09

## 2025-07-02 RX ADMIN — POLYETHYLENE GLYCOL 3350 17 G: 17 POWDER, FOR SOLUTION ORAL at 20:05

## 2025-07-02 RX ADMIN — BACLOFEN 5 MG: 10 TABLET ORAL at 12:09

## 2025-07-02 RX ADMIN — MINERAL SUPPLEMENT IRON 300 MG / 5 ML STRENGTH LIQUID 100 PER BOX UNFLAVORED 300 MG: at 20:06

## 2025-07-02 RX ADMIN — VALPROIC ACID 500 MG: 250 SOLUTION ORAL at 20:06

## 2025-07-02 RX ADMIN — SODIUM CHLORIDE, SODIUM LACTATE, POTASSIUM CHLORIDE, AND CALCIUM CHLORIDE: .6; .31; .03; .02 INJECTION, SOLUTION INTRAVENOUS at 06:42

## 2025-07-02 RX ADMIN — BENZTROPINE MESYLATE 2 MG: 1 TABLET ORAL at 20:04

## 2025-07-02 RX ADMIN — DOCUSATE SODIUM LIQUID 100 MG: 100 LIQUID ORAL at 16:10

## 2025-07-02 RX ADMIN — MUPIROCIN: 20 OINTMENT TOPICAL at 20:05

## 2025-07-02 RX ADMIN — SODIUM CHLORIDE, PRESERVATIVE FREE 10 ML: 5 INJECTION INTRAVENOUS at 20:06

## 2025-07-02 RX ADMIN — KETOROLAC TROMETHAMINE 30 MG: 30 INJECTION, SOLUTION INTRAMUSCULAR at 22:49

## 2025-07-02 RX ADMIN — BACLOFEN 5 MG: 10 TABLET ORAL at 20:04

## 2025-07-02 RX ADMIN — LEVETIRACETAM 500 MG: 100 SOLUTION ORAL at 12:18

## 2025-07-02 RX ADMIN — AMPICILLIN SODIUM AND SULBACTAM SODIUM 3000 MG: 2; 1 INJECTION, POWDER, FOR SOLUTION INTRAMUSCULAR; INTRAVENOUS at 03:17

## 2025-07-02 RX ADMIN — GUAIFENESIN 400 MG: 100 SOLUTION ORAL at 12:10

## 2025-07-02 RX ADMIN — MIRTAZAPINE 7.5 MG: 15 TABLET, FILM COATED ORAL at 20:04

## 2025-07-02 RX ADMIN — Medication 2 SPRAY: at 12:19

## 2025-07-02 RX ADMIN — MINERAL SUPPLEMENT IRON 300 MG / 5 ML STRENGTH LIQUID 100 PER BOX UNFLAVORED 300 MG: at 12:10

## 2025-07-02 RX ADMIN — Medication 2 SPRAY: at 16:10

## 2025-07-02 RX ADMIN — SODIUM CHLORIDE, PRESERVATIVE FREE 10 ML: 5 INJECTION INTRAVENOUS at 12:22

## 2025-07-02 RX ADMIN — SERTRALINE 200 MG: 50 TABLET, FILM COATED ORAL at 12:09

## 2025-07-02 RX ADMIN — LACOSAMIDE 100 MG: 10 SOLUTION ORAL at 20:30

## 2025-07-02 RX ADMIN — VALPROIC ACID 500 MG: 250 SOLUTION ORAL at 12:10

## 2025-07-02 RX ADMIN — Medication 1 PACKET: at 12:11

## 2025-07-02 RX ADMIN — GUAIFENESIN 400 MG: 100 SOLUTION ORAL at 20:05

## 2025-07-02 RX ADMIN — AMPICILLIN SODIUM AND SULBACTAM SODIUM 3000 MG: 2; 1 INJECTION, POWDER, FOR SOLUTION INTRAMUSCULAR; INTRAVENOUS at 12:40

## 2025-07-02 RX ADMIN — LACOSAMIDE 100 MG: 10 SOLUTION ORAL at 16:10

## 2025-07-02 RX ADMIN — POLYETHYLENE GLYCOL 3350 17 G: 17 POWDER, FOR SOLUTION ORAL at 12:11

## 2025-07-02 RX ADMIN — MUPIROCIN: 20 OINTMENT TOPICAL at 12:18

## 2025-07-02 RX ADMIN — FAMOTIDINE 20 MG: 20 TABLET, FILM COATED ORAL at 20:04

## 2025-07-02 RX ADMIN — BENZTROPINE MESYLATE 2 MG: 1 TABLET ORAL at 12:09

## 2025-07-02 NOTE — PROGRESS NOTES
Shift assessment completed. Routine vitals stable. Scheduled medications given. Patient is awake at this time, Respirations are easy and unlabored. Patient does not appear to be in distress, resting comfortably at this time. Call light within reach.

## 2025-07-02 NOTE — PLAN OF CARE
Problem: Skin/Tissue Integrity  Goal: Skin integrity remains intact  Description: 1.  Monitor for areas of redness and/or skin breakdown  2.  Assess vascular access sites hourly  3.  Every 4-6 hours minimum:  Change oxygen saturation probe site  4.  Every 4-6 hours:  If on nasal continuous positive airway pressure, respiratory therapy assess nares and determine need for appliance change or resting period  Outcome: Progressing  Flowsheets (Taken 7/1/2025 0325)  Skin Integrity Remains Intact:   Monitor for areas of redness and/or skin breakdown   Assess vascular access sites hourly     Problem: Pain  Goal: Verbalizes/displays adequate comfort level or baseline comfort level  Outcome: Progressing     Problem: Respiratory - Adult  Goal: Achieves optimal ventilation and oxygenation  Reactivated     Problem: Gastrointestinal - Adult  Goal: Minimal or absence of nausea and vomiting  Reactivated  Goal: Maintains or returns to baseline bowel function  Reactivated  Goal: Maintains adequate nutritional intake  Reactivated  Goal: Establish and maintain optimal ostomy function  Reactivated     Problem: Infection - Adult  Goal: Absence of infection at discharge  Reactivated  Goal: Absence of infection during hospitalization  Reactivated

## 2025-07-02 NOTE — DISCHARGE INSTR - COC
Continuity of Care Form    Patient Name: Chepe Clay   :  1995  MRN:  [id number]    Admit date:  2025  Discharge date:  25    Code Status Order: Full Code   Advance Directives:     Admitting Physician:  Adan Thomas MD  PCP: Bart Arora MD    Discharging Nurse: [facility]/Room[facility] 5TN-5582/5582-01  Discharging Unit Phone Number: [phone]    Emergency Contact:   Extended Emergency Contact Information  Primary Emergency Contact: Stephanie Covarrubias  Home Phone: [phone]  Relation: Legal Guardian  Secondary Emergency Contact: Natasha SALMONRN  Work Phone: [phone]  Relation: Other  Preferred language: English    Past Surgical History:  Past Surgical History:   Procedure Laterality Date    GASTROSTOMY TUBE PLACEMENT      GASTROSTOMY TUBE PLACEMENT      2016    GASTROSTOMY TUBE PLACEMENT N/A 2019    EGD PEG TUBE PLACEMENT performed by Chucho Quinonez MD at [facility] ENDOSCOPY    UPPER GASTROINTESTINAL ENDOSCOPY N/A 2019    EGD FOREIGN BODY REMOVAL performed by Chucho Quinonez MD at [facility] ENDOSCOPY       Immunization History:   Immunization History   Administered Date(s) Administered    COVID-19, MODERNA BLUE border, Primary or Immunocompromised, (age 12y+), IM, 100 mcg/0.5mL 2021    COVID-19, PFIZER PURPLE top, DILUTE for use, (age 12 y+), 30mcg/0.3mL 2021, 2021    Influenza Virus Vaccine 10/24/2011    TDaP, ADACEL (age 10y-64y), BOOSTRIX (age 10y+), IM, 0.5mL 10/24/2011, 2015       Active Problems:  Patient Active Problem List   Diagnosis Code    Drug overdose T50.901A    Cardiac arrest due to respiratory disorder (HCC) J98.9, I46.8    Chronic anoxic encephalopathy (HCC) G93.1    Acute respiratory failure with hypoxia (HCC) J96.01    Aspiration pneumonia of left lower lobe (HCC) J69.0    Cor pulmonale (HCC) I27.81    Convulsions/seizures (HCC) R56.9    Status epilepticus (HCC) G40.901    Non-traumatic rhabdomyolysis M62.82    Dystonia  G24.9    Non compliance w medication regimen Z91.148    Failure to thrive in adult R62.7    Hypokalemia E87.6    Hypernatremia E87.0    Agitation R45.1    Anxiety F41.9    Chronic hepatitis C without hepatic coma (HCC) B18.2    Dysphagia, oropharyngeal R13.12    Choreoathetoid limb movements G25.5    Hepatic encephalopathy (HCC) K76.82    Abscess of forearm, left L02.414    Severe protein-calorie malnutrition (Guardado: less than 60% of standard weight) E43    Colonic obstruction (HCC) K56.609    Fecal impaction in rectum (HCC) K56.41    Dislodged gastrostomy tube T85.528A    Bladder outlet obstruction N32.0    Hydronephrosis N13.30    Proctitis K62.89    Abdominal pain R10.9    Constipation K59.00    Urinary retention R33.9    Sepsis due to gram-negative UTI (HCC) A41.50, N39.0    Acute pancreatitis without infection or necrosis K85.90    Altered mental status R41.82    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    Functional quadriplegia (HCC) R53.2    Gastrostomy malfunction (HCC) K94.23    Sepsis (HCC) A41.9    Seizure disorder (HCC) G40.909    Elevated troponin R79.89    Fecal impaction (HCC) K56.41       Isolation/Infection:   Isolation            Contact          Patient Infection Status        Infection Onset Added Last Indicated Last Indicated By Review Planned Expiration    MDRO (multi-drug resistant organism) 06/26/22 06/28/22 06/28/22 Reshma Butterfield RN      urine    MRSA 08/25/17 08/28/17 08/28/17 Argelia Fournier RN      Arm                          Nurse Assessment:  Last Vital Signs: BP (!) 78/42   Pulse 62   Temp 98.3 °F (36.8 °C) (Axillary)   Resp 14   Ht 1.6 m (5' 3\")   Wt 50.6 kg (111 lb 8.8 oz)   LMP  (LMP Unknown)   SpO2 95%   BMI 19.76 kg/m²     Last documented pain score (0-10 scale):    Last Weight:   Wt Readings from Last 1 Encounters:   07/02/25 50.6 kg (111 lb 8.8 oz)     Mental Status:  alert    IV Access:  - None    Nursing Mobility/ADLs:  Walking   Dependent  Transfer

## 2025-07-02 NOTE — PROGRESS NOTES
Order in for milk of molasses enema. Pt has had 3 bowel movements this shift. One very large this morning. Secure message sent to on-call MD- Ok to not give and DC order. Care ongoing.

## 2025-07-02 NOTE — PROGRESS NOTES
Hospitalist Progress Note      PCP: Bart Arora MD    Date of Admission: 6/30/2025    Chief Complaint: Epistaxis    Hospital Course:  29 y.o. female with history of cardiac arrest from respiratory disorder, anoxic encephalopathy, seizure disorder, spastic quadriplegia, dysphagia s/p G tube, Hep C, chronic constipation was sent from [facility] with epistaxis.  Patient is nonverbal and cannot provide any history.  Found to have sepsis secondary to LLL aspiration PNA with evidence of complete opacification of L maxillary sinus.  Patient has been given IVF and IV Abx in ER.  Has been sedated by Ativan IV.  Started on IV Unasyn.  Seen by ENT.  Use Afrin PRN.  Seen by Surgery.  Noted to have fecal impaction.  Not given enema or GoLytely as ordered.  Repeat KUB requested     Subjective: Patient apparently had BM so staff did not give enema or GoLytely as ordered.  Patient is nonverbal and cannot provide any ROS       Medications:  Reviewed    Infusion Medications    sodium chloride      sodium chloride      lactated ringers 100 mL/hr at 07/02/25 0400     Scheduled Medications    Baclofen  5 mg Per G Tube BID    benztropine  2 mg Per G Tube BID    docusate sodium  100 mg Per G Tube QAM    famotidine  20 mg PEG Tube BID    ferrous Sulfate  300 mg Per G Tube TID    lacosamide  100 mg Per G Tube BID    acidophilus  1 packet Per G Tube BID    levETIRAcetam  500 mg PEG Tube BID    linaclotide  290 mcg Per G Tube QAM AC    mirtazapine  7.5 mg Per G Tube Nightly    polyethylene glycol  17 g Per G Tube BID    sertraline  200 mg Per G Tube Daily    valproic acid  500 mg Per G Tube BID    sodium chloride flush  5-40 mL IntraVENous 2 times per day    sodium chloride flush  5-40 mL IntraVENous 2 times per day    ampicillin-sulbactam  3,000 mg IntraVENous Q6H    guaiFENesin  400 mg Per G Tube Q12H    sodium chloride  2 spray Each Nostril 4x daily    mupirocin   Each Nostril BID    polyethylene glycol  2,000 mL Per G

## 2025-07-02 NOTE — PLAN OF CARE
Problem: Skin/Tissue Integrity  Goal: Skin integrity remains intact  Description: 1.  Monitor for areas of redness and/or skin breakdown  2.  Assess vascular access sites hourly  3.  Every 4-6 hours minimum:  Change oxygen saturation probe site  4.  Every 4-6 hours:  If on nasal continuous positive airway pressure, respiratory therapy assess nares and determine need for appliance change or resting period  7/2/2025 1704 by Peewee Rai RN  Outcome: Progressing  7/2/2025 0441 by Cole Solis RN  Outcome: Progressing  Flowsheets (Taken 7/1/2025 2322)  Skin Integrity Remains Intact:   Monitor for areas of redness and/or skin breakdown   Assess vascular access sites hourly     Problem: Pain  Goal: Verbalizes/displays adequate comfort level or baseline comfort level  7/2/2025 1704 by Peewee Rai RN  Outcome: Progressing  7/2/2025 0441 by Cole Solis RN  Outcome: Progressing     Problem: Respiratory - Adult  Goal: Achieves optimal ventilation and oxygenation  7/2/2025 1704 by Peewee Rai RN  Outcome: Progressing  7/2/2025 0441 by Cole Solis RN  Reactivated     Problem: Gastrointestinal - Adult  Goal: Minimal or absence of nausea and vomiting  7/2/2025 1704 by Peewee Rai RN  Outcome: Progressing  7/2/2025 0441 by Cole Solis RN  Reactivated  Goal: Maintains or returns to baseline bowel function  7/2/2025 1704 by Peewee Rai RN  Outcome: Progressing  7/2/2025 0441 by Cole Solis RN  Reactivated  Goal: Maintains adequate nutritional intake  7/2/2025 1704 by Peewee Rai RN  Outcome: Progressing  7/2/2025 0441 by Cole Solis RN  Reactivated  Goal: Establish and maintain optimal ostomy function  7/2/2025 1704 by Peewee Rai RN  Outcome: Progressing  7/2/2025 0441 by Cole Solis RN  Reactivated     Problem: Infection - Adult  Goal: Absence of infection at discharge  7/2/2025 1704 by Peewee Rai RN  Outcome: Progressing  7/2/2025 0441

## 2025-07-02 NOTE — PROGRESS NOTES
[facility]        PATIENT NAME: Chepe Clay     TODAY'S DATE: 7/2/2025    CC:Constipation  SUBJECTIVE:    Pt lying in bed, nonverbal, appears more sedate than yesterday.  No emesis  Has had BM  Enemas and Golytely given     Current Medications:   Current Facility-Administered Medications: docusate (COLACE) 50 MG/5ML liquid 100 mg, 100 mg, Per G Tube, QAM  baclofen (LIORESAL) tablet 5 mg, 5 mg, Per G Tube, BID  benztropine (COGENTIN) tablet 2 mg, 2 mg, Per G Tube, BID  famotidine (PEPCID) tablet 20 mg, 20 mg, PEG Tube, BID  ferrous Sulfate 300 (60 Fe) MG/5ML solution 300 mg, 300 mg, Per G Tube, TID  lacosamide (VIMPAT) 10 MG/ML oral solution 100 mg, 100 mg, Per G Tube, BID  acidophilus (LACTINEX/FLORANEX) 1 packet, 1 packet, Per G Tube, BID  levETIRAcetam (KEPPRA) 100 MG/ML oral solution 500 mg, 500 mg, PEG Tube, BID  linaclotide (LINZESS) capsule 290 mcg, 290 mcg, Per G Tube, QAM AC  midodrine (PROAMATINE) tablet 10 mg, 10 mg, Per G Tube, TID PRN  mirtazapine (REMERON) tablet 7.5 mg, 7.5 mg, Per G Tube, Nightly  polyethylene glycol (GLYCOLAX) packet 17 g, 17 g, Per G Tube, BID  risperiDONE (RisperDAL) tablet 0.25 mg, 0.25 mg, Oral, BID PRN  sertraline (ZOLOFT) tablet 200 mg, 200 mg, Per G Tube, Daily  valproic acid (DEPAKENE) 250 MG/5ML oral solution 500 mg, 500 mg, Per G Tube, BID  sodium chloride flush 0.9 % injection 5-40 mL, 5-40 mL, IntraVENous, 2 times per day  sodium chloride flush 0.9 % injection 5-40 mL, 5-40 mL, IntraVENous, PRN  0.9 % sodium chloride infusion, , IntraVENous, PRN  sodium chloride flush 0.9 % injection 5-40 mL, 5-40 mL, IntraVENous, 2 times per day  sodium chloride flush 0.9 % injection 5-40 mL, 5-40 mL, IntraVENous, PRN  0.9 % sodium chloride infusion, , IntraVENous, PRN  potassium chloride (KLOR-CON M) extended release tablet 40 mEq, 40 mEq, Oral, PRN **OR** potassium bicarb-citric acid (EFFER-K) effervescent tablet 40 mEq, 40 mEq, Oral, PRN **OR**  route 2 times daily  Patient not taking: Reported on 7/1/2025 9/19/22   Eleanor Dan MD        Allergies:  Patient has no known allergies.    Social History:    reports that she quit smoking about 14 years ago. Her smoking use included cigarettes. She started smoking about 16 years ago. She has never used smokeless tobacco. She reports that she does not drink alcohol and does not use drugs.    Family History:        Problem Relation Age of Onset    High Cholesterol Father     Cancer Paternal Grandmother     COPD Mother        REVIEW OF SYSTEMS:  CONSTITUTIONAL:  negative  HEENT:  negative  RESPIRATORY:  negative  CARDIOVASCULAR:  negative  GASTROINTESTINAL:  negative  GENITOURINARY:  negative  HEMATOLOGIC/LYMPHATIC:  negative  NEUROLOGICAL:  negative  SKIN: negative      OBJECTIVE:  VITALS:  BP (!) 93/59   Pulse 71   Temp 98.5 °F (36.9 °C) (Axillary)   Resp 13   Ht 1.6 m (5' 3\")   Wt 50.6 kg (111 lb 8.8 oz)   LMP  (LMP Unknown)   SpO2 94%   BMI 19.76 kg/m²     INTAKE/OUTPUT:    I/O last 3 completed shifts:  In: 3184.3 [I.V.:1618.8; NG/GT:915; IV Piggyback:650.5]  Out: 300 [Urine:300]  No intake/output data recorded.    CONSTITUTIONAL:  awake and alert, not oriented, nonverbal  LUNGS:  clear to auscultation  ABDOMEN:  normal bowel sounds, firm, non-distended, non-tender, GT in place         Data:  CBC:   Recent Labs     06/30/25 2238 07/02/25  0521   WBC 12.1* 8.6   HGB 14.1 12.3   HCT 41.9 36.6    125*     BMP:    Recent Labs     06/30/25 2238 06/30/25  2335 07/02/25  0521     --  141   K see below 3.9 4.0   CL 99  --  104   CO2 25  --  27   BUN 12  --  8   CREATININE 0.6  --  0.4*   GLUCOSE 88  --  80     Hepatic:   Recent Labs     06/30/25 2238 06/30/25 2335   AST 70*  --    ALT see below 34   BILITOT 0.3  --    ALKPHOS 72  --      Mag:    No results for input(s): \"MG\" in the last 72 hours.   Phos:   No results for input(s): \"PHOS\" in the last 72 hours.   INR: No results for input(s):

## 2025-07-02 NOTE — CONSULTS
[facility]   CONSULTATION  [phone]      Chief Complaint   Patient presents with    Epistaxis     Pt sent from [facility] via squad for a nose bleed, per facility denies any injury, bleeding controlled upon arrival per squad, non verbal at baseline             History of Present Illness:  Chepe Clay is a 29 y.o. patient who presented to the hospital with complaints of epistaxis. I have been asked to provide consultation regarding further management and testing. The history was obtained from nursing and chart review. The patient is nonverbal. She was brought to [facility] for a nose bleed. She was found to have pneumonia. Cardiology was consulted for elevated troponin. The patient denies any chest pain.       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 without return to pre-existing conscious level with patient surviving ([facility]), Dysarthria, Dysphagia, oropharyngeal phase, Extrapyramidal symptom, Generalized muscle weakness, GERD (gastroesophageal reflux disease), History of COVID-19, Hydronephrosis with renal and ureteral calculous obstruction, Ileus ([facility]), Loss of appetite, Major depressive disorder, recurrent, unspecified, MRSA (methicillin resistant staph aureus) culture positive, Nicotine dependence, other tobacco product, uncomplicated, Overdose, Poisoning by heroin, undetermined, initial encounter ([facility]), Schizophrenia, unspecified ([facility]), Seizures ([facility]), Spasticity, Streptococcal sepsis, unspecified ([facility]), TBI (traumatic brain injury) ([facility]), Unspecified asthma, uncomplicated, and Unspecified

## 2025-07-02 NOTE — PROGRESS NOTES
Nutrition Note    RECOMMENDATIONS  PO Diet: Recommend SLP evaluation as RN at NH reported pt receives pureed, thickened liquid diet  Nutrition Support:   Bolus feeds of Jevity 1.5 (standard with fiber formula): 375 mL at 6 AM, 250 mL at 12PM, 250 mL at 4 PM.   Recommend water flush of 30 mL before and after each TF bolus while receiving IV fluids  Increase to 60 mL before and after each TF bolus once IV fluids are discontinued .         ASSESSMENT   Pt triggered for consult to order and manage TF. Pt with PEG tube. Hard chart on unit indicates pt typically receives bolus TF regimen of Isosource 1.5: 375 mL at 6 AM, 250 mL at 12PM, 250 mL at 4PM with 120 mL water flush TID after each bolus. Isosource 1.5 not on hospital formulary, Jevity 1.5 will be used as equivalent formula. TF ordered yesterday but appears has not been given yet. RN at nursing home reported also consumes po diet of pureed with thickened liquids. No significant wt change per wt hx in EMR. Pt with abdominal distension and fecal impaction, given bowel regimen and having BMs. RD will continue to monitor.     Malnutrition Status: No malnutrition    NUTRITION DIAGNOSIS   Inadequate oral intake related to cognitive or neurological impairment, swallowing difficulty as evidenced by nutrition support - enteral nutrition    Goals: Tolerate nutrition support at goal rate, within 2 days     NUTRITION RELATED FINDINGS  Objective: LR at 100 mL/hr. LBM 7/2.  Wounds: None    CURRENT NUTRITION THERAPIES  Diet NPO  ADULT TUBE FEEDING; PEG; Standard with Fiber; Bolus; Other (specify); 375 mL at 6AM. 250 mL at 12PM. 250 mL at 4PM.; 0; Syringe Push; 60; Before and after each bolus       PO Intake: NPO   PO Supplement Intake:NPO    Current Tube Feeding (TF) Orders:  Feeding Route: PEG  Formula: Standard with Fiber  Schedule: Bolus  Feeding Regimen: 375 mL at 6 AM, 250 mL at 12PM, 250 mL at 4 PM.  Additives/Modulars: None  Water Flushes: 360 mL total daily per NH regimen

## 2025-07-02 NOTE — CARE COORDINATION
Discharge Planning:     (CM) reviewed patient's chart.     Patient comes from  [address] [place] [address]  Pt can return to group home no precert is needed .  TO call report please  call   [phone]     RN VANESA called and spoke to Charge nurse to clarify information so prepare for when patient is discharge to group home facility     Electronically signed by Marti MORALES RN on 7/2/25 at 4:43 PM EDT

## 2025-07-02 NOTE — PROGRESS NOTES
Fine crackles heard on LLL upon auscultation. Secure message sent to PA regarding fluid rate. Decreased down to 100 mlhr. Spot lasix push given- see MAR. Care ongoing.

## 2025-07-03 ENCOUNTER — APPOINTMENT (OUTPATIENT)
Age: 30
DRG: 871 | End: 2025-07-03
Attending: INTERNAL MEDICINE
Payer: MEDICARE

## 2025-07-03 LAB
ANION GAP SERPL CALCULATED.3IONS-SCNC: 10 MMOL/L (ref 3–16)
BASOPHILS # BLD: 0 K/UL (ref 0–0.2)
BASOPHILS NFR BLD: 0.3 %
BUN SERPL-MCNC: 7 MG/DL (ref 7–20)
CALCIUM SERPL-MCNC: 8.6 MG/DL (ref 8.3–10.6)
CHLORIDE SERPL-SCNC: 105 MMOL/L (ref 99–110)
CO2 SERPL-SCNC: 26 MMOL/L (ref 21–32)
CREAT SERPL-MCNC: 0.4 MG/DL (ref 0.6–1.1)
DEPRECATED RDW RBC AUTO: 13.9 % (ref 12.4–15.4)
ECHO AO ASC DIAM: 2.4 CM
ECHO AO ASCENDING AORTA INDEX: 1.6 CM/M2
ECHO AO ROOT DIAM: 2.8 CM
ECHO AO ROOT INDEX: 1.87 CM/M2
ECHO AV AREA PEAK VELOCITY: 2.6 CM2
ECHO AV AREA/BSA PEAK VELOCITY: 1.7 CM2/M2
ECHO AV PEAK GRADIENT: 4 MMHG
ECHO AV PEAK VELOCITY: 1 M/S
ECHO AV VELOCITY RATIO: 0.9
ECHO BSA: 1.49 M2
ECHO EST RA PRESSURE: 3 MMHG
ECHO LA AREA 2C: 13.6 CM2
ECHO LA AREA 4C: 13.7 CM2
ECHO LA MAJOR AXIS: 4.6 CM
ECHO LA MINOR AXIS: 4.2 CM
ECHO LA VOL BP: 34 ML (ref 22–52)
ECHO LA VOL MOD A2C: 35 ML (ref 22–52)
ECHO LA VOL MOD A4C: 30 ML (ref 22–52)
ECHO LA VOL/BSA BIPLANE: 23 ML/M2 (ref 16–34)
ECHO LA VOLUME INDEX MOD A2C: 23 ML/M2 (ref 16–34)
ECHO LA VOLUME INDEX MOD A4C: 20 ML/M2 (ref 16–34)
ECHO LV E' LATERAL VELOCITY: 13.3 CM/S
ECHO LV E' SEPTAL VELOCITY: 14.3 CM/S
ECHO LV EDV A2C: 99 ML
ECHO LV EDV A4C: 90 ML
ECHO LV EDV INDEX A4C: 60 ML/M2
ECHO LV EDV NDEX A2C: 66 ML/M2
ECHO LV EF PHYSICIAN: 60 %
ECHO LV EJECTION FRACTION A2C: 66 %
ECHO LV EJECTION FRACTION A4C: 65 %
ECHO LV EJECTION FRACTION BIPLANE: 64 % (ref 55–100)
ECHO LV ESV A2C: 34 ML
ECHO LV ESV A4C: 32 ML
ECHO LV ESV INDEX A2C: 23 ML/M2
ECHO LV ESV INDEX A4C: 21 ML/M2
ECHO LV FRACTIONAL SHORTENING: 29 % (ref 28–44)
ECHO LV INTERNAL DIMENSION DIASTOLE INDEX: 3 CM/M2
ECHO LV INTERNAL DIMENSION DIASTOLIC: 4.5 CM (ref 3.9–5.3)
ECHO LV INTERNAL DIMENSION SYSTOLIC INDEX: 2.13 CM/M2
ECHO LV INTERNAL DIMENSION SYSTOLIC: 3.2 CM
ECHO LV IVSD: 0.7 CM (ref 0.6–0.9)
ECHO LV MASS 2D: 95.7 G (ref 67–162)
ECHO LV MASS INDEX 2D: 63.8 G/M2 (ref 43–95)
ECHO LV POSTERIOR WALL DIASTOLIC: 0.7 CM (ref 0.6–0.9)
ECHO LV RELATIVE WALL THICKNESS RATIO: 0.31
ECHO LVOT AREA: 3.1 CM2
ECHO LVOT DIAM: 2 CM
ECHO LVOT MEAN GRADIENT: 2 MMHG
ECHO LVOT PEAK GRADIENT: 3 MMHG
ECHO LVOT PEAK VELOCITY: 0.9 M/S
ECHO LVOT STROKE VOLUME INDEX: 34.7 ML/M2
ECHO LVOT SV: 52.1 ML
ECHO LVOT VTI: 16.6 CM
ECHO MV A VELOCITY: 0.43 M/S
ECHO MV E VELOCITY: 0.88 M/S
ECHO MV E/A RATIO: 2.05
ECHO MV E/E' LATERAL: 6.62
ECHO MV E/E' RATIO (AVERAGED): 6.39
ECHO MV E/E' SEPTAL: 6.15
ECHO PV MAX VELOCITY: 0.7 M/S
ECHO PV PEAK GRADIENT: 2 MMHG
ECHO RA AREA 4C: 12.3 CM2
ECHO RA END SYSTOLIC VOLUME APICAL 4 CHAMBER INDEX BSA: 18 ML/M2
ECHO RA VOLUME: 27 ML
ECHO RIGHT VENTRICULAR SYSTOLIC PRESSURE (RVSP): 17 MMHG
ECHO RV BASAL DIMENSION: 2.7 CM
ECHO RV FREE WALL PEAK S': 11.1 CM/S
ECHO RV LONGITUDINAL DIMENSION: 7.7 CM
ECHO RV MID DIMENSION: 2.1 CM
ECHO RV TAPSE: 2.2 CM (ref 1.7–?)
ECHO TV REGURGITANT MAX VELOCITY: 1.87 M/S
ECHO TV REGURGITANT PEAK GRADIENT: 14 MMHG
EKG ATRIAL RATE: 48 BPM
EKG DIAGNOSIS: NORMAL
EKG P AXIS: 51 DEGREES
EKG P-R INTERVAL: 136 MS
EKG Q-T INTERVAL: 488 MS
EKG QRS DURATION: 80 MS
EKG QTC CALCULATION (BAZETT): 435 MS
EKG R AXIS: 0 DEGREES
EKG T AXIS: 1 DEGREES
EKG VENTRICULAR RATE: 48 BPM
EOSINOPHIL # BLD: 0.4 K/UL (ref 0–0.6)
EOSINOPHIL NFR BLD: 5.6 %
GFR SERPLBLD CREATININE-BSD FMLA CKD-EPI: >90 ML/MIN/{1.73_M2}
GLUCOSE BLD-MCNC: 80 MG/DL (ref 70–99)
GLUCOSE SERPL-MCNC: 89 MG/DL (ref 70–99)
HCT VFR BLD AUTO: 32.9 % (ref 36–48)
HGB BLD-MCNC: 11.1 G/DL (ref 12–16)
LYMPHOCYTES # BLD: 2.2 K/UL (ref 1–5.1)
LYMPHOCYTES NFR BLD: 27.8 %
MCH RBC QN AUTO: 30.1 PG (ref 26–34)
MCHC RBC AUTO-ENTMCNC: 33.6 G/DL (ref 31–36)
MCV RBC AUTO: 89.6 FL (ref 80–100)
MONOCYTES # BLD: 0.8 K/UL (ref 0–1.3)
MONOCYTES NFR BLD: 10.9 %
NEUTROPHILS # BLD: 4.3 K/UL (ref 1.7–7.7)
NEUTROPHILS NFR BLD: 55.4 %
PERFORMED ON: NORMAL
PLATELET # BLD AUTO: 129 K/UL (ref 135–450)
PMV BLD AUTO: 9.7 FL (ref 5–10.5)
POTASSIUM SERPL-SCNC: 4.2 MMOL/L (ref 3.5–5.1)
RBC # BLD AUTO: 3.67 M/UL (ref 4–5.2)
SODIUM SERPL-SCNC: 141 MMOL/L (ref 136–145)
WBC # BLD AUTO: 7.8 K/UL (ref 4–11)

## 2025-07-03 PROCEDURE — 93010 ELECTROCARDIOGRAM REPORT: CPT | Performed by: INTERNAL MEDICINE

## 2025-07-03 PROCEDURE — 6360000002 HC RX W HCPCS

## 2025-07-03 PROCEDURE — 93306 TTE W/DOPPLER COMPLETE: CPT | Performed by: INTERNAL MEDICINE

## 2025-07-03 PROCEDURE — 36415 COLL VENOUS BLD VENIPUNCTURE: CPT

## 2025-07-03 PROCEDURE — 99231 SBSQ HOSP IP/OBS SF/LOW 25: CPT | Performed by: INTERNAL MEDICINE

## 2025-07-03 PROCEDURE — 2500000003 HC RX 250 WO HCPCS: Performed by: INTERNAL MEDICINE

## 2025-07-03 PROCEDURE — 93005 ELECTROCARDIOGRAM TRACING: CPT

## 2025-07-03 PROCEDURE — 6370000000 HC RX 637 (ALT 250 FOR IP): Performed by: SURGERY

## 2025-07-03 PROCEDURE — 80048 BASIC METABOLIC PNL TOTAL CA: CPT

## 2025-07-03 PROCEDURE — 85025 COMPLETE CBC W/AUTO DIFF WBC: CPT

## 2025-07-03 PROCEDURE — 6360000004 HC RX CONTRAST MEDICATION: Performed by: INTERNAL MEDICINE

## 2025-07-03 PROCEDURE — C8929 TTE W OR WO FOL WCON,DOPPLER: HCPCS

## 2025-07-03 PROCEDURE — 51798 US URINE CAPACITY MEASURE: CPT

## 2025-07-03 PROCEDURE — 2580000003 HC RX 258: Performed by: INTERNAL MEDICINE

## 2025-07-03 PROCEDURE — 6360000002 HC RX W HCPCS: Performed by: INTERNAL MEDICINE

## 2025-07-03 PROCEDURE — 2580000003 HC RX 258

## 2025-07-03 PROCEDURE — 6370000000 HC RX 637 (ALT 250 FOR IP): Performed by: INTERNAL MEDICINE

## 2025-07-03 PROCEDURE — 1200000000 HC SEMI PRIVATE

## 2025-07-03 PROCEDURE — 51701 INSERT BLADDER CATHETER: CPT

## 2025-07-03 PROCEDURE — P9047 ALBUMIN (HUMAN), 25%, 50ML: HCPCS

## 2025-07-03 RX ORDER — DEXTROSE, SODIUM CHLORIDE, SODIUM LACTATE, POTASSIUM CHLORIDE, AND CALCIUM CHLORIDE 5; .6; .31; .03; .02 G/100ML; G/100ML; G/100ML; G/100ML; G/100ML
INJECTION, SOLUTION INTRAVENOUS CONTINUOUS
Status: DISCONTINUED | OUTPATIENT
Start: 2025-07-03 | End: 2025-07-06 | Stop reason: HOSPADM

## 2025-07-03 RX ORDER — ALBUMIN (HUMAN) 12.5 G/50ML
25 SOLUTION INTRAVENOUS ONCE
Status: COMPLETED | OUTPATIENT
Start: 2025-07-03 | End: 2025-07-03

## 2025-07-03 RX ADMIN — Medication 2 SPRAY: at 17:29

## 2025-07-03 RX ADMIN — Medication 1 PACKET: at 21:45

## 2025-07-03 RX ADMIN — AMPICILLIN SODIUM AND SULBACTAM SODIUM 3000 MG: 2; 1 INJECTION, POWDER, FOR SOLUTION INTRAMUSCULAR; INTRAVENOUS at 06:06

## 2025-07-03 RX ADMIN — POLYETHYLENE GLYCOL 3350 17 G: 17 POWDER, FOR SOLUTION ORAL at 10:29

## 2025-07-03 RX ADMIN — LEVETIRACETAM 500 MG: 100 SOLUTION ORAL at 21:26

## 2025-07-03 RX ADMIN — VALPROIC ACID 500 MG: 250 SOLUTION ORAL at 21:26

## 2025-07-03 RX ADMIN — BENZTROPINE MESYLATE 2 MG: 1 TABLET ORAL at 10:36

## 2025-07-03 RX ADMIN — Medication 1 PACKET: at 10:36

## 2025-07-03 RX ADMIN — FAMOTIDINE 20 MG: 20 TABLET, FILM COATED ORAL at 21:27

## 2025-07-03 RX ADMIN — POLYETHYLENE GLYCOL 3350 17 G: 17 POWDER, FOR SOLUTION ORAL at 21:30

## 2025-07-03 RX ADMIN — SODIUM CHLORIDE, PRESERVATIVE FREE 20 ML: 5 INJECTION INTRAVENOUS at 10:37

## 2025-07-03 RX ADMIN — LINACLOTIDE 290 MCG: 145 CAPSULE, GELATIN COATED ORAL at 05:16

## 2025-07-03 RX ADMIN — LACOSAMIDE 100 MG: 10 SOLUTION ORAL at 21:44

## 2025-07-03 RX ADMIN — AMPICILLIN SODIUM AND SULBACTAM SODIUM 3000 MG: 2; 1 INJECTION, POWDER, FOR SOLUTION INTRAMUSCULAR; INTRAVENOUS at 17:29

## 2025-07-03 RX ADMIN — BACLOFEN 5 MG: 10 TABLET ORAL at 21:26

## 2025-07-03 RX ADMIN — VALPROIC ACID 500 MG: 250 SOLUTION ORAL at 10:36

## 2025-07-03 RX ADMIN — SULFUR HEXAFLUORIDE 2 ML: 60.7; .19; .19 INJECTION, POWDER, LYOPHILIZED, FOR SUSPENSION INTRAVENOUS; INTRAVESICAL at 13:19

## 2025-07-03 RX ADMIN — MIDODRINE HYDROCHLORIDE 10 MG: 5 TABLET ORAL at 02:25

## 2025-07-03 RX ADMIN — DOCUSATE SODIUM LIQUID 100 MG: 100 LIQUID ORAL at 10:29

## 2025-07-03 RX ADMIN — BACLOFEN 5 MG: 10 TABLET ORAL at 10:29

## 2025-07-03 RX ADMIN — AMPICILLIN SODIUM AND SULBACTAM SODIUM 3000 MG: 2; 1 INJECTION, POWDER, FOR SOLUTION INTRAMUSCULAR; INTRAVENOUS at 00:02

## 2025-07-03 RX ADMIN — AMPICILLIN SODIUM AND SULBACTAM SODIUM 3000 MG: 2; 1 INJECTION, POWDER, FOR SOLUTION INTRAMUSCULAR; INTRAVENOUS at 12:33

## 2025-07-03 RX ADMIN — FAMOTIDINE 20 MG: 20 TABLET, FILM COATED ORAL at 10:29

## 2025-07-03 RX ADMIN — SODIUM CHLORIDE, PRESERVATIVE FREE 10 ML: 5 INJECTION INTRAVENOUS at 21:54

## 2025-07-03 RX ADMIN — Medication 2 SPRAY: at 10:36

## 2025-07-03 RX ADMIN — MINERAL SUPPLEMENT IRON 300 MG / 5 ML STRENGTH LIQUID 100 PER BOX UNFLAVORED 300 MG: at 10:29

## 2025-07-03 RX ADMIN — POLYETHYLENE GLYCOL-3350 AND ELECTROLYTES 2000 ML: 236; 6.74; 5.86; 2.97; 22.74 POWDER, FOR SOLUTION ORAL at 10:38

## 2025-07-03 RX ADMIN — BENZTROPINE MESYLATE 2 MG: 1 TABLET ORAL at 21:26

## 2025-07-03 RX ADMIN — LEVETIRACETAM 500 MG: 100 SOLUTION ORAL at 10:28

## 2025-07-03 RX ADMIN — LACOSAMIDE 100 MG: 10 SOLUTION ORAL at 10:32

## 2025-07-03 RX ADMIN — MINERAL SUPPLEMENT IRON 300 MG / 5 ML STRENGTH LIQUID 100 PER BOX UNFLAVORED 300 MG: at 21:25

## 2025-07-03 RX ADMIN — GUAIFENESIN 400 MG: 100 SOLUTION ORAL at 10:28

## 2025-07-03 RX ADMIN — MIRTAZAPINE 7.5 MG: 15 TABLET, FILM COATED ORAL at 21:27

## 2025-07-03 RX ADMIN — ALBUMIN (HUMAN) 25 G: 0.25 INJECTION, SOLUTION INTRAVENOUS at 03:57

## 2025-07-03 RX ADMIN — GUAIFENESIN 400 MG: 100 SOLUTION ORAL at 21:26

## 2025-07-03 RX ADMIN — SODIUM CHLORIDE, SODIUM LACTATE, POTASSIUM CHLORIDE, CALCIUM CHLORIDE AND DEXTROSE MONOHYDRATE: 5; 600; 310; 30; 20 INJECTION, SOLUTION INTRAVENOUS at 17:23

## 2025-07-03 RX ADMIN — SERTRALINE 200 MG: 50 TABLET, FILM COATED ORAL at 10:29

## 2025-07-03 RX ADMIN — SODIUM CHLORIDE, SODIUM LACTATE, POTASSIUM CHLORIDE, CALCIUM CHLORIDE AND DEXTROSE MONOHYDRATE: 5; 600; 310; 30; 20 INJECTION, SOLUTION INTRAVENOUS at 02:44

## 2025-07-03 RX ADMIN — SODIUM CHLORIDE, PRESERVATIVE FREE 10 ML: 5 INJECTION INTRAVENOUS at 10:37

## 2025-07-03 RX ADMIN — MUPIROCIN: 20 OINTMENT TOPICAL at 10:36

## 2025-07-03 RX ADMIN — Medication 2 SPRAY: at 12:34

## 2025-07-03 ASSESSMENT — PAIN SCALES - WONG BAKER: WONGBAKER_NUMERICALRESPONSE: HURTS LITTLE MORE

## 2025-07-03 NOTE — PROGRESS NOTES
Albumin done. Latest BP of 97/66. Secure message sent to on call MD regarding possible pressors per PA's suggestion after all other measures taken. MD stated to monitor BP an as long as MAP is above 60 and she is asymptomatic then it's okay. Nursing communication in. Care ongoing.

## 2025-07-03 NOTE — PROGRESS NOTES
[facility]  Progress notes  [phone]      Chief Complaint   Patient presents with    Epistaxis     Pt sent from [facility] via squad for a nose bleed, per facility denies any injury, bleeding controlled upon arrival per squad, non verbal at baseline             History of Present Illness:  Chepe Clay is a 29 y.o. patient who presented to the hospital with complaints of epistaxis. I have been asked to provide consultation regarding further management and testing. The history was obtained from nursing and chart review. The patient is nonverbal. She was brought to [facility] for a nose bleed. She was found to have pneumonia. Cardiology was consulted for elevated troponin. The patient denies any chest pain.     Subjective: patient with sinus bradycardia overnight.   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 greater than 24 hours without return to pre-existing conscious level with patient surviving ([facility]), Dysarthria, Dysphagia, oropharyngeal phase, Extrapyramidal symptom, Generalized muscle weakness, GERD (gastroesophageal reflux disease), History of COVID-19, Hydronephrosis with renal and ureteral calculous obstruction, Ileus ([facility]), Loss of appetite, Major depressive disorder, recurrent, unspecified, MRSA (methicillin resistant staph aureus) culture positive, Nicotine dependence, other tobacco product, uncomplicated, Overdose, Poisoning by heroin, undetermined, initial encounter ([facility]), Schizophrenia, unspecified ([facility]), Seizures ([facility]), Spasticity, Streptococcal sepsis, unspecified ([facility]), TBI (traumatic brain injury)    Temp:    SpO2:     Weight - Scale: 49.9 kg (110 lb)         General Appearance:  Alert, chronically ill appering    Head:  Normocephalic   Eyes:  PERR                       Lungs:   +crackles   Chest Wall:  No tenderness or deformity   Heart:  Regular rate and rhythm, S1, S2 normal, no murmur, rub or gallop   Abdomen:   Peg tube           Extremities: No edema                Neurologic: Does not respond appropriately, contracture of lue        Labs  CBC:   Lab Results   Component Value Date/Time    WBC 7.8 07/03/2025 03:10 AM    RBC 3.67 07/03/2025 03:10 AM    HGB 11.1 07/03/2025 03:10 AM    HCT 32.9 07/03/2025 03:10 AM    MCV 89.6 07/03/2025 03:10 AM    RDW 13.9 07/03/2025 03:10 AM     07/03/2025 03:10 AM     CMP:    Lab Results   Component Value Date/Time     07/03/2025 03:10 AM    K 4.2 07/03/2025 03:10 AM     07/03/2025 03:10 AM    CO2 26 07/03/2025 03:10 AM    BUN 7 07/03/2025 03:10 AM    CREATININE 0.4 07/03/2025 03:10 AM    GFRAA >60 10/04/2022 03:30 PM    GFRAA >60 09/20/2011 12:35 PM    AGRATIO 1.1 06/30/2025 10:38 PM    LABGLOM >90 07/03/2025 03:10 AM    GLUCOSE 89 07/03/2025 03:10 AM    CALCIUM 8.6 07/03/2025 03:10 AM    BILITOT 0.3 06/30/2025 10:38 PM    ALKPHOS 72 06/30/2025 10:38 PM    AST 70 06/30/2025 10:38 PM    ALT 34 06/30/2025 11:35 PM     PT/INR:  No results found for: \"PTINR\"  Lab Results   Component Value Date    CKTOTAL 167 06/29/2017    TROPONINI 0.09 (H) 08/03/2016       EKG:  I have reviewed EKG with the following interpretation:  Impression:  artifact  Sinus bradycardia, nonspecific st-t wave changes. No st elevations     Echo: - Left ventricle: The cavity size was normal. Wall thickness was     normal. Systolic function was normal. The estimated ejection     fraction was in the range of 55% to 60%. Wall motion was normal;     there were no regional wall motion abnormalities. The study is not     technically sufficient to allow evaluation of LV diastolic     function.

## 2025-07-03 NOTE — PLAN OF CARE
Problem: Skin/Tissue Integrity  Goal: Skin integrity remains intact  Description: 1.  Monitor for areas of redness and/or skin breakdown  2.  Assess vascular access sites hourly  3.  Every 4-6 hours minimum:  Change oxygen saturation probe site  4.  Every 4-6 hours:  If on nasal continuous positive airway pressure, respiratory therapy assess nares and determine need for appliance change or resting period  7/3/2025 0451 by Taurus Solis RN  Outcome: Progressing  7/2/2025 1704 by Peewee Rai RN  Outcome: Progressing     Problem: Pain  Goal: Verbalizes/displays adequate comfort level or baseline comfort level  7/3/2025 0451 by Taurus Solis RN  Outcome: Progressing  7/2/2025 1704 by Peewee Rai RN  Outcome: Progressing     Problem: Respiratory - Adult  Goal: Achieves optimal ventilation and oxygenation  7/3/2025 0451 by Taurus Solis RN  Outcome: Progressing  7/2/2025 1704 by Peewee Rai RN  Outcome: Progressing     Problem: Gastrointestinal - Adult  Goal: Minimal or absence of nausea and vomiting  7/3/2025 0451 by Taurus Solis RN  Outcome: Progressing  7/2/2025 1704 by Peewee Rai RN  Outcome: Progressing  Goal: Maintains or returns to baseline bowel function  7/3/2025 0451 by Taurus Solis RN  Outcome: Progressing  7/2/2025 1704 by Peewee Rai RN  Outcome: Progressing  Goal: Maintains adequate nutritional intake  7/3/2025 0451 by Taurus Solis RN  Outcome: Progressing  7/2/2025 1704 by Peewee Rai RN  Outcome: Progressing  Goal: Establish and maintain optimal ostomy function  7/3/2025 0451 by Taurus Solis RN  Outcome: Progressing  7/2/2025 1704 by Peewee Rai RN  Outcome: Progressing     Problem: Infection - Adult  Goal: Absence of infection at discharge  7/3/2025 0451 by Taurus Solis RN  Outcome: Progressing  7/2/2025 1704 by Peewee Rai, RN  Outcome: Progressing  Goal: Absence of infection during

## 2025-07-03 NOTE — PLAN OF CARE
Problem: Skin/Tissue Integrity  Goal: Skin integrity remains intact  Description: 1.  Monitor for areas of redness and/or skin breakdown  2.  Assess vascular access sites hourly  3.  Every 4-6 hours minimum:  Change oxygen saturation probe site  4.  Every 4-6 hours:  If on nasal continuous positive airway pressure, respiratory therapy assess nares and determine need for appliance change or resting period  7/3/2025 1323 by Taisha Burns LPN  Outcome: Progressing  7/3/2025 0451 by Taurus Solis, RN  Outcome: Progressing     Problem: Pain  Goal: Verbalizes/displays adequate comfort level or baseline comfort level  7/3/2025 1323 by Taisha Burns LPN  Outcome: Progressing  7/3/2025 0451 by Taurus Solis RN  Outcome: Progressing     Problem: Respiratory - Adult  Goal: Achieves optimal ventilation and oxygenation  7/3/2025 1323 by Taisha Burns LPN  Outcome: Progressing  7/3/2025 0451 by Taurus Solis RN  Outcome: Progressing     Problem: Gastrointestinal - Adult  Goal: Minimal or absence of nausea and vomiting  7/3/2025 1323 by Taisha Burns LPN  Outcome: Progressing  7/3/2025 0451 by Taurus Solis RN  Outcome: Progressing  Goal: Maintains or returns to baseline bowel function  7/3/2025 1323 by Taisha Burns LPN  Outcome: Progressing  7/3/2025 0451 by Taurus Solis, RN  Outcome: Progressing  Goal: Maintains adequate nutritional intake  7/3/2025 1323 by Taisha Burns LPN  Outcome: Progressing  7/3/2025 0451 by Taurus Solis, RN  Outcome: Progressing  Goal: Establish and maintain optimal ostomy function  7/3/2025 1323 by Taisha Burns LPN  Outcome: Progressing  7/3/2025 0451 by Taurus Solis RN  Outcome: Progressing     Problem: Infection - Adult  Goal: Absence of infection at discharge  7/3/2025 1323 by Taisha Burns LPN  Outcome: Progressing  7/3/2025 0451 by Taurus Solis, RN  Outcome: Progressing  Goal: Absence of infection

## 2025-07-03 NOTE — PROGRESS NOTES
Lab results in- see result tab. PA made aware- new order for Albumin in. Latest VS as follows: 87/47 mmHg, HR 50 apical. Care ongoing.

## 2025-07-03 NOTE — PROGRESS NOTES
Hospitalist Progress Note      PCP: Bart Arora MD    Date of Admission: 6/30/2025    Chief Complaint: Epistaxis    Hospital Course:  29 y.o. female with history of cardiac arrest from respiratory disorder, anoxic encephalopathy, seizure disorder, spastic quadriplegia, dysphagia s/p G tube, Hep C, chronic constipation was sent from [facility] with epistaxis.  Patient is nonverbal and cannot provide any history.  Found to have sepsis secondary to LLL aspiration PNA with evidence of complete opacification of L maxillary sinus.  Patient has been given IVF and IV Abx in ER.  Has been sedated by Ativan IV.  Started on IV Unasyn.  Seen by ENT.  Use Afrin PRN.  Seen by Surgery.  Noted to have fecal impaction.  Not given enema or GoLytely as ordered.  Repeat KUB with massive impaction on 7/2.  GoLytely given on 7/3.     Echo:    Left Ventricle: Normal left ventricular systolic function with a visually estimated EF of 60 - 65%. EF by 2D Simpsons Biplane is 64%. Left ventricle size is normal. Normal wall thickness. Normal wall motion. Normal diastolic function. Exaggerated respiratory motion.    Right Ventricle: Right ventricle size is normal. Normal systolic function.    Tricuspid Valve: Mild regurgitation. Est. RA Pressure is 3 mmHg. RVSP is 17 mmHg.    Pericardium: No pericardial effusion.    Image quality is adequate. Contrast used: Lumason.         Subjective: Patient being given GoLytely today.  Patient is awake, remains nonverbal and cannot provide any ROS       Medications:  Reviewed    Infusion Medications    dextrose 5% in lactated ringers 100 mL/hr at 07/03/25 0340    sodium chloride      sodium chloride       Scheduled Medications    docusate  100 mg Per G Tube QAM    Baclofen  5 mg Per G Tube BID    benztropine  2 mg Per G Tube BID    famotidine  20 mg PEG Tube BID    ferrous Sulfate  300 mg Per G Tube TID    lacosamide  100 mg Per G Tube BID    acidophilus  1 packet Per G Tube BID        125* 129*     Recent Labs     06/30/25 2238 06/30/25  2335 07/02/25  0521 07/03/25  0310     --  141 141   K see below 3.9 4.0 4.2   CL 99  --  104 105   CO2 25  --  27 26   BUN 12  --  8 7   CREATININE 0.6  --  0.4* 0.4*   CALCIUM 9.9  --  8.8 8.6     Recent Labs     06/30/25 2238 06/30/25 2335   AST 70*  --    ALT see below 34   BILITOT 0.3  --    ALKPHOS 72  --      No results for input(s): \"INR\" in the last 72 hours.  Recent Labs     07/01/25  0018 07/01/25  0412 07/01/25  1032   TROPHS 46* 39* 39*       Urinalysis:      Lab Results   Component Value Date/Time    NITRU Negative 06/30/2025 11:17 PM    WBCUA 3-5 06/30/2025 11:17 PM    BACTERIA 4+ 04/03/2025 03:21 PM    RBCUA 21-50 06/30/2025 11:17 PM    BLOODU TRACE 06/30/2025 11:17 PM    GLUCOSEU Negative 06/30/2025 11:17 PM    GLUCOSEU NEGATIVE 09/20/2011 04:50 PM       Radiology:  XR ABDOMEN (KUB) (SINGLE AP VIEW)   Final Result   1. Large volume of rectal stool measuring up to 11 cm in diameter, concerning for fecal impaction.   2. Moderate amount of stool in the left and right colon.   3. No bowel obstruction.            CT ABDOMEN PELVIS WO CONTRAST Additional Contrast? Oral (via PEG)   Final Result   1. Marked distention of the rectum with stool and questionable wall   thickening. Correlate for stercoral colitis.   2. No evidence of bowel obstruction.   3. Partially visualized high-density material and edema of the right upper   extremity subcutaneous tissues. Correlate with physical exam.         XR ABDOMEN (KUB) (SINGLE AP VIEW)   Final Result   Dilated loops of bowel throughout the abdomen. This may represent ileus or   obstruction.  Consider follow-up CT scan.  Recommend follow-up.         CT CHEST PULMONARY EMBOLISM W CONTRAST   Final Result   1. No evidence of pulmonary embolism.   2. Left lower lobe pneumonia.         XR CHEST PORTABLE   Final Result   No acute cardiopulmonary process.         CT FACIAL BONES WO CONTRAST   Final

## 2025-07-03 NOTE — PROGRESS NOTES
This nurse was called in by PCA regarding soft BP of the pt. Upon assessment, pt's Bps were on the soft side- see flowsheet. Pt withdraws to pain and briefly opens eyes to stimulation. Manual BP of 72/45 mmHg. Rechecked in BUE. Secure message sent to PA- PRN Midodrine given. BS taken- 80 mg/dL. Switched fluids to D5LR at 75 ml/hr since pt cannot take PO and does not have continuous TF running. Pt then started to arnold down- STAT EKG done- see results. PA made aware of results. Ordered to draw labs early- awaiting results. Care ongoing.

## 2025-07-04 ENCOUNTER — APPOINTMENT (OUTPATIENT)
Dept: GENERAL RADIOLOGY | Age: 30
DRG: 871 | End: 2025-07-04
Payer: MEDICARE

## 2025-07-04 LAB
ANION GAP SERPL CALCULATED.3IONS-SCNC: 13 MMOL/L (ref 3–16)
BACTERIA BLD CULT: NORMAL
BASOPHILS # BLD: 0 K/UL (ref 0–0.2)
BASOPHILS NFR BLD: 0.7 %
BUN SERPL-MCNC: 4 MG/DL (ref 7–20)
CALCIUM SERPL-MCNC: 9.3 MG/DL (ref 8.3–10.6)
CHLORIDE SERPL-SCNC: 103 MMOL/L (ref 99–110)
CO2 SERPL-SCNC: 26 MMOL/L (ref 21–32)
CREAT SERPL-MCNC: 0.5 MG/DL (ref 0.6–1.1)
DEPRECATED RDW RBC AUTO: 13.9 % (ref 12.4–15.4)
EOSINOPHIL # BLD: 0.4 K/UL (ref 0–0.6)
EOSINOPHIL NFR BLD: 5.2 %
GFR SERPLBLD CREATININE-BSD FMLA CKD-EPI: >90 ML/MIN/{1.73_M2}
GLUCOSE SERPL-MCNC: 84 MG/DL (ref 70–99)
HCT VFR BLD AUTO: 35.7 % (ref 36–48)
HGB BLD-MCNC: 12.2 G/DL (ref 12–16)
LYMPHOCYTES # BLD: 1.9 K/UL (ref 1–5.1)
LYMPHOCYTES NFR BLD: 26.6 %
MCH RBC QN AUTO: 30.2 PG (ref 26–34)
MCHC RBC AUTO-ENTMCNC: 34 G/DL (ref 31–36)
MCV RBC AUTO: 88.7 FL (ref 80–100)
MONOCYTES # BLD: 0.8 K/UL (ref 0–1.3)
MONOCYTES NFR BLD: 11 %
NEUTROPHILS # BLD: 4 K/UL (ref 1.7–7.7)
NEUTROPHILS NFR BLD: 56.5 %
PLATELET # BLD AUTO: 150 K/UL (ref 135–450)
PMV BLD AUTO: 9.5 FL (ref 5–10.5)
POTASSIUM SERPL-SCNC: 3.8 MMOL/L (ref 3.5–5.1)
RBC # BLD AUTO: 4.03 M/UL (ref 4–5.2)
SODIUM SERPL-SCNC: 142 MMOL/L (ref 136–145)
WBC # BLD AUTO: 7 K/UL (ref 4–11)

## 2025-07-04 PROCEDURE — 80048 BASIC METABOLIC PNL TOTAL CA: CPT

## 2025-07-04 PROCEDURE — 2500000003 HC RX 250 WO HCPCS: Performed by: INTERNAL MEDICINE

## 2025-07-04 PROCEDURE — 6360000002 HC RX W HCPCS: Performed by: INTERNAL MEDICINE

## 2025-07-04 PROCEDURE — 74018 RADEX ABDOMEN 1 VIEW: CPT

## 2025-07-04 PROCEDURE — 1200000000 HC SEMI PRIVATE

## 2025-07-04 PROCEDURE — 6370000000 HC RX 637 (ALT 250 FOR IP): Performed by: INTERNAL MEDICINE

## 2025-07-04 PROCEDURE — 43762 RPLC GTUBE NO REVJ TRC: CPT

## 2025-07-04 PROCEDURE — 2580000003 HC RX 258: Performed by: INTERNAL MEDICINE

## 2025-07-04 PROCEDURE — 85025 COMPLETE CBC W/AUTO DIFF WBC: CPT

## 2025-07-04 PROCEDURE — 36415 COLL VENOUS BLD VENIPUNCTURE: CPT

## 2025-07-04 PROCEDURE — 2580000003 HC RX 258

## 2025-07-04 RX ADMIN — BENZTROPINE MESYLATE 2 MG: 1 TABLET ORAL at 22:55

## 2025-07-04 RX ADMIN — GUAIFENESIN 400 MG: 100 SOLUTION ORAL at 22:53

## 2025-07-04 RX ADMIN — RISPERIDONE 0.25 MG: 0.5 TABLET, FILM COATED ORAL at 17:21

## 2025-07-04 RX ADMIN — VALPROIC ACID 500 MG: 250 SOLUTION ORAL at 22:53

## 2025-07-04 RX ADMIN — LINACLOTIDE 290 MCG: 145 CAPSULE, GELATIN COATED ORAL at 10:02

## 2025-07-04 RX ADMIN — SODIUM CHLORIDE, SODIUM LACTATE, POTASSIUM CHLORIDE, CALCIUM CHLORIDE AND DEXTROSE MONOHYDRATE: 5; 600; 310; 30; 20 INJECTION, SOLUTION INTRAVENOUS at 06:21

## 2025-07-04 RX ADMIN — VALPROIC ACID 500 MG: 250 SOLUTION ORAL at 09:59

## 2025-07-04 RX ADMIN — SODIUM CHLORIDE, SODIUM LACTATE, POTASSIUM CHLORIDE, CALCIUM CHLORIDE AND DEXTROSE MONOHYDRATE: 5; 600; 310; 30; 20 INJECTION, SOLUTION INTRAVENOUS at 18:55

## 2025-07-04 RX ADMIN — LACOSAMIDE 100 MG: 10 SOLUTION ORAL at 10:01

## 2025-07-04 RX ADMIN — BACLOFEN 5 MG: 10 TABLET ORAL at 22:54

## 2025-07-04 RX ADMIN — SERTRALINE 200 MG: 50 TABLET, FILM COATED ORAL at 10:01

## 2025-07-04 RX ADMIN — MUPIROCIN: 20 OINTMENT TOPICAL at 10:02

## 2025-07-04 RX ADMIN — AMPICILLIN SODIUM AND SULBACTAM SODIUM 3000 MG: 2; 1 INJECTION, POWDER, FOR SOLUTION INTRAMUSCULAR; INTRAVENOUS at 18:10

## 2025-07-04 RX ADMIN — POLYETHYLENE GLYCOL 3350 17 G: 17 POWDER, FOR SOLUTION ORAL at 10:01

## 2025-07-04 RX ADMIN — BACLOFEN 5 MG: 10 TABLET ORAL at 10:00

## 2025-07-04 RX ADMIN — Medication 1 PACKET: at 10:02

## 2025-07-04 RX ADMIN — FAMOTIDINE 20 MG: 20 TABLET, FILM COATED ORAL at 10:01

## 2025-07-04 RX ADMIN — BENZTROPINE MESYLATE 2 MG: 1 TABLET ORAL at 10:00

## 2025-07-04 RX ADMIN — AMPICILLIN SODIUM AND SULBACTAM SODIUM 3000 MG: 2; 1 INJECTION, POWDER, FOR SOLUTION INTRAMUSCULAR; INTRAVENOUS at 01:14

## 2025-07-04 RX ADMIN — POLYETHYLENE GLYCOL 3350 17 G: 17 POWDER, FOR SOLUTION ORAL at 22:56

## 2025-07-04 RX ADMIN — SODIUM CHLORIDE, PRESERVATIVE FREE 10 ML: 5 INJECTION INTRAVENOUS at 10:03

## 2025-07-04 RX ADMIN — AMPICILLIN SODIUM AND SULBACTAM SODIUM 3000 MG: 2; 1 INJECTION, POWDER, FOR SOLUTION INTRAMUSCULAR; INTRAVENOUS at 13:43

## 2025-07-04 RX ADMIN — Medication 2 SPRAY: at 17:21

## 2025-07-04 RX ADMIN — LEVETIRACETAM 500 MG: 100 SOLUTION ORAL at 09:59

## 2025-07-04 RX ADMIN — FAMOTIDINE 20 MG: 20 TABLET, FILM COATED ORAL at 22:55

## 2025-07-04 RX ADMIN — Medication 2 SPRAY: at 13:44

## 2025-07-04 RX ADMIN — LEVETIRACETAM 500 MG: 100 SOLUTION ORAL at 22:54

## 2025-07-04 RX ADMIN — MINERAL SUPPLEMENT IRON 300 MG / 5 ML STRENGTH LIQUID 100 PER BOX UNFLAVORED 300 MG: at 22:53

## 2025-07-04 RX ADMIN — AMPICILLIN SODIUM AND SULBACTAM SODIUM 3000 MG: 2; 1 INJECTION, POWDER, FOR SOLUTION INTRAMUSCULAR; INTRAVENOUS at 06:24

## 2025-07-04 RX ADMIN — MIRTAZAPINE 7.5 MG: 15 TABLET, FILM COATED ORAL at 22:55

## 2025-07-04 RX ADMIN — GUAIFENESIN 400 MG: 100 SOLUTION ORAL at 09:59

## 2025-07-04 RX ADMIN — Medication 2 SPRAY: at 10:02

## 2025-07-04 ASSESSMENT — PAIN SCALES - WONG BAKER: WONGBAKER_NUMERICALRESPONSE: HURTS A LITTLE BIT

## 2025-07-04 NOTE — PROGRESS NOTES
Patient started getting very agitated, shouting and crying around 1630. PRN Risperdal given. Bolus tube feeding completed at 1745. Will wait awhile after tube feeding and give patient time to calm down before attempting to disimpact patient per order entered by Dr. Thomas

## 2025-07-04 NOTE — PROGRESS NOTES
Hospitalist Progress Note      PCP: Bart Arora MD    Date of Admission: 6/30/2025    Chief Complaint: Epistaxis    Hospital Course:  29 y.o. female with history of cardiac arrest from respiratory disorder, anoxic encephalopathy, seizure disorder, spastic quadriplegia, dysphagia s/p G tube, Hep C, chronic constipation was sent from [facility] with epistaxis.  Patient is nonverbal and cannot provide any history.  Found to have sepsis secondary to LLL aspiration PNA with evidence of complete opacification of L maxillary sinus.  Patient has been given IVF and IV Abx in ER.  Has been sedated by Ativan IV.  Started on IV Unasyn.  Seen by ENT.  Use Afrin PRN.  Seen by Surgery.  Noted to have fecal impaction.  Not given enema or GoLytely as ordered.  Repeat KUB with massive impaction on 7/2.  GoLytely given on 7/3.     Echo:    Left Ventricle: Normal left ventricular systolic function with a visually estimated EF of 60 - 65%. EF by 2D Simpsons Biplane is 64%. Left ventricle size is normal. Normal wall thickness. Normal wall motion. Normal diastolic function. Exaggerated respiratory motion.    Right Ventricle: Right ventricle size is normal. Normal systolic function.    Tricuspid Valve: Mild regurgitation. Est. RA Pressure is 3 mmHg. RVSP is 17 mmHg.    Pericardium: No pericardial effusion.    Image quality is adequate. Contrast used: Lumason.    KUB on 7/4:  FINDINGS:  Scattered air-filled loops of small bowel and colon similar to previous  examination and there appears to be a large amount of stool within the rectum  similar to prior examination.  No evidence of free air.  No acute osseous  abnormality.     IMPRESSION:  No significant change from prior examination    Manual disimpaction requested on 7/4.    Subjective: Patient given GoLytely yesterday and KUB still with impaction.  Patient is awake, remains nonverbal and cannot provide any ROS       Medications:  Reviewed    Infusion Medications     dextrose 5% in lactated ringers 100 mL/hr at 07/04/25 1437    sodium chloride      sodium chloride       Scheduled Medications    docusate  100 mg Per G Tube QAM    Baclofen  5 mg Per G Tube BID    benztropine  2 mg Per G Tube BID    famotidine  20 mg PEG Tube BID    ferrous Sulfate  300 mg Per G Tube TID    lacosamide  100 mg Per G Tube BID    acidophilus  1 packet Per G Tube BID    levETIRAcetam  500 mg PEG Tube BID    linaclotide  290 mcg Per G Tube QAM AC    mirtazapine  7.5 mg Per G Tube Nightly    polyethylene glycol  17 g Per G Tube BID    sertraline  200 mg Per G Tube Daily    valproic acid  500 mg Per G Tube BID    sodium chloride flush  5-40 mL IntraVENous 2 times per day    sodium chloride flush  5-40 mL IntraVENous 2 times per day    ampicillin-sulbactam  3,000 mg IntraVENous Q6H    guaiFENesin  400 mg Per G Tube Q12H    sodium chloride  2 spray Each Nostril 4x daily    mupirocin   Each Nostril BID     PRN Meds: midodrine, risperiDONE, sodium chloride flush, sodium chloride, sodium chloride flush, sodium chloride, potassium chloride **OR** potassium alternative oral replacement **OR** potassium chloride, magnesium sulfate, ondansetron **OR** ondansetron, polyethylene glycol, acetaminophen **OR** acetaminophen, fluticasone, sodium chloride      Intake/Output Summary (Last 24 hours) at 7/4/2025 1507  Last data filed at 7/4/2025 1437  Gross per 24 hour   Intake 5472.96 ml   Output --   Net 5472.96 ml       Physical Exam Performed:    BP 99/67   Pulse 78   Temp 97.9 °F (36.6 °C) (Axillary)   Resp 18   Ht 1.6 m (5' 3\")   Wt 51.7 kg (114 lb)   LMP  (LMP Unknown)   SpO2 95%   BMI 20.19 kg/m²     General appearance:  Quadriplegic, awake, playful  Eyes: Sclera clear, pupils equal  ENT: Dried blood noted in L nare  Cardiovascular: RRR.  No murmur, gallop, rub. No edema in lower extremities  Respiratory: Coarse L rhonchi.  No wheezing or rales  Gastrointestinal: Abdomen soft, G tube, non-tender, distended,

## 2025-07-04 NOTE — PLAN OF CARE
Problem: Skin/Tissue Integrity  Goal: Skin integrity remains intact  Description: 1.  Monitor for areas of redness and/or skin breakdown  2.  Assess vascular access sites hourly  3.  Every 4-6 hours minimum:  Change oxygen saturation probe site  4.  Every 4-6 hours:  If on nasal continuous positive airway pressure, respiratory therapy assess nares and determine need for appliance change or resting period  Outcome: Progressing     Problem: Pain  Goal: Verbalizes/displays adequate comfort level or baseline comfort level  Outcome: Progressing     Problem: Respiratory - Adult  Goal: Achieves optimal ventilation and oxygenation  Outcome: Progressing     Problem: Gastrointestinal - Adult  Goal: Minimal or absence of nausea and vomiting  Outcome: Progressing  Goal: Maintains or returns to baseline bowel function  Outcome: Progressing  Goal: Maintains adequate nutritional intake  Outcome: Progressing  Goal: Establish and maintain optimal ostomy function  Outcome: Progressing     Problem: Nutrition Deficit:  Goal: Optimize nutritional status  Outcome: Progressing     Problem: Safety - Adult  Goal: Free from fall injury  Outcome: Progressing

## 2025-07-05 ENCOUNTER — APPOINTMENT (OUTPATIENT)
Dept: GENERAL RADIOLOGY | Age: 30
DRG: 871 | End: 2025-07-05
Payer: MEDICARE

## 2025-07-05 LAB
ANION GAP SERPL CALCULATED.3IONS-SCNC: 10 MMOL/L (ref 3–16)
BASOPHILS # BLD: 0.1 K/UL (ref 0–0.2)
BASOPHILS NFR BLD: 0.7 %
BUN SERPL-MCNC: 3 MG/DL (ref 7–20)
CALCIUM SERPL-MCNC: 9.1 MG/DL (ref 8.3–10.6)
CHLORIDE SERPL-SCNC: 106 MMOL/L (ref 99–110)
CO2 SERPL-SCNC: 25 MMOL/L (ref 21–32)
CREAT SERPL-MCNC: 0.5 MG/DL (ref 0.6–1.1)
DEPRECATED RDW RBC AUTO: 13.4 % (ref 12.4–15.4)
EOSINOPHIL # BLD: 0.3 K/UL (ref 0–0.6)
EOSINOPHIL NFR BLD: 4.2 %
GFR SERPLBLD CREATININE-BSD FMLA CKD-EPI: >90 ML/MIN/{1.73_M2}
GLUCOSE SERPL-MCNC: 101 MG/DL (ref 70–99)
HCT VFR BLD AUTO: 31.8 % (ref 36–48)
HGB BLD-MCNC: 11.1 G/DL (ref 12–16)
LYMPHOCYTES # BLD: 2.2 K/UL (ref 1–5.1)
LYMPHOCYTES NFR BLD: 31.1 %
MCH RBC QN AUTO: 30.9 PG (ref 26–34)
MCHC RBC AUTO-ENTMCNC: 34.8 G/DL (ref 31–36)
MCV RBC AUTO: 88.7 FL (ref 80–100)
MONOCYTES # BLD: 0.8 K/UL (ref 0–1.3)
MONOCYTES NFR BLD: 11.5 %
NEUTROPHILS # BLD: 3.8 K/UL (ref 1.7–7.7)
NEUTROPHILS NFR BLD: 52.5 %
PLATELET # BLD AUTO: 162 K/UL (ref 135–450)
PMV BLD AUTO: 8.9 FL (ref 5–10.5)
POTASSIUM SERPL-SCNC: 4 MMOL/L (ref 3.5–5.1)
RBC # BLD AUTO: 3.58 M/UL (ref 4–5.2)
SODIUM SERPL-SCNC: 141 MMOL/L (ref 136–145)
WBC # BLD AUTO: 7.2 K/UL (ref 4–11)

## 2025-07-05 PROCEDURE — 6360000002 HC RX W HCPCS: Performed by: INTERNAL MEDICINE

## 2025-07-05 PROCEDURE — 2500000003 HC RX 250 WO HCPCS: Performed by: INTERNAL MEDICINE

## 2025-07-05 PROCEDURE — 85025 COMPLETE CBC W/AUTO DIFF WBC: CPT

## 2025-07-05 PROCEDURE — 6370000000 HC RX 637 (ALT 250 FOR IP): Performed by: INTERNAL MEDICINE

## 2025-07-05 PROCEDURE — 2580000003 HC RX 258: Performed by: INTERNAL MEDICINE

## 2025-07-05 PROCEDURE — 36415 COLL VENOUS BLD VENIPUNCTURE: CPT

## 2025-07-05 PROCEDURE — 2580000003 HC RX 258

## 2025-07-05 PROCEDURE — 80048 BASIC METABOLIC PNL TOTAL CA: CPT

## 2025-07-05 PROCEDURE — 1200000000 HC SEMI PRIVATE

## 2025-07-05 PROCEDURE — 74018 RADEX ABDOMEN 1 VIEW: CPT

## 2025-07-05 RX ADMIN — AMPICILLIN SODIUM AND SULBACTAM SODIUM 3000 MG: 2; 1 INJECTION, POWDER, FOR SOLUTION INTRAMUSCULAR; INTRAVENOUS at 13:59

## 2025-07-05 RX ADMIN — GUAIFENESIN 400 MG: 100 SOLUTION ORAL at 09:29

## 2025-07-05 RX ADMIN — MUPIROCIN: 20 OINTMENT TOPICAL at 09:31

## 2025-07-05 RX ADMIN — LEVETIRACETAM 500 MG: 100 SOLUTION ORAL at 22:18

## 2025-07-05 RX ADMIN — LEVETIRACETAM 500 MG: 100 SOLUTION ORAL at 09:37

## 2025-07-05 RX ADMIN — VALPROIC ACID 500 MG: 250 SOLUTION ORAL at 09:29

## 2025-07-05 RX ADMIN — Medication 2 SPRAY: at 16:51

## 2025-07-05 RX ADMIN — RISPERIDONE 0.25 MG: 0.5 TABLET, FILM COATED ORAL at 01:05

## 2025-07-05 RX ADMIN — BACLOFEN 5 MG: 10 TABLET ORAL at 09:30

## 2025-07-05 RX ADMIN — LACOSAMIDE 100 MG: 10 SOLUTION ORAL at 09:37

## 2025-07-05 RX ADMIN — LACOSAMIDE 100 MG: 10 SOLUTION ORAL at 22:19

## 2025-07-05 RX ADMIN — FAMOTIDINE 20 MG: 20 TABLET, FILM COATED ORAL at 09:30

## 2025-07-05 RX ADMIN — VALPROIC ACID 500 MG: 250 SOLUTION ORAL at 22:18

## 2025-07-05 RX ADMIN — LACOSAMIDE 100 MG: 10 SOLUTION ORAL at 01:01

## 2025-07-05 RX ADMIN — Medication: at 09:37

## 2025-07-05 RX ADMIN — LINACLOTIDE 290 MCG: 145 CAPSULE, GELATIN COATED ORAL at 07:05

## 2025-07-05 RX ADMIN — Medication 1 PACKET: at 22:17

## 2025-07-05 RX ADMIN — MUPIROCIN: 20 OINTMENT TOPICAL at 22:20

## 2025-07-05 RX ADMIN — SERTRALINE 200 MG: 50 TABLET, FILM COATED ORAL at 09:30

## 2025-07-05 RX ADMIN — Medication 2 SPRAY: at 09:31

## 2025-07-05 RX ADMIN — AMPICILLIN SODIUM AND SULBACTAM SODIUM 3000 MG: 2; 1 INJECTION, POWDER, FOR SOLUTION INTRAMUSCULAR; INTRAVENOUS at 23:05

## 2025-07-05 RX ADMIN — BENZTROPINE MESYLATE 2 MG: 1 TABLET ORAL at 22:18

## 2025-07-05 RX ADMIN — Medication 1 PACKET: at 01:02

## 2025-07-05 RX ADMIN — AMPICILLIN SODIUM AND SULBACTAM SODIUM 3000 MG: 2; 1 INJECTION, POWDER, FOR SOLUTION INTRAMUSCULAR; INTRAVENOUS at 06:28

## 2025-07-05 RX ADMIN — MIRTAZAPINE 7.5 MG: 15 TABLET, FILM COATED ORAL at 22:19

## 2025-07-05 RX ADMIN — SODIUM CHLORIDE, SODIUM LACTATE, POTASSIUM CHLORIDE, CALCIUM CHLORIDE AND DEXTROSE MONOHYDRATE: 5; 600; 310; 30; 20 INJECTION, SOLUTION INTRAVENOUS at 06:28

## 2025-07-05 RX ADMIN — AMPICILLIN SODIUM AND SULBACTAM SODIUM 3000 MG: 2; 1 INJECTION, POWDER, FOR SOLUTION INTRAMUSCULAR; INTRAVENOUS at 01:01

## 2025-07-05 RX ADMIN — BACLOFEN 5 MG: 10 TABLET ORAL at 22:19

## 2025-07-05 RX ADMIN — POLYETHYLENE GLYCOL 3350 17 G: 17 POWDER, FOR SOLUTION ORAL at 22:17

## 2025-07-05 RX ADMIN — BENZTROPINE MESYLATE 2 MG: 1 TABLET ORAL at 09:30

## 2025-07-05 RX ADMIN — MINERAL SUPPLEMENT IRON 300 MG / 5 ML STRENGTH LIQUID 100 PER BOX UNFLAVORED 300 MG: at 09:29

## 2025-07-05 RX ADMIN — Medication 2 SPRAY: at 22:20

## 2025-07-05 RX ADMIN — MINERAL SUPPLEMENT IRON 300 MG / 5 ML STRENGTH LIQUID 100 PER BOX UNFLAVORED 300 MG: at 22:18

## 2025-07-05 RX ADMIN — POLYETHYLENE GLYCOL 3350 17 G: 17 POWDER, FOR SOLUTION ORAL at 09:29

## 2025-07-05 RX ADMIN — FAMOTIDINE 20 MG: 20 TABLET, FILM COATED ORAL at 22:19

## 2025-07-05 RX ADMIN — GUAIFENESIN 400 MG: 100 SOLUTION ORAL at 22:18

## 2025-07-05 RX ADMIN — LORAZEPAM 1 MG: 2 INJECTION INTRAMUSCULAR; INTRAVENOUS at 17:53

## 2025-07-05 RX ADMIN — MINERAL SUPPLEMENT IRON 300 MG / 5 ML STRENGTH LIQUID 100 PER BOX UNFLAVORED 300 MG: at 14:01

## 2025-07-05 RX ADMIN — SODIUM CHLORIDE, PRESERVATIVE FREE 10 ML: 5 INJECTION INTRAVENOUS at 09:31

## 2025-07-05 RX ADMIN — Medication 2 SPRAY: at 14:02

## 2025-07-05 RX ADMIN — SODIUM CHLORIDE, PRESERVATIVE FREE 10 ML: 5 INJECTION INTRAVENOUS at 09:32

## 2025-07-05 RX ADMIN — DOCUSATE SODIUM LIQUID 100 MG: 100 LIQUID ORAL at 09:29

## 2025-07-05 ASSESSMENT — PAIN SCALES - WONG BAKER
WONGBAKER_NUMERICALRESPONSE: NO HURT
WONGBAKER_NUMERICALRESPONSE: NO HURT

## 2025-07-05 NOTE — PROGRESS NOTES
Hospitalist Progress Note      PCP: Bart Arora MD    Date of Admission: 6/30/2025    Chief Complaint: Epistaxis    Hospital Course:  29 y.o. female with history of cardiac arrest from respiratory disorder, anoxic encephalopathy, seizure disorder, spastic quadriplegia, dysphagia s/p G tube, Hep C, chronic constipation was sent from [facility] with epistaxis.  Patient is nonverbal and cannot provide any history.  Found to have sepsis secondary to LLL aspiration PNA with evidence of complete opacification of L maxillary sinus.  Patient has been given IVF and IV Abx in ER.  Has been sedated by Ativan IV.  Started on IV Unasyn.  Seen by ENT.  Use Afrin PRN.  Seen by Surgery.  Noted to have fecal impaction.  Not given enema or GoLytely as ordered.  Repeat KUB with massive impaction on 7/2.  GoLytely given on 7/3.     Echo:    Left Ventricle: Normal left ventricular systolic function with a visually estimated EF of 60 - 65%. EF by 2D Simpsons Biplane is 64%. Left ventricle size is normal. Normal wall thickness. Normal wall motion. Normal diastolic function. Exaggerated respiratory motion.    Right Ventricle: Right ventricle size is normal. Normal systolic function.    Tricuspid Valve: Mild regurgitation. Est. RA Pressure is 3 mmHg. RVSP is 17 mmHg.    Pericardium: No pericardial effusion.    Image quality is adequate. Contrast used: Lumason.    KUB on 7/4:  FINDINGS:  Scattered air-filled loops of small bowel and colon similar to previous  examination and there appears to be a large amount of stool within the rectum  similar to prior examination.  No evidence of free air.  No acute osseous  abnormality.     IMPRESSION:  No significant change from prior examination    Manual disimpaction requested on 7/4.    Subjective:   Nursing notes reviewed.  Patient is awake, remains nonverbal and cannot provide any ROS       Medications:  Reviewed    Infusion Medications    dextrose 5% in lactated ringers 100 mL/hr  encephalopathy            S/P G tube  Continue Baclofen, Remeron and Risperdal  Epistaxis  Continue to treat chronic maxillary sinusitis  She has head tilted to left shoulder in room  Continue IV Unasyn, will transition to Augmentin via PEG on DC  Flonase, Ocean spray added  Robitussin via PEG bid  ENT input appreciated    DVT Prophylaxis: SCD  Diet: Diet NPO  ADULT TUBE FEEDING; PEG; Standard with Fiber; Bolus; Other (specify); 375 mL at 6AM. 250 mL at 12PM. 250 mL at 4PM.; 0; Syringe Push; 30; Other (specify); before and after each bolus while receiving IV fluids. Increase to 60 mL before and after ea...  Code Status: Full Code  PT/OT Eval Status: Not needed    Dispo - [facility]    Discussed with nursing and CM.    Will repeat KUB now.    Adan Thomas MD

## 2025-07-05 NOTE — PROGRESS NOTES
Pt assessment complete vss pt care, oral care completed. Repositioned purwick. Diaper changed, full of urine. RN from pm states pt had 3 bm and when that RN attempted digital disimpaction she could not feel stool. Pt appears comfortable. Repositioned onto right side with pillow support and floated heels on pillow support. Camera in room. Bolus feed late due to not having tube feed on unit. 375 was given to gravity with H20 bolus per order. No further needs at this time.

## 2025-07-05 NOTE — PLAN OF CARE
Problem: Skin/Tissue Integrity  Goal: Skin integrity remains intact  Description: 1.  Monitor for areas of redness and/or skin breakdown  2.  Assess vascular access sites hourly  3.  Every 4-6 hours minimum:  Change oxygen saturation probe site  4.  Every 4-6 hours:  If on nasal continuous positive airway pressure, respiratory therapy assess nares and determine need for appliance change or resting period  Outcome: Progressing     Problem: Pain  Goal: Verbalizes/displays adequate comfort level or baseline comfort level  Outcome: Progressing     Problem: Respiratory - Adult  Goal: Achieves optimal ventilation and oxygenation  Outcome: Progressing     Problem: Gastrointestinal - Adult  Goal: Minimal or absence of nausea and vomiting  Outcome: Progressing  Goal: Maintains or returns to baseline bowel function  Outcome: Progressing  Goal: Maintains adequate nutritional intake  Outcome: Progressing  Goal: Establish and maintain optimal ostomy function  Outcome: Progressing     Problem: Infection - Adult  Goal: Absence of infection at discharge  Outcome: Progressing  Goal: Absence of infection during hospitalization  Outcome: Progressing     Problem: Nutrition Deficit:  Goal: Optimize nutritional status  Outcome: Progressing     Problem: Safety - Adult  Goal: Free from fall injury  Outcome: Progressing

## 2025-07-06 VITALS
WEIGHT: 105.82 LBS | TEMPERATURE: 97.7 F | RESPIRATION RATE: 18 BRPM | HEART RATE: 90 BPM | OXYGEN SATURATION: 94 % | BODY MASS INDEX: 18.75 KG/M2 | DIASTOLIC BLOOD PRESSURE: 78 MMHG | HEIGHT: 63 IN | SYSTOLIC BLOOD PRESSURE: 113 MMHG

## 2025-07-06 LAB
ANION GAP SERPL CALCULATED.3IONS-SCNC: 11 MMOL/L (ref 3–16)
BASOPHILS # BLD: 0 K/UL (ref 0–0.2)
BASOPHILS NFR BLD: 0.4 %
BUN SERPL-MCNC: 6 MG/DL (ref 7–20)
CALCIUM SERPL-MCNC: 9.6 MG/DL (ref 8.3–10.6)
CHLORIDE SERPL-SCNC: 106 MMOL/L (ref 99–110)
CO2 SERPL-SCNC: 24 MMOL/L (ref 21–32)
CREAT SERPL-MCNC: 0.6 MG/DL (ref 0.6–1.1)
DEPRECATED RDW RBC AUTO: 13.5 % (ref 12.4–15.4)
EOSINOPHIL # BLD: 0.4 K/UL (ref 0–0.6)
EOSINOPHIL NFR BLD: 4.4 %
GFR SERPLBLD CREATININE-BSD FMLA CKD-EPI: >90 ML/MIN/{1.73_M2}
GLUCOSE SERPL-MCNC: 144 MG/DL (ref 70–99)
HCT VFR BLD AUTO: 40.1 % (ref 36–48)
HGB BLD-MCNC: 13.3 G/DL (ref 12–16)
LYMPHOCYTES # BLD: 2.2 K/UL (ref 1–5.1)
LYMPHOCYTES NFR BLD: 25.7 %
MCH RBC QN AUTO: 29.6 PG (ref 26–34)
MCHC RBC AUTO-ENTMCNC: 33.1 G/DL (ref 31–36)
MCV RBC AUTO: 89.4 FL (ref 80–100)
MONOCYTES # BLD: 0.5 K/UL (ref 0–1.3)
MONOCYTES NFR BLD: 5.8 %
NEUTROPHILS # BLD: 5.4 K/UL (ref 1.7–7.7)
NEUTROPHILS NFR BLD: 63.7 %
PLATELET # BLD AUTO: 202 K/UL (ref 135–450)
PMV BLD AUTO: 9.1 FL (ref 5–10.5)
POTASSIUM SERPL-SCNC: 4 MMOL/L (ref 3.5–5.1)
RBC # BLD AUTO: 4.49 M/UL (ref 4–5.2)
SODIUM SERPL-SCNC: 141 MMOL/L (ref 136–145)
WBC # BLD AUTO: 8.5 K/UL (ref 4–11)

## 2025-07-06 PROCEDURE — 6370000000 HC RX 637 (ALT 250 FOR IP): Performed by: STUDENT IN AN ORGANIZED HEALTH CARE EDUCATION/TRAINING PROGRAM

## 2025-07-06 PROCEDURE — 36415 COLL VENOUS BLD VENIPUNCTURE: CPT

## 2025-07-06 PROCEDURE — 2580000003 HC RX 258: Performed by: INTERNAL MEDICINE

## 2025-07-06 PROCEDURE — 2580000003 HC RX 258

## 2025-07-06 PROCEDURE — 85025 COMPLETE CBC W/AUTO DIFF WBC: CPT

## 2025-07-06 PROCEDURE — 2500000003 HC RX 250 WO HCPCS: Performed by: INTERNAL MEDICINE

## 2025-07-06 PROCEDURE — 80048 BASIC METABOLIC PNL TOTAL CA: CPT

## 2025-07-06 PROCEDURE — 6360000002 HC RX W HCPCS: Performed by: INTERNAL MEDICINE

## 2025-07-06 PROCEDURE — 6370000000 HC RX 637 (ALT 250 FOR IP): Performed by: INTERNAL MEDICINE

## 2025-07-06 RX ORDER — SENNOSIDES 8.6 MG/1
2 TABLET ORAL 2 TIMES DAILY
Qty: 120 TABLET | Refills: 0 | Status: SHIPPED | OUTPATIENT
Start: 2025-07-06

## 2025-07-06 RX ORDER — AMOXICILLIN AND CLAVULANATE POTASSIUM 250; 62.5 MG/5ML; MG/5ML
500 POWDER, FOR SUSPENSION ORAL 2 TIMES DAILY
Qty: 100 ML | Refills: 0 | Status: SHIPPED | OUTPATIENT
Start: 2025-07-06 | End: 2025-07-11

## 2025-07-06 RX ADMIN — LACOSAMIDE 100 MG: 10 SOLUTION ORAL at 10:37

## 2025-07-06 RX ADMIN — SODIUM CHLORIDE, PRESERVATIVE FREE 10 ML: 5 INJECTION INTRAVENOUS at 10:37

## 2025-07-06 RX ADMIN — AMPICILLIN SODIUM AND SULBACTAM SODIUM 3000 MG: 2; 1 INJECTION, POWDER, FOR SOLUTION INTRAMUSCULAR; INTRAVENOUS at 04:37

## 2025-07-06 RX ADMIN — GUAIFENESIN 400 MG: 100 SOLUTION ORAL at 10:36

## 2025-07-06 RX ADMIN — LEVETIRACETAM 500 MG: 100 SOLUTION ORAL at 10:36

## 2025-07-06 RX ADMIN — SERTRALINE 200 MG: 50 TABLET, FILM COATED ORAL at 10:35

## 2025-07-06 RX ADMIN — Medication 2 SPRAY: at 10:38

## 2025-07-06 RX ADMIN — Medication 2 SPRAY: at 12:49

## 2025-07-06 RX ADMIN — DOCUSATE SODIUM LIQUID 100 MG: 100 LIQUID ORAL at 10:36

## 2025-07-06 RX ADMIN — BACLOFEN 5 MG: 10 TABLET ORAL at 10:36

## 2025-07-06 RX ADMIN — MUPIROCIN: 20 OINTMENT TOPICAL at 10:39

## 2025-07-06 RX ADMIN — MINERAL SUPPLEMENT IRON 300 MG / 5 ML STRENGTH LIQUID 100 PER BOX UNFLAVORED 300 MG: at 10:36

## 2025-07-06 RX ADMIN — VALPROIC ACID 500 MG: 250 SOLUTION ORAL at 10:36

## 2025-07-06 RX ADMIN — MIDODRINE HYDROCHLORIDE 10 MG: 5 TABLET ORAL at 04:49

## 2025-07-06 RX ADMIN — LINACLOTIDE 290 MCG: 145 CAPSULE, GELATIN COATED ORAL at 05:14

## 2025-07-06 RX ADMIN — FAMOTIDINE 20 MG: 20 TABLET, FILM COATED ORAL at 10:35

## 2025-07-06 RX ADMIN — AMPICILLIN SODIUM AND SULBACTAM SODIUM 3000 MG: 2; 1 INJECTION, POWDER, FOR SOLUTION INTRAMUSCULAR; INTRAVENOUS at 10:43

## 2025-07-06 RX ADMIN — SODIUM CHLORIDE, SODIUM LACTATE, POTASSIUM CHLORIDE, CALCIUM CHLORIDE AND DEXTROSE MONOHYDRATE: 5; 600; 310; 30; 20 INJECTION, SOLUTION INTRAVENOUS at 10:41

## 2025-07-06 RX ADMIN — Medication 1 PACKET: at 10:35

## 2025-07-06 RX ADMIN — BENZTROPINE MESYLATE 2 MG: 1 TABLET ORAL at 10:35

## 2025-07-06 RX ADMIN — SODIUM CHLORIDE, PRESERVATIVE FREE 10 ML: 5 INJECTION INTRAVENOUS at 10:35

## 2025-07-06 RX ADMIN — MINERAL SUPPLEMENT IRON 300 MG / 5 ML STRENGTH LIQUID 100 PER BOX UNFLAVORED 300 MG: at 12:49

## 2025-07-06 RX ADMIN — POLYETHYLENE GLYCOL 3350 17 G: 17 POWDER, FOR SOLUTION ORAL at 10:35

## 2025-07-06 ASSESSMENT — PAIN SCALES - WONG BAKER
WONGBAKER_NUMERICALRESPONSE: NO HURT

## 2025-07-06 NOTE — PROGRESS NOTES
Pt incontinent of urine.  Brief changed.  Pericare complete.  New gown applied.  Pt supplies pack for discharge.  Dinner tube feedings administered to pt.  Pt tolerated well.  No signs of distress noted. Bed alarm set.  Call light on bed with pt.

## 2025-07-06 NOTE — PROGRESS NOTES
Pt was crying tonight because she was hungry. I called [facility] and spoke to the charge nurse and she verified that she is on a pureed diet w/ thin liquids. Notified the night PA to see if we can adjust her diet order.

## 2025-07-06 NOTE — PROGRESS NOTES
This RN attempted to perform digital disimpaction to remove fecal load in pts rectum but the rectum was free of stool.

## 2025-07-06 NOTE — DISCHARGE SUMMARY
Hospital Medicine Discharge Summary    Patient: Chepe Clay     Gender: female  : 1995   Age: 29 y.o.  MRN: [id number]    Admitting Physician: Adan Thomas MD  Discharge Physician: Adan Thomas MD     Code Status: Full Code     Admit Date: 2025   Discharge Date:   25    Disposition:  [facility]    Discharge Diagnoses:    Active Hospital Problems    Diagnosis Date Noted    Pneumonia of left lower lobe due to infectious organism [J18.9] 2025    Single subsegmental pulmonary embolism without acute cor pulmonale (HCC) [I26.93] 2025    Sepsis (HCC) [A41.9] 2025    Seizure disorder (HCC) [G40.909] 2025    Elevated troponin [R79.89] 2025    Fecal impaction (HCC) [K56.41] 2025    Functional quadriplegia (HCC) [R53.2] 2025    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  PCP needs to ensure AGGRESSIVE bowel regimen    Condition at Discharge:  Stable    Hospital Course:   29 y.o. female with history of cardiac arrest from respiratory disorder, anoxic encephalopathy, seizure disorder, spastic quadriplegia, dysphagia s/p G tube, Hep C, chronic constipation was sent from [facility] with epistaxis.  Patient is nonverbal and cannot provide any history.  Found to have sepsis secondary to LLL aspiration PNA with evidence of complete opacification of L maxillary sinus.  Patient has been given IVF and IV Abx in ER.  Has been sedated by Ativan IV.  Started on IV Unasyn.  Seen by ENT.  Use Afrin PRN.  Seen by Surgery.  Noted to have fecal impaction.  Not given enema or GoLytely as ordered.  Repeat KUB with massive impaction on .  GoLytely given on 7/3.      Echo:    Left Ventricle: Normal left ventricular systolic function with a visually

## 2025-07-06 NOTE — CARE COORDINATION
07/06/25 1304   IMM Letter   IMM Letter given to Patient/Family/Significant other/Guardian/POA/by: IMM Letter- CM LVM for [facility] Stephanie Short   IMM Letter date given: 07/06/25   IMM Letter time given: 1304         Electronically signed by ERUM Huggins on 7/6/2025 at 1:05 PM

## 2025-07-06 NOTE — CARE COORDINATION
Case Management -  Discharge Note      Patient Name: Chepe Clay                   YOB: 1995  Room: [facility]5582/5582-01            Readmission Risk (Low < 19, Mod (19-27), High > 27): Readmission Risk Score: 21.3    Current PCP: Bart Arora MD      (IMM) Important Message from Medicare:    Has pt received appropriate compliance notices before being discharged if required: yes  Compliance doc:  [x] 2nd IMM; [] Code 44 [] Michael  Date Given: 07/06/2025 LVM for [facility] Given By: Nadira    Patient noted to have a discharge order.  Pt has been medically cleared to return to LTC (Long Term Care)    Patient [place]     [address] [place] [id number]  Phone [phone]  Fax [phone]         Pre-cert Required/obtained: N/A  Transportation scheduled for 530pm  Transportation provided by [facility]  ([phone])    AVS faxed and agency notified: Yes and spoke with RN at [facility]  Family Notified: CM called LG- Stephanie Covarrubias and LVM. CM did not leave Patient's name as VM was not secure.    Nurse to call report to facility         Financial    Payor: MEDICARE / Plan: MEDICARE PART A AND B / Product Type: *No Product type* /     Pharmacy:  Potential assistance Purchasing Medications: No  Meds-to-Beds request: No      [facility] - [place], [place] - [address] [phone] - F [phone]  [address] [address]  Phone: [phone] Fax: [phone]    [facility] - [place], [place] - [address] [phone] - F [phone]  [address] [address]  Phone: [phone] Fax: [phone]      Notes:    Additional Case Management Notes: All CM needs met.         Electronically signed by ERUM Huggins on 7/6/2025 at 1:09 PM

## 2025-07-06 NOTE — PROGRESS NOTES
Paged by RN regarding changing diet order for patient. Patient currently is NPO with bolus tube feeds, however when RN spoke with [facility] where patient resides, they stated patient at baseline is on an oral puréed and thin liquid diet.    Patient chart reviewed, most recent KUB performed 1819 continues to show findings that are consistent with ileus along with significant stool.  Additionally, patient's sepsis thought to be secondary to aspiration pneumonia, unclear how she would tolerate oral intake as her medications are per G-tube.    Would recommend considering a swallow eval during the day, with potential diet advancement if possible, as RN states patient is crying due to hunger and would like her oral diet advanced back to baseline if possible.    Elena Gilmore PA-C  12:02 AM  07/06/25

## 2025-07-06 NOTE — PROGRESS NOTES
Head to toe assessment complete.  Pt weaned to RA.  Remained at 94% on RA for > 10 minutes.  Bolus tube feed administered with free water flush.  Pt tolerated well.  Pt repositioned in bed.  Brief dry.

## (undated) DEVICE — THE ROTH NET RETRIEVER SELECT IS USED TO RETRIEVE EXCISED POLYPS, TISSUE SAMPLES, FOREIGN BODIES AND CALCULI DURING FLEXIBLE AND RIGID ENDOSCOPY PROCEDURES.: Brand: ROTH NET

## (undated) DEVICE — ENDOSCOPY KIT: Brand: MEDLINE INDUSTRIES, INC.

## (undated) DEVICE — BITE BLK 60FR GRN ENDOSCP AD W STRP SLD DISPOSABLE

## (undated) DEVICE — THE DISPOSABLE RAPTOR GRASPING DEVICE IS USED TO GRASP TISSUE AND/OR RETRIEVE FOREIGN BODIES, EXCISED TISSUE AND STENTS DURING ENDOSCOPIC PROCEDURES.: Brand: RAPTOR

## (undated) DEVICE — TUBE GASTROSTMY 20FR MED GRD SIL FEED GAM RECESS DST TIP 010020] AVANOS MEDICAL]